# Patient Record
Sex: MALE | Race: WHITE | Employment: OTHER | URBAN - METROPOLITAN AREA
[De-identification: names, ages, dates, MRNs, and addresses within clinical notes are randomized per-mention and may not be internally consistent; named-entity substitution may affect disease eponyms.]

---

## 2018-03-29 ENCOUNTER — HOSPITAL ENCOUNTER (EMERGENCY)
Age: 63
Discharge: HOME | End: 2018-03-29
Attending: FAMILY MEDICINE

## 2018-03-29 VITALS
DIASTOLIC BLOOD PRESSURE: 82 MMHG | SYSTOLIC BLOOD PRESSURE: 135 MMHG | OXYGEN SATURATION: 96 % | HEART RATE: 88 BPM | BODY MASS INDEX: 29.47 KG/M2 | RESPIRATION RATE: 18 BRPM | WEIGHT: 237 LBS | TEMPERATURE: 97.6 F | HEIGHT: 75 IN

## 2018-03-29 DIAGNOSIS — E11.9 NEW ONSET TYPE 2 DIABETES MELLITUS (HCC): Primary | ICD-10-CM

## 2018-03-29 LAB
ALBUMIN SERPL-MCNC: 4.2 G/DL (ref 3.5–5.2)
ALP BLD-CCNC: 83 U/L (ref 40–129)
ALT SERPL-CCNC: 30 U/L (ref 0–40)
ANION GAP SERPL CALCULATED.3IONS-SCNC: 17 MMOL/L (ref 7–16)
AST SERPL-CCNC: 19 U/L (ref 0–39)
BACTERIA: ABNORMAL /HPF
BASOPHILS ABSOLUTE: 0.03 E9/L (ref 0–0.2)
BASOPHILS RELATIVE PERCENT: 0.3 % (ref 0–2)
BETA-HYDROXYBUTYRATE: 1.81 MMOL/L (ref 0.02–0.27)
BILIRUB SERPL-MCNC: 0.7 MG/DL (ref 0–1.2)
BILIRUBIN URINE: ABNORMAL
BLOOD, URINE: ABNORMAL
BUN BLDV-MCNC: 15 MG/DL (ref 8–23)
CALCIUM SERPL-MCNC: 10 MG/DL (ref 8.6–10.2)
CHLORIDE BLD-SCNC: 93 MMOL/L (ref 98–107)
CLARITY: ABNORMAL
CO2: 24 MMOL/L (ref 22–29)
COLOR: ABNORMAL
CREAT SERPL-MCNC: 1 MG/DL (ref 0.7–1.2)
EOSINOPHILS ABSOLUTE: 0.11 E9/L (ref 0.05–0.5)
EOSINOPHILS RELATIVE PERCENT: 1.1 % (ref 0–6)
EPITHELIAL CELLS, UA: ABNORMAL /HPF
GFR AFRICAN AMERICAN: >60
GFR NON-AFRICAN AMERICAN: >60 ML/MIN/1.73
GLUCOSE BLD-MCNC: 319 MG/DL (ref 74–109)
GLUCOSE URINE: >=1000 MG/DL
HCT VFR BLD CALC: 47.3 % (ref 37–54)
HEMOGLOBIN: 16.4 G/DL (ref 12.5–16.5)
IMMATURE GRANULOCYTES #: 0.05 E9/L
IMMATURE GRANULOCYTES %: 0.5 % (ref 0–5)
KETONES, URINE: 40 MG/DL
LEUKOCYTE ESTERASE, URINE: NEGATIVE
LYMPHOCYTES ABSOLUTE: 2.44 E9/L (ref 1.5–4)
LYMPHOCYTES RELATIVE PERCENT: 24.4 % (ref 20–42)
MCH RBC QN AUTO: 30.3 PG (ref 26–35)
MCHC RBC AUTO-ENTMCNC: 34.7 % (ref 32–34.5)
MCV RBC AUTO: 87.3 FL (ref 80–99.9)
METER GLUCOSE: 248 MG/DL (ref 70–110)
METER GLUCOSE: 341 MG/DL (ref 70–110)
MONOCYTES ABSOLUTE: 0.73 E9/L (ref 0.1–0.95)
MONOCYTES RELATIVE PERCENT: 7.3 % (ref 2–12)
NEUTROPHILS ABSOLUTE: 6.64 E9/L (ref 1.8–7.3)
NEUTROPHILS RELATIVE PERCENT: 66.4 % (ref 43–80)
NITRITE, URINE: NEGATIVE
PDW BLD-RTO: 13.1 FL (ref 11.5–15)
PH UA: 5 (ref 5–9)
PH VENOUS: 7.38 (ref 7.3–7.42)
PLATELET # BLD: 138 E9/L (ref 130–450)
PMV BLD AUTO: 12.4 FL (ref 7–12)
POTASSIUM SERPL-SCNC: 4.5 MMOL/L (ref 3.5–5)
PROTEIN UA: NEGATIVE MG/DL
RBC # BLD: 5.42 E12/L (ref 3.8–5.8)
RBC UA: ABNORMAL /HPF (ref 0–2)
SODIUM BLD-SCNC: 134 MMOL/L (ref 132–146)
SPECIFIC GRAVITY UA: >=1.03 (ref 1–1.03)
TOTAL PROTEIN: 7.3 G/DL (ref 6.4–8.3)
UROBILINOGEN, URINE: 0.2 E.U./DL
WBC # BLD: 10 E9/L (ref 4.5–11.5)
WBC UA: ABNORMAL /HPF (ref 0–5)

## 2018-03-29 PROCEDURE — 99284 EMERGENCY DEPT VISIT MOD MDM: CPT

## 2018-03-29 PROCEDURE — 36415 COLL VENOUS BLD VENIPUNCTURE: CPT

## 2018-03-29 PROCEDURE — 81001 URINALYSIS AUTO W/SCOPE: CPT

## 2018-03-29 PROCEDURE — 82962 GLUCOSE BLOOD TEST: CPT

## 2018-03-29 PROCEDURE — 83036 HEMOGLOBIN GLYCOSYLATED A1C: CPT

## 2018-03-29 PROCEDURE — 80053 COMPREHEN METABOLIC PANEL: CPT

## 2018-03-29 PROCEDURE — 82800 BLOOD PH: CPT

## 2018-03-29 PROCEDURE — 2580000003 HC RX 258: Performed by: FAMILY MEDICINE

## 2018-03-29 PROCEDURE — 82010 KETONE BODYS QUAN: CPT

## 2018-03-29 PROCEDURE — 85025 COMPLETE CBC W/AUTO DIFF WBC: CPT

## 2018-03-29 RX ORDER — 0.9 % SODIUM CHLORIDE 0.9 %
1000 INTRAVENOUS SOLUTION INTRAVENOUS ONCE
Status: COMPLETED | OUTPATIENT
Start: 2018-03-29 | End: 2018-03-29

## 2018-03-29 RX ADMIN — SODIUM CHLORIDE 1000 ML: 9 INJECTION, SOLUTION INTRAVENOUS at 18:09

## 2018-03-29 RX ADMIN — SODIUM CHLORIDE 1000 ML: 9 INJECTION, SOLUTION INTRAVENOUS at 17:02

## 2018-03-30 LAB — HBA1C MFR BLD: 10.2 % (ref 4.8–5.9)

## 2018-09-20 ENCOUNTER — APPOINTMENT (OUTPATIENT)
Dept: CT IMAGING | Age: 63
End: 2018-09-20
Payer: MEDICARE

## 2018-09-20 ENCOUNTER — HOSPITAL ENCOUNTER (EMERGENCY)
Age: 63
Discharge: HOME | End: 2018-09-20
Payer: MEDICARE

## 2018-09-20 VITALS
BODY MASS INDEX: 29.84 KG/M2 | TEMPERATURE: 98 F | WEIGHT: 240 LBS | HEIGHT: 75 IN | RESPIRATION RATE: 18 BRPM | OXYGEN SATURATION: 93 % | DIASTOLIC BLOOD PRESSURE: 68 MMHG | SYSTOLIC BLOOD PRESSURE: 130 MMHG | HEART RATE: 80 BPM

## 2018-09-20 DIAGNOSIS — S30.1XXA CONTUSION OF FLANK AND BACK, INITIAL ENCOUNTER: Primary | ICD-10-CM

## 2018-09-20 DIAGNOSIS — S32.009A: ICD-10-CM

## 2018-09-20 LAB
ANION GAP SERPL CALCULATED.3IONS-SCNC: 14 MMOL/L (ref 7–16)
BACTERIA: NORMAL /HPF
BASOPHILS ABSOLUTE: 0.03 E9/L (ref 0–0.2)
BASOPHILS RELATIVE PERCENT: 0.3 % (ref 0–2)
BILIRUBIN URINE: NEGATIVE
BLOOD, URINE: NEGATIVE
BUN BLDV-MCNC: 19 MG/DL (ref 8–23)
CALCIUM SERPL-MCNC: 9.7 MG/DL (ref 8.6–10.2)
CHLORIDE BLD-SCNC: 98 MMOL/L (ref 98–107)
CLARITY: CLEAR
CO2: 25 MMOL/L (ref 22–29)
COLOR: ABNORMAL
CREAT SERPL-MCNC: 1 MG/DL (ref 0.7–1.2)
EOSINOPHILS ABSOLUTE: 0.05 E9/L (ref 0.05–0.5)
EOSINOPHILS RELATIVE PERCENT: 0.4 % (ref 0–6)
EPITHELIAL CELLS, UA: NORMAL /HPF
GFR AFRICAN AMERICAN: >60
GFR NON-AFRICAN AMERICAN: >60 ML/MIN/1.73
GLUCOSE BLD-MCNC: 227 MG/DL (ref 74–109)
GLUCOSE URINE: >=1000 MG/DL
HCT VFR BLD CALC: 50.5 % (ref 37–54)
HEMOGLOBIN: 16.5 G/DL (ref 12.5–16.5)
IMMATURE GRANULOCYTES #: 0.08 E9/L
IMMATURE GRANULOCYTES %: 0.7 % (ref 0–5)
KETONES, URINE: NEGATIVE MG/DL
LEUKOCYTE ESTERASE, URINE: NEGATIVE
LYMPHOCYTES ABSOLUTE: 1.58 E9/L (ref 1.5–4)
LYMPHOCYTES RELATIVE PERCENT: 14 % (ref 20–42)
MCH RBC QN AUTO: 29.3 PG (ref 26–35)
MCHC RBC AUTO-ENTMCNC: 32.7 % (ref 32–34.5)
MCV RBC AUTO: 89.5 FL (ref 80–99.9)
MONOCYTES ABSOLUTE: 0.63 E9/L (ref 0.1–0.95)
MONOCYTES RELATIVE PERCENT: 5.6 % (ref 2–12)
NEUTROPHILS ABSOLUTE: 8.9 E9/L (ref 1.8–7.3)
NEUTROPHILS RELATIVE PERCENT: 79 % (ref 43–80)
NITRITE, URINE: NEGATIVE
PDW BLD-RTO: 14.2 FL (ref 11.5–15)
PH UA: 5.5 (ref 5–9)
PLATELET # BLD: 156 E9/L (ref 130–450)
PMV BLD AUTO: 11.4 FL (ref 7–12)
POTASSIUM SERPL-SCNC: 5 MMOL/L (ref 3.5–5)
PROTEIN UA: ABNORMAL MG/DL
RBC # BLD: 5.64 E12/L (ref 3.8–5.8)
RBC UA: NORMAL /HPF (ref 0–2)
SODIUM BLD-SCNC: 137 MMOL/L (ref 132–146)
SPECIFIC GRAVITY UA: >=1.03 (ref 1–1.03)
UROBILINOGEN, URINE: 0.2 E.U./DL
WBC # BLD: 11.3 E9/L (ref 4.5–11.5)
WBC UA: NORMAL /HPF (ref 0–5)

## 2018-09-20 PROCEDURE — 85025 COMPLETE CBC W/AUTO DIFF WBC: CPT

## 2018-09-20 PROCEDURE — 80048 BASIC METABOLIC PNL TOTAL CA: CPT

## 2018-09-20 PROCEDURE — 74177 CT ABD & PELVIS W/CONTRAST: CPT

## 2018-09-20 PROCEDURE — 6370000000 HC RX 637 (ALT 250 FOR IP): Performed by: NURSE PRACTITIONER

## 2018-09-20 PROCEDURE — 99284 EMERGENCY DEPT VISIT MOD MDM: CPT

## 2018-09-20 PROCEDURE — 36415 COLL VENOUS BLD VENIPUNCTURE: CPT

## 2018-09-20 PROCEDURE — 81001 URINALYSIS AUTO W/SCOPE: CPT

## 2018-09-20 PROCEDURE — 6360000004 HC RX CONTRAST MEDICATION: Performed by: RADIOLOGY

## 2018-09-20 RX ORDER — HYDROCODONE BITARTRATE AND ACETAMINOPHEN 5; 325 MG/1; MG/1
1 TABLET ORAL ONCE
Status: COMPLETED | OUTPATIENT
Start: 2018-09-20 | End: 2018-09-20

## 2018-09-20 RX ORDER — HYDROCODONE BITARTRATE AND ACETAMINOPHEN 5; 325 MG/1; MG/1
1 TABLET ORAL EVERY 6 HOURS PRN
Qty: 8 TABLET | Refills: 0 | Status: SHIPPED | OUTPATIENT
Start: 2018-09-20 | End: 2018-09-22

## 2018-09-20 RX ADMIN — IOPAMIDOL 100 ML: 755 INJECTION, SOLUTION INTRAVENOUS at 14:29

## 2018-09-20 RX ADMIN — HYDROCODONE BITARTRATE AND ACETAMINOPHEN 1 TABLET: 5; 325 TABLET ORAL at 13:04

## 2018-09-20 ASSESSMENT — PAIN DESCRIPTION - ORIENTATION
ORIENTATION: RIGHT
ORIENTATION: RIGHT
ORIENTATION: RIGHT;LEFT

## 2018-09-20 ASSESSMENT — PAIN DESCRIPTION - LOCATION
LOCATION: BACK

## 2018-09-20 ASSESSMENT — PAIN SCALES - GENERAL
PAINLEVEL_OUTOF10: 10
PAINLEVEL_OUTOF10: 5
PAINLEVEL_OUTOF10: 6
PAINLEVEL_OUTOF10: 10
PAINLEVEL_OUTOF10: 6

## 2018-09-20 ASSESSMENT — PAIN DESCRIPTION - DESCRIPTORS
DESCRIPTORS: CONSTANT;SHARP
DESCRIPTORS: ACHING;CONSTANT
DESCRIPTORS: CONSTANT;SHARP

## 2018-09-20 ASSESSMENT — PAIN DESCRIPTION - FREQUENCY: FREQUENCY: CONTINUOUS

## 2018-09-20 ASSESSMENT — PAIN DESCRIPTION - PAIN TYPE
TYPE: ACUTE PAIN

## 2018-09-20 NOTE — ED NOTES
Home going instructions with verbalized understanding , scripts given x 111 [facility], RN  09/20/18 6523

## 2018-09-20 NOTE — ED NOTES
Patient states he was stepping down from truck , when his foot slipped and he fell landing on right lower back area , patient denies hitting head, c/o right lower back pain worse with any movement pain is constant . Patient has 6 inch discolored area across right lower back, not open .  Moving all extremities without difficulty     Bonnie Cali RN  09/20/18 4766

## 2018-09-21 ENCOUNTER — TELEPHONE (OUTPATIENT)
Dept: ADMINISTRATIVE | Age: 63
End: 2018-09-21

## 2018-09-22 NOTE — ED PROVIDER NOTES
Lucía Dewey CNP was seeing this patient and CT had not results before her shift ended. Upon results, I went in and evaluated the patient and he noted no numbness, tingling, weakness, bowel or bladder incontinence. He had tenderness localized over the R lower lumbar region. I discussed the spine fracture with him and his son as well as the normal kidney function, discussed limiting torso movement and to follow up with PCP and ortho specialist. They voiced understanding. Rx monitoring system was reviewed and short duration of norco Rx'd.      Noni Rizvi PA-C  09/20/18 8767
Glucose 227 (H) 74 - 109 mg/dL    BUN 19 8 - 23 mg/dL    CREATININE 1.0 0.7 - 1.2 mg/dL    GFR Non-African American >60 >=60 mL/min/1.73    GFR African American >60     Calcium 9.7 8.6 - 10.2 mg/dL   CBC Auto Differential   Result Value Ref Range    WBC 11.3 4.5 - 11.5 E9/L    RBC 5.64 3.80 - 5.80 E12/L    Hemoglobin 16.5 12.5 - 16.5 g/dL    Hematocrit 50.5 37.0 - 54.0 %    MCV 89.5 80.0 - 99.9 fL    MCH 29.3 26.0 - 35.0 pg    MCHC 32.7 32.0 - 34.5 %    RDW 14.2 11.5 - 15.0 fL    Platelets 651 638 - 544 E9/L    MPV 11.4 7.0 - 12.0 fL    Neutrophils % 79.0 43.0 - 80.0 %    Immature Granulocytes % 0.7 0.0 - 5.0 %    Lymphocytes % 14.0 (L) 20.0 - 42.0 %    Monocytes % 5.6 2.0 - 12.0 %    Eosinophils % 0.4 0.0 - 6.0 %    Basophils % 0.3 0.0 - 2.0 %    Neutrophils # 8.90 (H) 1.80 - 7.30 E9/L    Immature Granulocytes # 0.08 E9/L    Lymphocytes # 1.58 1.50 - 4.00 E9/L    Monocytes # 0.63 0.10 - 0.95 E9/L    Eosinophils # 0.05 0.05 - 0.50 E9/L    Basophils # 0.03 0.00 - 0.20 E9/L   Microscopic Urinalysis   Result Value Ref Range    WBC, UA NONE 0 - 5 /HPF    RBC, UA 0-1 0 - 2 /HPF    Epi Cells RARE /HPF    Bacteria, UA NONE /HPF       Imaging: All Radiology results interpreted by Radiologist unless otherwise noted. CT ABDOMEN PELVIS W IV CONTRAST Additional Contrast? None   Final Result      1. Nondisplaced fractures of the right transverse processes of L1 and   L2.   2. Punctate cholelithiasis. 3. Hepatic steatosis. ALERT:  THIS IS AN ABNORMAL REPORT                       ED Course / Medical Decision Making     Medications   HYDROcodone-acetaminophen (NORCO) 5-325 MG per tablet 1 tablet (1 tablet Oral Given 9/20/18 1304)   iopamidol (ISOVUE-370) 76 % injection 100 mL (100 mLs Intravenous Given 9/20/18 1429)     ED Course as of Sep 22 1730   Thu Sep 20, 2018   1445 Awake and alert. Symptoms and exam improved.  Discussed available results and that he will likely be discharged with a musculoskeletal injury and should

## 2020-08-31 ENCOUNTER — HOSPITAL ENCOUNTER (INPATIENT)
Age: 65
LOS: 4 days | Discharge: NURSING, REHAB OR LONG-TERM CARE | DRG: 065 | End: 2020-09-04
Attending: EMERGENCY MEDICINE | Admitting: INTERNAL MEDICINE
Payer: MEDICARE

## 2020-08-31 ENCOUNTER — APPOINTMENT (OUTPATIENT)
Dept: CT IMAGING | Age: 65
DRG: 065 | End: 2020-08-31
Payer: MEDICARE

## 2020-08-31 ENCOUNTER — APPOINTMENT (OUTPATIENT)
Dept: GENERAL RADIOLOGY | Age: 65
DRG: 065 | End: 2020-08-31
Payer: MEDICARE

## 2020-08-31 ENCOUNTER — APPOINTMENT (OUTPATIENT)
Dept: MRI IMAGING | Age: 65
DRG: 065 | End: 2020-08-31
Payer: MEDICARE

## 2020-08-31 PROBLEM — I63.9 ACUTE CEREBROVASCULAR ACCIDENT (CVA) (HCC): Status: ACTIVE | Noted: 2020-08-31

## 2020-08-31 PROBLEM — J44.9 COPD (CHRONIC OBSTRUCTIVE PULMONARY DISEASE) (HCC): Status: ACTIVE | Noted: 2020-08-31

## 2020-08-31 PROBLEM — E11.65 UNCONTROLLED TYPE 2 DIABETES MELLITUS WITH HYPERGLYCEMIA (HCC): Status: ACTIVE | Noted: 2020-08-31

## 2020-08-31 PROBLEM — I63.9 ACUTE CVA (CEREBROVASCULAR ACCIDENT) (HCC): Status: ACTIVE | Noted: 2020-08-31

## 2020-08-31 PROBLEM — F17.200 TOBACCO DEPENDENCE: Status: ACTIVE | Noted: 2020-08-31

## 2020-08-31 PROBLEM — R47.1 DYSARTHRIA: Status: ACTIVE | Noted: 2020-08-31

## 2020-08-31 PROBLEM — I63.512 ACUTE ISCHEMIC LEFT MCA STROKE (HCC): Status: ACTIVE | Noted: 2020-08-31

## 2020-08-31 PROBLEM — I66.02 MIDDLE CEREBRAL ARTERY STENOSIS, LEFT: Status: ACTIVE | Noted: 2020-08-31

## 2020-08-31 LAB
ACETAMINOPHEN LEVEL: <5 MCG/ML (ref 10–30)
ALBUMIN SERPL-MCNC: 4.1 G/DL (ref 3.5–5.2)
ALP BLD-CCNC: 72 U/L (ref 40–129)
ALT SERPL-CCNC: 11 U/L (ref 0–40)
ANION GAP SERPL CALCULATED.3IONS-SCNC: 11 MMOL/L (ref 7–16)
APTT: 35.3 SEC (ref 24.5–35.1)
AST SERPL-CCNC: 10 U/L (ref 0–39)
BASOPHILS ABSOLUTE: 0.02 E9/L (ref 0–0.2)
BASOPHILS RELATIVE PERCENT: 0.2 % (ref 0–2)
BILIRUB SERPL-MCNC: 0.7 MG/DL (ref 0–1.2)
BUN BLDV-MCNC: 17 MG/DL (ref 8–23)
CALCIUM SERPL-MCNC: 9.5 MG/DL (ref 8.6–10.2)
CHLORIDE BLD-SCNC: 102 MMOL/L (ref 98–107)
CO2: 26 MMOL/L (ref 22–29)
CREAT SERPL-MCNC: 0.9 MG/DL (ref 0.7–1.2)
EKG ATRIAL RATE: 84 BPM
EKG P AXIS: 83 DEGREES
EKG P-R INTERVAL: 140 MS
EKG Q-T INTERVAL: 366 MS
EKG QRS DURATION: 82 MS
EKG QTC CALCULATION (BAZETT): 432 MS
EKG R AXIS: 70 DEGREES
EKG T AXIS: 49 DEGREES
EKG VENTRICULAR RATE: 84 BPM
EOSINOPHILS ABSOLUTE: 0.05 E9/L (ref 0.05–0.5)
EOSINOPHILS RELATIVE PERCENT: 0.5 % (ref 0–6)
ETHANOL: <10 MG/DL (ref 0–0.08)
GFR AFRICAN AMERICAN: >60
GFR NON-AFRICAN AMERICAN: >60 ML/MIN/1.73
GLUCOSE BLD-MCNC: 201 MG/DL (ref 74–99)
HCT VFR BLD CALC: 49.5 % (ref 37–54)
HEMOGLOBIN: 16.8 G/DL (ref 12.5–16.5)
IMMATURE GRANULOCYTES #: 0.04 E9/L
IMMATURE GRANULOCYTES %: 0.4 % (ref 0–5)
INR BLD: 1
LACTIC ACID: 1.4 MMOL/L (ref 0.5–2.2)
LYMPHOCYTES ABSOLUTE: 1.73 E9/L (ref 1.5–4)
LYMPHOCYTES RELATIVE PERCENT: 16.7 % (ref 20–42)
MCH RBC QN AUTO: 30.4 PG (ref 26–35)
MCHC RBC AUTO-ENTMCNC: 33.9 % (ref 32–34.5)
MCV RBC AUTO: 89.5 FL (ref 80–99.9)
METER GLUCOSE: 146 MG/DL (ref 74–99)
METER GLUCOSE: 170 MG/DL (ref 74–99)
MONOCYTES ABSOLUTE: 0.65 E9/L (ref 0.1–0.95)
MONOCYTES RELATIVE PERCENT: 6.3 % (ref 2–12)
NEUTROPHILS ABSOLUTE: 7.89 E9/L (ref 1.8–7.3)
NEUTROPHILS RELATIVE PERCENT: 75.9 % (ref 43–80)
PDW BLD-RTO: 14.1 FL (ref 11.5–15)
PLATELET # BLD: 144 E9/L (ref 130–450)
PMV BLD AUTO: 10.8 FL (ref 7–12)
POTASSIUM SERPL-SCNC: 4.2 MMOL/L (ref 3.5–5)
PROTHROMBIN TIME: 10.7 SEC (ref 9.3–12.4)
RBC # BLD: 5.53 E12/L (ref 3.8–5.8)
SALICYLATE, SERUM: <0.3 MG/DL (ref 0–30)
SODIUM BLD-SCNC: 139 MMOL/L (ref 132–146)
TOTAL CK: 132 U/L (ref 20–200)
TOTAL PROTEIN: 6.9 G/DL (ref 6.4–8.3)
TRICYCLIC ANTIDEPRESSANTS SCREEN SERUM: NEGATIVE NG/ML
TROPONIN: <0.01 NG/ML (ref 0–0.03)
WBC # BLD: 10.4 E9/L (ref 4.5–11.5)

## 2020-08-31 PROCEDURE — 0042T CT BRAIN PERFUSION: CPT

## 2020-08-31 PROCEDURE — 99448 NTRPROF PH1/NTRNET/EHR 21-30: CPT | Performed by: PSYCHIATRY & NEUROLOGY

## 2020-08-31 PROCEDURE — 82962 GLUCOSE BLOOD TEST: CPT

## 2020-08-31 PROCEDURE — 6360000004 HC RX CONTRAST MEDICATION: Performed by: RADIOLOGY

## 2020-08-31 PROCEDURE — 70498 CT ANGIOGRAPHY NECK: CPT

## 2020-08-31 PROCEDURE — 6370000000 HC RX 637 (ALT 250 FOR IP): Performed by: INTERNAL MEDICINE

## 2020-08-31 PROCEDURE — 99285 EMERGENCY DEPT VISIT HI MDM: CPT

## 2020-08-31 PROCEDURE — 93005 ELECTROCARDIOGRAM TRACING: CPT | Performed by: EMERGENCY MEDICINE

## 2020-08-31 PROCEDURE — 70551 MRI BRAIN STEM W/O DYE: CPT

## 2020-08-31 PROCEDURE — 70450 CT HEAD/BRAIN W/O DYE: CPT

## 2020-08-31 PROCEDURE — 99284 EMERGENCY DEPT VISIT MOD MDM: CPT

## 2020-08-31 PROCEDURE — 6370000000 HC RX 637 (ALT 250 FOR IP): Performed by: EMERGENCY MEDICINE

## 2020-08-31 PROCEDURE — 2060000000 HC ICU INTERMEDIATE R&B

## 2020-08-31 PROCEDURE — 71045 X-RAY EXAM CHEST 1 VIEW: CPT

## 2020-08-31 PROCEDURE — 93010 ELECTROCARDIOGRAM REPORT: CPT | Performed by: INTERNAL MEDICINE

## 2020-08-31 PROCEDURE — 70496 CT ANGIOGRAPHY HEAD: CPT

## 2020-08-31 RX ORDER — DEXTROSE MONOHYDRATE 25 G/50ML
12.5 INJECTION, SOLUTION INTRAVENOUS PRN
Status: DISCONTINUED | OUTPATIENT
Start: 2020-08-31 | End: 2020-09-04 | Stop reason: HOSPADM

## 2020-08-31 RX ORDER — ONDANSETRON 2 MG/ML
4 INJECTION INTRAMUSCULAR; INTRAVENOUS EVERY 6 HOURS PRN
Status: DISCONTINUED | OUTPATIENT
Start: 2020-08-31 | End: 2020-09-04 | Stop reason: HOSPADM

## 2020-08-31 RX ORDER — ALBUTEROL SULFATE 2.5 MG/3ML
2.5 SOLUTION RESPIRATORY (INHALATION) 4 TIMES DAILY
Status: DISCONTINUED | OUTPATIENT
Start: 2020-08-31 | End: 2020-08-31

## 2020-08-31 RX ORDER — PANTOPRAZOLE SODIUM 40 MG/1
40 TABLET, DELAYED RELEASE ORAL
Status: DISCONTINUED | OUTPATIENT
Start: 2020-09-01 | End: 2020-09-04 | Stop reason: HOSPADM

## 2020-08-31 RX ORDER — ATORVASTATIN CALCIUM 40 MG/1
40 TABLET, FILM COATED ORAL NIGHTLY
Status: DISCONTINUED | OUTPATIENT
Start: 2020-08-31 | End: 2020-09-04 | Stop reason: HOSPADM

## 2020-08-31 RX ORDER — ATORVASTATIN CALCIUM 80 MG/1
80 TABLET, FILM COATED ORAL NIGHTLY
Status: DISCONTINUED | OUTPATIENT
Start: 2020-08-31 | End: 2020-08-31

## 2020-08-31 RX ORDER — IPRATROPIUM BROMIDE AND ALBUTEROL SULFATE 2.5; .5 MG/3ML; MG/3ML
1 SOLUTION RESPIRATORY (INHALATION) EVERY 4 HOURS PRN
Status: DISCONTINUED | OUTPATIENT
Start: 2020-08-31 | End: 2020-09-04 | Stop reason: HOSPADM

## 2020-08-31 RX ORDER — DOCUSATE SODIUM 100 MG/1
100 CAPSULE, LIQUID FILLED ORAL NIGHTLY
Status: DISCONTINUED | OUTPATIENT
Start: 2020-08-31 | End: 2020-09-04 | Stop reason: HOSPADM

## 2020-08-31 RX ORDER — CLOPIDOGREL BISULFATE 75 MG/1
300 TABLET ORAL ONCE
Status: COMPLETED | OUTPATIENT
Start: 2020-08-31 | End: 2020-08-31

## 2020-08-31 RX ORDER — ASPIRIN 81 MG/1
324 TABLET, CHEWABLE ORAL ONCE
Status: COMPLETED | OUTPATIENT
Start: 2020-08-31 | End: 2020-08-31

## 2020-08-31 RX ORDER — SODIUM CHLORIDE 0.9 % (FLUSH) 0.9 %
10 SYRINGE (ML) INJECTION
Status: ACTIVE | OUTPATIENT
Start: 2020-08-31 | End: 2020-08-31

## 2020-08-31 RX ORDER — DEXTROSE MONOHYDRATE 50 MG/ML
100 INJECTION, SOLUTION INTRAVENOUS PRN
Status: DISCONTINUED | OUTPATIENT
Start: 2020-08-31 | End: 2020-09-04 | Stop reason: HOSPADM

## 2020-08-31 RX ORDER — NICOTINE POLACRILEX 4 MG
15 LOZENGE BUCCAL PRN
Status: DISCONTINUED | OUTPATIENT
Start: 2020-08-31 | End: 2020-09-04 | Stop reason: HOSPADM

## 2020-08-31 RX ORDER — ASPIRIN 81 MG/1
81 TABLET ORAL DAILY
Status: DISCONTINUED | OUTPATIENT
Start: 2020-08-31 | End: 2020-09-04 | Stop reason: HOSPADM

## 2020-08-31 RX ORDER — ACETAMINOPHEN 325 MG/1
650 TABLET ORAL EVERY 4 HOURS PRN
Status: DISCONTINUED | OUTPATIENT
Start: 2020-08-31 | End: 2020-09-04 | Stop reason: HOSPADM

## 2020-08-31 RX ADMIN — ASPIRIN 324 MG: 81 TABLET, CHEWABLE ORAL at 14:12

## 2020-08-31 RX ADMIN — CLOPIDOGREL 300 MG: 75 TABLET, FILM COATED ORAL at 14:12

## 2020-08-31 RX ADMIN — ATORVASTATIN CALCIUM 40 MG: 40 TABLET, FILM COATED ORAL at 21:37

## 2020-08-31 RX ADMIN — ASPIRIN 81 MG: 81 TABLET, COATED ORAL at 21:37

## 2020-08-31 RX ADMIN — DOCUSATE SODIUM 100 MG: 100 CAPSULE, LIQUID FILLED ORAL at 21:37

## 2020-08-31 RX ADMIN — IOPAMIDOL 100 ML: 755 INJECTION, SOLUTION INTRAVENOUS at 14:59

## 2020-08-31 ASSESSMENT — PAIN SCALES - GENERAL
PAINLEVEL_OUTOF10: 0
PAINLEVEL_OUTOF10: 0

## 2020-08-31 NOTE — ED PROVIDER NOTES
Department of Emergency Medicine   ED  Provider Note  Admit Date/RoomTime: 8/31/2020 12:59 PM  ED Room: 19/19 8/31/20  2:42 PM EDT    Stroke Alert called: Peter Gonzalez Alert     HISTORY OF PRESENT ILLNESS:  (Nurses Notes Reviewed)    Chief Complaint:   Cerebrovascular Accident (found by son this morning, last known well 2 days ago)      Source of history provided by:  patient, EMS personnel and ED provider @ [facility]. History/Exam Limitations: due to condition. David Hay is a 77 y.o. old male presenting to the emergency department by Mobile ICU transfer, with unknown onset of expressive aphasia, which began unknown time, last seen well at 5 PM last night. Last known well time: 5pm yesterday. The episode occurred at home. Since recognized the symptoms have been persistent. He has no neurologic history. He has stroke risk factors of: none. There has been no history of recent trauma. Patient presents as a transfer from [address] [place] [address]. He was seen at their facility after last known well at 5 PM.  He was found to have expressive aphasia as well as facial droop. He was not found to be a TPA candidate, tele-stroke was consulted there. He was transferred here for possible eligibility of interventional.  Did receive aspirin and Plavix prehospital.    Code Status on file: No Order. NIH Stroke Scale at time of initial evaluation:   Last known well time: 5pm yesterday   NIH Stroke Scale at time of initial evaluation: 1440  1A: Level of Consciousness 0 - alert; keenly responsive   1B: Ask Month and Age 2 - answers neither question correctly   1C:  Tell Patient To Open and Close Eyes, then Hand  Squeeze 1   2: Test Horizontal Extraocular Movements 0 - normal   3: Test Visual Fields 0 - no visual loss   4: Test Facial Palsy 2   5A: Test Left Arm Motor Drift 0 - no drift, limb holds 90 (or 45) degrees for full 10 seconds   5B: Test Right Arm Motor Drift 0 - no drift, limb holds 90 (or 45) degrees for full 10 seconds   6A: Test Left Leg Motor Drift 0 - no drift; leg holds 30 degree position for full 5 seconds   6B: Test Right Leg Motor Drift 0 - no drift; leg holds 30 degree position for full 5 seconds   7: Test Limb Ataxia   (FNF/Heel-Shin) 0 - absent   8: Test Sensation 0 - normal; no sensory loss   9: Test Language/Aphasia 2 - severe aphasia; all communication is through fragmentary expression; great need for inference, questioning, and guessing by the listener. Range of information that can be exchanged is limited; listener carries burden of communication. Examiner cannot identify materials provided from patient response. 10: Test Dysarthria 1 - mild to moderate, patient slurs at least some words and at worst, can be understood with some difficulty   11: Test Extinction/Inattention 0 - no abnormality   Total NIH Stroke Score: 8     tPA Criteria*  Inclusion criteria:  - Ischemic stroke onset within 3 hours of drug administration  - Age 25 or older  - No hemorrhage or non-stroke cause of deficit on CT  - Measurable deficit on NIH Stroke Scale    Exclusion criteria: If the patient. ...  - has minor or improving symptoms  - had seizure at onset of stroke  - has had another stroke or serious head trauma within the last 3 months  - has had major surgery within the last 14 days  - has known history of intracranial hemorrhage  - has sustained systolic blood pressure >509 mmHg  - has sustained diastolic blood pressure >883 mmHg  - requires aggressive treatment is necessary to lower their blood pressure  - has symptoms suggestive of subarachnoid hemorrhage  - has had GI or urinary tract hemorrhage within the last 21 days  - has had an arterial puncture at a non-compressible site within the last 7 days  - received heparin within the last 48 hours and has an elevated PTT  - has a prothrombin time (PT) >15 seconds  - has a platelet count <419,869 uL  - serum blood glucose is <50 mg/dL or >400 mg/dL    Relative Contraindications:  - NIH Stroke score >22  - Patient's CT shows evidence of large MCA territory infarction (>1/3 the MCA territory)    *[facility] Policy Paper      Acute CVA Core Measures:     - t-PA Eligibility: IV t-PA was considered and not given due to violations in inclusion criteria including stroke onset was greater than 3 hours prior to presentation          Past Medical History:  has a past medical history of Diabetes mellitus ([facility] [address] Past Surgical History:  has a past surgical history that includes hernia repair and Tonsillectomy. Social History:  reports that he has been smoking cigarettes. He has been smoking about 1.00 pack per day. He has never used smokeless tobacco. He reports that he does not drink alcohol or use drugs. Prior Functional Status(Modified Oriska Scale):  0=No symptoms at all    Family History: family history is not on file. The patients home medications have been reviewed. Prior to Admission medications    Medication Sig Start Date End Date Taking? Authorizing Provider   albuterol sulfate HFA (PROVENTIL HFA) 108 (90 BASE) MCG/ACT inhaler Inhale 2 puffs into the lungs every 4 hours as needed for Wheezing 3/11/17 3/11/18  Romeo Yeh DO       Allergies: Patient has no known allergies. Review of Systems:   Pertinent positives and negatives are stated within HPI, all other systems reviewed and are negative.    ---------------------------------------------------PHYSICAL EXAM--------------------------------------    Constitutional/General: Alert and but unable to answer orientation questions  Head: Normocephalic and atraumatic  Eyes: PERRL, EOMI  Mouth: Oropharynx clear, handling secretions, no trismus. There is facial droop  Neck: Supple, full ROM, non tender to palpation in the midline, no stridor, no crepitus, no meningeal signs  Pulmonary: Lungs clear to auscultation bilaterally, . Not in respiratory distress  Cardiovascular:  Regular rate. Regular rhythm. . 2+ distal pulses  Chest: no chest wall tenderness  Abdomen: Soft. Non tender. Non distended. +BS. No rebound, guarding, or rigidity. No pulsatile masses appreciated. Musculoskeletal: Moves all extremities x 4. Warm and well perfused, no clubbing, cyanosis, or edema. Capillary refill <3 seconds  Skin: warm and dry. No rashes.    Neurologic: Please see night stroke scale, briefly there is left-sided facial droop, expressive aphasia with some dysarthria, NIH stroke scale of 8 at this time  Psych: calm Affect      -------------------------------------------------- RESULTS -------------------------------------------------  All laboratory and imaging studies have been reviewed by myself    LABS:  Results for orders placed or performed during the hospital encounter of 08/31/20   CBC auto differential   Result Value Ref Range    WBC 10.4 4.5 - 11.5 E9/L    RBC 5.53 3.80 - 5.80 E12/L    Hemoglobin 16.8 (H) 12.5 - 16.5 g/dL    Hematocrit 49.5 37.0 - 54.0 %    MCV 89.5 80.0 - 99.9 fL    MCH 30.4 26.0 - 35.0 pg    MCHC 33.9 32.0 - 34.5 %    RDW 14.1 11.5 - 15.0 fL    Platelets 189 796 - 458 E9/L    MPV 10.8 7.0 - 12.0 fL    Neutrophils % 75.9 43.0 - 80.0 %    Immature Granulocytes % 0.4 0.0 - 5.0 %    Lymphocytes % 16.7 (L) 20.0 - 42.0 %    Monocytes % 6.3 2.0 - 12.0 %    Eosinophils % 0.5 0.0 - 6.0 %    Basophils % 0.2 0.0 - 2.0 %    Neutrophils Absolute 7.89 (H) 1.80 - 7.30 E9/L    Immature Granulocytes # 0.04 E9/L    Lymphocytes Absolute 1.73 1.50 - 4.00 E9/L    Monocytes Absolute 0.65 0.10 - 0.95 E9/L    Eosinophils Absolute 0.05 0.05 - 0.50 E9/L    Basophils Absolute 0.02 0.00 - 0.20 E9/L   Comprehensive Metabolic Panel   Result Value Ref Range    Sodium 139 132 - 146 mmol/L    Potassium 4.2 3.5 - 5.0 mmol/L    Chloride 102 98 - 107 mmol/L    CO2 26 22 - 29 mmol/L    Anion Gap 11 7 - 16 mmol/L    Glucose 201 (H) 74 - 99 mg/dL    BUN 17 8 - 23 mg/dL    CREATININE 0.9 0.7 - 1.2 mg/dL    GFR Non- encounter and vital signs as below have been reviewed. /79   Pulse 78   Temp 97.5 °F (36.4 °C) (Temporal)   Resp 16   Wt 240 lb (108.9 kg)   SpO2 95%   BMI 30.00 kg/m²   Oxygen Saturation Interpretation: Normal    The patients available past medical records and past encounters were reviewed. ------------------------------ ED COURSE/MEDICAL DECISION MAKING----------------------  Medications   aspirin chewable tablet 324 mg (324 mg Oral Given 8/31/20 1412)   clopidogrel (PLAVIX) tablet 300 mg (300 mg Oral Given 8/31/20 1412)     Tele-neurology has already been consulted         Medical Decision Making:    Patient presents from [facility] after strokelike episode, last known well nearly 24 hours ago. He was accepted by medicine but sent to the ED for CTA CT brain perfusions. These were obtained, spoke with neuro intervention. He is not a candidate at this time. Spoke with family and updated them, patient will be admitted    Re-Evaluations:             Re-evaluation. Patients symptoms show no change    This patient's ED course included: a personal history and physicial examination, re-evaluation prior to disposition, IV medications, cardiac monitoring, continuous pulse oximetry and complex medical decision making and emergency management    This patient has remained hemodynamically stable during their ED course. Consultations: The case has been discussed with Dr. Connie Crawley, they patient is not a candidate for neuro intervention at this time. Dr. Radha Villanueva has previously been consulted and placed admitting orders             Counseling: The emergency provider has spoken with the patient and family member patient and son and discussed todays presentation, condition, results and treatment options, in addition to providing specific details for the plan of care and counseling regarding the diagnosis and prognosis.   Questions are answered at this time

## 2020-08-31 NOTE — VIRTUAL HEALTH
Consults  Patient Location:  [facility] Emergency Department    Provider Location ([facility]/State): [place], [place]    This virtual visit was conducted via interactive/real-time audio/video. [facility] AT [place] Stroke and Vascular Neurology Consult for  [facility] ED Stroke Alert through [address] @ 1:40pm  8/31/2020 1:52 PM  Pt Name: Veda Briones  MRN: [id number]  YOB: 1955  Date of evaluation: 8/31/2020  Primary Care Physician: Maxx Gonzalez MD  Reason for Evaluation: Stroke evaluation with Phone Consult, Discussion and Review of imaging    Veda Briones is a 77 y.o. male with DM, walked into ED with global aphasia since 5pm yesterday. Patient has no weakness. Stroke alert was called. CT head in the ED showed a left acute insular stroke. Allergies  has No Known Allergies. Medications  Prior to Admission medications    Medication Sig Start Date End Date Taking? Authorizing Provider   albuterol sulfate HFA (PROVENTIL HFA) 108 (90 BASE) MCG/ACT inhaler Inhale 2 puffs into the lungs every 4 hours as needed for Wheezing 3/11/17 3/11/18  Syed Palacios DO    Scheduled Meds:  Continuous Infusions:  PRN Meds:.  Past Medical History   has a past medical history of Diabetes mellitus ([facility] [address]   Social History  Social History     Socioeconomic History    Marital status:      Spouse name: Not on file    Number of children: Not on file    Years of education: Not on file    Highest education level: Not on file   Occupational History    Not on file   Social Needs    Financial resource strain: Not on file    Food insecurity     Worry: Not on file     Inability: Not on file   [employer] needs     Medical: Not on file     Non-medical: Not on file   Tobacco Use    Smoking status: Current Every Day Smoker     Packs/day: 1.00     Types: Cigarettes    Smokeless tobacco: Never Used   Substance and Sexual Activity    Alcohol use: No    Drug use: No    Sexual activity: Not on file   Lifestyle    Physical activity     Days per week: Not on file     Minutes per session: Not on file    Stress: Not on file   Relationships    Social connections     Talks on phone: Not on file     Gets together: Not on file     Attends Denominational service: Not on file     Active member of club or organization: Not on file     Attends meetings of clubs or organizations: Not on file     Relationship status: Not on file    Intimate partner violence     Fear of current or ex partner: Not on file     Emotionally abused: Not on file     Physically abused: Not on file     Forced sexual activity: Not on file   Other Topics Concern    Not on file   Social History Narrative    Not on file     Family History  History reviewed. No pertinent family history. OBJECTIVE  BP (!) 149/72   Pulse 82   Temp 99 °F (37.2 °C) (Temporal)   Resp 18   Wt 240 lb (108.9 kg)   SpO2 95%   BMI 30.00 kg/m²     NIH Stroke Scale  Interval: Baseline  Level of Consciousness (1a. ): Alert  LOC Questions (1b. ): Answers neither question correctly  LOC Commands (1c. ): Performs one task correctly  Best Gaze (2. ): Normal  Visual (3. ): No visual loss  Facial Palsy (4. ): (!) Partial paralysis  Motor Arm, Left (5a. ): No drift  Motor Arm, Right (5b. ): No drift  Motor Leg, Left (6a. ): No drift  Motor Leg, Right (6b. ): No drift  Limb Ataxia (7. ): Absent  Sensory (8. ): Normal  Best Language (9. ): Severe aphasia  Dysarthria (10. ): Normal  Extinction and Inattention (11): No abnormality  Total: 7  Pre-Morbid mRS: 0    Imaging:  Images were personally reviewed including:  CT brain without contrast: acute left insular stroke  CTA imaging: n/a    Assessment    77year old man with acute left insular stroke      Recommendations:  1. NIH 7  2. Recommend Inpatient Neurology Consult for further assessment and evaluation   3.  consider . [id number]  4. Not a tPA candidate due to out of window  5.  Not a thrombectomy candidate as his stroke is apparent on the CT head  6. Direct admit to [facility] for stroke workup and neurology consult  7. Load patient with aspirin 325mg x 1 dose, con't with 81mg daily lifelong  8. Load patient with plavix 300mg x 1 dose, con't with 75mg daily for 21 days  9. lipitor 80mg daily. 10. MRI brain w/o contreast, CTA head/neck when he is in [facility].             Discussed with ED Physician        This is a Phone Consult, I have not seen the patient face to face, there is no telemedicine service available at the consulting hospital time spend 21 mins    Carmen Valles MD   Stroke, Neurocritical Care And/or [address] [facility] Stroke [address]  Electronically signed 8/31/2020 at 1:52 PM

## 2020-08-31 NOTE — ED NOTES
Patient passed swallow screen. One chewable aspirin given to patient and instructed to chew. Patient not able to follow command and swallowed aspirin whole. Plavix and rest of aspirin held. Dr. Jorge Geronimo notified.       [facility] Valentino, RN  08/31/20 0771

## 2020-08-31 NOTE — CONSULTS
Neurointerventional Radiology    I was consulted by ED attending Dr. Martha Saldaña regarding endovascular intervention in this 69-year-old male who presents with aphasia. NIHSS 7. Onset was 1700 hrs on 8/30/2020, the day before presentation to the ED. Notes and imaging reviewed. CT shows evolving small infarct in left insula, frontal operculum, putamen, and external capsule. CTA shows severe atherosclerotic stenosis in the proximal cervical left ICA. No intracranial proximal MCA occlusion is identified. CTP shows delayed, decreased CBF and preserved CBV in left frontal and temporal lobes corresponding with the region of the ischemic insult on CT. The patient was loaded with clopidogrel 300 mg and aspirin 324 mg in ED at 1412 hrs. A/P:   Left MCA territory infarct. Not a candidate for intracranial endovascular intervention. Severe proximal left ICA stenosis (~75% by my estimate). Stat brain MRI ordered. I discussed the case with Dr. Martha Saldaña.      Electronically signed by Roxy Lopez MD on 8/31/2020 at 5:05 PM

## 2020-08-31 NOTE — ED NOTES
Patient given aspirin and plavix, per Dr. Diego orders.       [facility] Valentino, RN  08/31/20 3662

## 2020-08-31 NOTE — ED PROVIDER NOTES
HPI:  8/31/20, Time: 1:15 PM EDT      HPI per son, patient is unable to communicate, the only words he is able to verbalse are \"yes\" and \"ok\". Richard Lynn is a 77 y.o. male presenting to the ED for right facial droop, loss of speech and inability to follow commands. He was brought in by his son, who found him like this about an hour prior to arrival. So stated he last saw with his father 2 days ago, but a family member spoke with him on the phone around 8pm last night and he was normal. The patient apparently didn't show up for work this morning and there were messages on his phone looking for him. The complaint has been persistent, moderate in severity, and worsened by nothing. There is no report of drug or alcohol use. Patient takes no medications, and lives independently and works full-time. Patient is unable to participate in the HPI but son denies fever/chills, cough, congestion, chest pain, shortness of breath, edema, headache, visual disturbances, focal paresthesias, focal weakness, abdominal pain, nausea, vomiting, diarrhea, constipation, dysuria, hematuria, trauma, neck or back pain or other complaints. ROS:   Pertinent positives and negatives are stated within HPI, all other systems reviewed and are negative.      --------------------------------------------- PAST HISTORY ---------------------------------------------  Past Medical History:  has a past medical history of Diabetes mellitus ([facility] [address] Past Surgical History:  has a past surgical history that includes hernia repair and Tonsillectomy. Social History:  reports that he has been smoking cigarettes. He has been smoking about 1.00 pack per day. He has never used smokeless tobacco. He reports that he does not drink alcohol or use drugs. Family History: family history is not on file. The patients home medications have been reviewed.     Allergies: Patient has no known allergies. ---------------------------------------------------PHYSICAL EXAM--------------------------------------    Constitutional:  Well developed, well nourished, no acute distress, non-toxic appearance   Eyes:  PERRL, conjunctiva normal, EOMI  HENT:  Atraumatic, external ears normal, nose normal, oropharynx moist. Neck- normal range of motion, no nuchal rigidity   Respiratory:  No respiratory distress, normal breath sounds, no rales, no wheezing   Cardiovascular:  Normal rate, normal rhythm, no murmurs, no gallops, no rubs. Radial and DP pulses 2+ bilaterally. GI:  Soft, nondistended, normal bowel sounds, nontender, no rebound, no guarding   :  No costovertebral angle tenderness   Musculoskeletal:  No edema, no tenderness, no deformities. Back- no tenderness  Integument:  Well hydrated, no rash. Adequate perfusion. Lymphatic:  No cervical lymphadenopathy noted   Neurologic:  Alert & oriented x 3,  normal gait, no focal deficits noted. Aphasia (says only \"yes\" and \"ok\"). Follows intermittent commands. Mild right facial droop (spares forehead otherwise CN 2-12 WNL. Psychiatric:  Speech and behavior appropriate     .    -------------------------------------------------- RESULTS -------------------------------------------------  I have personally reviewed all laboratory and imaging results for this patient. Results are listed below.      LABS:  Results for orders placed or performed during the hospital encounter of 08/31/20   CBC auto differential   Result Value Ref Range    WBC 10.4 4.5 - 11.5 E9/L    RBC 5.53 3.80 - 5.80 E12/L    Hemoglobin 16.8 (H) 12.5 - 16.5 g/dL    Hematocrit 49.5 37.0 - 54.0 %    MCV 89.5 80.0 - 99.9 fL    MCH 30.4 26.0 - 35.0 pg    MCHC 33.9 32.0 - 34.5 %    RDW 14.1 11.5 - 15.0 fL    Platelets 920 108 - 152 E9/L    MPV 10.8 7.0 - 12.0 fL    Neutrophils % 75.9 43.0 - 80.0 %    Immature Granulocytes % 0.4 0.0 - 5.0 %    Lymphocytes % 16.7 (L) 20.0 - 42.0 %    Monocytes % 6.3 2.0 - 12.0 %    Eosinophils % 0.5 0.0 - 6.0 %    Basophils % 0.2 0.0 - 2.0 %    Neutrophils Absolute 7.89 (H) 1.80 - 7.30 E9/L    Immature Granulocytes # 0.04 E9/L    Lymphocytes Absolute 1.73 1.50 - 4.00 E9/L    Monocytes Absolute 0.65 0.10 - 0.95 E9/L    Eosinophils Absolute 0.05 0.05 - 0.50 E9/L    Basophils Absolute 0.02 0.00 - 0.20 E9/L   Comprehensive Metabolic Panel   Result Value Ref Range    Sodium 139 132 - 146 mmol/L    Potassium 4.2 3.5 - 5.0 mmol/L    Chloride 102 98 - 107 mmol/L    CO2 26 22 - 29 mmol/L    Anion Gap 11 7 - 16 mmol/L    Glucose 201 (H) 74 - 99 mg/dL    BUN 17 8 - 23 mg/dL    CREATININE 0.9 0.7 - 1.2 mg/dL    GFR Non-African American >60 >=60 mL/min/1.73    GFR African American >60     Calcium 9.5 8.6 - 10.2 mg/dL    Total Protein 6.9 6.4 - 8.3 g/dL    Alb 4.1 3.5 - 5.2 g/dL    Total Bilirubin 0.7 0.0 - 1.2 mg/dL    Alkaline Phosphatase 72 40 - 129 U/L    ALT 11 0 - 40 U/L    AST 10 0 - 39 U/L   Troponin   Result Value Ref Range    Troponin <0.01 0.00 - 0.03 ng/mL   APTT   Result Value Ref Range    aPTT 35.3 (H) 24.5 - 35.1 sec   Protime-INR   Result Value Ref Range    Protime 10.7 9.3 - 12.4 sec    INR 1.0    Serum Drug Screen   Result Value Ref Range    Ethanol Lvl <10 mg/dL    Acetaminophen Level <5.0 (L) 10.0 - 29.7 mcg/mL    Salicylate, Serum <9.7 0.0 - 30.0 mg/dL   Lactic Acid, Plasma   Result Value Ref Range    Lactic Acid 1.4 0.5 - 2.2 mmol/L   CK   Result Value Ref Range    Total  20 - 200 U/L   POCT Glucose   Result Value Ref Range    Meter Glucose 170 (H) 74 - 99 mg/dL   EKG 12 Lead   Result Value Ref Range    Ventricular Rate 84 BPM    Atrial Rate 84 BPM    P-R Interval 140 ms    QRS Duration 82 ms    Q-T Interval 366 ms    QTc Calculation (Bazett) 432 ms    P Axis 83 degrees    R Axis 70 degrees    T Axis 49 degrees       RADIOLOGY:  Interpreted by Radiologist.  XR CHEST PORTABLE   Final Result   COPD with atelectasis in the lung bases.             CT HEAD WO CONTRAST Final Result   1. Recent infarct in the left insula, frontal operculum, basal   ganglia, and external capsule, possibly subacute. 2. Possible acute or subacute infarct in the left parietal lobe. 3. No sign of acute intracranial hemorrhage or mass effect. The major findings were discussed with ED attending Dr. Gilmer Burns   at approximately 1330 hours on 8/31/2020. EKG Interpretation  Interpreted by emergency department physician,    Time: 1300  Rhythm: normal sinus   Rate: 84  Axis: normal  Conduction: normal  ST Segments: no acute change  T Waves: no acute change  Clinical Impression: no acute changes  Comparison to prior EKG: None      ------------------------- NURSING NOTES AND VITALS REVIEWED ---------------------------   The nursing notes within the ED encounter and vital signs as below have been reviewed by myself. /79   Pulse 78   Temp 97.5 °F (36.4 °C) (Temporal)   Resp 16   Wt 240 lb (108.9 kg)   SpO2 95%   BMI 30.00 kg/m²   Oxygen Saturation Interpretation: Normal    The patients available past medical records and past encounters were reviewed. ------------------------------ ED COURSE/MEDICAL DECISION MAKING----------------------  Medications   aspirin chewable tablet 324 mg (324 mg Oral Given 8/31/20 1412)   clopidogrel (PLAVIX) tablet 300 mg (300 mg Oral Given 8/31/20 1412)           Procedures:  none      Medical Decision Making:    NIH Stroke Scale/Score at time of initial evaluation:  1A: Level of Consciousness 0 - alert; keenly responsive   1B: Ask Month and Age 2 - answers neither question correctly   1C:  Tell Patient To Open and Close Eyes, then Hand  Squeeze 1 - performs one task correctly   2: Test Horizontal Extraocular Movements 0 - normal   3: Test Visual Fields 0 - no visual loss   4: Test Facial Palsy 2 - partial paralysis (total or near total paralysis of the lower face)   5A: Test Left Arm Motor Drift 0 - no drift, limb holds 90 (or 45) degrees for full 10 seconds   5B: Test Right Arm Motor Drift 0 - no drift, limb holds 90 (or 45) degrees for full 10 seconds   6A: Test Left Leg Motor Drift 0 - no drift; leg holds 30 degree position for full 5 seconds   6B: Test Right Leg Motor Drift 0 - no drift; leg holds 30 degree position for full 5 seconds   7: Test Limb Ataxia   (FNF/Heel-Shin) 0 - absent   8: Test Sensation 0 - normal; no sensory loss   9: Test Language/Aphasia 2 - severe aphasia; all communication is through fragmentary expression; great need for inference, questioning, and guessing by the listener. Range of information that can be exchanged is limited; listener carries burden of communication. Examiner cannot identify materials provided from patient response. 10: Test Dysarthria 0 - normal   11: Test Extinction/Inattention 0 - no abnormality   Total Score: 7   8/31/20 at 1:15 PM EDT. Acute CVA Core Measures:      - t-PA Eligibility: IV t-PA was considered and not given due to violations in inclusion criteria including stroke onset was greater than 3 hours prior to presentation             This patient's ED course included: multiple bedside re-evaluations, cardiac monitoring, continuous pulse oximetry and a personal history and physicial eaxmination    This patient has remained hemodynamically stable and remained unchanged during their ED course. Re-Evaluations:             Time: 1400  Re-evaluation. Patients symptoms show no change  Repeat physical examination is not changed        Consultations:             1:27 PM EDT  Spoke with Dr Alfonzo Russell, radiologist, discussed case,    1:44 PM EDT  Spoke with Dr Fatimah Wilson, discussed case, accepts to [facility] ER for [facility] alert and CTA head/neck and perfusion and possible IR intervention immediately   1:55 PM EDT  Spoke with Dr. Sena Bowers, discussed case, patient not candidate for tpa or IR. Admit medicine and get MRI. Recommends ASA 324mg, Plavix 300mg and Lipitor 80mg now.     2:10 PM EDT  Spoke with Dr Angelito Horton, discussed case, accepts patient to their service. Critical Care: none        Counseling: The emergency provider has spoken with the patient and son and discussed todays results, in addition to providing specific details for the plan of care and counseling regarding the diagnosis and prognosis. Questions are answered at this time and they are agreeable with the plan.       --------------------------------- IMPRESSION AND DISPOSITION ---------------------------------    IMPRESSION  1.  Acute CVA (cerebrovascular accident) ([facility] [address]        DISPOSITION  Disposition: Admit to telemetry  Patient condition is stable                 Bia Sadler DO  08/31/20 4927

## 2020-09-01 ENCOUNTER — APPOINTMENT (OUTPATIENT)
Dept: ULTRASOUND IMAGING | Age: 65
DRG: 065 | End: 2020-09-01
Payer: MEDICARE

## 2020-09-01 LAB
ANION GAP SERPL CALCULATED.3IONS-SCNC: 13 MMOL/L (ref 7–16)
BACTERIA: ABNORMAL /HPF
BILIRUBIN URINE: NEGATIVE
BLOOD, URINE: NEGATIVE
BUN BLDV-MCNC: 17 MG/DL (ref 8–23)
CALCIUM SERPL-MCNC: 9.4 MG/DL (ref 8.6–10.2)
CHLORIDE BLD-SCNC: 99 MMOL/L (ref 98–107)
CHOLESTEROL, TOTAL: 188 MG/DL (ref 0–199)
CLARITY: CLEAR
CO2: 24 MMOL/L (ref 22–29)
COLOR: YELLOW
CREAT SERPL-MCNC: 0.8 MG/DL (ref 0.7–1.2)
GFR AFRICAN AMERICAN: >60
GFR NON-AFRICAN AMERICAN: >60 ML/MIN/1.73
GLUCOSE BLD-MCNC: 154 MG/DL (ref 74–99)
GLUCOSE URINE: >=1000 MG/DL
HBA1C MFR BLD: 10 % (ref 4–5.6)
HDLC SERPL-MCNC: 36 MG/DL
KETONES, URINE: NEGATIVE MG/DL
LDL CHOLESTEROL CALCULATED: 128 MG/DL (ref 0–99)
LEUKOCYTE ESTERASE, URINE: NEGATIVE
LV EF: 50 %
LVEF MODALITY: NORMAL
METER GLUCOSE: 126 MG/DL (ref 74–99)
METER GLUCOSE: 144 MG/DL (ref 74–99)
METER GLUCOSE: 151 MG/DL (ref 74–99)
METER GLUCOSE: 159 MG/DL (ref 74–99)
NITRITE, URINE: NEGATIVE
PH UA: 7 (ref 5–9)
POTASSIUM SERPL-SCNC: 4.2 MMOL/L (ref 3.5–5)
PROTEIN UA: NEGATIVE MG/DL
RBC UA: ABNORMAL /HPF (ref 0–2)
SODIUM BLD-SCNC: 136 MMOL/L (ref 132–146)
SPECIFIC GRAVITY UA: 1.01 (ref 1–1.03)
TRIGL SERPL-MCNC: 120 MG/DL (ref 0–149)
UROBILINOGEN, URINE: 1 E.U./DL
VLDLC SERPL CALC-MCNC: 24 MG/DL
WBC UA: ABNORMAL /HPF (ref 0–5)

## 2020-09-01 PROCEDURE — 2580000003 HC RX 258: Performed by: FAMILY MEDICINE

## 2020-09-01 PROCEDURE — 36415 COLL VENOUS BLD VENIPUNCTURE: CPT

## 2020-09-01 PROCEDURE — 6370000000 HC RX 637 (ALT 250 FOR IP): Performed by: FAMILY MEDICINE

## 2020-09-01 PROCEDURE — 93306 TTE W/DOPPLER COMPLETE: CPT

## 2020-09-01 PROCEDURE — 80061 LIPID PANEL: CPT

## 2020-09-01 PROCEDURE — 97530 THERAPEUTIC ACTIVITIES: CPT

## 2020-09-01 PROCEDURE — 82962 GLUCOSE BLOOD TEST: CPT

## 2020-09-01 PROCEDURE — 6370000000 HC RX 637 (ALT 250 FOR IP): Performed by: INTERNAL MEDICINE

## 2020-09-01 PROCEDURE — 83036 HEMOGLOBIN GLYCOSYLATED A1C: CPT

## 2020-09-01 PROCEDURE — 97162 PT EVAL MOD COMPLEX 30 MIN: CPT

## 2020-09-01 PROCEDURE — 92610 EVALUATE SWALLOWING FUNCTION: CPT

## 2020-09-01 PROCEDURE — APPSS30 APP SPLIT SHARED TIME 16-30 MINUTES: Performed by: NURSE PRACTITIONER

## 2020-09-01 PROCEDURE — 92523 SPEECH SOUND LANG COMPREHEN: CPT

## 2020-09-01 PROCEDURE — 93880 EXTRACRANIAL BILAT STUDY: CPT

## 2020-09-01 PROCEDURE — 99222 1ST HOSP IP/OBS MODERATE 55: CPT | Performed by: SURGERY

## 2020-09-01 PROCEDURE — 81001 URINALYSIS AUTO W/SCOPE: CPT

## 2020-09-01 PROCEDURE — 97166 OT EVAL MOD COMPLEX 45 MIN: CPT

## 2020-09-01 PROCEDURE — 2060000000 HC ICU INTERMEDIATE R&B

## 2020-09-01 PROCEDURE — 80048 BASIC METABOLIC PNL TOTAL CA: CPT

## 2020-09-01 RX ORDER — INSULIN GLARGINE 100 [IU]/ML
10 INJECTION, SOLUTION SUBCUTANEOUS DAILY
Status: DISCONTINUED | OUTPATIENT
Start: 2020-09-01 | End: 2020-09-02

## 2020-09-01 RX ORDER — SODIUM CHLORIDE 9 MG/ML
INJECTION, SOLUTION INTRAVENOUS CONTINUOUS
Status: ACTIVE | OUTPATIENT
Start: 2020-09-01 | End: 2020-09-01

## 2020-09-01 RX ADMIN — PANTOPRAZOLE SODIUM 40 MG: 40 TABLET, DELAYED RELEASE ORAL at 05:33

## 2020-09-01 RX ADMIN — ATORVASTATIN CALCIUM 40 MG: 40 TABLET, FILM COATED ORAL at 19:59

## 2020-09-01 RX ADMIN — INSULIN GLARGINE 10 UNITS: 100 INJECTION, SOLUTION SUBCUTANEOUS at 11:27

## 2020-09-01 RX ADMIN — ASPIRIN 81 MG: 81 TABLET, COATED ORAL at 08:41

## 2020-09-01 RX ADMIN — SODIUM CHLORIDE: 9 INJECTION, SOLUTION INTRAVENOUS at 08:40

## 2020-09-01 RX ADMIN — DOCUSATE SODIUM 100 MG: 100 CAPSULE, LIQUID FILLED ORAL at 19:59

## 2020-09-01 ASSESSMENT — PAIN SCALES - GENERAL
PAINLEVEL_OUTOF10: 0

## 2020-09-01 NOTE — PROGRESS NOTES
Romberg: positive  - four stage balance test:   Feet together: 10 seconds, semi-tandem: 8 seconds, Tandem: pt unable, SLS: NT  The above balance tests indicate pt is at high risk for falls. Patient education  Pt educated on role of PT intervention. Pt educated on safety in room with utilization of call light for assistance with mobility. Discussed with pt and son need for acute rehabilitation. Patient response to education:   Pt verbalized understanding Pt demonstrated skill Pt requires further education in this area   yes Yes with cues yes     ASSESSMENT:    Comments:  RN cleared pt for activity prior to session. Pt received supine in bed and agreeable to PT intervention with OT collaboration at this time. Pt performed all functional mobility as noted above. Pt presenting with severe aphasia and impaired motor planning/command following. Pt demonstrating good strength throughout BUE and BLE but is unable to follow multi-step commands. Pt's speech is limited to \"yes\" and \"ok\" at this time. At end of session, pt returned to supine and left with all needs met and call light in reach. Pt requires continued skilled PT intervention for the purposes of maximizing functional mobility and independence. Pt would benefit from physical medicine and rehabilitation consultation. Treatment:  Patient practiced and was instructed in the following treatment:     Therapeutic Activities Completed:  o Functional mobility as noted above:   - Bed mobility: SBA all aspects with bed rail. Cueing for safety. Pt sat at EOB at SBA level. - Transfer training: sit<>stand x 3 reps at Kaylin level. Pt cued for use of BUE to complete to allow for more independent performance. Stand pivot with no AD Kaylin. Pt unsteady throughout. - Ambulation: 50 feet no AD Kaylin x 2 reps.   Mildly unsteady throughout and worsened with fatigue.    o Skilled repositioning in supine with HOB elevated for comfort.  o Pt education as noted above.    Pt's/ family goals   1. Acute Rehab. Patient and or family understand(s) diagnosis, prognosis, and plan of care. yes    PLAN:    PT care will be provided in accordance with the objectives noted above. Exercises and functional mobility practice will be used as well as appropriate assistive devices or modalities to obtain goals. Patient and family education will also be administered as needed. Frequency of treatments: 2-5x/week x 1-2 weeks. Time in  1330  Time out  1355    Total Treatment Time  15 minutes     Evaluation Time includes thorough review of current medical information, gathering information on past medical history/social history and prior level of function, completion of standardized testing/informal observation of tasks, assessment of data and education on plan of care and goals.     CPT codes:  [] Low Complexity PT evaluation 98822  [x] Moderate Complexity PT evaluation 03358  [] High Complexity PT evaluation 26719  [] PT Re-evaluation 78246  [] Gait training 68315 0 minutes  [] Manual therapy 66812 0 minutes  [x] Therapeutic activities 86085 15 minutes  [] Therapeutic exercises 11572 0 minutes  [] Neuromuscular reeducation 00098 0 minutes     Jennifer Callahan, PT, DPT  [id number]

## 2020-09-01 NOTE — PROGRESS NOTES
Hospitalist Progress Note      SYNOPSIS:     77 y.o. male who presented to [facility] with past medical history of diabetes, COPD and tobacco dependence. Patient was last known well 2 days ago. Found by son today altered and not able to answer questions. Patient's dysarthria is constant, severe, associated with right-sided weakness and facial droop. Patient is unable to answer any questions due to severe dysarthria. NIH score was 8. Vital signs notable for blood pressure of 142/83. Labs showed hemoglobin of 16.8 glucose 201, troponin is negative and blood alcohol level is negative. Chest x-ray shows COPD/atelectasis, CAT scan of the head shows acute left MCA stroke. CTA of the head and neck showed 90% left ICA stenosis. EKG shows sinus rhythm rate of 84. SUBJECTIVE:    Patient seen and examined  Records reviewed. No current complaints  Patient's son at bedside. Also name Tia Roberts. Difficulty with speech noted    Stable overnight. No other overnight issues reported. Temp (24hrs), Av.2 °F (36.8 °C), Min:97.5 °F (36.4 °C), Max:99 °F (37.2 °C)    DIET: DIET CARB CONTROL;  CODE: Full Code    Intake/Output Summary (Last 24 hours) at 2020 1025  Last data filed at 2020 0537  Gross per 24 hour   Intake 50 ml   Output 0 ml   Net 50 ml       OBJECTIVE:    BP (!) 102/58   Pulse 59   Temp 97.5 °F (36.4 °C) (Temporal)   Resp 16   Ht 6' 1\" (1.854 m)   Wt 240 lb (108.9 kg)   SpO2 94%   BMI 31.66 kg/m²     General appearance: No apparent distress, appears stated age and cooperative. HEENT:  Conjunctivae/corneas clear. Neck: Supple. No jugular venous distention. Respiratory: Clear to auscultation bilaterally, normal respiratory effort  Cardiovascular: Regular rate rhythm, normal S1-S2  Abdomen: Soft, nontender, nondistended  Musculoskeletal: No clubbing, cyanosis, no bilateral lower extremity edema. Brisk capillary refill.    Skin: Appears to have some kind of rash or abrasion on bilateral heels.  Neurologic: awake, alert and following commands; with marked aphasia. Follows simple commands. ASSESSMENT:    Acute left MCA stroke  Carotid artery stenosis, symptomatic  Dysarthria  Uncontrolled type 2 diabetes with an A1c of 10.0  COPD without exacerbation, chronic  Hyperlipidemia  Tobacco dependence    A1c 10.0    PLAN:  Allow for permissive hypertension  Not a candidate for intracranial endovascular intervention or TPA due to being out of window at the time of presentation  Vascular surgery has been consulted in regards to left proximal ICA stenosis. Ultrasound Doppler  ordered to evaluate carotids and flow. Continue with antiplatelet, statin. Neurology consultation is pending  Tobacco cessation counseling has been provided  Insulin has been ordered but not given yet? Would recommend changing long-acting Lantus 10 to 15 units a day.   Echo cardiogram, PT OT evaluation  Case was discussed in detail with patient's son with patient's permission at bedside    DISPOSITION: Pending further course, likely rehab if needed    Medications:  REVIEWED DAILY    Infusion Medications    sodium chloride 75 mL/hr at 09/01/20 0840    dextrose       Scheduled Medications    docusate sodium  100 mg Oral Nightly    pantoprazole  40 mg Oral QAM AC    aspirin  81 mg Oral Daily    atorvastatin  40 mg Oral Nightly    insulin lispro  0-10 Units Subcutaneous 4x Daily AC & HS     PRN Meds: ondansetron, acetaminophen, glucose, dextrose, glucagon (rDNA), dextrose, perflutren lipid microspheres, ipratropium-albuterol    Labs:     Recent Labs     08/31/20  1316   WBC 10.4   HGB 16.8*   HCT 49.5          Recent Labs     08/31/20  1316 09/01/20  0549    136   K 4.2 4.2    99   CO2 26 24   BUN 17 17   CREATININE 0.9 0.8   CALCIUM 9.5 9.4       Recent Labs     08/31/20  1316   PROT 6.9   ALKPHOS 72   ALT 11   AST 10   BILITOT 0.7       Recent Labs     08/31/20  1316   INR 1.0       Recent Labs 08/31/20  1316   CKTOTAL 132   TROPONINI <0.01       Chronic labs:    Lab Results   Component Value Date    CHOL 188 09/01/2020    TRIG 120 09/01/2020    HDL 36 09/01/2020    LDLCALC 128 (H) 09/01/2020    INR 1.0 08/31/2020    LABA1C 10.0 (H) 09/01/2020       Radiology: REVIEWED DAILY    +++++++++++++++++++++++++++++++++++++++++++++++++  [facility] Physician - [address] [place], [place]  +++++++++++++++++++++++++++++++++++++++++++++++++  NOTE: This report was transcribed using voice recognition software. Every effort was made to ensure accuracy; however, inadvertent computerized transcription errors may be present.

## 2020-09-01 NOTE — CONSULTS
Vascular Surgery Consultation Note    Reason for Consult:  Evaluation of Bilateral Carotid artery stenosis in patient with new CVA in L MCA distribution     HPI :    This is a 77 y.o. male with a past medical history of COPD, type 2 diabetes, and 1 pack/day smoking history who is admitted to the hospital for treatment of right facial droop, loss of speech, and inability to follow commands. CT head showed an ischemic event in the left MCA distribution. CTA neck showed left proximal ICA stenosis greater than 50%. Vascular surgery is consulted for evaluation and treatment of carotid artery stenosis in this patient who had a recent CVA. History obtained from chart. Patient does follow commands appropriately but cannot answer my questions and gets confused easily.     ROS : Negative if blank [], Positive if [x]  General Vascular   [] Fevers [] Claudication (Blocks)   [] Chills [] Rest Pain   [] Weight Loss [] Tissue Loss   [] Chest Pain [] Clotting Disorder    [] SOB at rest [] Leg Swelling   [] SOB with exertion [] DVT/PE      [] Nausea    [] Vomitting [x] Stroke/TIA   [] Abdominal Pain [] Focal weakness   [] Melena [x] Slurred Speech   [] Hematochezia [] Vision Changes   [] Hematuria    [] Dysuria [] Hx of Central Catheters   [] Wears Glasses/Contacts  [] Dialysis and If so date initiated   [] Blindness     [x] Right Hand Dominant   [] Difficulty swallowing        Past Medical History:   Diagnosis Date    Diabetes mellitus ([facility] [address]         Past Surgical History:   Procedure Laterality Date    HERNIA REPAIR      TONSILLECTOMY       Current Medications:    dextrose        ondansetron, acetaminophen, glucose, dextrose, glucagon (rDNA), dextrose, perflutren lipid microspheres, ipratropium-albuterol    docusate sodium  100 mg Oral Nightly    pantoprazole  40 mg Oral QAM AC    aspirin  81 mg Oral Daily    atorvastatin  40 mg Oral Nightly    insulin lispro  0-10 Units Subcutaneous 4x Daily AC & HS Allergies:  Patient has no known allergies. Social History     Socioeconomic History    Marital status:      Spouse name: Not on file    Number of children: Not on file    Years of education: Not on file    Highest education level: Not on file   Occupational History    Not on file   Social Needs    Financial resource strain: Not on file    Food insecurity     Worry: Not on file     Inability: Not on file    Transportation needs     Medical: Not on file     Non-medical: Not on file   Tobacco Use    Smoking status: Current Every Day Smoker     Packs/day: 1.00     Types: Cigarettes    Smokeless tobacco: Never Used   Substance and Sexual Activity    Alcohol use: No    Drug use: No    Sexual activity: Not on file   Lifestyle    Physical activity     Days per week: Not on file     Minutes per session: Not on file    Stress: Not on file   Relationships    Social connections     Talks on phone: Not on file     Gets together: Not on file     Attends [employer] service: Not on file     Active member of club or organization: Not on file     Attends meetings of clubs or organizations: Not on file     Relationship status: Not on file    Intimate partner violence     Fear of current or ex partner: Not on file     Emotionally abused: Not on file     Physically abused: Not on file     Forced sexual activity: Not on file   Other Topics Concern    Not on file   Social History Narrative    Not on file        History reviewed. No pertinent family history. PHYSICAL EXAM:    BP (!) 142/83   Pulse 66   Temp 97.9 °F (36.6 °C) (Temporal)   Resp 18   Ht 6' 1\" (1.854 m)   Wt 240 lb (108.9 kg)   SpO2 96%   BMI 31.66 kg/m²   CONSTITUTIONAL: Follows commands. No ability for speech. No facial droop.   Normal  EYES:  lids and lashes normal, sclera clear and conjunctiva normal  ENT:  normocepalic, without obvious abnormality, external ears without lesions  NECK:  supple, symmetrical, trachea midline,no jugular venous distension, no carotid bruits  HEMATOLOGIC/LYMPHATICS:  no cervical lymphadenopathy  LUNGS:  no increased work of breathing, good air exchange  CARDIOVASCULAR:  regular rate and rhythm no murmur noted  ABDOMEN:  soft, non-distended, non-tender, Aorta is not palpable  SKIN:  normal skin color, texture, turgor  EXTREMITIES:   R UE Swelling absent Incisions absent       5/5 Strength  L UE Swelling absent Incisions absent       5/5 Strength  R LE Edema absent  Incisions absent    Varicose veins absent    Wounds absent, normalcaprefill   5/5 Strength, neuropathy is absent  L LE Edema absent  Incisions absent    Varicose veins absent    Wounds absent, normalcaprefill   5/5 Strength, neuropathy is absent  R brachial 2+ L brachial 2+   R radial 2+ L radial 2+   R femoral 2+ L femoral 2+   R popliteal 2+ L popliteal 2+   R posterior tibial 2+ L posterior tibial 2+   R dorsalis pedis 2+ L dorsalis pedis 2+     LABS:    Lab Results   Component Value Date    WBC 10.4 08/31/2020    HGB 16.8 (H) 08/31/2020    HCT 49.5 08/31/2020     08/31/2020    PROTIME 10.7 08/31/2020    INR 1.0 08/31/2020    K 4.2 08/31/2020    BUN 17 08/31/2020    CREATININE 0.9 08/31/2020       RADIOLOGY:    CTA neck: Left proximal ICA stenosis greater than 50%    CT head plus MRI head show new ischemic event in left MCA distribution    Assesment/Plan    New-onset CVA in patient with greater than 50% carotid artery stenosis  Patient loaded with aspirin 325+ Plavix 300+ statin --continue  Plan for bilateral carotid artery duplex ultrasounds to further evaluate carotid artery disease      Electronically signed by Diana Latham MD on 9/1/2020 at 1:40 AM     Pt seen  He decline examination   He did not want to talk and asked me to leave the room    L MCA stroke  Likely Sx Carotid L ICA    CTA L ICA 70% stenosis    Carotid duplex ordered    Will reevluate pt 9/2/20 to discuss future L CEA     Birdgette Reid

## 2020-09-01 NOTE — PROGRESS NOTES
SPEECH/LANGUAGE PATHOLOGY  BEDSIDE SWALLOWING EVALUATION    PATIENT NAME:  Henrietta Fowler      :  1955          TODAY'S DATE:  2020 ROOM:  [facility]      SUMMARY OF EVALUATION     DYSPHAGIA DIAGNOSIS:  Within functional limits      DIET RECOMMENDATIONS: Regular consistency solids with  thin liquids     FEEDING RECOMMENDATIONS:     Assistance level:  no assistance needed      Compensatory strategies recommended:compensatory strategies are not recommended at this time    THERAPY RECOMMENDATIONS:      Dysphagia therapy is not recommended                  PROCEDURE     Consistencies Administered During the Evaluation   Liquids: Thin   Solids:  Pureed, solids      Method of Intake:   Straw, spoon  Self fed, fed by clinician    Position:   Upright seated                  RESULTS     Oral Stage: The oral stage of swallowing was within functional limits      Pharyngeal Stage:      No signs of aspiration were noted during this evaluation however, silent aspiration cannot be ruled out at bedside. If silent aspiration is suspected, a Videofluoroscopic Study of Swallowing (MBS) is recommended and requires a physician order. The Speech Language Pathologist (SLP) completed education with the patient regarding results of evaluation. Explained that Speech Pathology intervention is not warranted at this time      CPT code:  29755  bedside swallow eval      [x]The admitting diagnosis and active problem list, as listed below have been reviewed prior to initiation of this evaluation.      ADMITTING DIAGNOSIS: Acute cerebrovascular accident (CVA) ([address] [I63.9]  Acute cerebrovascular accident (CVA) ([address] [I63.9]  Acute cerebrovascular accident (CVA) ([address] [I63.9]  Acute CVA (cerebrovascular accident) ([address] [I63.9]     ACTIVE PROBLEM LIST:   Patient Active Problem List   Diagnosis    Acute ischemic left MCA stroke ([address]    Middle cerebral artery stenosis, left    Dysarthria    [facility] type 2 [facility] mellitus with hyperglycemia [facility]    Tobacco dependence    COPD (chronic obstructive pulmonary disease) ([facility]

## 2020-09-01 NOTE — PROGRESS NOTES
Patient would not hold still used all straps, cushions, etc. Patient ended up pulling himself out of scanner. Was a danger to himself trying to jump down off of table. Tech returned patient to room- to not have the patient wait for transport like this. Dr. Alto Nissen notified and he wants us to send what we were able to get. Limited study. Best images obtainable.

## 2020-09-01 NOTE — PROGRESS NOTES
MRI called to inform me that the patient \" is not an MRI candidate. Patient repeatedly said no and was  Trying to get out of the machine.

## 2020-09-01 NOTE — PROGRESS NOTES
SPEECH/LANGUAGE PATHOLOGY  SPEECH/LANGUAGE/COGNITIVE EVALUATION      PATIENT NAME:  Adan Wray      :  1955          TODAY'S DATE:  2020 ROOM:  [address] [place] [address]       ADMITTING DIAGNOSIS: Acute cerebrovascular accident (CVA) ([facility] [I63.9]  Acute cerebrovascular accident (CVA) ([facility] [I63.9]  Acute cerebrovascular accident (CVA) ([facility] [I63.9]  Acute CVA (cerebrovascular accident) ([facility] [I63.9]    SPEECH PATHOLOGY DIAGNOSIS:    Marked receptive/moderate expressive aphasia    THERAPY RECOMMENDATIONS:       Speech Pathology intervention is recommended 3-6 times per week for LOS or when goals are met with emphasis on the following: To Improve Orientation to spatial and temporal surroundings with use of external memory aides. To Improve comprehension of questions, directions and conversation during structured and non-structured activities with moderate verbal and visual cuing  To reduce paraphasic and perseveration errors during structured tasks and conversation with moderate verbal and visual cuing  To Improve expressive language skills for completion of automatic speech tasks, object/picture naming, phrase completion, and phrasal expression of basic wants and needs with moderate verbal and visual cuing               MOTOR SPEECH       Oral Peripheral Examination   Adequate lingual/labial strength     Parameters of Speech Production  Respiration:  Adequate for speech production  Articulation:  Within functional limits  Resonance:  Within functional limits  Quality:   Within functional limits  Pitch:     Within functional limits  Intensity: Within functional limits  Fluency:  Intact  Prosody Intact    RECEPTIVE LANGUAGE    Comprehension of Yes/No Questions:   Inconsistent    Process  Simple Verbal Commands:   Latent and Inconsistent  Process Intermediate Verbal Commands:   Incomplete and Perseveration  Process Complex Verbal Commands:     Incomplete    Comprehension of Conversation: Inconsistent      EXPRESSIVE LANGUAGE     Serials: Impaired    Imitation:  Words   Impaired   Sentences Impaired    Naming:  (Modality used:  Verbal)  Confrontation Naming  Impaired  Functional Description  Impaired  Response Naming: Impaired    Conversation:      Literal paraphasic errors were noted and Semantic paraphasic errors were noted    COGNITION     Unable to evaluation cognition at this time due to aphasia          CLINICAL OBSERVATIONS NOTED DURING THE EVALUATION  Latent responses, Inconsistent responses, Perseveration errors, Paraphasic errors and Cueing was required                  Prognosis for improvements is good  This plan will be re-evaluated and revised in 1 week  if warranted. Patient stated goals: Did not state  Treatment goals discussed with Patient  and Family   The Family understand(s) the diagnosis, prognosis and plan of care       CPT code:    18588  eval speech sound lang comprehension        The admitting diagnosis and active problem list, as listed below have been reviewed prior to initiation of this evaluation.         ACTIVE PROBLEM LIST:   Patient Active Problem List   Diagnosis    Acute ischemic left [facility] stroke ([facility]    Middle cerebral artery stenosis, left    Dysarthria    Uncontrolled type 2 diabetes mellitus with hyperglycemia (HCC)    Tobacco dependence    COPD (chronic obstructive pulmonary disease) ([facility]

## 2020-09-01 NOTE — CONSULTS
Haven Cooley is a 77 y.o. right handed     Neurology was consulted for L MCA stroke    Past Medical History:     Past Medical History:   Diagnosis Date    Diabetes mellitus ([address]      Past Surgical History:     Past Surgical History:   Procedure Laterality Date    HERNIA REPAIR      TONSILLECTOMY       Allergies:     Patient has no known allergies. Medications:     Prior to Admission medications    Medication Sig Start Date End Date Taking? Authorizing Provider   albuterol sulfate HFA (PROVENTIL HFA) 108 (90 BASE) MCG/ACT inhaler Inhale 2 puffs into the lungs every 4 hours as needed for Wheezing 3/11/17 3/11/18  Alba Daly DO     Social History:     He reports smoking 1-2 ppd, denies alcohol use. He lives at home independently and works part-time as a . Review of Systems:     No chest pain or palpitations  + chronic SOB  No vertigo, lightheadedness or loss of consciousness  No falls, tripping or stumbling  No incontinence of bowels or bladder  No itching or bruising appreciated  No numbness, tingling or focal arm/leg weakness    ROS is otherwise negative     Family History:     History reviewed. No pertinent family history. History of Present Illness:     Patient is a limited historian due to mixed aphasia. His son, Rena Henriquez at bedside, provides most of the history otherwise obtained from medical record. 76-year-old male with significant past medical history of diabetes mellitus, COPD and long-standing history of tobacco dependence presented to the emergency department yesterday with expressive aphasia, right-sided weakness and facial droop. Last known well was determined two days prior to arrival. His son reports the patient spoke with his brother on Sunday 8/30 evening at 8 pm without any reported speech difficulties. His son states he was to go to his father's house on Monday to meet the cable company as his dad was to be working.  When he arrived at noon, his dad was still at home and with limited speech output. Upon arrival to the ED, NIHSS 8- severe aphasia, dysarthria  CT head revealed hypodensity in left subcortex and left parietal lobe. He was deemed not a candidate for IV thrombolytics due to late presentation and evidence of infarct on imaging by telestroke consultation. He received loading doses of aspirin and Plavix. He was transferred to [facility] for further imaging and neurology evaluation. CTA head/neck demonstrated bilateral carotid artery stenosis with greater than 50% stenosis of proximal left ICA. CT brain perfusion showed delayed mean and total transit time in the left MCA territory. He was deemed not a candidate for endovascular reperfusion therapy due to no evidence of large vessel occlusion by neuro-interventional radiology. MRI brain was obtained however study limited as patient refused and was attempting to get out of machine. Imaging does revealed L MCA territory infarct with small foci of diffusion restriction throughout the left frontal, parietal and occipital lobes concerning for watershed distribution. Vascular surgery has been consulted for high grade stenosis of the left ICA and recommend further evaluation with carotid ultrasound. He remains on daily DAPT and high intensity statin. , A1c 10.0. Today, he son is at the bedside and reports his father has been tired and showing no interest in eating. Therapy evaluations are pending.       Objective:     BP (!) 102/58   Pulse 59   Temp 97.5 °F (36.4 °C) (Temporal)   Resp 16   Ht 6' 1\" (1.854 m)   Wt 240 lb (108.9 kg)   SpO2 94%   BMI 31.66 kg/m²     General appearance: alert, appears stated age, cooperative and no distress noted   Head: normocephalic, without obvious abnormality, atraumatic  Eyes: conjunctivae/corneas clear  Neck: no adenopathy, no carotid bruit, supple, symmetrical, trachea midline  Lungs: clear to auscultation bilaterally  Heart: regular rate and rhythm-NSR on the monitor, S1, S2 normal, no murmur  Extremities: normal, atraumatic, no cyanosis or edema, multiple healing abrasions to bilateral lower extremities   Pulses: 2+ and symmetric  Skin: color, texture, turgor normal---no rashes or lesions    Mental Status: alert, oriented to self- able to state first and last name appropriately, disoriented to time, place and situation    Speech: fragmented, no dysarthria appreciated   Language: mixed aphasia- paraphasic and anomic errors, repetition intact, follows simple commands, unable to perform 3-step tasks    Cranial Nerves:  I: smell    II: visual acuity     II: visual fields Blinks to threat bilaterally   II: pupils PERRLA   III,VII: ptosis None   III,IV,VI: extraocular muscles  EOMI without nystagmus    V: mastication Normal   V: facial light touch sensation  Normal   V,VII: corneal reflex     VII: facial muscle function - upper  Normal   VII: facial muscle function - lower Normal   VIII: hearing Normal   IX: soft palate elevation  Normal   IX,X: gag reflex    XI: trapezius strength  5/5   XI: sternocleidomastoid strength 5/5   XI: neck extension strength  5/5   XII: tongue strength  Normal     Motor:  5/5 throughout  Normal bulk and tone   No drift  No abnormal movements    Sensory:  LT intact throughotu    Coordination:   FN, FFM and MARVIN normal- slightly decreased on the right   HS normal    Gait:  normal    DTR:   Right Brachioradialis reflex 1+  Left Brachioradialis reflex 1+  Right Biceps reflex 1+  Left Biceps reflex 1+  Right Quadriceps reflex 2+  Left Quadriceps reflex 2+  Right Achilles reflex 1+  Left Achilles reflex 1+    No Babinskis  No Baldwin's     Laboratory/Radiology:     CBC with Differential:    Lab Results   Component Value Date    WBC 10.4 08/31/2020    RBC 5.53 08/31/2020    HGB 16.8 08/31/2020    HCT 49.5 08/31/2020     08/31/2020    MCV 89.5 08/31/2020    MCH 30.4 08/31/2020    MCHC 33.9 08/31/2020    RDW 14.1 08/31/2020    NRBC 1.3 03/11/2017 LYMPHOPCT 16.7 08/31/2020    MONOPCT 6.3 08/31/2020    BASOPCT 0.2 08/31/2020    MONOSABS 0.65 08/31/2020    LYMPHSABS 1.73 08/31/2020    EOSABS 0.05 08/31/2020    BASOSABS 0.02 08/31/2020     CMP:    Lab Results   Component Value Date     09/01/2020    K 4.2 09/01/2020    CL 99 09/01/2020    CO2 24 09/01/2020    BUN 17 09/01/2020    CREATININE 0.8 09/01/2020    GFRAA >60 09/01/2020    LABGLOM >60 09/01/2020    GLUCOSE 154 09/01/2020    PROT 6.9 08/31/2020    LABALBU 4.1 08/31/2020    CALCIUM 9.4 09/01/2020    BILITOT 0.7 08/31/2020    ALKPHOS 72 08/31/2020    AST 10 08/31/2020    ALT 11 08/31/2020     HgBA1c:    Lab Results   Component Value Date    LABA1C 10.0 09/01/2020     FLP:    Lab Results   Component Value Date    TRIG 120 09/01/2020    HDL 36 09/01/2020    LDLCALC 128 09/01/2020    LABVLDL 24 09/01/2020     MRI brain 8/31/20  Left middle cerebral artery infarction        CTA head/neck  Soft and calcific atherosclerotic plaque results in approximately 90%    stenosis of the origin of the left internal carotid artery.         A focus of hypoattenuation in the left middle cerebral artery    territory involving the insula, frontal operculum, basal ganglia and    external capsule compatible with subacute infarct is once again    present as seen on prior CTs earlier today.         Cortical atrophy and chronic periventricular microangiopathy.         No other evidence for stenosis, aneurysm or dissection on CTA of the    head and neck. I independently reviewed the labs and imaging studies today    Assessment/Plan:     Left MCA territory infarct with small infarcts in the left parietal, frontal and occipital lobes in watershed distribution producing a moderate mixed aphasia. Mechanism of stroke concerning for artery-to-artery embolus in setting of high-grade left carotid artery stenosis. Continue DAPT for secondary stroke prevention and permissive hypertension.  Vascular surgery following- carotid ultrasound recommended to determine need and timing for possible left CEA. Risk factor modification  Hyperlipidemia: - goal less than 70, continue Lipitor 40 mg daily  Diabetes mellitus: markedly elevated A1c 10.0%, goal less than 7%- defer to medicine  Tobacco dependence: smoking cessation encouraged    Stroke Standards  VTE prophylaxis: PCDs ordered   Swallow screen: passed bedside, SLP dysphagia evaluation pending  Rehab assessment: PT/OT/SLP evaluations in process     Will discuss with neurohospitalist, Dr. Shayla Sargent.      ELAINE Briceno, APRN, FNP-C  8:54 AM  9/1/2020

## 2020-09-01 NOTE — PROGRESS NOTES
Pt pulled IV out. At first pt refused to let nurse reinsert a new site. This nurse was able to insert a new iv site. Pt is also refusing to eat dinner at this time. On waiting list for a tel sitter.

## 2020-09-02 PROBLEM — I63.232 STROKE DUE TO STENOSIS OF LEFT CAROTID ARTERY (HCC): Chronic | Status: ACTIVE | Noted: 2020-09-02

## 2020-09-02 LAB
ANION GAP SERPL CALCULATED.3IONS-SCNC: 14 MMOL/L (ref 7–16)
BUN BLDV-MCNC: 17 MG/DL (ref 8–23)
CALCIUM SERPL-MCNC: 9.7 MG/DL (ref 8.6–10.2)
CHLORIDE BLD-SCNC: 98 MMOL/L (ref 98–107)
CO2: 23 MMOL/L (ref 22–29)
CREAT SERPL-MCNC: 0.8 MG/DL (ref 0.7–1.2)
GFR AFRICAN AMERICAN: >60
GFR NON-AFRICAN AMERICAN: >60 ML/MIN/1.73
GLUCOSE BLD-MCNC: 121 MG/DL (ref 74–99)
METER GLUCOSE: 125 MG/DL (ref 74–99)
POTASSIUM SERPL-SCNC: 4 MMOL/L (ref 3.5–5)
SODIUM BLD-SCNC: 135 MMOL/L (ref 132–146)

## 2020-09-02 PROCEDURE — 6370000000 HC RX 637 (ALT 250 FOR IP): Performed by: INTERNAL MEDICINE

## 2020-09-02 PROCEDURE — 97535 SELF CARE MNGMENT TRAINING: CPT

## 2020-09-02 PROCEDURE — 99232 SBSQ HOSP IP/OBS MODERATE 35: CPT | Performed by: PHYSICIAN ASSISTANT

## 2020-09-02 PROCEDURE — 82962 GLUCOSE BLOOD TEST: CPT

## 2020-09-02 PROCEDURE — 2060000000 HC ICU INTERMEDIATE R&B

## 2020-09-02 PROCEDURE — 80048 BASIC METABOLIC PNL TOTAL CA: CPT

## 2020-09-02 PROCEDURE — 6370000000 HC RX 637 (ALT 250 FOR IP): Performed by: FAMILY MEDICINE

## 2020-09-02 PROCEDURE — 36415 COLL VENOUS BLD VENIPUNCTURE: CPT

## 2020-09-02 PROCEDURE — 92507 TX SP LANG VOICE COMM INDIV: CPT

## 2020-09-02 PROCEDURE — 99221 1ST HOSP IP/OBS SF/LOW 40: CPT | Performed by: PHYSICAL MEDICINE & REHABILITATION

## 2020-09-02 RX ORDER — ATORVASTATIN CALCIUM 40 MG/1
40 TABLET, FILM COATED ORAL NIGHTLY
Status: CANCELLED | OUTPATIENT
Start: 2020-09-02

## 2020-09-02 RX ORDER — ASPIRIN 81 MG/1
81 TABLET ORAL DAILY
Status: CANCELLED | OUTPATIENT
Start: 2020-09-03

## 2020-09-02 RX ORDER — GLIPIZIDE 5 MG/1
5 TABLET ORAL
Status: DISCONTINUED | OUTPATIENT
Start: 2020-09-03 | End: 2020-09-04 | Stop reason: HOSPADM

## 2020-09-02 RX ORDER — CLOPIDOGREL BISULFATE 75 MG/1
75 TABLET ORAL DAILY
Status: CANCELLED | OUTPATIENT
Start: 2020-09-02

## 2020-09-02 RX ORDER — CLOPIDOGREL BISULFATE 75 MG/1
75 TABLET ORAL DAILY
Status: DISCONTINUED | OUTPATIENT
Start: 2020-09-02 | End: 2020-09-04 | Stop reason: HOSPADM

## 2020-09-02 RX ORDER — GLIPIZIDE 5 MG/1
5 TABLET ORAL
Status: CANCELLED | OUTPATIENT
Start: 2020-09-03

## 2020-09-02 RX ADMIN — ASPIRIN 81 MG: 81 TABLET, COATED ORAL at 10:12

## 2020-09-02 RX ADMIN — CLOPIDOGREL 75 MG: 75 TABLET, FILM COATED ORAL at 14:13

## 2020-09-02 RX ADMIN — ATORVASTATIN CALCIUM 40 MG: 40 TABLET, FILM COATED ORAL at 20:01

## 2020-09-02 RX ADMIN — DOCUSATE SODIUM 100 MG: 100 CAPSULE, LIQUID FILLED ORAL at 20:01

## 2020-09-02 RX ADMIN — PANTOPRAZOLE SODIUM 40 MG: 40 TABLET, DELAYED RELEASE ORAL at 06:44

## 2020-09-02 RX ADMIN — ACETAMINOPHEN 650 MG: 325 TABLET, FILM COATED ORAL at 12:10

## 2020-09-02 ASSESSMENT — PAIN SCALES - GENERAL
PAINLEVEL_OUTOF10: 4
PAINLEVEL_OUTOF10: 10

## 2020-09-02 NOTE — CARE COORDINATION
PM & R consult was completed. PM & R DR brittany sanchez for appropriateness.    Yunior Montgomery, L.S.W.  [phone]

## 2020-09-02 NOTE — PROGRESS NOTES
Modified [place]   Grooming Stand by Assist  CGA/SBA  Standing at sink, cues to locate items on R side of sink  Cued he was holding mouth wash not tooth brush with pt correcting & then completed oral hygiene  Modified [place]    UB Dressing Stand by Assist   Gown while seated EOB SBA  simulated Modified [place]    LB Dressing Minimal Assist  Min A  Simulated pants    SBA with socks  [place] & donning seated in chair   Supervision    Bathing Minimal Assist  Min A  simulated Supervision    Toileting Minimal Assist   NT Supervision    Bed Mobility  Rolling: Stand by Assist   Supine to sit: Stand by Assist   Sit to supine: Stand by Assist   SBA  Supine to sit  Rolling: Independent   Supine to sit: Modified [place]   Sit to supine: Modified [place]    Functional Transfers Min A  For safety and balance  CGA  Cues for hand placement & safety Supervision   Functional Mobility Min A w/o AD  Household distances  Min/CGA  No AD  In room distances, cues to attend to R side of environment   Supervision   Balance Sitting: SBA  Standing: Min A w/o AD  Sitting: SBA  Standing: CGA/Min     Activity Tolerance Fair  Noted physical and mental fatigue as session progressed  Fair Good   Visual/  Perceptual Glasses: no  R inattention noted during functional ambulation and formal assessment  Unable to further assess d/t cognition    R inattention   Possible visual deficit       Education:  Pt was educated through out treatment regarding proper technique & safety with bed mobility, functional transfers & mobility, increasing awareness of R side, OOB activity & ADL compensatory strategies to ease tasks to improve safety & prevent falls and allow pt to return home safely. Comments: Upon arrival pt was in bed & agreeable to therapy son present. At end of session son still present, pt seated in chair, ARU  arrived, all lines and tubes intact, call light within reach.  Encouraged pt to stay up in chair & open blinds through out the day. Pt refused to have blinds opened at this time. · Pt has made Good progress towards set goals. · Continue with current plan of care    Treatment Time In: 2:00           Treatment Time Out: 2:23              Treatment Charges: Mins Units   Ther Ex  [id number]     Manual Therapy Nikki Mccracken [phone] [id number]     ADL/Home Mgt [id number] 23 2   Neuro Re-ed [id number]     Group Therapy      Orthotic manage/training  [id number]     Non-Billable Time     Total Timed Treatment 23 2       Dede KHAN  [address] [place] [address] Drive, [address] [place] [address]

## 2020-09-02 NOTE — PROGRESS NOTES
Shabbir Abreu is a 77 y.o. right handed male     Neurology is following for stroke. Past medical history significant for hyperlipidemia, diabetes, tobacco use. He presented with acute onset expressive aphasia and right-sided weakness as well as a right facial droop. He had presented outside of the window for TPA or neurovascular intervention. MRI of the brain revealed a left MCA stroke. Vessel imaging revealed left ICA stenosis. Vascular was consulted for evaluation. A carotid ultrasound revealed 50 to 79% stenosis of the left ICA. An echo with bubble was completed and unrevealing. He has been started on dual antiplatelet therapy and high intensity statin. . HA1C 10.0    He is a current smoker of 1 pack/day. He reports continued speech difficulty as well as right-sided weakness. He feels that his symptoms have improved since admission. He is being evaluated to possibly go to acute rehab. He has no new neurologic symptoms or complaints today. His son is at the bedside. All questions were answered at this time. Review of systems otherwise negative other than stated above.     Current Facility-Administered Medications   Medication Dose Route Frequency Provider Last Rate Last Dose    [START ON 9/3/2020] glipiZIDE (GLUCOTROL) tablet 5 mg  5 mg Oral QAM AC Naomi Greco MD        docusate sodium (COLACE) capsule 100 mg  100 mg Oral Nightly Vitaly Kent, DO   100 mg at 09/01/20 1959    pantoprazole (PROTONIX) tablet 40 mg  40 mg Oral QAM AC Vitaly Kent, DO   40 mg at 09/02/20 0644    ondansetron (ZOFRAN) injection 4 mg  4 mg Intravenous Q6H PRN Laine Cruz DO        acetaminophen (TYLENOL) tablet 650 mg  650 mg Oral Q4H PRN Vitaly Kent, DO        aspirin EC tablet 81 mg  81 mg Oral Daily Vitaly Kent, DO   81 mg at 09/02/20 1012    atorvastatin (LIPITOR) tablet 40 mg  40 mg Oral Nightly Vitaly Kent, DO   40 mg at 09/01/20 1959    glucose (GLUTOSE) 40 % oral gel 15 g  15 g Oral PRN [facility], DO        dextrose 50 % IV solution  12.5 g Intravenous PRN Daphnie Sanchez,         glucagon (rDNA) injection 1 mg  1 mg Intramuscular PRN [facility], DO        dextrose 5 % solution  100 mL/hr Intravenous PRN [facility], DO        perflutren lipid microspheres (DEFINITY) injection 1.65 mg  1.5 mL Intravenous ONCE PRN [facility], DO        ipratropium-albuterol (DUONEB) nebulizer solution 1 ampule  1 ampule Inhalation Q4H PRN Travis Webber MD         Objective:       BP (!) 141/75   Pulse 69   Temp 97.6 °F (36.4 °C)   Resp 16   Ht 6' 1\" (1.854 m)   Wt 240 lb (108.9 kg)   SpO2 96%   BMI 31.66 kg/m²     General appearance: alert, appears stated age, cooperative and no distress noted   Head: normocephalic, without obvious abnormality, atraumatic  Eyes: conjunctivae/corneas clear  Neck: Limited ROM. No carotid bruit  Lungs: clear to auscultation bilaterally  Heart: regular rate and rhythm  Extremities: normal, atraumatic, no cyanosis or edema  Pulses: 2+ and symmetric  Skin: color, texture, turgor normal---no rashes or lesions    Mental Status: Alert. Oriented to self and son. Difficulty answering remainder of orientation questions relative to aphasia. Follows simple commands well. Difficulty with multistep commands. Appropriate attention/concentration  Repetition intact    Speech: No dysarthria, decreased fluency  Language: Mixed aphasia, expressive > receptive. Anomic and paraphasic errors as well as perseveration.      Cranial Nerves:  I: smell    II: visual acuity     II: visual fields Full to finger counting   II: pupils GRADY   III,VII: ptosis None   III,IV,VI: extraocular muscles  Full ROM   V: mastication Normal   V: facial light touch sensation  Normal   V,VII: corneal reflex     VII: facial muscle function - upper  Normal   VII: facial muscle function - lower  right seventh [facility] paresis VIII: hearing Normal   IX: soft palate elevation  Normal   IX,X: gag reflex    XI: trapezius strength  5/5   XI: sternocleidomastoid strength 5/5   XI: neck extension strength  5/5   XII: tongue strength  Normal     Motor:  5/5 throughout except RUE 5-/5  Normal tone and bulk  No drift  No abnormal movements    Sensory:  LT normal    Coordination:   FN F, FFM slightly slower on right without dysmetria  HTS normal    DTR:   +1 arms  +2 knees  +1 ankles    No Babinskis  No Baldwin's    No pathological reflexes    Laboratory/Radiology:     CBC with Differential:    Lab Results   Component Value Date    WBC 10.4 08/31/2020    RBC 5.53 08/31/2020    HGB 16.8 08/31/2020    HCT 49.5 08/31/2020     08/31/2020    MCV 89.5 08/31/2020    MCH 30.4 08/31/2020    MCHC 33.9 08/31/2020    RDW 14.1 08/31/2020    NRBC 1.3 03/11/2017    LYMPHOPCT 16.7 08/31/2020    MONOPCT 6.3 08/31/2020    BASOPCT 0.2 08/31/2020    MONOSABS 0.65 08/31/2020    LYMPHSABS 1.73 08/31/2020    EOSABS 0.05 08/31/2020    BASOSABS 0.02 08/31/2020     CMP:    Lab Results   Component Value Date     09/02/2020    K 4.0 09/02/2020    CL 98 09/02/2020    CO2 23 09/02/2020    BUN 17 09/02/2020    CREATININE 0.8 09/02/2020    GFRAA >60 09/02/2020    LABGLOM >60 09/02/2020    GLUCOSE 121 09/02/2020    PROT 6.9 08/31/2020    LABALBU 4.1 08/31/2020    CALCIUM 9.7 09/02/2020    BILITOT 0.7 08/31/2020    ALKPHOS 72 08/31/2020    AST 10 08/31/2020    ALT 11 08/31/2020     HgBA1c:    Lab Results   Component Value Date    LABA1C 10.0 09/01/2020     FLP:    Lab Results   Component Value Date    TRIG 120 09/01/2020    HDL 36 09/01/2020    LDLCALC 128 09/01/2020    LABVLDL 24 09/01/2020     MRI brain         CTA head and neck  Soft and calcific atherosclerotic plaque results in approximately 90%   stenosis of the origin of the left internal carotid artery.       A focus of hypoattenuation in the left middle cerebral artery   territory involving the insula,

## 2020-09-02 NOTE — PLAN OF CARE
Problem: Falls - Risk of:  Goal: Will remain free from falls  Description: Will remain free from falls  Outcome: Met This Shift  Goal: Absence of physical injury  Description: Absence of physical injury  Outcome: Met This Shift     Problem: HEMODYNAMIC STATUS  Goal: Patient has stable vital signs and fluid balance  Outcome: Met This Shift     Problem: ACTIVITY INTOLERANCE/IMPAIRED MOBILITY  Goal: Mobility/activity is maintained at optimum level for patient  Outcome: Met This Shift

## 2020-09-02 NOTE — PROGRESS NOTES
Chart reviewed with Dr Lexi Arango. Patient appropriate for ARU.  Will accept pending medical stability, insurance approval, completed testing and bed availability

## 2020-09-02 NOTE — CONSULTS
PM&R Consult Note  Patient: Castillo Santana  Age/sex: 77 y.o. male  Medical Record #: [id number]      Referring physician: Pankaj Ramos MD  Consulting physician: Dr. Martinez Damon MD  Primary care provider: Kasandra Harvey MD      Chief complaint:   Impairments and acitivities limitations in ADLs, mobility, functional communication, cognition secondary to acute CVA. HPI:   Castillo Santana is a 77 y.o. male who presented to [facility] on 8/31/2020 after he was found by son with right facial droop, aphasia, and confusion, after not showing up to work that morning. He was out of the window for tPA. Head CT revealed acute left MCA stroke. CTA of head/neck showed 90% left ICA stenosis. Carotic US showed 70% stenosis of the left ICA. He was evaluated by Neurology and started on DAPT and statin. Post stroke workup completed. Brain MRI showed left MCA infarct. He was evaluated by Vascular surgery who recommended left CEA in approximately 6 weeks. He has participated in therapy evaluations on acute care. Past Medical History:   Diagnosis Date    Diabetes mellitus ([facility] [address]     Stroke due to stenosis of left carotid artery ([facility] [address] 9/2/2020     Past Surgical History:   Procedure Laterality Date    HERNIA REPAIR      TONSILLECTOMY         History reviewed. No pertinent family history.     No Known Allergies    Scheduled Meds:  [START ON 9/3/2020] glipiZIDE, 5 mg, QAM AC  clopidogrel, 75 mg, Daily  docusate sodium, 100 mg, Nightly  pantoprazole, 40 mg, QAM AC  aspirin, 81 mg, Daily  atorvastatin, 40 mg, Nightly        PRN Meds  ondansetron, 4 mg, Q6H PRN  acetaminophen, 650 mg, Q4H PRN  glucose, 15 g, PRN  dextrose, 12.5 g, PRN  glucagon (rDNA), 1 mg, PRN  dextrose, 100 mL/hr, PRN  perflutren lipid microspheres, 1.5 mL, ONCE PRN  ipratropium-albuterol, 1 ampule, Q4H PRN        Social History:  Social History     Socioeconomic History    Marital status:      Spouse name: Not on file    Number of children: Not on file    Years of education: Not on file    Highest education level: Not on file   Occupational History    Not on file   Social Needs    Financial resource strain: Not on file    Food insecurity     Worry: Not on file     Inability: Not on file    Transportation needs     Medical: Not on file     Non-medical: Not on file   Tobacco Use    Smoking status: Current Every Day Smoker     Packs/day: 1.00     Types: Cigarettes    Smokeless tobacco: Never Used   Substance and Sexual Activity    Alcohol use: No    Drug use: No    Sexual activity: Not on file   Lifestyle    Physical activity     Days per week: Not on file     Minutes per session: Not on file    Stress: Not on file   Relationships    Social connections     Talks on phone: Not on file     Gets together: Not on file     Attends [employer] service: Not on file     Active member of club or organization: Not on file     Attends meetings of clubs or organizations: Not on file     Relationship status: Not on file    Intimate partner violence     Fear of current or ex partner: Not on file     Emotionally abused: Not on file     Physically abused: Not on file     Forced sexual activity: Not on file   Other Topics Concern    Not on file   Social History Narrative    Not on file       Social supports: Lives alone. Son lives locally. Home situation: Lives in 1 level home with 1 step to enter and no handrail    Working full time as a        Functional History:  Premorbid ADL's: independent   Premorbid Mobility: independent   Present: decrease in mobility and function and decreased cognition.     Physical Therapy:  Bed mobility: SBA  Transfers: Min A  Ambulation: 50 ft no AD Min A    Occupational Therapy:  Feeding: Supervision  Grooming: CGA/SBA  UB dressing: sBA  LB dressing: Min A    Review Of Systems:   Contsitutional: No Fever, chills  HEENT: No HA, blurry vision  Respiratory: No Cough, SOB  Cardiovascular: No CP  Gastrointestinal: No nausea, vomiting, diarrhea, abdominal discomfort; + continent   Genitourinary: No Dysuria  Musculoskeletal:  per HPI  Neurologic: per HPI  Psychiatric: No Depression, No anxiety      Physical Examination:  Vitals:   Vitals:    09/01/20 1925 09/01/20 2345 09/02/20 0750 09/02/20 1400   BP: (!) 152/69 (!) 142/76 (!) 141/75 115/74   Pulse: 60 58 69 55   Resp: 16 16 16 16   Temp: 97.7 °F (36.5 °C) 97.9 °F (36.6 °C) 97.6 °F (36.4 °C) 96.8 °F (36 °C)   TempSrc: Temporal Temporal     SpO2:  96% 96% 96%   Weight:       Height:           GEN: NAD, sitting up at bedside    HEENT: NC/AT, PERRL, EOMI  RESP: CTAB, no R/R/W  CV: +S1 S2, RRR  ABD: soft, NT, ND, normal BS   EXT: No erythema/clubbing/cyanosis, 2+ pulses  Skin: no rash  Psych: pleasant mood and affect     Neuro Exam:  Alert, oriented to person, to Month, to \"[place]\". No oriented to date.     -Speech clear, no dysarthria  -Expressive > receptive aphasia is present  -Perseverative  -Follows 1 step commands  -Recall impaired, 0/3 at 3 min    Cranial Nerves:  I: olfactory not tested  II  visual fields grossly intact   III, IV, VI: extra occular muscles intact  Pupils (II, III): ERRL  V: Facial Sensation/Jaw clench: intact  VII: Facial Motor: right facial droop  VIII. Hearing: intact  XI/X: Palate: intact  XI: Shoulder shrug/SCM:intact  XII: Tongue: intact      Coordination:  F - N: slow on right, not ataxic     Sensory:    LT: intact           Motor:   Tone was normal    Motor Findings  Strength: Right  Strength: Left    Deltoid  5/5  5/5    Biceps  5/5  5/5    Triceps  5/5  5/5    Wrist Extensors  5/5  5/5    FDP 5-/5 5/5   Finger abductors 5-/5 5/5   Iliospoas  5/5  5/5    Quadriceps  5/5  5/5    Tibialis anterior  5/5  5/5    EHL 5/5 5/5   Gastroc soleus  5/5  5/5        DTRs:  Reflex Right Left   Biceps 1+ 1+   Triceps 1+ 1+   Brachioradialis 1+ 1+   Patella 2+ 2+   Achilles  1+ 1+     No Babinski  No Baldwin's      Laboratory:    Lab Results   Component Value Date    WBC 10.4 08/31/2020    HGB 16.8 (H) 08/31/2020    HCT 49.5 08/31/2020    MCV 89.5 08/31/2020     08/31/2020     Lab Results   Component Value Date     09/02/2020    K 4.0 09/02/2020    CL 98 09/02/2020    CO2 23 09/02/2020    BUN 17 09/02/2020    CREATININE 0.8 09/02/2020    GLUCOSE 121 09/02/2020    CALCIUM 9.7 09/02/2020      Lab Results   Component Value Date    ALT 11 08/31/2020    AST 10 08/31/2020    ALKPHOS 72 08/31/2020    BILITOT 0.7 08/31/2020       Lab Results   Component Value Date    COLORU Yellow 09/01/2020    NITRU Negative 09/01/2020    GLUCOSEU >=1000 09/01/2020    KETUA Negative 09/01/2020    UROBILINOGEN 1.0 09/01/2020    BILIRUBINUR Negative 09/01/2020     Lab Results   Component Value Date    LABA1C 10.0 (H) 09/01/2020         Pertinent Imaging:  Head CT; Impression    1. Recent infarct in the left insula, frontal operculum, basal    ganglia, and external capsule, possibly subacute.         2. Possible acute or subacute infarct in the left parietal lobe.         3. No sign of acute intracranial hemorrhage or mass effect.         The major findings were discussed with ED attending Dr. Iman Timmons        Brain MRI  Impression         Left middle cerebral artery infarction.         CTA head/neck  Impression         Soft and calcific atherosclerotic plaque results in approximately 90%    stenosis of the origin of the left internal carotid artery.         A focus of hypoattenuation in the left middle cerebral artery    territory involving the insula, frontal operculum, basal ganglia and    external capsule compatible with subacute infarct is once again    present as seen on prior CTs earlier today.         Cortical atrophy and chronic periventricular microangiopathy.         No other evidence for stenosis, aneurysm or dissection on CTA of the    head and neck.             Carotid US  Impression         50-79% stenosis left ICA based on NASCET criteria (0-49%).         No hemodynamically significant stenosis of right internal carotid    artery.         No evidence for subclavian steal.          IMPRESSION:  Sharlene He is a 77 y.o. male with impairments and acitivities limitations in ADLs, mobility, functional communication, cognition secondary to acute CVA. Demonstrating impaired cognition, impaired communication, impaired balance, impaired safety awareness,  impaired functional mobility and impaired self care. Recommendations:   Patient is appropriate for Acute rehabilitation when medically cleared and any pending workup is complete. Thank you for the consult.       Lizz Esquivel MD  Physical Medicine and Rehabilitation

## 2020-09-02 NOTE — PROGRESS NOTES
Acute Rehab Pre-Admission Screen      Referral date: 9/1/20  Onset/Hospital Admit Date: 8/31/2020 12:59 PM    Current Location: [facility]    Name: Ryan Rosenr: 1955  Age: 77 y.o. Admitting Diagnosis: CVA   Address: [address] [place] [address]  Home Phone: [phone] (home)  Rebekah Lang #:     Sex: male  Race:   Marital Status:    Ethnic/Cultural/Advent Considerations: none    Advanced Directives: [x] Full Code  [] [address] [] Medications only       [] Living Will  [] DPOA      []Organ donor      [] No mechanical breathing or ventilation     [] no tube feeding, nutrition or hydration      [x] Patient does not have advanced directives or living will        COVERAGE INFORMATION   Primary Insurer: medicare Medicare #: [id number]  Verified coverage: [] Patient  [] Family/caregiver    [x] financial department [] insurance carrier    MEDICAL UPDATE:  History of present admission: 77year old male admitted 8/31/20 after being found by son at home with AMS and and unable to answer questions. Right sided weakness and facial droop. Outside window of TPA. CT showed acute left MCA. CTA of the head and neck showed 90% left ICA stenosis. Per Vascular, he will need CEA in about 6 weeks.        PHYSICIAN / REFERRAL INFORMATION  Referring Physician: Jannette Samuels MD  Attending Physician: Robin Crowley MD  Primary Care Physician: Vidhya Mahan MD  Consultants/Opinions (see full consult notes on chart): neurology [facility]- CVA  Vascular- ICA stenosis  Clyde Posadas (cardiology ) rec: negative stress test, low risk for CEA    SOCIAL INFORMATION  Primary  Contact: Taylor Nieves  Relationship: child  Primary Phone:   Secondary  Contact: Trudy Ward  Relationship: brother  Primary Phone: [phone]    Previous Community Services: none  Caregiver available: [] Yes [] No Hours per day available: TBD  Patient previously employed:  [x] Yes [] Part Time [] Full Time [] No [] Retired  Occupation/Profession: master security  Prior living arrangements: [x] Home  [] Assisted living  [] SNF [] Other  Lived with:  [x] Alone  [] Spouse  [] Family  [] Other  Home:  1 story home  1 entry steps  Rails: 0   Bedroom: [x] 1st floor  [] 2nd floor    Bathroom:  [x] 1st floor  [] 2nd floor    Prior Functional Level: Independent for: all, drove   Dominant hand: [x] Right  [] Left    Previous Home Equipment:  [] Cane [] Grab bars [] Orthotic / prosthetic   [] Shower chair [] Tub bench  [] 3-in-1 Commode [] Long handle sponge   [] Oxygen [] Sock aide  [] Wheelchair  [] motorized wc/scooter  [] Wheelchair cushion   [] Crutches [] Long handle shoehorn  [] Reachers [] Toilet seat elevator [] Rollator  [] Walker(wheeled)   [] Walker(standard) [] Mechanical lift    [x] None of the above     Has patient had 2 or more falls in the past year or any fall with injury in the past year? [] yes   [x] no   []unknown    Has patient had major surgery during past 100 days prior to admission?    [] yes   [x] no     Surgical History:  Past Surgical History:   Procedure Laterality Date    CARDIOVASCULAR STRESS TEST  09/03/2020    Lexiscan stress test    HERNIA REPAIR      TONSILLECTOMY         Past Medical:  Past Medical History:   Diagnosis Date    [facility]     Stroke due to stenosis of left carotid artery ([facility] 9/2/2020       Current Co-morbidities:  [] Alzheimer's   [] Dysphasia     [] Parkinsonism  [] Amputation   [] GERD  [] Peripheral artery disease   [] Anemia      [] Encephalopathy  [] Peripheral vascular disease  [] Anxiety   [] Gangrene   [] Pneumonia  [] Aphasia   [] Gout    [] Polyneuropathy  [] Asthma   [] Heart Failure (diastolic) [] Post-polio syndrome  [] Atrial fibrillation  [] Heart Failure (left-sided) [] Pseudomonas enteritis   [] Blind    [] Heart Failure (right-sided) [] Pulmonary embolism  [] Cellulitis     [] Heart Failure (systolic) [] Renal dialysis  [] Clostridium difficile  [] Hemiparesis   [] Renal failure  [] Congestive heart failure [] Hypertension   [] Rheumatoid arthritis  [x] COPD   [] Hypotension   [] Seizure disorder   [] Coronary Artery Disease [] Hypothyroidism  [] Septicemia   [] Deaf    [] Hyperlipidemia   [] Sleep apnea  [] Depression   [] Morbid obesity  [] Spinal cord injury  [x] Diabetes   [] MRSA   [x] Stroke  [] Diabetic nephropathy  [] Myocardial infarction  [] Tracheostomy  [] Diabetic neuropathy  [] Osteoarthritis  [] Traumatic brain injury   [] Diabetic retinopathy  [] Osteoporosis   [] Urinary tract infection  [] DVT    [] Pancytopenia  [] Vocal cord paralysis  []  Spinal stenosis   []  kidney disease [] VRE  [] Post op    []    []        Medical/Functional Conditions requiring inpatient rehabilitation: Requires multidisciplinary treatment including PM&R physician daily care, 24 hour rehabilitation nursing, physical therapy, occupational therapy, rehabilitation psychology, recreation therapy and rehabilitation social work, nutrition services due to new deficits status post CVA. Risk for Medical/Clinical Complications: Falls, injury, pain, skin breakdown, abnormal vitals, abnormal labs, DVT, PE, pneumonia, decreased mobility, neuro changes     CLINICAL DATA:     Height : 6'1     Weight:  240 lbs   BMI: 31.66       Date: 9/1/20 Date: 9/4/20 Date:   temperature 97.9 98.4    pulse 58 64    respirations 16 20    Blood pressure 142/76 118/63    Pulse oximeter 96% room air 94% Room air       ALLERGIES: Patient has no known allergies. DIET : DIET CARB CONTROL; Carb Control: 4 carb choices (60 gms)/meal    Current Lab and Diagnostic Tests:   Recent Results (from the past 24 hour(s))   POCT Glucose    Collection Time: 09/03/20  9:08 PM   Result Value Ref Range    Meter Glucose 141 (H) 74 - 99 mg/dL     Ct Head Wo Contrast  Result Date: 8/31/2020  1. Recent infarct in the left insula, frontal operculum, basal ganglia, and external capsule, possibly subacute.  2. Possible acute or subacute infarct in the left parietal lobe. 3. No sign of acute intracranial hemorrhage or mass effect. The major findings were discussed with ED attending Dr. Carlos Ring at approximately 1330 hours on 8/31/2020. Xr Chest Portable  Result Date: 8/31/2020  COPD with atelectasis in the lung bases. Cta Neck W Contrast  Result Date: 8/31/2020  Soft and calcific atherosclerotic plaque results in approximately 90% stenosis of the origin of the left internal carotid artery. A focus of hypoattenuation in the left middle cerebral artery territory involving the insula, frontal operculum, basal ganglia and external capsule compatible with subacute infarct is once again present as seen on prior CTs earlier today. Cortical atrophy and chronic periventricular microangiopathy. No other evidence for stenosis, aneurysm or dissection on CTA of the head and neck. Ct Brain Perfusion  Result Date: 8/31/2020  TFocal area of infarct is less than 50% of the ischemic penumbra of acute left MCA distribution ischemic event. Cta Head W Contrast  Result Date: 8/31/2020  Soft and calcific atherosclerotic plaque results in approximately 90% stenosis of the origin of the left internal carotid artery. A focus of hypoattenuation in the left middle cerebral artery territory involving the insula, frontal operculum, basal ganglia and external capsule compatible with subacute infarct is once again present as seen on prior CTs earlier today. Cortical atrophy and chronic periventricular microangiopathy. No other evidence for stenosis, aneurysm or dissection on CTA of the head and neck. Mri Brain Wo Contrast  Result Date: 8/31/2020  Left middle cerebral artery infarction. Us Carotid Artery Bilateral  Result Date: 9/1/2020  50-79% stenosis left ICA based on NASCET criteria (0-49%). No hemodynamically significant stenosis of right internal carotid artery.  No evidence for subclavian steal.      Additional labs or diagnostic Special equipment:  [] Devices/Splints  [] Type   [] Brace   [] Type  [] Bariatric bed  [] Extra wide commode  [] Extra wide wheelchair [] Extra wide walker  [] Aiden walker  [] Aiden wheelchair  [] Transfer lift    [] Other equipment     FUNCTIONAL STATUS PT / Dennie Lints / Mitch Canter:  Boboe Perla / EVAL Discipline Initial: 9/1/20 Follow Up: 9/4/20 Current:    Eating OT Supervision   Supervision      Grooming OT Stand by Assist   Stand by Assist  /Minimum assistance      Bathing OT Minimum assistance   Minimum assistance      Dressing Upper Extremity OT Stand by Assist   Stand by Assist      Dressing Lower Extremity OT Minimum assistance   Minimum assistance      Toileting OT Minimum assistance   nt    Toilet Transfers OT nt Minimum assistance    Tub/Shower Transfers OT nt nt    Homemaking OT nt nt    Bed Mobility PT Stand by Assist   Stand by Assist  (9/3/20)    Bed/Wheelchair Transfers PT Minimum assistance   Minimum assistance  (9/3/20)    Locomotion Walk / Wheelchair  Device:  Distance: PT 50 feet no AD Minimum assistance   75 feet no AD Kaylin    Endurance PT Fair+     Expression SP Marked receptive/moderate expressive aphasia     Social Interaction SP      Problem Solving SP      Memory SP      Comprehension SP      Swallowing SP      Bowel Management NSG      Bladder Management NSG        Comments on Functional Status: Able to tolerate 3 hours of therapy a day.  Will benefit from 3 hours of acute rehab therapy daily to improve gait, transfers, ADLs, IADLs, aphasia    [x] Able to participate a minimum of 3 hours per day of therapy intervention    Required treatments/services: [x] Rehabilitation nursing [] Dietitian / nurtition                 [x] Case management  [] Respiratory Therapy      [x] Social work   [] Other 1    Required Therapy:  Therapy Hours per Day Days per Week Therapeutic Interventions Required   [x] Physical Therapy 1 5-7 Gait, transfers, Safety, strength, education, endurance   [x] Occupational Therapy 1 5-7 ADLs, IADLs, Safety, strength, education, endurance   [x] Speech Pathology 1 5-7 Speech, cognition, swallowing, safety, education   [] Prosthetics / Rc Homer       []         Anticipated Discharge Plan:   Anticipated DME Needs:  [x] Home     [] Commode   [] Alone    [] Wheelchair   [] Supervised    [] Walker   [] Assist    [] Oxygen        [] Hospital Bed  [] Assisted Living    [] Ramp        [x] To Be Determined    Anticipated Home Health Services:  Anticipated Outpatient Services:  [] PT       [] PT  [] OT      [] OT  [] Speech     [] Speech  [] Nursing     [] Dialysis  [] Aide      [x] To Be Determined  [x] To Be Determined    Anticipated support group:  [] Amputation  [] Multiple Sclerosis  [x] Stroke  [] Brain Injury  [] Spinal cord injury  [] Other     Barriers to discharge: impaired self care, impaired communication    Discharge Support: [] Patient lives alone and does not have a caregiver available     [x] Patient has a caregiver available     [] Discharge plan has been verified with patient's caregiver      [] Caregiver is in agreement with the discharge plan     Expected functional status for safe discharge: Supervision     Patient/support person goals: go home    Expected length of stay: 7-10 days    Discussed expected length of stay and agreeable to IRF plan: [x] Yes   [] No    Impairment Group Category: 1.4    Etiological Diagnosis: CVA    Primary Rehabilitation Diagnosis: CVA    Electronically signed by Nick De León RN on 9/4/2020 at 9:00 AM    Prescreen completed __________________________________ (signature of prescreener)    Date:   9/4/20 Time:  0900    JUSTIFICATION FOR ADMISSION TO ACUTE REHABILITATION:  Patient has suffered decline in functional abilities for gait, transfers, speech, swallowing, cognition,  ADL's and IADL's as well as endurance. Patient has functional deficits requiring intensive therapy across multiple disciplines in order to return home safely.  Patient will need physician oversight for respiratory issues, abnormal vital signs, nutritional and hydration status, safety issues, medications and therapy modalities. PT, OT and speech will work on deficits as noted in evaluations. Case management and social work will provide services for DME and management of a safe discharge home. RECOMMEND LEVEL OF CARE  Recommend inpatient rehabilitation: [x] Yes   [] No  If no indicate reason:  [] Functional level too high  [] Unmotivated  [] No insurance carrier approval [] Unlikely to return to community  [] No medical necessity  [] Patient or family chose other facility  [] Too medically complex  [] Inadequate discharge plan  [] Rehabilitation bed unavailable [] Functional level too low  [] patient or family refused ARU    If patient not accepted for IRF admission, recommended level of care:  [] [address]  [] [address] [facility]  [] [place]   [] Home Care  [] Other      [] LTAC       Physician Assigned:  [x] Dr. Jagdish Angeles         [] Dr. Sindy Reynolds              [] Dr. Aida Cornelius [] Dr. Keri Bernal  [] Dr. Sue Galdamez (if not admitted within 48 hours of initial pre-screen)    Medical Update/Changes: Prescreen updated to reflect current data since initiated. Functional Update/Changes: Therapy notes updated on prescreen graph to reflect current status.      Reviewer Signature:_____________________________________    Date:  9/4/20 Time: 0900    PHYSICIAN ADMISSION DETERMINATION AND REVIEW UPDATE:     ____________________________________________________________________  ____________________________________________________________________  ____________________________________________________________________  ____________________________________________________________________  ____________________________________________________________________    Physician Signature:_____________________________________    Print Signature:_________________________________________    Date: 9/4/20    Time:  0900

## 2020-09-02 NOTE — PROGRESS NOTES
Hospitalist Progress Note      SYNOPSIS:     77 y.o. male who presented to [facility] with past medical history of diabetes, COPD and tobacco dependence. Patient was last known well 2 days ago. Found by son today altered and not able to answer questions. Patient's dysarthria is constant, severe, associated with right-sided weakness and facial droop. Patient is unable to answer any questions due to severe dysarthria. NIH score was 8. Vital signs notable for blood pressure of 142/83. Labs showed hemoglobin of 16.8 glucose 201, troponin is negative and blood alcohol level is negative. Chest x-ray shows COPD/atelectasis, CAT scan of the head shows acute left MCA stroke. CTA of the head and neck showed 90% left ICA stenosis. EKG shows sinus rhythm rate of 84. SUBJECTIVE:    Patient seen and examined  Records reviewed. No current complaints  Reviewed pending results from work-up yesterday      Stable overnight. No other overnight issues reported. Temp (24hrs), Av.6 °F (36.4 °C), Min:97.3 °F (36.3 °C), Max:97.9 °F (36.6 °C)    DIET: DIET CARB CONTROL; Carb Control: 4 carb choices (60 gms)/meal  CODE: Full Code    Intake/Output Summary (Last 24 hours) at 2020 1330  Last data filed at 2020 1925  Gross per 24 hour   Intake 975 ml   Output --   Net 975 ml       OBJECTIVE:    BP (!) 141/75   Pulse 69   Temp 97.6 °F (36.4 °C)   Resp 16   Ht 6' 1\" (1.854 m)   Wt 240 lb (108.9 kg)   SpO2 96%   BMI 31.66 kg/m²     General appearance: No apparent distress, appears stated age and cooperative. HEENT:  Conjunctivae/corneas clear. Neck: Supple. No jugular venous distention. Respiratory: Clear to auscultation bilaterally, normal respiratory effort  Cardiovascular: Regular rate rhythm, normal S1-S2  Abdomen: Soft, nontender, nondistended  Musculoskeletal: No clubbing, cyanosis, no bilateral lower extremity edema. Brisk capillary refill.    Skin: Appears to have some kind of rash or abrasion on DAILY    +++++++++++++++++++++++++++++++++++++++++++++++++  Usama Nicholas  +++++++++++++++++++++++++++++++++++++++++++++++++  NOTE: This report was transcribed using voice recognition software. Every effort was made to ensure accuracy; however, inadvertent computerized transcription errors may be present.

## 2020-09-02 NOTE — PROGRESS NOTES
Message sent to vascular resident regarding patients son requesting to be updated on the plan of care.  Awaiting response

## 2020-09-02 NOTE — PROGRESS NOTES
Pt pulled another IV site out and refusing to let nurse insert a new site. Left wrist is swollen where iv site was.

## 2020-09-03 ENCOUNTER — APPOINTMENT (OUTPATIENT)
Dept: NUCLEAR MEDICINE | Age: 65
DRG: 065 | End: 2020-09-03
Payer: MEDICARE

## 2020-09-03 ENCOUNTER — APPOINTMENT (OUTPATIENT)
Dept: NON INVASIVE DIAGNOSTICS | Age: 65
DRG: 065 | End: 2020-09-03
Payer: MEDICARE

## 2020-09-03 LAB
ANION GAP SERPL CALCULATED.3IONS-SCNC: 14 MMOL/L (ref 7–16)
ANION GAP SERPL CALCULATED.3IONS-SCNC: 22 MMOL/L (ref 7–16)
BUN BLDV-MCNC: 22 MG/DL (ref 8–23)
BUN BLDV-MCNC: 23 MG/DL (ref 8–23)
CALCIUM SERPL-MCNC: 9.5 MG/DL (ref 8.6–10.2)
CALCIUM SERPL-MCNC: 9.7 MG/DL (ref 8.6–10.2)
CHLORIDE BLD-SCNC: 99 MMOL/L (ref 98–107)
CHLORIDE BLD-SCNC: 99 MMOL/L (ref 98–107)
CO2: 18 MMOL/L (ref 22–29)
CO2: 23 MMOL/L (ref 22–29)
CREAT SERPL-MCNC: 0.8 MG/DL (ref 0.7–1.2)
CREAT SERPL-MCNC: 0.8 MG/DL (ref 0.7–1.2)
GFR AFRICAN AMERICAN: >60
GFR AFRICAN AMERICAN: >60
GFR NON-AFRICAN AMERICAN: >60 ML/MIN/1.73
GFR NON-AFRICAN AMERICAN: >60 ML/MIN/1.73
GLUCOSE BLD-MCNC: 144 MG/DL (ref 74–99)
GLUCOSE BLD-MCNC: 150 MG/DL (ref 74–99)
LV EF: 68 %
LVEF MODALITY: NORMAL
MAGNESIUM: 2.1 MG/DL (ref 1.6–2.6)
METER GLUCOSE: 141 MG/DL (ref 74–99)
METER GLUCOSE: 142 MG/DL (ref 74–99)
POTASSIUM SERPL-SCNC: 4 MMOL/L (ref 3.5–5)
POTASSIUM SERPL-SCNC: 4.1 MMOL/L (ref 3.5–5)
SODIUM BLD-SCNC: 136 MMOL/L (ref 132–146)
SODIUM BLD-SCNC: 139 MMOL/L (ref 132–146)
TSH SERPL DL<=0.05 MIU/L-ACNC: 2.28 UIU/ML (ref 0.27–4.2)

## 2020-09-03 PROCEDURE — 97129 THER IVNTJ 1ST 15 MIN: CPT

## 2020-09-03 PROCEDURE — 6360000002 HC RX W HCPCS: Performed by: CLINICAL NURSE SPECIALIST

## 2020-09-03 PROCEDURE — 6370000000 HC RX 637 (ALT 250 FOR IP): Performed by: FAMILY MEDICINE

## 2020-09-03 PROCEDURE — 83735 ASSAY OF MAGNESIUM: CPT

## 2020-09-03 PROCEDURE — 93017 CV STRESS TEST TRACING ONLY: CPT

## 2020-09-03 PROCEDURE — 82962 GLUCOSE BLOOD TEST: CPT

## 2020-09-03 PROCEDURE — A9500 TC99M SESTAMIBI: HCPCS | Performed by: RADIOLOGY

## 2020-09-03 PROCEDURE — 78452 HT MUSCLE IMAGE SPECT MULT: CPT

## 2020-09-03 PROCEDURE — 3430000000 HC RX DIAGNOSTIC RADIOPHARMACEUTICAL: Performed by: RADIOLOGY

## 2020-09-03 PROCEDURE — 93018 CV STRESS TEST I&R ONLY: CPT | Performed by: INTERNAL MEDICINE

## 2020-09-03 PROCEDURE — 2060000000 HC ICU INTERMEDIATE R&B

## 2020-09-03 PROCEDURE — 80048 BASIC METABOLIC PNL TOTAL CA: CPT

## 2020-09-03 PROCEDURE — 97530 THERAPEUTIC ACTIVITIES: CPT

## 2020-09-03 PROCEDURE — APPSS60 APP SPLIT SHARED TIME 46-60 MINUTES: Performed by: CLINICAL NURSE SPECIALIST

## 2020-09-03 PROCEDURE — 6370000000 HC RX 637 (ALT 250 FOR IP): Performed by: INTERNAL MEDICINE

## 2020-09-03 PROCEDURE — 84443 ASSAY THYROID STIM HORMONE: CPT

## 2020-09-03 PROCEDURE — 99223 1ST HOSP IP/OBS HIGH 75: CPT | Performed by: INTERNAL MEDICINE

## 2020-09-03 PROCEDURE — 36415 COLL VENOUS BLD VENIPUNCTURE: CPT

## 2020-09-03 PROCEDURE — 93016 CV STRESS TEST SUPVJ ONLY: CPT | Performed by: INTERNAL MEDICINE

## 2020-09-03 PROCEDURE — 92507 TX SP LANG VOICE COMM INDIV: CPT

## 2020-09-03 RX ORDER — LORAZEPAM 2 MG/ML
1 INJECTION INTRAMUSCULAR PRN
Status: DISCONTINUED | OUTPATIENT
Start: 2020-09-03 | End: 2020-09-04 | Stop reason: HOSPADM

## 2020-09-03 RX ADMIN — ASPIRIN 81 MG: 81 TABLET, COATED ORAL at 08:45

## 2020-09-03 RX ADMIN — PANTOPRAZOLE SODIUM 40 MG: 40 TABLET, DELAYED RELEASE ORAL at 05:31

## 2020-09-03 RX ADMIN — REGADENOSON 0.4 MG: 0.08 INJECTION, SOLUTION INTRAVENOUS at 15:17

## 2020-09-03 RX ADMIN — GLIPIZIDE 5 MG: 5 TABLET ORAL at 06:46

## 2020-09-03 RX ADMIN — CLOPIDOGREL 75 MG: 75 TABLET, FILM COATED ORAL at 08:45

## 2020-09-03 RX ADMIN — Medication 10 MILLICURIE: at 14:32

## 2020-09-03 RX ADMIN — Medication 35 MILLICURIE: at 15:36

## 2020-09-03 NOTE — CARE COORDINATION
[facility] Lia[facility] - [facility] will let JORGE know this afternoon if a bed will likely open for Pt. Pt was accepted, pending bed availability. Son agreed to [facility] if there is no bed available at [facility] here. Discharge Plan is ARU, when medically stable. JORGE/FELICITA to follow for discharge needs.    KENISHA Lee.  [phone]

## 2020-09-03 NOTE — CONSULTS
Inpatient Cardiology Consultation      Reason for Consult:  Preoperative risk assessment    Consulting Physician: Dr. Denae Archuleta    Requesting Physician:  Dr. Alicja Heredia     Date of Consultation: 9/3/2020    History of Present Illness:    Mr. Melly Alonso is a 77year old gentleman who is previously unknown to our practice;  he denies past cardiology evaluation. He was brought to the ER on August 31 after being found with right facial droop and aphasia, and CT of the head confirmed acute left insular stroke. He was medicated with 80 mg of Lipitor, 324 mg of aspirin and 300 mg of clopidogrel. tPA was not given as he was last known well the day prior to presentation. CTA showed severe atherosclerotic stenosis in the proximal left ICA, and vascular surgery was consulted, with plans to wait six weeks prior to surgical intervention. Cardiology consulted was requested for preoperative risk stratification. Currently, Mr. Melly Alonso is resting in bed and in no apparent distress; his son, Elaina To, is present and supplements the history. His speech and right side weakness have improved. He is typically active without chest discomfort or exertional dyspnea, and his son has noticed no decline in functional capacity. Of note, ransthoracic echocardiogram performed September 1 showed EF of 50% with no wall motion abnormalities or significant valve disease or wall motion abnormalities. Past Medical History:    1. Diabetes mellitus: he was prescribed metformin in the past, but did not take it and was trying to control with diet. 2. Tobacco abuse  3. COPD    Medications Prior to Admit:  Prior to Admission medications    Medication Sig Start Date End Date Taking?  Authorizing Provider   albuterol sulfate HFA (PROVENTIL HFA) 108 (90 BASE) MCG/ACT inhaler Inhale 2 puffs into the lungs every 4 hours as needed for Wheezing 3/11/17 3/11/18  Kaushik Muñoz DO       Current Medications:    Current Facility-Administered Medications: glipiZIDE (GLUCOTROL) tablet 5 mg, 5 mg, Oral, QAM AC  clopidogrel (PLAVIX) tablet 75 mg, 75 mg, Oral, Daily  docusate sodium (COLACE) capsule 100 mg, 100 mg, Oral, Nightly  pantoprazole (PROTONIX) tablet 40 mg, 40 mg, Oral, QAM AC  ondansetron (ZOFRAN) injection 4 mg, 4 mg, Intravenous, Q6H PRN  acetaminophen (TYLENOL) tablet 650 mg, 650 mg, Oral, Q4H PRN  aspirin EC tablet 81 mg, 81 mg, Oral, Daily  atorvastatin (LIPITOR) tablet 40 mg, 40 mg, Oral, Nightly  glucose (GLUTOSE) 40 % oral gel 15 g, 15 g, Oral, PRN  dextrose 50 % IV solution, 12.5 g, Intravenous, PRN  glucagon (rDNA) injection 1 mg, 1 mg, Intramuscular, PRN  dextrose 5 % solution, 100 mL/hr, Intravenous, PRN  perflutren lipid microspheres (DEFINITY) injection 1.65 mg, 1.5 mL, Intravenous, ONCE PRN  ipratropium-albuterol (DUONEB) nebulizer solution 1 ampule, 1 ampule, Inhalation, Q4H PRN    Allergies:  Patient has no known allergies. Social History:    He smokes 1 PPD and has been a smoker for 54 years. He denies alcohol or illicit drug use and lives alone. Family History: Mother  at 76 of suspected heart attack  Father's cause of death is unknown   One brother who had MI in his late 46s, and he thinks he had stents placed then. He  in his 62s of cancer  One sister who was killed in MVA at age 25     Review of Systems:     · Constitutional: Denies fatigue, fevers, chills or night sweats  · Eyes: No vision problems prior to admission. He does have some difficulty with vision in his right eye now. · ENT: Denies headaches, bleeding gums, or epistaxis   · Cardiovascular: Denies chest pain, palpitations, or lower extremity swelling. · Respiratory: Denies dyspnea, cough, orthopnea or PND. · Gastrointestinal: Denies nausea, vomiting, diarrhea, abdominal pain, or dark/bloody stools. · Genitourinary: Denies urgency, dysuria or hematuria. · Musculoskeletal: Denies gait disturbance, myalgias or arthralgias.    · Integumentary: Denies rash or ulcerations. · Neurological: Denies dizziness or syncope. Stroke symptoms as above. · Psychiatric: Denies anxiety or depression. · Endocrine: Denies temperature intolerance or excessive thirst.   · Hematologic/Lymphatic: Denies abnormal bruising or bleeding. Physical Exam:  BP (!) 148/67   Pulse 65   Temp 97.2 °F (36.2 °C) (Temporal)   Resp 16   Ht 6' 1\" (1.854 m)   Wt 240 lb (108.9 kg)   SpO2 96%   BMI 31.66 kg/m²   CONST:  Well developed, well nourished gentleman who appears of stated age. Awake, alert and cooperative. No apparent distress. HEENT:   Head- Normocephalic, atraumatic   Neck-  No stridor, jugular venous distention or carotid bruit. CHEST: Chest symmetrical and non-tender to palpation. Respirations unlabored. RESPIRATORY: Breath sounds clear throughout   CARDIOVASCULAR:     Heart Ausculation- Regular rate and rhythm, no murmur. No s3, s4 or rub   PV: No lower extremity edema. No varicosities. Pedal pulses palpable  ABDOMEN: Soft, non-tender to light palpation. Bowel sounds present. No palpable masses  MS: Good muscle strength and tone. No atrophy or abnormal movements. : Deferred  SKIN: Warm and dry   NEURO / PSYCH: Oriented to person, place and time. Follows all commands. Pleasant affect     Telemetry: SR with 1.7 second pause at 9:21 AM on September 2.  Triplet at 4:48 AM this morning   ECG 8/31/2020: SR, 84 with nonspecific ST abnormality       Intake/Output Summary (Last 24 hours) at 9/3/2020 0725  Last data filed at 9/3/2020 0212  Gross per 24 hour   Intake 0 ml   Output --   Net 0 ml       Labs:   CBC:   Recent Labs     08/31/20  1316   WBC 10.4   HGB 16.8*   HCT 49.5        BMP:   Recent Labs     09/02/20  0609 09/03/20  0449    136   K 4.0 4.0   CO2 23 23   BUN 17 22   CREATININE 0.8 0.8   LABGLOM >60 >60   CALCIUM 9.7 9.5     HgA1c:   Lab Results   Component Value Date    LABA1C 10.0 (H) 09/01/2020     PT/INR:   Recent Labs     08/31/20  1316   PROTIME 10. 7   INR 1.0     APTT:  Recent Labs     08/31/20  1316   APTT 35.3*     CARDIAC ENZYMES:  Recent Labs     08/31/20  1316   CKTOTAL 132   TROPONINI <0.01     FASTING LIPID PANEL:  Lab Results   Component Value Date    CHOL 188 09/01/2020    HDL 36 09/01/2020    LDLCALC 128 09/01/2020    TRIG 120 09/01/2020     LIVER PROFILE:  Recent Labs     08/31/20  1316   AST 10   ALT 11   LABALBU 4.1     TTE 9/01/2020 (Dr. Annette Orellana):  Summary   Technically suboptimal and limited study, adequate images limited to  subcostal projection. Left ventricular internal dimensions were normal in diastole and systole. No regional wall motion abnormalities seen. Low normal left ventricular systolic function. EF 50%. No intracardiac shunt via saline contrast injection including valsalva    Carotid ultrasound 9/01/2020: Impression:      50-79% stenosis left ICA based on NASCET criteria (0-49%). No hemodynamically significant stenosis of right internal carotid artery. No evidence for subclavian steal.     MRI brain without contrast 8/31/2020: Impression:       Left middle cerebral artery infarction. CTA neck with contrast 8/31/2020: Impression:   Soft and calcific atherosclerotic plaque results in approximately 90%  stenosis of the origin of the left internal carotid artery. A focus of hypoattenuation in the left middle cerebral artery territory involving the insula, frontal operculum, basal ganglia and external capsule compatible with subacute infarct is once again present as seen on prior CTs earlier today. Cortical atrophy and chronic periventricular microangiopathy.    No other evidence for stenosis, aneurysm or dissection on CTA of the head and neck    CXR 8/31/2020: Impression:      COPD with atelectasis in the lung bases. Assessment and Recommendations to follow by Dr. Louis Aaron.      Electronically signed by CARROLL Bird on 9/3/2020 at 7:25 AM      I have personally seen and evaluated the bowel sounds. Neuro: Full ROM X 4, EOMI, no tremors. EXT: No bilateral lower extremity edema  Skin: warm, dry, intact. Good turgor. Psych: A&O x 3, normal behavior, not anxious. Patient seen and examined. Chart, labs & data reviewed. A:  1. Left MCA acute CVA. 2. Peripheral vascular disease with severe carotid stenosis. 3. Atypical chest discomfort as described above. 4. Smoker/COPD on exam.  5. Diabetes. 6. He did have one ventricular triplet this morning only. Rec:  1. Lexiscan Cardiolite stress test.  2. Check magnesium and TSH. 3. Risk stratification and further recommendations based on the stress test report.     Electronically signed by Charlies Kayser, DO on 9/3/2020 at 10:06 AM

## 2020-09-03 NOTE — PROGRESS NOTES
Occupational Therapy     Date:9/3/2020  Patient Name: Lin Larkin  MRN: [id number]  : 1955  Room: [address] [place] [address]     Completed chart review & attempted to see pt with pt off the unit for testing. Will attempt to see pt at another time. Ekaterina Banegas.  [address] [place] [address], [address] [place] [address]

## 2020-09-03 NOTE — PROGRESS NOTES
Physical Therapy  Treatment Note   Name: Dominick Otto  : 1955  MRN: [id number]    Referring Provider:  Vinod Sanchez DO    Date of Service: 9/3/2020    Evaluating PT:  Tati Odell, PT, DPT [id number]    Room #:  7641/3637-Q  Diagnosis:  Acute CVA  PMHx/PSHx:  DM  Precautions:  Falls, aphasia, impaired multi-step command following  Equipment Needs:  TBD closer to discharge    SUBJECTIVE:    Pt lives alone in a 1 story home with 1 stairs to enter and 0 rail. Bed is on first floor and bath is on first floor. Pt ambulated with no AD independently PTA. Pt worked full time, independent with ADLs, and driving PTA. OBJECTIVE:   Initial Evaluation  Date: 2020 Treatment  Date: 9/3/2020 Short Term/ Long Term   Goals   AM-PAC 6 Clicks     Was pt agreeable to Eval/treatment? yes yes    Does pt have pain? No signs or complaints of pain none    Bed Mobility  Rolling: SBA  Supine to sit: SBA  Sit to supine: SBA  Scooting: SBA SBA all aspects Rolling: Independent  Supine to sit: Independent  Sit to supine: Independent  Scooting: Independent   Transfers Sit to stand: Kaylin  Stand to sit: Kaylin  Stand pivot: Kaylin no AD Sit<>Stand: SBA  Stand pivot: Kaylin no AD Sit to stand: Independent  Stand to sit: Independent  Stand pivot: Independent   Ambulation    50 feet with no AD Kaylin 75 feet no AD Kaylin 300 feet with no AD Independent   Stair negotiation: ascended and descended  NT, unsafe at this time. 4 steps with 2 rails modA 4 steps with 1 rail [place]   ROM BUE:  Per OT note  BLE:  WNL     Strength BUE:  Per OT note  BLE:  WNL     Balance Sitting EOB:  SBA  Dynamic Standing:  Kaylin no AD Sitting EOB: SBA  Dynamic standing: Kaylin no AD Sitting EOB:  Independent  Dynamic Standing:  Independent     Pt is A & O x difficult to assess d/t aphasia. Pt able to state name and location.    Sensation:  Pt denies numbness and tingling to extremities  Edema:  Unremarkable    Patient education  Pt educated on role of PT intervention. Pt educated on safety in room with utilization of call light for assistance with mobility. Patient response to education:   Pt verbalized understanding Pt demonstrated skill Pt requires further education in this area   yes yes yes     ASSESSMENT:    Comments:  RN cleared pt for activity prior to session. Pt received seated in chair and agreeable to PT intervention at this time. Pt performed all functional mobility as noted above. Pt demonstrating improved speech and cognition on this date. Continues to be aphasic and with R sided visual field deficits. At end of session, pt returned to supine and left with all needs met and call light in reach. Pt requires continued skilled PT intervention for the purposes of maximizing functional mobility and independence. Treatment:  Patient practiced and was instructed in the following treatment:     Therapeutic Activities Completed:  o Functional mobility as noted above:   - Bed mobility: SBA all aspects  - Transfer training: SBA sit<>stand and Kaylin stand pivot with no AD. Pt midly unsteady when turning.   - Ambulation: 75 feet with no AD Kaylin. Pt then performed 50 feet x 2 reps of ambulation with head turns to challenge dynamic balance and address R sided visual field deficits. Pt with worsening balance with head turns. o Balance Training: four stage balance test: feet together x 10 seconds, semi-tandem x 10 seconds, full tandem: pt had LOB x 2 attempts. o Skilled repositioning in supine with HOB elevated for comfort.  o Pt education as noted above. PLAN:    Patient is making good progress towards established goals. Will continue with current POC.       Time in  1315  Time out  1330    Total Treatment Time  15 minutes     CPT codes:  [] Gait training 58718 0 minutes  [] Manual therapy 94279 0 minutes  [x] Therapeutic activities 62683 15 minutes  [] Therapeutic exercises 97973 0 minutes  [] Neuromuscular reeducation 52384 0 minutes    Padmini Cortez, PT, DPT  [id number]

## 2020-09-03 NOTE — PROCEDURES
Lexiscan stress report    Indication: Chest pain    He was infused with Lexiscan 0.4 mg bolus followed by currently bolus. Stress ECG: No ischemic changes    Assessment:  Nondiagnostic stress ECG for ischemia.   Perfusion imaging pending

## 2020-09-03 NOTE — CARE COORDINATION
[facility] - Liaison [facility] informed SW that she will accept Pt and bed will be ready Friday. [facility] requested updated OT note. Pt already left unit for stress test.  OT will update note Friday morning. JORGE informed [place] -  [facility] that OT will see Pt in the am d/t Pt leaving floor for Stress test.  [facility] needs therapy notes within 48 hours of admission. JORGE informed Jeferson Marie by phone that Pt was accepted at ARU and would be admitted on Friday. JORGE will call Son and inform about room Number and time of transfer. JORGE/FELICITA to follow for discharge needs.    Gopi Quintanilla, L.S.W.  [phone]

## 2020-09-03 NOTE — PLAN OF CARE
Problem: Falls - Risk of:  Goal: Will remain free from falls  Description: Will remain free from falls  Outcome: Met This Shift  Goal: Absence of physical injury  Description: Absence of physical injury  Outcome: Met This Shift     Problem: ACTIVITY INTOLERANCE/IMPAIRED MOBILITY  Goal: Mobility/activity is maintained at optimum level for patient  Outcome: Met This Shift

## 2020-09-03 NOTE — PROGRESS NOTES
Vascular Surgery Progress Note    Pt is being seen in f/u today regarding Sx L ICA, stroke  Subjective  Pt s/e. Feeling better today. Denies new deficits. Denies sob or cp.     Current Medications:    dextrose        ondansetron, acetaminophen, glucose, dextrose, glucagon (rDNA), dextrose, perflutren lipid microspheres, ipratropium-albuterol    [START ON 9/3/2020] glipiZIDE  5 mg Oral QAM AC    clopidogrel  75 mg Oral Daily    docusate sodium  100 mg Oral Nightly    pantoprazole  40 mg Oral QAM AC    aspirin  81 mg Oral Daily    atorvastatin  40 mg Oral Nightly      PHYSICAL EXAM:    /74   Pulse 55   Temp 96.8 °F (36 °C)   Resp 16   Ht 6' 1\" (1.854 m)   Wt 240 lb (108.9 kg)   SpO2 96%   BMI 31.66 kg/m²   No intake or output data in the 24 hours ending 09/02/20 2231     Gen awake and alert  CVS 1S2  Resp good resp excursion  CN II - XII intact except for right fascial droop  Abd soft nt nd  R UE 3/5  strength, 4/5 flexion extension  L UE 5/5 , flexion, extension  R LE 5/5 DF/PF  L LE 5/5 DF/PF    LABS:    Lab Results   Component Value Date    WBC 10.4 08/31/2020    HGB 16.8 (H) 08/31/2020    HCT 49.5 08/31/2020     08/31/2020    PROTIME 10.7 08/31/2020    INR 1.0 08/31/2020    APTT 35.3 (H) 08/31/2020    K 4.0 09/02/2020    BUN 17 09/02/2020    CREATININE 0.8 09/02/2020     A/P L ICA sx stenosis cva  · Discussed with patient re findings of L ICA stenosis ~ 70%  · cva etiology is likely related to L ICA stenosis  · continue asa plavix statin  I had a long and detailed discussion the patient regarding carotid stenosis   All options, risks and benefits were explained including non-intervention with observation  The risks, benefits, options and alternatives were explained in detail including but not limited to complications of extracranial nerve injury, hoarseness of voice, difficulty swallowing, deviation of the tongue, stroke, paralysis, cardiopulmonary complications and death with surgical intervention  Will discuss with pts son in the am  Plan would be considering stroke volume to wait ~ 6 weeks prior to intervention  Patient understands and consents for surgery. All of their questions were answered.    Consult cardiology for risk stratification    Bridgette Hernandes

## 2020-09-03 NOTE — PROGRESS NOTES
Stress test was negative for ischemia. Low risk for carotid endarterectomy for perioperative ischemic cardiac events.

## 2020-09-03 NOTE — PROCEDURES
[address]                  [address] [facility],  [facility]                              CARDIAC STRESS TEST  PATIENT NAME: Viviana Gipson                       :        1955  MED REC NO:   [id number]                            ROOM:       [facility]  ACCOUNT NO:   [de-identified]                           ADMIT DATE: 2020  PROVIDER:     Hector Ying DO    CARDIOVASCULAR DIAGNOSTIC DEPARTMENT    DATE OF STUDY:  2020    LEXISCAN CARDIOLITE STRESS TEST    RESTING BLOOD PRESSURE:  159/80. RESTING HEART RATE:  60.    TARGET HEART RATE:  N/A. PEAK INFUSION HEART RATE:  57. PEAK INFUSION BLOOD PRESSURE:  136/80. SYMPTOMS:  None. REASON FOR TERMINATION:  Protocol completion. PROTOCOL:  He was infused with Lexiscan 0.4 mg bolus followed by  Cardiolite bolus. BASELINE ECG:  Sinus rhythm, 57 beats per minute. Normal axis. Nonspecific ST-T waves. STRESS ECG:  Sinus rhythm, no dysrhythmias, no significant ST-T wave  changes for ischemia. ASSESSMENT:  1. Nondiagnostic stress ECG for ischemia. 2.  Perfusion imaging is pending.         Brayden Dick DO    D: 2020 15:21:36       T: 2020 15:42:47     [email or web address]  Job#: [id number]     Doc#: [id number]    CC:

## 2020-09-03 NOTE — PROGRESS NOTES
Hospitalist Progress Note      SYNOPSIS:     77 y.o. male who presented to [facility] with past medical history of diabetes, COPD and tobacco dependence. Patient was last known well 2 days ago. Found by son today altered and not able to answer questions. Patient's dysarthria is constant, severe, associated with right-sided weakness and facial droop. Patient is unable to answer any questions due to severe dysarthria. NIH score was 8. Vital signs notable for blood pressure of 142/83. Labs showed hemoglobin of 16.8 glucose 201, troponin is negative and blood alcohol level is negative. Chest x-ray shows COPD/atelectasis, CAT scan of the head shows acute left MCA stroke. CTA of the head and neck showed 90% left ICA stenosis. EKG shows sinus rhythm rate of 84. SUBJECTIVE:    Patient seen and examined  Records reviewed. Cardio consulted for clearance  No complaints      Stable overnight. No other overnight issues reported. Temp (24hrs), Av °F (36.1 °C), Min:96.8 °F (36 °C), Max:97.2 °F (36.2 °C)    DIET: DIET CARB CONTROL; Carb Control: 4 carb choices (60 gms)/meal  CODE: Full Code    Intake/Output Summary (Last 24 hours) at 9/3/2020 1704  Last data filed at 9/3/2020 0212  Gross per 24 hour   Intake 0 ml   Output --   Net 0 ml       OBJECTIVE:    /66   Pulse 63   Temp 96.9 °F (36.1 °C) (Temporal)   Resp 17   Ht 6' 1\" (1.854 m)   Wt 240 lb (108.9 kg)   SpO2 97%   BMI 31.66 kg/m²     General appearance: No apparent distress, appears stated age and cooperative. HEENT:  Conjunctivae/corneas clear. Neck: Supple. No jugular venous distention. Respiratory: Clear to auscultation bilaterally, normal respiratory effort  Cardiovascular: Regular rate rhythm, normal S1-S2  Abdomen: Soft, nontender, nondistended  Musculoskeletal: No clubbing, cyanosis, no bilateral lower extremity edema. Brisk capillary refill. Skin: Appears to have some kind of rash or abrasion on bilateral heels.   Neurologic: awake, alert and following commands; with marked aphasia. Follows simple commands. ASSESSMENT:    Acute left MCA stroke  Carotid artery stenosis, symptomatic  Dysarthria  Uncontrolled type 2 diabetes with an A1c of 10.0  COPD without exacerbation, chronic  Hyperlipidemia  Tobacco dependence    A1c 10.0    PLAN:  Allow for permissive hypertension  Echo unremarkable  Vascular surgery following for future left carotid endarterectomy  Tobacco cessation counseling has been provided again today  I discontinue insulin. Start glipizide and possibly metformin  Case was discussed in detail with patient's son with patient's permission at bedside  For stress test  Consult acute rehab for admission    DISPOSITION: Pending further course, likely rehab if needed    Medications:  REVIEWED DAILY    Infusion Medications    dextrose       Scheduled Medications    glipiZIDE  5 mg Oral QAM AC    clopidogrel  75 mg Oral Daily    docusate sodium  100 mg Oral Nightly    pantoprazole  40 mg Oral QAM AC    aspirin  81 mg Oral Daily    atorvastatin  40 mg Oral Nightly     PRN Meds: LORazepam, ondansetron, acetaminophen, glucose, dextrose, glucagon (rDNA), dextrose, perflutren lipid microspheres, ipratropium-albuterol    Labs:     No results for input(s): WBC, HGB, HCT, PLT in the last 72 hours. Recent Labs     09/01/20  0549 09/02/20  0609 09/03/20  0449    135 136   K 4.2 4.0 4.0   CL 99 98 99   CO2 24 23 23   BUN 17 17 22   CREATININE 0.8 0.8 0.8   CALCIUM 9.4 9.7 9.5       No results for input(s): PROT, ALB, ALKPHOS, ALT, AST, BILITOT, AMYLASE, LIPASE in the last 72 hours. No results for input(s): INR in the last 72 hours. No results for input(s): [place] in the last 72 hours.     Chronic labs:    Lab Results   Component Value Date    CHOL 188 09/01/2020    TRIG 120 09/01/2020    HDL 36 09/01/2020    LDLCALC 128 (H) 09/01/2020    TSH 2.280 09/03/2020    INR 1.0 08/31/2020    LABA1C 10.0 (H) 09/01/2020       Radiology: REVIEWED DAILY    +++++++++++++++++++++++++++++++++++++++++++++++++  Wang [address] [place] [address]  +++++++++++++++++++++++++++++++++++++++++++++++++  NOTE: This report was transcribed using voice recognition software. Every effort was made to ensure accuracy; however, inadvertent computerized transcription errors may be present.

## 2020-09-03 NOTE — PROGRESS NOTES
SURGERY  DAILY PROGRESS NOTE  9/3/2020    Chief Complaint:  Chief Complaint   Patient presents with    Cerebrovascular Accident     found by son this morning, last known well 2 days ago       Subjective:  Pain controlled, tolerating diet    Objective:  BP (!) 148/67   Pulse 65   Temp 97.2 °F (36.2 °C) (Temporal)   Resp 16   Ht 6' 1\" (1.854 m)   Wt 240 lb (108.9 kg)   SpO2 96%   BMI 31.66 kg/m²     GENERAL:  Laying in bed, awake, alert, cooperative, no apparent distress  LUNGS:  No increased work of breathing  CARDIOVASCULAR:  Regular rate   ABDOMEN:  Soft, no tenderness, non distended  EXTREMITIES:  5/5 strength in all extremities except 4/5  strength in LUE    Assessment/Plan:  77 y.o. male s/p L ICA CVA    Continue ASA/Plavix  Will need CEA in about 6 weeks  Consult cardiology for risk stratification    Electronically signed by Ej Connor MD on 9/3/2020 at 6:18 AM    Pt seen and examined  Denies acute issues overnight  No new deficits    Gen awake and alert  CVS 1S2  Resp good resp excursion  CN II - XII intact except for right fascial droop  Abd soft nt nd  R UE    4/5  strength, 4/5 flexion extension  L UE 5/5 , flexion, extension  R LE    5/5 DF/PF  L LE     5/5 DF/PF    A/P L ICA sx stenosis cva  · Discussed with patient re findings of L ICA stenosis ~ 70%  · cva etiology is likely related to L ICA stenosis  · continue asa plavix statin  · Plan would be considering stroke volume to wait ~ 6 weeks prior to intervention  · Patient understands and consents for surgery - L CEA in the future  · Cardiology has deemed pt low risk after stress test    [address] [place] [address]

## 2020-09-03 NOTE — PROGRESS NOTES
Patient was in stress test    I did speak extensively on the phone with his son in regards to his sx carotid disease    · Discussed with patient re findings of L ICA stenosis ~ 70%  · cva etiology is likely related to L ICA stenosis  · continue asa plavix statin  · I had a long and detailed discussion the sont regarding carotid stenosis   · All options, risks and benefits were explained including non-intervention with observation  · The risks, benefits, options and alternatives were explained in detail including but not limited to complications of extracranial nerve injury, hoarseness of voice, difficulty swallowing, deviation of the tongue, stroke, paralysis, cardiopulmonary complications and death with surgical intervention  · Plan would be considering stroke volume to wait ~ 6 weeks prior to intervention    Susie Luong

## 2020-09-04 ENCOUNTER — CARE COORDINATION (OUTPATIENT)
Dept: CASE MANAGEMENT | Age: 65
End: 2020-09-04

## 2020-09-04 ENCOUNTER — HOSPITAL ENCOUNTER (INPATIENT)
Age: 65
LOS: 11 days | Discharge: HOME | DRG: 057 | End: 2020-09-15
Attending: PHYSICAL MEDICINE & REHABILITATION | Admitting: PHYSICAL MEDICINE & REHABILITATION
Payer: MEDICARE

## 2020-09-04 ENCOUNTER — APPOINTMENT (OUTPATIENT)
Dept: CT IMAGING | Age: 65
DRG: 065 | End: 2020-09-04
Payer: MEDICARE

## 2020-09-04 VITALS
WEIGHT: 240 LBS | DIASTOLIC BLOOD PRESSURE: 62 MMHG | RESPIRATION RATE: 20 BRPM | BODY MASS INDEX: 31.81 KG/M2 | HEART RATE: 65 BPM | HEIGHT: 73 IN | OXYGEN SATURATION: 93 % | TEMPERATURE: 98.3 F | SYSTOLIC BLOOD PRESSURE: 121 MMHG

## 2020-09-04 LAB
METER GLUCOSE: 116 MG/DL (ref 74–99)
METER GLUCOSE: 132 MG/DL (ref 74–99)
METER GLUCOSE: 137 MG/DL (ref 74–99)

## 2020-09-04 PROCEDURE — 82962 GLUCOSE BLOOD TEST: CPT

## 2020-09-04 PROCEDURE — 70450 CT HEAD/BRAIN W/O DYE: CPT

## 2020-09-04 PROCEDURE — 92507 TX SP LANG VOICE COMM INDIV: CPT

## 2020-09-04 PROCEDURE — 6370000000 HC RX 637 (ALT 250 FOR IP): Performed by: FAMILY MEDICINE

## 2020-09-04 PROCEDURE — 99222 1ST HOSP IP/OBS MODERATE 55: CPT | Performed by: PHYSICAL MEDICINE & REHABILITATION

## 2020-09-04 PROCEDURE — 1280000000 HC REHAB R&B

## 2020-09-04 PROCEDURE — 6370000000 HC RX 637 (ALT 250 FOR IP): Performed by: PHYSICAL MEDICINE & REHABILITATION

## 2020-09-04 PROCEDURE — 6370000000 HC RX 637 (ALT 250 FOR IP): Performed by: INTERNAL MEDICINE

## 2020-09-04 PROCEDURE — 97535 SELF CARE MNGMENT TRAINING: CPT

## 2020-09-04 PROCEDURE — 99231 SBSQ HOSP IP/OBS SF/LOW 25: CPT | Performed by: PHYSICIAN ASSISTANT

## 2020-09-04 RX ORDER — GLIPIZIDE 5 MG/1
5 TABLET ORAL
Status: DISCONTINUED | OUTPATIENT
Start: 2020-09-05 | End: 2020-09-15 | Stop reason: HOSPADM

## 2020-09-04 RX ORDER — CLOPIDOGREL BISULFATE 75 MG/1
75 TABLET ORAL DAILY
Status: DISCONTINUED | OUTPATIENT
Start: 2020-09-05 | End: 2020-09-15 | Stop reason: HOSPADM

## 2020-09-04 RX ORDER — ACETAMINOPHEN 325 MG/1
650 TABLET ORAL EVERY 6 HOURS PRN
Status: DISCONTINUED | OUTPATIENT
Start: 2020-09-04 | End: 2020-09-15 | Stop reason: HOSPADM

## 2020-09-04 RX ORDER — ASPIRIN 81 MG/1
81 TABLET ORAL DAILY
Status: DISCONTINUED | OUTPATIENT
Start: 2020-09-05 | End: 2020-09-15 | Stop reason: HOSPADM

## 2020-09-04 RX ORDER — ATORVASTATIN CALCIUM 40 MG/1
40 TABLET, FILM COATED ORAL NIGHTLY
Status: DISCONTINUED | OUTPATIENT
Start: 2020-09-04 | End: 2020-09-15 | Stop reason: HOSPADM

## 2020-09-04 RX ORDER — POLYETHYLENE GLYCOL 3350 17 G/17G
17 POWDER, FOR SOLUTION ORAL DAILY PRN
Status: DISCONTINUED | OUTPATIENT
Start: 2020-09-04 | End: 2020-09-15 | Stop reason: HOSPADM

## 2020-09-04 RX ADMIN — ACETAMINOPHEN 650 MG: 325 TABLET, FILM COATED ORAL at 20:44

## 2020-09-04 RX ADMIN — PANTOPRAZOLE SODIUM 40 MG: 40 TABLET, DELAYED RELEASE ORAL at 05:21

## 2020-09-04 RX ADMIN — ATORVASTATIN CALCIUM 40 MG: 40 TABLET, FILM COATED ORAL at 20:44

## 2020-09-04 RX ADMIN — ASPIRIN 81 MG: 81 TABLET, COATED ORAL at 10:16

## 2020-09-04 RX ADMIN — METFORMIN HYDROCHLORIDE 500 MG: 500 TABLET ORAL at 19:56

## 2020-09-04 RX ADMIN — GLIPIZIDE 5 MG: 5 TABLET ORAL at 05:21

## 2020-09-04 RX ADMIN — CLOPIDOGREL 75 MG: 75 TABLET, FILM COATED ORAL at 08:45

## 2020-09-04 ASSESSMENT — PAIN SCALES - GENERAL
PAINLEVEL_OUTOF10: 0
PAINLEVEL_OUTOF10: 3
PAINLEVEL_OUTOF10: 0
PAINLEVEL_OUTOF10: 0

## 2020-09-04 ASSESSMENT — PAIN DESCRIPTION - LOCATION: LOCATION: GENERALIZED

## 2020-09-04 ASSESSMENT — PAIN DESCRIPTION - FREQUENCY: FREQUENCY: INTERMITTENT

## 2020-09-04 ASSESSMENT — PAIN DESCRIPTION - DESCRIPTORS: DESCRIPTORS: ACHING

## 2020-09-04 NOTE — DISCHARGE INSTR - COC
Continuity of Care Form    Patient Name: Vale Hall   :  1955  MRN:  [id number]    Admit date:  2020  Discharge date:  20    Code Status Order: Full Code   Advance Directives:   [facility] Documentation     Date/Time Healthcare Directive Type of Healthcare Directive Copy in [facility] [address] Agent's Name Healthcare Agent's Phone Number    20 1755  No, patient does not have an advance directive for healthcare treatment -- -- -- -- --          Admitting Physician:  Oseas Calabrese DO  PCP: Maritza Galvan MD    Discharging Nurse: [facility] Unit/Room#: 8505/8505-B  Discharging Unit Phone Number: [phone]    Emergency Contact:   Extended Emergency Contact Information  Primary Emergency Contact: Yanet Vallejo  [employer] Phone: [phone]  Relation: Child  Secondary Emergency Contact: Amador Or [address] Phone: [phone]  Relation: Brother/Sister    Past Surgical History:  Past Surgical History:   Procedure Laterality Date    CARDIOVASCULAR STRESS TEST  2020    Lexiscan stress test    HERNIA REPAIR      TONSILLECTOMY         Immunization History: There is no immunization history on file for this patient.     Active Problems:  Patient Active Problem List   Diagnosis Code    Acute ischemic left MCA stroke ([facility]) O51.686    Middle cerebral artery stenosis, left I66.02    Dysarthria R47.1    Uncontrolled type 2 diabetes mellitus with hyperglycemia ([facility]) E11.65    Tobacco dependence F17.200    COPD (chronic obstructive pulmonary disease) ([facility]) J44.9    Stroke due to stenosis of left carotid artery ([facility]) N72.064    Acute CVA (cerebrovascular accident) ([facility] [address] I63.9       Isolation/Infection:   Isolation          No Isolation        Patient Infection Status     None to display          Nurse Assessment:  Last Vital Signs: /63   Pulse 64   Temp 98.4 °F (36.9 °C) (Temporal)   Resp 20 Ht 6' 1\" (1.854 m)   Wt 240 lb (108.9 kg)   SpO2 94%   BMI 31.66 kg/m²     Last documented pain score (0-10 scale): Pain Level: 0  Last Weight:   Wt Readings from Last 1 Encounters:   08/31/20 240 lb (108.9 kg)     Mental Status:  oriented and alert int. Expressive aphasia     IV Access:  - None    Nursing Mobility/ADLs:  Walking   Assisted  Transfer  Assisted  Bathing  Assisted  Dressing  Assisted  Toileting  Assisted  Feeding  Assisted  Med Admin  Assisted  Med Delivery   whole    Wound Care Documentation and Therapy:        Elimination:  Continence:   · Bowel: Yes  · Bladder: Yes  Urinary Catheter: none  Colostomy/Ileostomy/Ileal Conduit: No       Date of Last BM:     Intake/Output Summary (Last 24 hours) at 9/4/2020 0924  Last data filed at 9/4/2020 0331  Gross per 24 hour   Intake 0 ml   Output --   Net 0 ml     No intake/output data recorded. Safety Concerns:     None    Impairments/Disabilities:      Speech    Nutrition Therapy:  Current Nutrition Therapy:   - Oral Diet:  Carb Control 4 carbs/meal (1800kcals/day)    Routes of Feeding: Oral  Liquids: Thin Liquids  Daily Fluid Restriction: no  Last Modified Barium Swallow with Video (Video Swallowing Test): not done    Treatments at the Time of Hospital Discharge:   Respiratory Treatments: ***  Oxygen Therapy:  is not on home oxygen therapy.   Ventilator:    - No ventilator support    Rehab Therapies: Physical Therapy and Occupational Therapy  Weight Bearing Status/Restrictions: No weight bearing restirctions  Other Medical Equipment (for information only, NOT a DME order):  ***  Other Treatments: ***    Patient's personal belongings (please select all that are sent with patient):  None, clothes and shaver    RN SIGNATURE:  Electronically signed by Franca Meyers RN on 9/4/20 at 9:28 AM EDT    CASE MANAGEMENT/SOCIAL WORK SECTION    Inpatient Status Date: ***    Readmission Risk Assessment Score:  Readmission Risk              Risk of Unplanned Readmission:        9           Discharging to Facility/ Agency   · Name:   · Address:  · Phone:  · Fax:    Dialysis Facility (if applicable)   · Name:  · Address:  · Dialysis Schedule:  · Phone:  · Fax:    / signature: {Esignature:[id number]}    PHYSICIAN SECTION    Prognosis: {Prognosis:9787556398}    Condition at Discharge: Carolyn Bryant Patient Condition:414801200}    Rehab Potential (if transferring to Rehab): {Prognosis:4878753855}    Recommended Labs or Other Treatments After Discharge: ***    Physician Certification: I certify the above information and transfer of [place]  is necessary for the continuing treatment of the diagnosis listed and that he requires {Admit to Appropriate Level of Care:76913} for {GREATER/LESS:384562739} 30 days.      Update Admission H&P: {CHP DME Changes in [facility]:465612097}    PHYSICIAN SIGNATURE:  {Esignature:[id number]}

## 2020-09-04 NOTE — PROGRESS NOTES
Patient oriented to room .  Patient Guide reviewed and patient given an explanation of Rights And Responsibilities Important message from medicare signed and copy given to patient  Chucho Teri 9/4/2020 4:14 PM

## 2020-09-04 NOTE — PROGRESS NOTES
Asked by nursing to reevaluate patient for concerns of worsening dysarthria. Son at bedside who also feels his father's speech is more slurred. Per patient when he woke up this am, he noticed his speech was worse. His son also feels his father's L side of face is drooping more    He is now also reporting new numbness in the L arm. On Exam   Mild dysarthria and decreased fluency   Mixed aphasia expressive > receptive   CN II-XII intact except R 7th [facility] paresis   Motor: 5/5 throughout, no drift, no abnormal movements   Sensory: Decreased to LT in L arm   Coordination: No ataxia     Will send down for CT head to assess for any interval change     Continue DAPT and statin and plan for f/u with vascular in near future for CEA     Agueda Al PA-C    Repeat [facility] shows evolution of infarct--no bleed or new findings. BP has been in the 928O-652X systolic. Due to his severe ICA stenosis there is likely a pressure dependent component. Recommend -160. He is not on any antihypertensives. Recommend increasing fluid and salt intake. He is okay for rehab.

## 2020-09-04 NOTE — PROGRESS NOTES
OT BEDSIDE TREATMENT NOTE      Date:2020  Patient Name: Richard Lynn  MRN: [id number]  : 1955  Room: [facility]     Per OT Eval:    Evaluating [facility], OTR/L #[id number]     AM-PAC Daily Activity Raw Score:   Recommended Adaptive Equipment: TBD      Diagnosis: Acute cerebrovascular accident (CVA) ([facility] [address] [I63.9]  Acute cerebrovascular accident (CVA) ([facility] [address] [I63.9]  Acute cerebrovascular accident (CVA) ([facility] [address] [I63.9]  Acute CVA (cerebrovascular accident) ([facility] [address] [I63.9]     Modified Stanton Scale (MRS)  Score     Description  0             No symptoms  1             No significant disability despite symptoms  2             Slight disability; able to look after own affairs  3             Moderate disability; able to ambulate without assist/ requires assist with ADLs  4             Moderate/Severe disability;requires assist to ambulate/assist with ADLs  5             Severe disability;bedridden/incontinent   6               Score:   4  Referring physician: Ashia Garsia DO      Pertinent Medical History: COPD, DM     Precautions:  Falls, aphasic      Home Living: Pt lives alone in 1 floor home. 1 BARRY, 0 handrail   Equipment owned: n/a     Prior Level of Function: independent with ADLs , independent with IADLs; ambulated independently w/o AD  Driving: yes  Occupation:  (full time)  Son provided PLOF/home setup     Pain Level: Pt c/o no pain this session      Cognition: A&O: x 3, difficulty verbally this morning due to aphasia but able to state name, Ed Cerda. Pt was pleasant & cooperative, flat affect, continues to have difficulty with word finding & processing. Asked pt to reach for comb and  another object. Appears to be global aphasia?               Memory: Fair              Sequencing:  fair-              Problem solving:  fair-              Judgement/safety:  fair decreased awareness of his deficits & impulsive with movements                Functional Assessment:    Initial Eval Status  Date: 9/1/20 Treatment Status  Date:  9/4/20 Short Term Goals/LTG  Treatment frequency: 1-4x/wk   Feeding Supervision Supervision Modified [place]   Grooming Stand by Assist  CGA/SBA  Standing at sink, cues to locate items on R side of sink  When cued to  comb, pt picked up toothpaste  Modified [place]    UB Dressing Stand by Assist   Gown while seated EOB SBA  Doff & tiff gown, after cues for initiation  Modified [place]    LB Dressing Minimal Assist  Min A  Simulated pants    SBA with socks  [place] & donning seated in chair   Supervision    Bathing Minimal Assist  Min A  simulated Supervision    Toileting Minimal Assist   NT Supervision    Bed Mobility  Rolling: Stand by Assist   Supine to sit: Stand by Assist   Sit to supine: Stand by Assist   Supervision   Supine to sit  Rolling: Independent   Supine to sit: Modified [place]   Sit to supine: Modified [place]    Functional Transfers Min A  For safety and balance  CGA  Cues for hand placement & safety Supervision   Functional Mobility Min A w/o AD  Household distances  Min/CGA  No AD  In room distances, cues to attend to R side of environment   Supervision   Balance Sitting: SBA  Standing: Min A w/o AD  Sitting: SBA  Standing: CGA/Min     Activity Tolerance Fair  Noted physical and mental fatigue as session progressed  Fair Good   Visual/  Perceptual Glasses: no  R inattention noted during functional ambulation and formal assessment  Unable to further assess d/t cognition    R inattention   Possible visual deficit       Education:  Pt was educated through out treatment regarding proper technique & safety with bed mobility, functional transfers & mobility, increasing awareness of R side, OOB activity & ADL compensatory strategies to ease tasks to improve safety & prevent falls and allow pt to return home safely. Comments: Upon arrival pt was in bed & agreeable to therapy.  At end of session pt seated in

## 2020-09-04 NOTE — PROGRESS NOTES
Pt seen for expressive aphasia. Good accuracy with completion of following multiple step directions. Good completion of phrase completion task. Pt benefited from intermittent verbal, phonemic, and semantic cues when asked to identify an object using multiple modalities. Pt benefited from more time to think before answering. Will continue to be seen.     Nathanael Castillo, Graduate Clinician

## 2020-09-04 NOTE — PROGRESS NOTES
Pt seen for expressive aphasia. Good accuracy with completion of picture identification task. Pt was given several modalities and benefited from min-mod v/c. Pt completed a following sequential directions task with good accuracy. Pt was asked what he would request in a functional scenario. Given min v/c, Pt was able to express his requests with good accuracy. Will continue to be seen.     Jayde Sheikh, Graduate Clinician

## 2020-09-04 NOTE — PROGRESS NOTES
SURGERY  DAILY PROGRESS NOTE  9/4/2020    Chief Complaint:  Chief Complaint   Patient presents with    Cerebrovascular Accident     found by son this morning, last known well 2 days ago       Subjective:  Pain controlled, tolerating diet. Denies any weakness.      Objective:  BP (!) 140/65   Pulse 62   Temp 97.6 °F (36.4 °C) (Temporal)   Resp 18   Ht 6' 1\" (1.854 m)   Wt 240 lb (108.9 kg)   SpO2 95%   BMI 31.66 kg/m²     GENERAL:  Laying in bed, awake, alert, cooperative, no apparent distress  LUNGS:  No increased work of breathing  CARDIOVASCULAR:  Regular rate   ABDOMEN:  Soft, no tenderness, non distended  EXTREMITIES:  5/5 strength in all extremities    Assessment/Plan:  77 y.o. male s/p L ICA CVA    Continue ASA/Plavix and statin  Will need CEA in about 6 weeks  Appreciate cardiology recommendations    Electronically signed by Rupa Oliva MD on 9/4/2020 at 7:17 AM

## 2020-09-04 NOTE — LETTER
PORTABLE PATIENT PROFILE  Adan Wray  [id number]-R    MEDICAL DIAGNOSIS/CONDITION:   Patient Active Problem List   Diagnosis    Acute ischemic left MCA stroke ([facility] [address]    Middle cerebral artery stenosis, left    Dysarthria    Uncontrolled type 2 diabetes mellitus with hyperglycemia (HCC)    Tobacco dependence    COPD (chronic obstructive pulmonary disease) ([facility] [address]    Stroke due to stenosis of left carotid artery (HCC)    Acute CVA (cerebrovascular accident) ([facility] [address]       INSURANCE INFORMATION:  Payor: MEDICARE /  /  /     ADVANCED DIRECTIVES:   Advance Directives (For Healthcare)  Pre-existing DNR Comfort Care/DNR Arrest/DNI Order: No  Healthcare Directive: No, patient does not have an advance directive for healthcare treatment  Information on Healthcare Directives Requested: Yes  Patient Requests Assistance: Yes, advice to complete post discharge  Advance Directives: Documents explained, Documents given  [unfilled]     EMERGENCY CONTACT:       RISK FACTORS:   Social History     Tobacco Use    Smoking status: Current Every Day Smoker     Packs/day: 1.00     Types: Cigarettes    Smokeless tobacco: Never Used   Substance Use Topics    Alcohol use: Yes     Alcohol/week: 1.0 standard drinks     Types: 1 Cans of beer per week     Comment: on occassion       ALLERGIES:  No Known Allergies    IMMUNIZATIONS:  There is no immunization history for the selected administration types on file for this patient. SWALLOWING:   Difficulty Chewing or Swallowing Food: No    VISION AND HEARING:   Sensory Problems  Visual impairment: Glasses    PHYSICIANS INVOLVED WITH CARE:    Fatoumata Jimenes MD  No ref.  provider found  MD Fatoumata Boone MD

## 2020-09-04 NOTE — PROGRESS NOTES
Pt accepted to ARU bed 7715V. Orders placed under misc nursing. Bed will be ready this afternoon after discharges take place.

## 2020-09-04 NOTE — DISCHARGE SUMMARY
Hospitalist Discharge Summary    Patient ID: Ace Fregoso   Patient : 1955  Patient's PCP: Steven Gonzales MD    Admit Date: 2020   Admitting Physician: Nara Vega DO    Discharge Date:  2020  Discharge Physician: Yassine Santos MD   Discharge Condition: Stable  Discharge Disposition: Acute Rehab at [facility] course in brief:  (Please refer to daily progress notes for a comprehensive review of the hospitalization by requesting medical records)    77 y.o. male who presented to [address] [place] [address] with past medical history of diabetes, COPD and tobacco dependence. Patient was last known well 2 days ago. Found by son today altered and not able to answer questions. Patient's dysarthria is constant, severe, associated with right-sided weakness and facial droop. Patient is unable to answer any questions due to severe dysarthria.      NIH score was 8. Vital signs notable for blood pressure of 142/83. Labs showed hemoglobin of 16.8 glucose 201, troponin is negative and blood alcohol level is negative. Chest x-ray shows COPD/atelectasis, CAT scan of the head shows acute left MCA stroke. CTA of the head and neck showed 90% left ICA stenosis. EKG shows sinus rhythm rate of 84. Vascular surgery was consulted for left carotid endarterectomy. Plan for 6 weeks. Cardiology was consulted for preop clearance. Patient had a stress test which was negative and patient was deemed a surgical candidate with low operative risk for perioperative ischemic cardiac events. PT OT evaluations deemed patient a candidate for acute rehab. Patient was discharged in stable condition to inpatient rehab at [facility]    Discharge exam  /63   Pulse 64   Temp 98.4 °F (36.9 °C) (Temporal)   Resp 20   Ht 6' 1\" (1.854 m)   Wt 240 lb (108.9 kg)   SpO2 94%   BMI 31.66 kg/m²   General appearance: No apparent distress, appears stated age and cooperative.   HEENT: Conjunctivae/corneas clear. Neck: Supple. No jugular venous distention. Respiratory: Clear to auscultation bilaterally, normal respiratory effort  Cardiovascular: Regular rate rhythm, normal S1-S2  Abdomen: Soft, nontender, nondistended  Musculoskeletal: No clubbing, cyanosis, no bilateral lower extremity edema. Brisk capillary refill. Skin: Appears to have some kind of rash or abrasion on bilateral heels. Neurologic: awake, alert and following commands; with marked aphasia. Follows simple commands. Consults:   IP CONSULT TO NEUROLOGY  IP CONSULT TO HOSPITALIST  IP CONSULT TO NEUROLOGY  IP CONSULT TO NEUROLOGY  IP CONSULT TO VASCULAR SURGERY  IP CONSULT TO PHYSICAL MEDICINE REHAB  IP CONSULT TO CARDIOLOGY    Discharge Diagnoses:    Acute left MCA stroke  Carotid artery stenosis, symptomatic  Dysarthria  Uncontrolled type 2 diabetes with an A1c of 10.0  COPD without exacerbation, chronic  Hyperlipidemia  Tobacco dependence    A1c 10.0    Discharge Instructions / Follow up:    Continued appropriate risk factor modification of blood pressure, diabetes and serum lipids will remain essential to reducing risk of future atherosclerotic development    Activity: activity as tolerated    Significant labs:  CBC:   No results for input(s): WBC, RBC, HGB, HCT, MCV, RDW, PLT in the last 72 hours. BMP:   Recent Labs     09/02/20  0609 09/03/20  0449    139  136   K 4.0 4.1  4.0   CL 98 99  99   CO2 23 18*  23   BUN 17 23  22   CREATININE 0.8 0.8  0.8   MG  --  2.1     LFT:  No results for input(s): PROT, ALB, ALKPHOS, ALT, AST, BILITOT, AMYLASE, LIPASE in the last 72 hours. PT/INR: No results for input(s): INR, APTT in the last 72 hours. BNP: No results for input(s): BNP in the last 72 hours.   Hgb A1C:   Lab Results   Component Value Date    LABA1C 10.0 (H) 09/01/2020     Folate and B12: No results found for: JTWLDGXC84, No results found for: FOLATE  Thyroid Studies:   Lab Results   Component Value Date    TSH 2.280 2020       Urinalysis:    Lab Results   Component Value Date    NITRU Negative 2020    WBCUA NONE 2020    BACTERIA NONE SEEN 2020    RBCUA NONE 2020    BLOODU Negative 2020    SPECGRAV 1.015 2020    GLUCOSEU >=1000 2020       Imaging:  Ct Head Wo Contrast    Result Date: 2020  Patient MRN:  [id number] : 1955 Age: 77 years Gender: Male Order Date:  2020 1:19 PM Exam: CT HEAD WO CONTRAST Indication:  Evaluate intracranial abnormality Evaluate intracranial abnormality Number of images:  152 Contrast: None. Comparison: None. Technique:  Computed tomography of the head was performed without intravenous contrast. Images were constructed in the axial plane. Multiplanar reformation of the axial images was performed in coronal and sagittal planes. Low-dose CT acquisition technique included one of the following options: (1) automated exposure control;  (2) adjustment of mA and/or kV according to patient's size; or (3) use of iterative reconstruction. Findings: No acute intracranial hemorrhage is seen. There is no intracranial mass effect. There is a small, confluent area of hypoattenuation and loss of gray-white differentiation involving the left frontal operculum, mesial temporal lobe, putamen, and external capsule, suggestive of a recent infarct. There is also a very small cortical area of hypoattenuation in the posterior left parietal lobe (series 2 images 18-19), suggestive of a recent infarct. There is mild generalized cerebral volume loss and associated prominence of the lateral and third ventricles. 1. Recent infarct in the left insula, frontal operculum, basal ganglia, and external capsule, possibly subacute. 2. Possible acute or subacute infarct in the left parietal lobe. 3. No sign of acute intracranial hemorrhage or mass effect.  The major findings were discussed with ED attending Dr. Laura Casey at approximately 1330 hours on 2020. Xr Chest Portable    Result Date: 2020  Patient MRN:  [id number] : 1955 Age: 77 years Gender: Male Order Date:  2020 1:19 PM EXAM: XR CHEST PORTABLE INDICATION:  Possible Stroke 2 images Possible Stroke COMPARISON: 3/11/2017 FINDINGS: Heart and the mediastinal structures are normal. There is COPD with atelectasis in lung bases. There is no focal consolidation or pleural effusion. COPD with atelectasis in the lung bases. Cta Neck W Contrast    Result Date: 2020  Clinical indications: RUIZ alert  RUIZ alert Exposure control: This examination and all examinations utilizing ionizing radiation at this facility done so according to the ALARA (as low as reasonably achievable) principal for radiation dose reduction. TECHNIQUE: Axial, sagittal, and coronal computed tomography of the head and neck was performed following intravenous administration of 100 mL of Isovue-370. 3-D post processing. Three-dimensional reconstructions of the arterial tree. MIP imaging. FINDINGS: Soft and calcific atherosclerotic plaque results in approximately 90% stenosis of the origin of the left internal carotid artery. A focus of hypoattenuation in the left middle cerebral artery territory involving the insula, frontal operculum, basal ganglia and external capsule compatible with subacute infarct is once again present as seen on prior CTs earlier today. There is mild prominence of ventricles, sulci and cisterns bilaterally. There is mild hypoattenuation in the periventricular deep white matter bilaterally. There is calcific and soft plaque elsewhere within the distal common and proximal internal carotid arteries bilaterally and the bilateral carotid siphons. The brain parenchyma is otherwise unremarkable. There is no evidence for mass or lymphadenopathy within the neck. There is mild mucosal thickening within the paranasal sinuses but no fluid levels are evident.  There are degenerative changes of the carotid arteries, vertebral arteries, the internal carotid arteries, the external carotid arteries or their respective visualized branches. Soft and calcific atherosclerotic plaque results in approximately 90% stenosis of the origin of the left internal carotid artery. A focus of hypoattenuation in the left middle cerebral artery territory involving the insula, frontal operculum, basal ganglia and external capsule compatible with subacute infarct is once again present as seen on prior CTs earlier today. Cortical atrophy and chronic periventricular microangiopathy. No other evidence for stenosis, aneurysm or dissection on CTA of the head and neck. Mri Brain Wo Contrast    Result Date: 2020  Diffusion-weighted axial imaging of the brain is compared to multiple CTs, CTAs and CT brain perfusion studies obtained earlier today. CLINICAL INDICATION: Left middle cerebral artery infarction. COMPARISON: CT brain 2020. CTA brain 2020. CT brain perfusion 2020. FINDINGS: As seen on the recent CT scans, a region of increased signal intensity on the diffusion weighted sequence measuring approximately 3.5 x 3.0 cm is centered within the left insula, frontal operculum, basal ganglia and external capsule. There are smaller regions of diffusion restriction elsewhere within the left frontal, parietal and occipital lobes. Left middle cerebral artery infarction. Nm Cardiac Stress Test Nuclear Imaging    Result Date: 9/3/2020  Patient MRN:  [id number] : 1955 Age: 77 years Gender: Male Order Date:  9/3/2020 2:06 PM EXAM: NM MYOCARDIAL SPECT REST EXERCISE OR RX Number of Images: 9 imaging sequence and composite views INDICATION: Preoperative evaluation, smoker, type II diabetic, COPD, CVA COMPARISON: None TECHNIQUE: 10.8  mCi of Tc-99m MIBI was injected intravenously at rest and cardiac SPECT images were performed.  In addition, 35 mCi of Tc-99m MIBI was injected intravenously at maximum stress by using Lexiscan chemical stress (0.4 mg/5 mL). Stress SPECT images and gated study were performed. FINDINGS: Reported vital signs were 159/80 mmHg and 60 BPM at baseline, and feet measurements were 156/80 mmHg, and 94 BPM. Three-minute resting/recovery vital signs were reported as 136/80 mmHg and 82 bpm. Initial  tomographic images show no significant motion artifact. Perfusion images demonstrate no reversible perfusion defect. Subtle diminished activity in the inferior wall near the base appears fixed, and could be in part due to diaphragmatic attenuation artifact. Wall motion is within normal limits. The end diastolic volume is 66 ml. The end systolic volume is 21 ml. The estimated ejection fraction is 68 %. 1. No reversible perfusion defect 2. Ejection fraction is 68 %. 3. No significant wall motion abnormality     Us Carotid Artery Bilateral    Result Date: 9/1/2020  Real-time and Doppler sonography of the carotid and vertebrobasilar arterial systems. Grayscale, color Doppler, and spectral Doppler analysis. [facility] FINDINGS: Peak systolic velocity of the left internal carotid artery is 175 cm/s. Peak systolic velocity of left common carotid artery is 84 cm/s. The ratio of ICA to CCA peak systolic velocities on the left is 2.1. The Excelling velocity of right internal carotid artery is 84 cm/s. Peak systolic velocity right common carotid artery is 87 cm/s. Ratio of ICA to CCA peak systolic velocities on the right is 1.0. There is antegrade flow within the vertebral arteries bilaterally. There is atherosclerotic plaque within the distal common and proximal internal carotid arteries bilaterally. There is no visible evidence for hemodynamically significant stenosis. Antegrade flow is seen within the vertebral arteries bilaterally. Evaluation of peak systolic velocities and ratios of ICA and CCA reveals no numeric evidence for hemodynamically significant stenosis.  Peak systolic velocity of ICA to CCA on the right is 0.6, on the left, 0.5.     50-79% stenosis left ICA based on NASCET criteria (0-49%). No hemodynamically significant stenosis of right internal carotid artery. No evidence for subclavian steal.      Discharge Medications:      Medication List      ASK your doctor about these medications    albuterol sulfate  (90 Base) MCG/ACT inhaler  Commonly known as:  Proventil HFA  Inhale 2 puffs into the lungs every 4 hours as needed for Wheezing     metFORMIN 1000 MG tablet  Commonly known as:  GLUCOPHAGE            Time Spent on discharge is more than 35 minutes in the examination, evaluation, counseling and review of medications and discharge plan.    +++++++++++++++++++++++++++++++++++++++++++++++++  [place], [place]  +++++++++++++++++++++++++++++++++++++++++++++++++  NOTE: This report was transcribed using voice recognition software. Every effort was made to ensure accuracy; however, inadvertent computerized transcription errors may be present.

## 2020-09-04 NOTE — PROGRESS NOTES
Mary Gaitan [place] with Neuro about Dr. Dimple Rojas is requesting for her to evaluate him again because son is complaining he has slurred speech now.

## 2020-09-05 LAB
ANION GAP SERPL CALCULATED.3IONS-SCNC: 13 MMOL/L (ref 7–16)
BASOPHILS ABSOLUTE: 0.03 E9/L (ref 0–0.2)
BASOPHILS RELATIVE PERCENT: 0.4 % (ref 0–2)
BUN BLDV-MCNC: 29 MG/DL (ref 8–23)
CALCIUM SERPL-MCNC: 9.5 MG/DL (ref 8.6–10.2)
CHLORIDE BLD-SCNC: 100 MMOL/L (ref 98–107)
CO2: 22 MMOL/L (ref 22–29)
CREAT SERPL-MCNC: 1 MG/DL (ref 0.7–1.2)
EOSINOPHILS ABSOLUTE: 0.09 E9/L (ref 0.05–0.5)
EOSINOPHILS RELATIVE PERCENT: 1.3 % (ref 0–6)
GFR AFRICAN AMERICAN: >60
GFR NON-AFRICAN AMERICAN: >60 ML/MIN/1.73
GLUCOSE BLD-MCNC: 132 MG/DL (ref 74–99)
HCT VFR BLD CALC: 53.8 % (ref 37–54)
HEMOGLOBIN: 18 G/DL (ref 12.5–16.5)
IMMATURE GRANULOCYTES #: 0.03 E9/L
IMMATURE GRANULOCYTES %: 0.4 % (ref 0–5)
LYMPHOCYTES ABSOLUTE: 2.05 E9/L (ref 1.5–4)
LYMPHOCYTES RELATIVE PERCENT: 28.5 % (ref 20–42)
MCH RBC QN AUTO: 29.2 PG (ref 26–35)
MCHC RBC AUTO-ENTMCNC: 33.5 % (ref 32–34.5)
MCV RBC AUTO: 87.2 FL (ref 80–99.9)
METER GLUCOSE: 113 MG/DL (ref 74–99)
METER GLUCOSE: 128 MG/DL (ref 74–99)
MONOCYTES ABSOLUTE: 0.69 E9/L (ref 0.1–0.95)
MONOCYTES RELATIVE PERCENT: 9.6 % (ref 2–12)
NEUTROPHILS ABSOLUTE: 4.31 E9/L (ref 1.8–7.3)
NEUTROPHILS RELATIVE PERCENT: 59.8 % (ref 43–80)
PDW BLD-RTO: 13.8 FL (ref 11.5–15)
PLATELET # BLD: 152 E9/L (ref 130–450)
PMV BLD AUTO: 11.3 FL (ref 7–12)
POTASSIUM REFLEX MAGNESIUM: 4.5 MMOL/L (ref 3.5–5)
RBC # BLD: 6.17 E12/L (ref 3.8–5.8)
SODIUM BLD-SCNC: 135 MMOL/L (ref 132–146)
WBC # BLD: 7.2 E9/L (ref 4.5–11.5)

## 2020-09-05 PROCEDURE — 85025 COMPLETE CBC W/AUTO DIFF WBC: CPT

## 2020-09-05 PROCEDURE — 36415 COLL VENOUS BLD VENIPUNCTURE: CPT

## 2020-09-05 PROCEDURE — 92523 SPEECH SOUND LANG COMPREHEN: CPT

## 2020-09-05 PROCEDURE — 97530 THERAPEUTIC ACTIVITIES: CPT

## 2020-09-05 PROCEDURE — 92507 TX SP LANG VOICE COMM INDIV: CPT

## 2020-09-05 PROCEDURE — 6370000000 HC RX 637 (ALT 250 FOR IP): Performed by: FAMILY MEDICINE

## 2020-09-05 PROCEDURE — 80048 BASIC METABOLIC PNL TOTAL CA: CPT

## 2020-09-05 PROCEDURE — 99232 SBSQ HOSP IP/OBS MODERATE 35: CPT | Performed by: PHYSICAL MEDICINE & REHABILITATION

## 2020-09-05 PROCEDURE — 97162 PT EVAL MOD COMPLEX 30 MIN: CPT

## 2020-09-05 PROCEDURE — 92610 EVALUATE SWALLOWING FUNCTION: CPT

## 2020-09-05 PROCEDURE — 97166 OT EVAL MOD COMPLEX 45 MIN: CPT

## 2020-09-05 PROCEDURE — 97535 SELF CARE MNGMENT TRAINING: CPT

## 2020-09-05 PROCEDURE — 1280000000 HC REHAB R&B

## 2020-09-05 PROCEDURE — 82962 GLUCOSE BLOOD TEST: CPT

## 2020-09-05 RX ADMIN — ATORVASTATIN CALCIUM 40 MG: 40 TABLET, FILM COATED ORAL at 19:48

## 2020-09-05 RX ADMIN — ASPIRIN 81 MG: 81 TABLET, COATED ORAL at 08:35

## 2020-09-05 RX ADMIN — METFORMIN HYDROCHLORIDE 500 MG: 500 TABLET ORAL at 15:56

## 2020-09-05 RX ADMIN — METFORMIN HYDROCHLORIDE 500 MG: 500 TABLET ORAL at 08:35

## 2020-09-05 RX ADMIN — CLOPIDOGREL 75 MG: 75 TABLET, FILM COATED ORAL at 08:35

## 2020-09-05 RX ADMIN — GLIPIZIDE 5 MG: 5 TABLET ORAL at 06:12

## 2020-09-05 ASSESSMENT — PAIN SCALES - GENERAL
PAINLEVEL_OUTOF10: 0

## 2020-09-05 NOTE — PROGRESS NOTES
Date:2020  Patient Name: Greer Styles  MRN: [id number]  : 1955  Room: [address] [place] [address]    Discharge Recommendations: to home     Frequency / Duration: BID 5-7 tx/wk; 1-2 weeks  Recommended Adaptive Equipment:        Diagnosis: CVA  Surgery: none   Past Medical History: COPD, DM  HPI: Pt admitted 20 after being found by son at home with AMS, unable to answer questions. Right sided weakness and facial droop noted. MRI showed acute left MCA.  CTA of the head and neck showed 90% left ICA stenosis. Per Vascular, he will need CEA in about 6 weeks  Precautions: falls, aphasia(expressive and receptive), TSM, bed alarm      Home Living: Pt lives alone in a 1 story with 1 steps to enter and 0 rail(s); bed/bath on 1st  Bathroom setup: tub shower  Equipment owned: none    Prior Level of Function: indep with ADLs;  indep with IADLs. No AD for ambulation. Driving: yes  Occupation: full time      Pain Level:   Reports 0/10 pain     Cognition:   alert, oriented to self, month, year, Pt unable to verbalize location upon request, improved with verbal and visual prompts,   Communication: receptive and expressive aphasia    memory: fair  Problem solving: fair-  Judgement/safety: fair  decreased awareness of his deficits & impulsive with movements  Comments: bed alarm, TSM, requires repeated directions to process info d/t  aphasia    Vision/Perception  Hearing: wfl  Vision: appears grossly intact ; Glasses: yes [] no [] reading [x]  Perception: appears to have mild impairment as noted during ADL performance. May benefit from further testing.     UE Assessment:  Hand Dominance: right    Rt UE ROM: Within Functional Limits  Rt UE Strength: 4/5  Rt  strength: wfl  Coordination: wfl    Lt UE ROM: Within Functional Limits  Lt UE Strength: 4/5   Lt  Strength: wfl  Coordination: wfl    Sensation: NT, no c/o numbness in hands/feet, He did c/o numbness in lower back area (chronic?)  Tone: wfl  Edema: none Functional Assessment:     Bed mobility: supervision      Commode Transfer: NT     Tub / Shower Transfer: NT      Function Mobility: SBA with no AD    Sit Balance: indep  Stand Balance: SBA    Activities of Daily Living/IADL:     Feeding: supervision     Grooming/oral hygiene: SBA/CGA standing at sink, cues to locate items, sequence     Bathing: SBA UE bathing while standing next to sink, SBA LE simulated seated EOB     UB Dressing: SBA to doff/don pull over shirt     LB Dressing: min A to don belt on pants and for fastening           Footware: SBA to doff/don socks, tennis shoes,      Toileting: SBA to stand at toilet to urinate. Adjust belt on pants, cues for hand hygiene at sink. Homemaking: min A and cues for item retrieval and location in room and on floor. Endurance: fair+ with ADL activity    Patient / Family Goal:   Not stated    Rehab potential:   good       OT for functional assessment of  ADL, Functional Transfer/Mobility Training, Equipment Needs, Energy Conservation Techniques, Pt/Family Education, Ther Ex., endurance and balance ex. OT role and POC reviewed. Patient  demo fair understanding of instructions. Requires repeated verbal cues and visual prompts for improved comprehension.  .    Long Term Goals: 2 weeks   indep UE ADL- feeding, grooming, bathing, dressing  indep LE ADL- bathing, dressing  indep functional transfers to commode, tub shower  indep functional mobility/standing balance during ADL/IADLs with no AD  pt to demo good judgement, safety, problem solving and sequencing during ADLs and IADLs  indep IADL to return home to indep living    Assessment of current deficits   Functional mobility [x]  ROM [] Strength [x]  Cognition [x]  ADLs [x]   IADLs [x] Safety Awareness [x] Endurance [x]  Fine Motor Coordination [x] Balance [x] Vision/perception [x] Sensation [x]   Gross Motor Coordination [x]      Plan of Care:   ADL retraining [x]   Equipment needs [x]   Neuromuscular re-education [] Energy Conservation Techniques [x]  Functional Transfer training [x] Patient and/or Family Education [x]  Functional Mobility training [x]  Environmental Modifications [x]  Cognitive re-training [x]   Compensatory techniques for ADLs [x]  Splinting Needs []   Positioning/joint protection []  Other: []       Treatment/ Education Provided: Pt was educated through out treatment regarding proper technique & safety with bed mobility, functional transfers & mobility, increasing awareness of R side, OOB activity & ADL compensatory strategies to ease tasks to improve safety & prevent falls and allow pt to return home safely. Evaluation time: In: 56   Out: 0805  Treatment Time In: 0805         Treatment Time Out: 0830         Treatment Charges: Mins Units   Ther Ex  [id number]       Manual Therapy 53556       Thera Activities [id number]     ADL/Home Mgt 00188 25 2   Neuro Re-ed [id number]       Group Therapy        Orthotic manage/training  87117       Non-Billable Time       Total Timed Treatment 25 2      Total individual Tx Time: 35   Time in Time out   Evaluation + Tx Session [id number]     · Eval Complexity: Mod Complexity  · History: Expanded review of medical records and additional review of physical, cognitive, or psychosocial history related to current functional performance  · Exam: 5+ performance deficits  · Assistance/Modification: min assistance or modifications required to perform tasks. May have comorbidities that affect occupational performance.       [place], [place]  [id number]

## 2020-09-05 NOTE — PROGRESS NOTES
Physical Therapy    Facility/Department: [facility] 5SE REHAB  Initial Assessment    NAME: Princess Botello  : 1955  MRN: [id number]    Date of Service: 2020    Evaluating Therapist: Bill Meza PT, DPT    ROOM: [facility]  DIAGNOSIS: CVA  PRECAUTIONS: falls, aphasia (mixed; expressive>receptive), L peripheral visual field deficit  HPI: Pt admitted 20 after being found by son at home with AMS, unable to answer questions. Right sided weakness and facial droop noted. MRI showed acute left MCA. CTA of the head and neck showed 90% left ICA stenosis. Per Vascular, he will need CEA in about 6 weeks. Pt lives alone in a 1 story home with 1 stairs to enter and 0 rail. Bed is on first floor and bath is on first floor. Pt ambulated with no AD independently PTA. Pt worked full time, independent with ADLs, and driving PTA. Initial Evaluation  20 AM     PM    Short Term Goals Long Term Goals    Was pt agreeable to Eval/treatment? yes       Does pt have pain?  yes       Bed Mobility  Rolling: Supervision  Supine to sit: Supervision  Sit to supine: Supervision  Scooting: Supervision   Mod I Independent   Transfers Sit to stand: SBA  Stand to sit: SBA  Stand pivot: SBA   Supervision Independent   Ambulation   300 feet x 2 reps without AD with SBA   600 feet without AD with Supervison 1000 feet without AD with Supervision   Walking 10 feet on uneven surface 10 feet without AD with SBA   10 feet without AD with Supervision 10 feet without AD with Supervision   Wheel Chair Mobility NA       Car Transfers SBA   Supervision Supervision    Stair negotiation: ascended and descended  12 steps with 1 rail with SBA   16 steps with 1 rail with Supervision 20 steps without rail with Supervision   Curb Step:   ascended and descended 7.5 inch step without AD and SBA   7.5 inch step with out AD and Supervision 7.5 inch step without AD and Supervision   Picking up object off the floor Picked up 4lb weight with SB assist   Will pick days.    Time in: 0845  Time out: [id number] [place], [place], DPT  [id number]

## 2020-09-05 NOTE — PROGRESS NOTES
Speech Language Pathology  ACUTE REHABILITATION--DAILY PROGRESS NOTE            FIMS    FIMS SCORES      Swallowing    Current Status  7--Independent    Short Term Goal Met    Long Term Goal Met     Receptive    Current Status  3--Moderate Assistance    Short Term Goal 4--Minimal Assistance    Long Term Goal 5--Supervision     Expressive    Current Status  3--Moderate Assistance    Short Term Goal 4--Minimal Assistance    Long Term Goal 5--Supervision     Social Interaction    Current Status  3--Moderate Assistance    Short Term Goal 4--Minimal Assistance    Long Term Goal 5--Supervision         Problem Solving    Current Status  3--Moderate Assistance    Short Term Goal 4--Minimal Assistance    Long Term Goal 5--Supervision      Memory    Current Status  3--Moderate Assistance    Short Term Goal 4--Minimal Assistance    Long Term Goal 5--Supervision                      SWALLOWING:      Diet:  Regular consistency solids with thin liquids     Patient consumed breakfast without difficulty. Not being followed for swallow therapy. LANGUAGE:     Required repetition of most questions and stimulus items x2. Increased accuracy of responses when questions are phrased in a yes/no format; however these responses were still inconsistent. He required cueing to recite the days of the week and the months of the year. He would list 2-3 correctly and then skip. He benefits from multiple repetitions. He completed basic level open ended carrier phrases on 6/10 attempts. He was able to name some items around the room (pen, cup, paper), but unable to name television, clock, and window. COGNITION:       Alert and grossly oriented to person and date. He selected the correct place given a choice of 2. Safety:  fair    SPEECH:     Good intelligibility; however speech is characterized with neologisms and paraphasic errors. SP recommended after discharge:   To be determined     Will continue SP intervention as per previously established POC.     Minute Tracking:    Individual therapy:   15 minutes  Concurrent therapy:    0 minutes  Group therapy:     0 minutes  Co-treatment therapy:    0 minutes    Total minutes for 9/5/2020: 15 minutes      [address] [place], [place] [address]Saurav [address]  9/5/2020

## 2020-09-05 NOTE — PROGRESS NOTES
SPEECH/LANGUAGE PATHOLOGY  CLINICAL ASSESSMENT OF SWALLOWING FUNCTION    PATIENT NAME:  Trice Mills      :  1955      TODAY'S DATE:  2020  ROOM:  [address] [place] [address]    SUMMARY OF EVALUATION     DYSPHAGIA DIAGNOSIS:  Within functional limits      DIET RECOMMENDATIONS: Regular consistency solids with  thin liquids     FEEDING RECOMMENDATIONS:     Assistance level:  no assistance needed      Compensatory strategies recommended:compensatory strategies are not recommended at this time    THERAPY RECOMMENDATIONS:      Dysphagia therapy is not recommended                  PROCEDURE     Consistencies Administered During the Evaluation   Liquids: Thin   Solids:  Pureed, solids      Method of Intake:   Straw, spoon  Self fed, fed by clinician    Position:   Upright seated                  RESULTS     Oral Stage: The oral stage of swallowing was within functional limits      Pharyngeal Stage:      No signs of aspiration were noted during this evaluation however, silent aspiration cannot be ruled out at bedside. If silent aspiration is suspected, a Videofluoroscopic Study of Swallowing (MBS) is recommended and requires a physician order. The Speech Language Pathologist (SLP) completed education with the patient regarding results of evaluation. Explained that Speech Pathology intervention is not warranted at this time      CPT code:  28907  bedside swallow eval        [x]The admitting diagnosis and active problem list, as listed below have been reviewed prior to initiation of this evaluation.      ADMITTING DIAGNOSIS: Acute CVA (cerebrovascular accident) [facility]) [I63.9]     ACTIVE PROBLEM LIST:   Patient Active Problem List   Diagnosis    [facility] left [facility] stroke [facility]    Middle cerebral artery stenosis, left    Dysarthria    Uncontrolled type 2 diabetes mellitus with hyperglycemia (HCC)    Tobacco dependence    COPD (chronic obstructive pulmonary disease) ([facility]    Stroke due to stenosis of left carotid artery ([facility]    Acute [facility]cerebrovascular accident) [facility])       [facility] [address]  9/5/2020

## 2020-09-05 NOTE — FLOWSHEET NOTE
09/05/20 1348   Encounter Summary   Services provided to: Patient and family together   Referral/Consult From: [facility] Family members   Continue Visiting   (9/5)   Complexity of Encounter High   Spiritual Assessment Completed Yes   Advance Care Planning Yes   Routine   Type Follow up   Spiritual/Restorationism   Type Spiritual support   Assessment Approachable;Coping   Intervention Explored feelings, thoughts, concerns; Active listening;Discussed belief system/Jainism practices/virgilio;Discussed illness/injury and it's impact; Discussed meaning/purpose   Outcome Expressed gratitude;Engaged in conversation;Expressed feelings/needs/concerns   Advance Directives (For Healthcare)   Healthcare Directive No, patient does not have an advance directive for healthcare treatment   Information on Healthcare Directives Requested Yes   Patient Requests Assistance Yes, advice to complete post discharge   Advance Directives Documents explained;Documents given     Healthcare directive documents [facility]. At this time he would not be able to fill out. Michael's son had concerns regarding paying bills and so fourth which I let him know that documents that I gave would be for medical care. They were thankful for the support.

## 2020-09-05 NOTE — H&P
History and Physical Exam    Date of Admission:  9/4/2020  Primary Care Physician: Larry Alba MD  Attending Physician:  Sandrita Díaz MD      Chief Complaint:   Impairments and acitivities limitations in ADLs, mobility, functional communication, cognition secondary to acute CVA. History of Present Illness:  Henrietta Fowler is a 77 y.o. male who presented to [facility] on 8/31/2020 after he was found by son with right facial droop, aphasia, and confusion, after not showing up to work that morning. He was out of the window for tPA. Head CT revealed acute left MCA stroke. CTA of head/neck showed 90% left ICA stenosis. Carotic US showed 70% stenosis of the left ICA. He was evaluated by Neurology and started on DAPT and statin. Post stroke workup completed. Brain MRI showed left MCA infarct. He was evaluated by Vascular surgery who recommended left CEA in approximately 6 weeks.  He participated in therapy evaluations on acute care and is now admitted for the acute inpatient rehab program.       Histories:   Past Medical History:   Past Medical History:   Diagnosis Date    Diabetes mellitus ([facility]     Stroke due to stenosis of left carotid artery ([facility] 9/2/2020      Past Surgical History:    Past Surgical History:   Procedure Laterality Date    CARDIOVASCULAR STRESS TEST  09/03/2020    Lexiscan stress test    HERNIA REPAIR      TONSILLECTOMY             Family History:   No pertinent history reported     Social History:   Social History     Socioeconomic History    Marital status:      Spouse name: Not on file    Number of children: Not on file    Years of education: Not on file    Highest education level: Not on file   Occupational History    Not on file   Social Needs    Financial resource strain: Not on file    Food insecurity     Worry: Not on file     Inability: Not on file    Transportation needs     Medical: Not on file     Non-medical: Not on file   Tobacco Use    Smoking status: Current Every Day Smoker     Packs/day: 1.00     Types: Cigarettes    Smokeless tobacco: Never Used   Substance and Sexual Activity    Alcohol use: No    Drug use: No    Sexual activity: Not on file   Lifestyle    Physical activity     Days per week: Not on file     Minutes per session: Not on file    Stress: Not on file   Relationships    Social connections     Talks on phone: Not on file     Gets together: Not on file     Attends Yazidism service: Not on file     Active member of club or organization: Not on file     Attends meetings of clubs or organizations: Not on file     Relationship status: Not on file    Intimate partner violence     Fear of current or ex partner: Not on file     Emotionally abused: Not on file     Physically abused: Not on file     Forced sexual activity: Not on file   Other Topics Concern    Not on file   Social History Narrative    Not on file       Home Environment:  Social supports: Lives alone. Son lives locally. Home situation: Lives in 1 level home with 1 step to enter and no handrail     Working full time as a      Functional Status:        Premorbid ADL's: independent   Premorbid Mobility: independent     Current Functional Status:    Physical Therapy:  Bed mobility: SBA  Transfers: Min A  Ambulation: 50 ft no AD Min A     Occupational Therapy:  Feeding: Supervision  Grooming: CGA/SBA  UB dressing: SBA  LB dressing: Min A    Medications    Scheduled Meds:  [START ON 9/5/2020] aspirin, 81 mg, Daily  atorvastatin, 40 mg, Nightly  [START ON 9/5/2020] clopidogrel, 75 mg, Daily  [START ON 9/5/2020] glipiZIDE, 5 mg, QAM AC  metFORMIN, 500 mg, BID WC        PRN Meds:  acetaminophen, 650 mg, Q6H PRN  polyethylene glycol, 17 g, Daily PRN          Drug Allergies   Patient has no known allergies. Review of Systems   Constitutional:  No fevers/chills. Eye:  Negative for vision changes. Ear/Nose/Mouth/Throat:  Negative for hearing changes.     Respiratory:  No SOB, No cough. Cardiovascular:  No CP, No palpitations. Gastrointestinal:  No nausea, No vomiting   Genitourinary:  no dysuria. Hematology/Lymphatics:  Negative. Endocrine:  Negative. Musculoskeletal:  Per HPI  Integumentary:  No rashes. Neurologic:  Per HPI  Psychiatric:  No anxiety, No depression. Physical Examination:   Vitals:    09/04/20 1616   BP: (!) 150/90   Pulse: 62   Resp: 20   Temp: 98.9 °F (37.2 °C)   TempSrc: Temporal   Weight: 197 lb 7 oz (89.6 kg)   Height: 6' 3\" (1.905 m)          GEN: NAD, sitting up at bedside    HEENT: NC/AT, PERRL, EOMI  RESP: CTAB, no R/R/W  CV: +S1 S2, RRR  ABD: soft, NT, ND, normal BS   EXT: No erythema/clubbing/cyanosis, 2+ pulses  Skin: no rash  Psych: pleasant mood and affect      Neuro Exam:  Alert, oriented to person, to month, to place. Not oriented to date.      -Speech clear, no dysarthria  -Expressive > receptive aphasia is present  -Perseverative  -Follows 1 step commands  -Recall impaired, 0/3 at 3 min  -slow processing  -impaired attention/concentration      Cranial Nerves:  I: olfactory not tested  II  visual fields grossly intact   III, IV, VI: extra occular muscles intact  Pupils (II, III): ERRL  V: Facial Sensation/Jaw clench: intact  VII: Facial Motor: right facial droop  VIII. Hearing: intact  XI/X: Palate: intact  XI: Shoulder shrug/SCM:intact  XII: Tongue: intact        Coordination:  F - N: slow on right, not ataxic      Sensory:    LT: intact                       Motor:   Tone was normal     Motor Findings  Strength: Right  Strength: Left    Deltoid  5/5  5/5    Biceps  5/5  5/5    Triceps  5/5  5/5    Wrist Extensors  5/5  5/5    FDP 5-/5 5/5   Finger abductors 5-/5 5/5   Iliospoas  5/5  5/5    Quadriceps  5/5  5/5    Tibialis anterior  5/5  5/5    EHL 5/5 5/5   Gastroc soleus  5/5  5/5          DTRs:  Reflex Right Left   Biceps 1+ 1+   Triceps 1+ 1+   Brachioradialis 1+ 1+   Patella 2+ 2+   Achilles  1+ 1+      No Babinski  No Denny's         Laboratory:    Lab Results   Component Value Date    WBC 10.4 08/31/2020    HGB 16.8 (H) 08/31/2020    HCT 49.5 08/31/2020    MCV 89.5 08/31/2020     08/31/2020     Lab Results   Component Value Date     09/03/2020     09/03/2020    K 4.0 09/03/2020    K 4.1 09/03/2020    CL 99 09/03/2020    CL 99 09/03/2020    CO2 23 09/03/2020    CO2 18 09/03/2020    BUN 22 09/03/2020    BUN 23 09/03/2020    CREATININE 0.8 09/03/2020    CREATININE 0.8 09/03/2020    GLUCOSE 150 09/03/2020    GLUCOSE 144 09/03/2020    CALCIUM 9.5 09/03/2020    CALCIUM 9.7 09/03/2020      Lab Results   Component Value Date    ALT 11 08/31/2020    AST 10 08/31/2020    ALKPHOS 72 08/31/2020    BILITOT 0.7 08/31/2020       Lab Results   Component Value Date    COLORU Yellow 09/01/2020    NITRU Negative 09/01/2020    GLUCOSEU >=1000 09/01/2020    KETUA Negative 09/01/2020    UROBILINOGEN 1.0 09/01/2020    BILIRUBINUR Negative 09/01/2020     Lab Results   Component Value Date    LABA1C 10.0 (H) 09/01/2020         Imaging:  Head CT; Impression    1. Recent infarct in the left insula, frontal operculum, basal    ganglia, and external capsule, possibly subacute.         2. Possible acute or subacute infarct in the left parietal lobe.         3.  No sign of acute intracranial hemorrhage or mass effect.         The major findings were discussed with ED attending Dr. Dilshad Menendez          Brain MRI  Impression         Left middle cerebral artery infarction.          CTA head/neck  Impression         Soft and calcific atherosclerotic plaque results in approximately 90%    stenosis of the origin of the left internal carotid artery.         A focus of hypoattenuation in the left middle cerebral artery    territory involving the insula, frontal operculum, basal ganglia and    external capsule compatible with subacute infarct is once again    present as seen on prior CTs earlier today.         Cortical atrophy and chronic periventricular microangiopathy.         No other evidence for stenosis, aneurysm or dissection on CTA of the    head and neck.               Carotid US  Impression         50-79% stenosis left ICA based on NASCET criteria (0-49%).         No hemodynamically significant stenosis of right internal carotid    artery.         No evidence for subclavian steal.              Impression and Plan:  76 y/o male with impairments and acitivities limitations in ADLs, mobility, functional communication, cognition secondary to acute CVA. Primary Medical Rehabilitation Diagnosis and Impression:       Dx: Acute CVA   Assoc Impairments: aphasia, cognitive impairment, right upper extremity weakness    Functional Deficits Summary: see above      Preliminary Overall Medical Rehabilitation Plan    Daily physiatrist evaluation and management     24 hour specialty rehabilitation nursing care       Medical prognosis is good   The following licensed rehabilitation therapy disciplines will be ordered: PT, OT, Speech therapy. Patient to have 60 min of each therapy Mon-Fri and 30 min of each therapy Sat or Sun   Functional Goals: Supervision    Potential: good potential to reach functional goals   ELOS: 10 days with anticipated discharge to home    Co-morbidities :  -Left MCA stroke: Continue Aspirin, Plavix, statin. Begin acute rehab program.   -Expressive/receptive aphasia, cognitive impairment: speech therapy to follow on rehab  -Left ICA stenosis: evaluated by Vascular surgery, recommended left CEA in approximately 6 weeks  -DM: continue metformin, glipizide. BG controlled      Summary of Medical Appropriateness for Admission to [address] [place] [address],[address]    1.  The functional deficits and medical and nursing needs outlined above require:   a. close medical supervision by a physiatrist;   b. 24 hour availability of nurses skilled in rehabilitation; and   c. treatment by multiple other licensed rehabilitation professionals in a time intensive and medically coordinated program.   2. The medical stability of the patient and management of medical or surgical co-morbidities are:   a. manageable in the rehabilitation hospital; and   b. sufficiently under control so as to permit simultaneous participation in the rehabilitation program.   3. The patient is now capable of fully participating in the inpatient rehabilitation program.   4. The patient has a high probability of benefiting from the program of care.    5. The patient has a home and available family or care providers         Electronically signed by Lakeisha Wheeler MD on 9/4/2020 at 8:26 PM

## 2020-09-05 NOTE — PROGRESS NOTES
Goal 5--Supervision         MOTOR SPEECH       Oral Peripheral Examination   Adequate lingual/labial strength     Parameters of Speech Production  Respiration:  Adequate for speech production  Articulation:  Within functional limits  Resonance:  Within functional limits  Quality:   Within functional limits  Pitch: Within functional limits  Intensity: Within functional limits  Fluency:  Dysfluent  Prosody Intact    RECEPTIVE LANGUAGE    Comprehension of Yes/No Questions:   Inconsistent    Process  Simple Verbal Commands:   Inconsistent  Process Intermediate Verbal Commands:   Inconsistent and Perseveration  Process Complex Verbal Commands:     Unable    Comprehension of Conversation:      Inconsistent      EXPRESSIVE LANGUAGE     Serials: Impaired    Imitation:  Words   Impaired   Sentences Impaired    Naming:  (Modality used:  Verbal)  Confrontation Naming  Impaired  Functional Description  Impaired  Response Naming: Impaired    Conversation:      Anomia was present, Literal paraphasic errors were noted, Semantic paraphasic errors were noted and Neologistic errors were noted    COGNITION     Attention/Orientation  Attention: Sustained attention   Orientation:   Person:  oriented    Place:  oriented with cues    Year:  correct    Month:  accurate within 5 days    Day of Week:  correct    Situation:  Inaccurately described    Memory   Long Term Recall: Address:  could not recall   Birthdate:  recalled after cuing  Age: recalled after cuing  Family: could not recall    Immediate Recall: Sock:  recalled accurately     Blue:  recalled accurately     Bed:  recalled accurately    Delayed Recall:   Sock: could not recall     Blue:  could not recall     Bed:  could not recall    Organization/Problem Solving/Reasoning   Sequencing:    Verbal: Impaired      Motor: To be assessed    Problem solving: Verbal: Impaired      Motor:   To be assessed    Mathematics:  Simple:  Impaired     Complex:  Impaired      CLINICAL OBSERVATIONS NOTED DURING THE EVALUATION  Latent responses, Inconsistent responses, Perseveration errors, Paraphasic errors and Cueing was required      EDUCATION    Speech Pathologist (SLP) completed education with the patient and/or family regarding identified cognitive linguistic deficits and subsequent need for speech pathology intervention. Discussed deficit areas to be targeted by formal intervention and established short/long term goals. Reviewed compensatory strategies to improve functional outcome (as appropriate). Encouraged patient and/or family to engage SLP in structured Q&A session relative to identified deficit areas. Patient and/or family indicated understanding of all information provided via satisfactory verbal response. ? Prognosis for improvements is good  This plan will be re-evaluated and revised in 1 week  if warranted. Patient stated goals: Did not state  Treatment goals discussed with Patient   The Patient understand(s) the diagnosis, prognosis and plan of care       The admitting diagnosis and active problem list, as listed below have been reviewed prior to initiation of this evaluation.         ACTIVE PROBLEM LIST:   Patient Active Problem List   Diagnosis    Acute ischemic left MCA stroke ([address]    Middle cerebral artery stenosis, left    Dysarthria    Uncontrolled type 2 diabetes mellitus with hyperglycemia (HCC)    Tobacco dependence    COPD (chronic obstructive pulmonary disease) ([address]    Stroke due to stenosis of left carotid artery ([address]    Acute CVA (cerebrovascular accident) [facility])         Noble Bacon M.S. [facility] 8461  9/5/2020

## 2020-09-05 NOTE — PROGRESS NOTES
[facility] Physical Medicine and Rehabilitation  Comprehensive Progress Note    Subjective:      Daryl Goetz is a 77 y.o. male admitted to inpatient rehabilitation for impairments and acitivities limitations in ADLs, mobility, functional communication, cognition secondary to acute left MCA CVA. .      No acute events overnight. No cp, sob, n/v. Tolerating therapy evaluations. BG controlled. No complaints. Impulsive. The patient's medical records have been reviewed. Scheduled Meds:aspirin, 81 mg, Daily  atorvastatin, 40 mg, Nightly  clopidogrel, 75 mg, Daily  glipiZIDE, 5 mg, QAM AC  metFORMIN, 500 mg, BID WC  influenza virus vaccine, 0.5 mL, Prior to discharge      Continuous Infusions:  PRN Meds:acetaminophen, 650 mg, Q6H PRN  polyethylene glycol, 17 g, Daily PRN         Objective:      Vitals:    09/04/20 1616 09/04/20 1749 09/05/20 0245 09/05/20 0741   BP: (!) 150/90  132/79 (!) 159/84   Pulse: 62 75 68 77   Resp: 20  16 20   Temp: 98.9 °F (37.2 °C)  98 °F (36.7 °C) 97.7 °F (36.5 °C)   TempSrc: Temporal  Oral Temporal   SpO2:   92% 97%   Weight: 197 lb 7 oz (89.6 kg)      Height: 6' 3\" (1.905 m)          General appearance: Alert, NAD  Lungs: CTA b/l, no w/r/r  Heart: RRR  Abdomen: soft, non-tender, normal bowel sounds  Extremities: no cyanosis or edema  Musculoskeletal: Range of motion normal   Neurologic: Alert, oriented. Expressive>receptive aphasia. Impaired recall. Impaired attention/concentration.  Motor 5/5 throughout, except right FDP and intrinsics 5-/5       Laboratory:    Lab Results   Component Value Date    WBC 7.2 09/05/2020    HGB 18.0 (H) 09/05/2020    HCT 53.8 09/05/2020    MCV 87.2 09/05/2020     09/05/2020     Lab Results   Component Value Date     09/05/2020    K 4.5 09/05/2020     09/05/2020    CO2 22 09/05/2020    BUN 29 09/05/2020    CREATININE 1.0 09/05/2020    GLUCOSE 132 09/05/2020    CALCIUM 9.5 09/05/2020      Lab Results   Component Value Date    ALT 11 08/31/2020    AST 10 08/31/2020    ALKPHOS 72 08/31/2020    BILITOT 0.7 08/31/2020       Lab Results   Component Value Date    COLORU Yellow 09/01/2020    NITRU Negative 09/01/2020    GLUCOSEU >=1000 09/01/2020    KETUA Negative 09/01/2020    UROBILINOGEN 1.0 09/01/2020    BILIRUBINUR Negative 09/01/2020     Lab Results   Component Value Date    LABA1C 10.0 (H) 09/01/2020       Functional Status:   Feeding: Supervision  Grooming: CGA  UB dressing: SBA  LB dressing: Min A  Bed mobility: Supervision  Transfers: SBA  Ambulation: 300 ft without AD SBA       Assessment/Plan:       77 y.o. male admitted to inpatient rehabilitation for impairments and acitivities limitations in ADLs, mobility, functional communication, cognition secondary to acute left MCA CVA. .    -Left MCA stroke: Continue Aspirin, Plavix, statin. Continue acute rehab program.   -Expressive/receptive aphasia, cognitive impairment: speech therapy to follow on rehab  -Left ICA stenosis: evaluated by Vascular surgery, recommended left CEA in approximately 6 weeks  -DM: continue metformin, glipizide. BG controlled        -BG stable, on oral medication, no insulin.  Will decreased BG checks to [facility] BID      Electronically signed by Mya Aguilar MD on 9/5/2020 at 12:32 PM

## 2020-09-05 NOTE — PLAN OF CARE
Problem: Falls - Risk of:  Goal: Will remain free from falls  Description: Will remain free from falls  Outcome: Met This Shift  Goal: Absence of physical injury  Description: Absence of physical injury  Outcome: Met This Shift     Problem: HEMODYNAMIC STATUS  Goal: Patient has stable vital signs and fluid balance  Outcome: Met This Shift     Problem: ACTIVITY INTOLERANCE/IMPAIRED MOBILITY  Goal: Mobility/activity is maintained at optimum level for patient  Outcome: Met This Shift     Problem: Skin Integrity:  Goal: Will show no infection signs and symptoms  Description: Will show no infection signs and symptoms  Outcome: Met This Shift  Goal: Absence of new skin breakdown  Description: Absence of new skin breakdown  Outcome: Met This Shift     Problem: COMMUNICATION IMPAIRMENT  Goal: Ability to express needs and understand communication  Outcome: Ongoing     Problem: Neurological  Goal: Maximum potential motor/sensory/cognitive function  Outcome: Ongoing

## 2020-09-06 LAB
METER GLUCOSE: 116 MG/DL (ref 74–99)
METER GLUCOSE: 158 MG/DL (ref 74–99)

## 2020-09-06 PROCEDURE — 6370000000 HC RX 637 (ALT 250 FOR IP): Performed by: FAMILY MEDICINE

## 2020-09-06 PROCEDURE — 97535 SELF CARE MNGMENT TRAINING: CPT

## 2020-09-06 PROCEDURE — 97530 THERAPEUTIC ACTIVITIES: CPT

## 2020-09-06 PROCEDURE — 92507 TX SP LANG VOICE COMM INDIV: CPT

## 2020-09-06 PROCEDURE — 82962 GLUCOSE BLOOD TEST: CPT

## 2020-09-06 PROCEDURE — 1280000000 HC REHAB R&B

## 2020-09-06 RX ADMIN — ASPIRIN 81 MG: 81 TABLET, COATED ORAL at 08:41

## 2020-09-06 RX ADMIN — GLIPIZIDE 5 MG: 5 TABLET ORAL at 06:47

## 2020-09-06 RX ADMIN — ATORVASTATIN CALCIUM 40 MG: 40 TABLET, FILM COATED ORAL at 20:22

## 2020-09-06 RX ADMIN — CLOPIDOGREL 75 MG: 75 TABLET, FILM COATED ORAL at 08:41

## 2020-09-06 RX ADMIN — METFORMIN HYDROCHLORIDE 500 MG: 500 TABLET ORAL at 16:31

## 2020-09-06 RX ADMIN — METFORMIN HYDROCHLORIDE 500 MG: 500 TABLET ORAL at 08:41

## 2020-09-06 ASSESSMENT — PAIN SCALES - GENERAL: PAINLEVEL_OUTOF10: 0

## 2020-09-06 NOTE — PROGRESS NOTES
Therapeutic Recreation Assessment     LEISURE INTEREST SURVEY               Key: P = Past Interest C = Current Interest F = Future Interest     Arts/Crafts:  ___Mechanical  ___Woodworking    ___ Painting   ___Floral arranging ___ Ceramics         _ __ Knitting                                                     ___ Crocheting        ___Other: ___________      Cooking:  _ _Baking _C__Cooking    ___   Other: _______   Comments:       Games:  _P__Cards  ___Darts  ___Board games    ___Word games  ___Crossword puzzles    ___Seek-n-find/jumble                   P___Other: _Uno________      Horticulture:  ___Vegetables  ___Flowers  ___House plants                                                     ___Other: ___________      House/Yard Care:  ___Ironing  ___Laundry  ___Cleaning                                                      _C__Repairs              _C__Lawn care                                                     ___Other: ___________      Music Listening/Playing:  ___Classical       ___Big Band       ___Rock-n-Roll                                                     __C_Country          ___R&B             ___Gospel/Hymns                                                     ___Other: ___________      Outdoor Activities:  ___Hunting          ___Fishing          ___Camping                                                     ___Other: ___________      Pets/Animals:  P___Dogs             ___Cats                ___Fish                                                     ___Birds             ___Livestock         ___Other: _________    Reading:   ___Newspapers  ___Magazines ___Other:stories                                                     ___Other: ___________      Cheondoism Activities:  Restorationism: ___________   Holly Sachs Activities: ___________      Shopping:   _C__Grocery  ___Clothing  ___Flea market     ___Other: ___________      Socialization: _C__Family  C___Friends  C___Neighbors       ___Church ___Volunteer ___Club/Organization                                                     ___Other: ___________    Sports/Exercises:  ___Walking  P___Football  ___Basketball     ___Golf  ___Baseball  ___Tennis       ___Bowling  _C__Other: __Coached Football_________      Traveling:   ___Global  _C__Stateside  C___Local       ___Other: ___________      TV/Radio:   ___Mysteries  ___Comedies ___Romance                                                     ___Game show       _C__Sports       ___News                                                      ___Talk show          ___Other: ___________      Other:     Personal Leisure Profile:   Question Response   Attributes Identify a positive quality: []Ambitious  []Appreciative  [x]Caring  []Courteous  []Considerate  []Compassionate  []Creative  []Dependable   []Generous  []Honest  []Hard working  [employer]  []Kind  [][place]   [][place]  []Mannerly  []Patient  []Polite  []Respectful  []Sociable  []Sense of humor  []Thoughtful  []Other: ___________    [facility] are very good at Sports   Awareness Why do you participate in your leisure interest: []Competition  []Creativity  []Concentration  []Exercise  [x]Enjoyment  []Fun  []Hand/eye Coordination  []Increase confidence  []Learning a new skill  []Relaxation  []Social Interaction  []Supporting one another  []Thinking  []Other: ___________    Are your leisure activities limited at this time? [x]Yes  []No  Comments: _________    How often do you participate in your leisure interest? Everyday   Resources Name a restaurant, store or business you frequent? [employer]   Personal When are you most happy?  Sports,Family     Barriers to Leisure Participation:  []Visual   [][place]  [x]Cognition/Communication  []Motivation  []Financial  []Transportation  []Time Constraints  [x]Physical Ability  []Leisure Awareness  []Drug/Alcohol  []Other: ___________    Recommendations:  []Group Session  [x]Individual Session  []Client Refused Services  []No Therapy []Other: ___________    Objectives:  [x]Establish adaptations for leisure involvement   []Increase Self worth/self esteem  []Community reintegration training   [x]Social interaction  [x]Leisure participation  []Other: ___________    Goals for Therapeutic Recreation Services:   Social Interaction:   Admission Functional Kinney Measure: ____3_______  Discharge Functional Kinney Measure: _____4/5______   Other:________________________________      Leisure Choice/ Increase Lucila Quality of Life: To participate in 1 premorbid leisure activities of choice by discharge to increase leisure choice and independence thus increasing the overall quality of his/her life. Socialization/Social Interaction: To increase social interaction skills by introducing self 1 x per week at the beginning of TR session. Leisure Activity Tolerance: To increase leisure tolerance by attending 1 leisure activities per week for 20 minutes with active participation. Self Expression: To participate in 1 leisure activity(s) of choice, identifying 1 benefits to leisure participation. Self esteem/confidence: To complete 1 leisure activities with success 1x  by discharge. Lucie Montes

## 2020-09-06 NOTE — PLAN OF CARE
Problem: Falls - Risk of:  Goal: Will remain free from falls  Description: Will remain free from falls  9/5/2020 2154 by [facility]  Outcome: Met This Shift  9/5/2020 0941 by Soheila Strange RN  Outcome: Met This Shift  Goal: Absence of physical injury  Description: Absence of physical injury  9/5/2020 2154 by [facility]  Outcome: Met This Shift  9/5/2020 0941 by Soheila Strange RN  Outcome: Met This Shift     Problem: HEMODYNAMIC STATUS  Goal: Patient has stable vital signs and fluid balance  9/5/2020 2154 by [facility]  Outcome: Met This Shift  9/5/2020 0941 by Soheila Strange RN  Outcome: Met This Shift     Problem: ACTIVITY INTOLERANCE/IMPAIRED MOBILITY  Goal: Mobility/activity is maintained at optimum level for patient  9/5/2020 2154 by [facility]  Outcome: Met This Shift  9/5/2020 0941 by Soheila Strange RN  Outcome: Met This Shift     Problem: COMMUNICATION IMPAIRMENT  Goal: Ability to express needs and understand communication  9/5/2020 2154 by [facility]  Outcome: Met This Shift  9/5/2020 0941 by Soheila Strange RN  Outcome: Ongoing     Problem: Skin Integrity:  Goal: Will show no infection signs and symptoms  Description: Will show no infection signs and symptoms  9/5/2020 2154 by [facility]  Outcome: Met This Shift  9/5/2020 0941 by Soheila Strange RN  Outcome: Met This Shift  Goal: Absence of new skin breakdown  Description: Absence of new skin breakdown  9/5/2020 2154 by [facility]  Outcome: Met This Shift  9/5/2020 0941 by Soheila Starnge RN  Outcome: Met This Shift     Problem: Neurological  Goal: Maximum potential motor/sensory/cognitive function  9/5/2020 2154 by [facility]  Outcome: Met This Shift  9/5/2020 0941 by Soheila Strange RN  Outcome: Ongoing

## 2020-09-06 NOTE — PLAN OF CARE
Problem: Falls - Risk of:  Goal: Will remain free from falls  Description: Will remain free from falls  Outcome: Met This Shift  Goal: Absence of physical injury  Description: Absence of physical injury  Outcome: Met This Shift     Problem: HEMODYNAMIC STATUS  Goal: Patient has stable vital signs and fluid balance  Outcome: Met This Shift     Problem: ACTIVITY INTOLERANCE/IMPAIRED MOBILITY  Goal: Mobility/activity is maintained at optimum level for patient  Outcome: Met This Shift     Problem: COMMUNICATION IMPAIRMENT  Goal: Ability to express needs and understand communication  Outcome: Met This Shift     Problem: Skin Integrity:  Goal: Will show no infection signs and symptoms  Description: Will show no infection signs and symptoms  Outcome: Met This Shift  Goal: Absence of new skin breakdown  Description: Absence of new skin breakdown  Outcome: Met This Shift     Problem: Neurological  Goal: Maximum potential motor/sensory/cognitive function  Outcome: Met This Shift

## 2020-09-06 NOTE — PROGRESS NOTES
Speech Language Pathology  ACUTE REHABILITATION--DAILY PROGRESS NOTE            FIMS    FIMS SCORES      Swallowing    Current Status  7--Independent    Short Term Goal Met    Long Term Goal Met     Receptive    Current Status  3--Moderate Assistance    Short Term Goal 4--Minimal Assistance    Long Term Goal 5--Supervision     Expressive    Current Status  3--Moderate Assistance    Short Term Goal 4--Minimal Assistance    Long Term Goal 5--Supervision     Social Interaction    Current Status  3--Moderate Assistance    Short Term Goal 4--Minimal Assistance    Long Term Goal 5--Supervision         Problem Solving    Current Status  3--Moderate Assistance    Short Term Goal 4--Minimal Assistance    Long Term Goal 5--Supervision      Memory    Current Status  3--Moderate Assistance    Short Term Goal 4--Minimal Assistance    Long Term Goal 5--Supervision                      SWALLOWING:      Diet:  Regular consistency solids with thin liquids     Patient consumed breakfast without difficulty. Not being followed for swallow therapy. LANGUAGE:     Required repetition of most questions and stimulus items x2. Increased accuracy of responses when questions are phrased in a yes/no format; however these responses were still inconsistent. He benefits from multiple repetitions. He completed basic level phrasal completions with 70% accuracy with occasional perseveration observed. COGNITION:       Alert and grossly oriented to person and date. Safety:  fair    SPEECH:     Good intelligibility; however speech is characterized with neologisms and paraphasic errors. SP recommended after discharge: To be determined     Will continue SP intervention as per previously established POC.     Larisa Sweeney M.A.  Speech Pathologist  9/06/2020     Minute Tracking:    Individual therapy:     0 minutes  Concurrent therapy:    0 minutes  Group therapy:   30 minutes  Co-treatment therapy:    0 minutes    Total minutes for 9/6/2020: 30 minutes

## 2020-09-06 NOTE — PROGRESS NOTES
Physical Therapy    Facility/Department: [address] [facility]     NAME: Princess Botello  : 1955  MRN: [id number]    Date of Service: 2020    Evaluating Therapist: Bill Meza PT, DPT    ROOM: [facility]  DIAGNOSIS: CVA  PRECAUTIONS: falls, aphasia (mixed; expressive>receptive), L peripheral visual field deficit  HPI: Pt admitted 20 after being found by son at home with AMS, unable to answer questions. Right sided weakness and facial droop noted. MRI showed acute left MCA. CTA of the head and neck showed 90% left ICA stenosis. Per Vascular, he will need CEA in about 6 weeks. Pt lives alone in a 1 story home with 1 stairs to enter and 0 rail. Bed is on first floor and bath is on first floor. Pt ambulated with no AD independently PTA. Pt worked full time, independent with ADLs, and driving PTA. Initial Evaluation  20 AM      2020 PM    Short Term Goals Long Term Goals    Was pt agreeable to Eval/treatment? yes Yes       Does pt have pain?  yes c/o pain but did not specify       Bed Mobility  Rolling: Supervision  Supine to sit: Supervision  Sit to supine: Supervision  Scooting: Supervision Rolling: Supervision  Supine to sit: Supervision  Sit to supine: NT  Scooting: Supervision  Mod I Independent   Transfers Sit to stand: SBA  Stand to sit: SBA  Stand pivot: SBA Sit to stand: SBA  Stand to sit: SBA  Stand pivot: SBA  Supervision Independent   Ambulation   300 feet x 2 reps without AD with  ft , 400 ft x 1 no AD SBA  600 feet without AD with Supervison 1000 feet without AD with Supervision   Walking 10 feet on uneven surface 10 feet without AD with SBA NT  10 feet without AD with Supervision 10 feet without AD with Supervision   Wheel Chair Mobility NA NA      Car Transfers SBA SBA  Supervision Supervision    Stair negotiation: ascended and descended  12 steps with 1 rail with SBA 12 steps w/ 2 rails SBA  16 steps with 1 rail with Supervision 20 steps without rail with

## 2020-09-06 NOTE — PROGRESS NOTES
OCCUPATIONAL THERAPY DAILY NOTE    Date:2020  Patient Name: Armando Zavala  MRN: [id number]  : 1955  Room: [address] [place], [place] [address]     Diagnosis: CVA  Surgery: none   Past Medical History: COPD, DM    Precautions: falls, aphasia(expressive and receptive),TSM, bed alarm     Functional Assessment:   Date Status AE  Comments   Feeding 2020 Sup  Per eval    Grooming 20  SBA/CGA  Per eval    Bathing 20  SBA  Per eval    UB Dressing 20  SBA  Per eval   LB Dressing 20  SBA   Pt donned shoes while seated on EOB using BLE crossover method    Homemaking 20  CGA No device  Pt stood to fold washcloths in OT apt including stacking items with no seated rest break needed     Functional Transfers / Balance:   Date Status DME  Comments   Sit Balance 20 I/SUP  Sitting balance on EOB and queen bed, on firm recliner/sofa, on dining room chair and on commode and tub bench    Stand Balance 20  SBA  Standing balance during ambulation using no device, transfers and hmkg task. [x] Tub  [] Shower   Transfer 20 SBA Shower ariel and grab bar  Pt entered/exited tub using shower ariel and grab bar. Commode   Transfer 20 S/SBA 3:1 device Pt completed commode transfers with 3:1 device over preexisting due to height. Functional   Mobility 20  SBA No device  Pt used no device ambulating from room to OTapt and back    Other: firm recliner,sofa, dining chair      Supine to EOB 20 SBA        supervision        Pt engaged in transfers on/off various surfaces. Supine to EOB transfers at supervision level for safety. Functional Exercises / Activity:  Pt engaged in functional transfers training in OT apt to increase skills along with safety awareness. Pt completed hmkg tasks standing in front of washer/dryer folding wash cloths with no LOB noted and niece present since patient wanting her to come with him.      Pt ambulated without device at SBA household distances and in OT apt

## 2020-09-07 LAB
METER GLUCOSE: 129 MG/DL (ref 74–99)
METER GLUCOSE: 159 MG/DL (ref 74–99)

## 2020-09-07 PROCEDURE — 1280000000 HC REHAB R&B

## 2020-09-07 PROCEDURE — 97110 THERAPEUTIC EXERCISES: CPT

## 2020-09-07 PROCEDURE — 99231 SBSQ HOSP IP/OBS SF/LOW 25: CPT | Performed by: PHYSICAL MEDICINE & REHABILITATION

## 2020-09-07 PROCEDURE — 6370000000 HC RX 637 (ALT 250 FOR IP): Performed by: FAMILY MEDICINE

## 2020-09-07 PROCEDURE — 97530 THERAPEUTIC ACTIVITIES: CPT

## 2020-09-07 PROCEDURE — 82962 GLUCOSE BLOOD TEST: CPT

## 2020-09-07 RX ADMIN — CLOPIDOGREL 75 MG: 75 TABLET, FILM COATED ORAL at 08:25

## 2020-09-07 RX ADMIN — GLIPIZIDE 5 MG: 5 TABLET ORAL at 06:36

## 2020-09-07 RX ADMIN — ATORVASTATIN CALCIUM 40 MG: 40 TABLET, FILM COATED ORAL at 20:23

## 2020-09-07 RX ADMIN — METFORMIN HYDROCHLORIDE 500 MG: 500 TABLET ORAL at 08:25

## 2020-09-07 RX ADMIN — METFORMIN HYDROCHLORIDE 500 MG: 500 TABLET ORAL at 16:20

## 2020-09-07 RX ADMIN — ASPIRIN 81 MG: 81 TABLET, COATED ORAL at 08:25

## 2020-09-07 ASSESSMENT — PAIN SCALES - GENERAL
PAINLEVEL_OUTOF10: 0
PAINLEVEL_OUTOF10: 0

## 2020-09-07 NOTE — PROGRESS NOTES
OCCUPATIONAL THERAPY DAILY NOTE    Date:2020  Patient Name: Vale Hall  MRN: [id number]  : 1955  Room: [address] [place] [address]     Diagnosis: CVA  Surgery: none   Past Medical History: COPD, DM    Precautions: falls, aphasia(expressive and receptive),TSM, bed alarm     Functional Assessment:   Date Status AE  Comments   Feeding 2020 Sup     Grooming 20  SBA/CGA     Bathing 20  SBA     UB Dressing 20  SBA     LB Dressing 20  SBA   Seated EOB pt donned shoes. Homemaking 20  CGA No device       Functional Transfers / Balance:   Date Status DME  Comments   Sit Balance 20 I/SUP     Stand Balance 20  SBA     [x] Tub  [] Shower   Transfer 20 SBA Shower ariel and grab bar     Commode   Transfer 20 S/SBA 3:1 device    Functional   Mobility 20  SBA No device     Other: firm recliner,sofa, dining chair      Supine to EOB 20 SBA        supervision           Functional Exercises / Activity:  3B block copy pattern with focus on spatial relations and problem solving. Pt completes with Mod A over all for Moderately complex designs for spatial relations. To complete minimally complex designs pt able to complete independently with increased time. 3# dowel ariel 3 X 10 on all planes to increase endurance and BUE strength for independence with functional tasks and transfers. Sensory / Neuromuscular Re-Education:      Cognitive Skills:   Status Comments   Problem   Solving Fair+    Memory Fair+    Sequencing Fair+    Safety Fair+      Visual Perception:    Education:  Pt on safety ambulating to and from therapy. [] Family teach completed on:    Pain Level: 0/10    Additional Notes:       Patient has made good progress during treatment sessions toward set goals.  Therapy emphasis to obtain goals:  Long Term Goals: 2 weeks   indep UE ADL- feeding, grooming, bathing, dressing  indep LE ADL- bathing, dressing  indep functional transfers to commode, tub shower  indep functional mobility/standing balance during ADL/IADLs with no AD  pt to demo good judgement, safety, problem solving and sequencing during ADLs and IADLs  indep IADL to return home to indep living    [x] Continue with current OT Plan of care.   [] Prepare for Discharge     DISCHARGE RECOMMENDATIONS  Recommended DME:    Post Discharge Care:   []Home Independently  []Home with 24hr Care / Supervision []Home with Partial Supervision []Home with Home Health OT []Home with Out Pt OT []Other: ___   Comments:         Time in Time out Tx Time Breakdown  Variance:   First Session  11:00 11:30 [x] Individual Tx- 30 Mins  [] Concurrent Tx -  [] Co-Tx -   [] Group Tx -   [] Time Missed -     Second Session   [] Individual Tx-   [] Concurrent Tx -  [] Co-Tx -   [] Group Tx -   [] Time Missed -     Third Session    [] Individual Tx-   [] Concurrent Tx -  [] Co-Tx -   [] Group Tx -   [] Time Missed -         Total Tx Time  30 mins     Nany Auburn FLANAGAN/L [id number]

## 2020-09-07 NOTE — PROGRESS NOTES
[facility]  Comprehensive Progress Note    Subjective:      Adan Wray is a 77 y.o. male admitted to inpatient rehabilitation for impairments and acitivities limitations in ADLs, mobility, functional communication, cognition secondary to acute left MCA CVA. No acute events overnight. No cp, sob, n/v. No new issues reported by staff. Patient denies complaints. Tolerating therapy, progressing. The patient's medical records have been reviewed. Scheduled Meds:aspirin, 81 mg, Daily  atorvastatin, 40 mg, Nightly  clopidogrel, 75 mg, Daily  glipiZIDE, 5 mg, QAM AC  metFORMIN, 500 mg, BID WC  influenza virus vaccine, 0.5 mL, Prior to discharge      Continuous Infusions:  PRN Meds:acetaminophen, 650 mg, Q6H PRN  polyethylene glycol, 17 g, Daily PRN         Objective:      Vitals:    09/05/20 0741 09/06/20 0100 09/06/20 0745 09/07/20 0727   BP: (!) 159/84  134/70 127/73   Pulse: 77  52 81   Resp: 20  18 18   Temp: 97.7 °F (36.5 °C)  97.8 °F (36.6 °C) 97.7 °F (36.5 °C)   TempSrc: Temporal  Temporal Temporal   SpO2: 97% 95% 96% 95%   Weight:       Height:         General appearance: Alert, NAD  Lungs: CTA b/l, no w/r/r  Heart: RRR  Abdomen: soft, non-tender, normal bowel sounds  Extremities: no cyanosis or edema  Musculoskeletal: Range of motion normal   Neurologic: Alert, oriented. Expressive>receptive aphasia. Impaired recall. Impaired attention/concentration.  Motor 5/5 throughout, except right FDP and intrinsics 5-/5       Laboratory:    Lab Results   Component Value Date    WBC 7.2 09/05/2020    HGB 18.0 (H) 09/05/2020    HCT 53.8 09/05/2020    MCV 87.2 09/05/2020     09/05/2020     Lab Results   Component Value Date     09/05/2020    K 4.5 09/05/2020     09/05/2020    CO2 22 09/05/2020    BUN 29 09/05/2020    CREATININE 1.0 09/05/2020    GLUCOSE 132 09/05/2020    CALCIUM 9.5 09/05/2020      Lab Results   Component Value Date    ALT 11 08/31/2020 AST 10 08/31/2020    ALKPHOS 72 08/31/2020    BILITOT 0.7 08/31/2020       Lab Results   Component Value Date    COLORU Yellow 09/01/2020    NITRU Negative 09/01/2020    GLUCOSEU >=1000 09/01/2020    KETUA Negative 09/01/2020    UROBILINOGEN 1.0 09/01/2020    BILIRUBINUR Negative 09/01/2020     Lab Results   Component Value Date    LABA1C 10.0 (H) 09/01/2020         Functional Status:   Feeding: Supervision  Grooming: CGA  UB dressing: SBA  LB dressing: Min A  Bed mobility: Supervision  Transfers: SBA  Ambulation: 300 ft without AD SBA       Assessment/Plan:       77 y.o. male admitted to inpatient rehabilitation for impairments and acitivities limitations in ADLs, mobility, functional communication, cognition secondary to acute left MCA CVA. .     -Left MCA stroke: Continue Aspirin, Plavix, statin. Continue acute rehab program.   -Expressive/receptive aphasia, cognitive impairment: speech therapy to follow on rehab  -Left ICA stenosis: evaluated by Vascular surgery, recommended left CEA in approximately 6 weeks  -DM: continue metformin, glipizide. BG controlled    Continue Acute rehab program. Progressing well in therapy. Encourage ambulation.        Electronically signed by Phu Clay MD on 9/7/2020 at 11:14 AM

## 2020-09-08 LAB — METER GLUCOSE: 133 MG/DL (ref 74–99)

## 2020-09-08 PROCEDURE — 97530 THERAPEUTIC ACTIVITIES: CPT

## 2020-09-08 PROCEDURE — 82962 GLUCOSE BLOOD TEST: CPT

## 2020-09-08 PROCEDURE — 6370000000 HC RX 637 (ALT 250 FOR IP): Performed by: FAMILY MEDICINE

## 2020-09-08 PROCEDURE — 97535 SELF CARE MNGMENT TRAINING: CPT

## 2020-09-08 PROCEDURE — 1280000000 HC REHAB R&B

## 2020-09-08 PROCEDURE — 97110 THERAPEUTIC EXERCISES: CPT

## 2020-09-08 PROCEDURE — 92507 TX SP LANG VOICE COMM INDIV: CPT

## 2020-09-08 RX ADMIN — METFORMIN HYDROCHLORIDE 500 MG: 500 TABLET ORAL at 17:18

## 2020-09-08 RX ADMIN — METFORMIN HYDROCHLORIDE 500 MG: 500 TABLET ORAL at 08:13

## 2020-09-08 RX ADMIN — ASPIRIN 81 MG: 81 TABLET, COATED ORAL at 08:13

## 2020-09-08 RX ADMIN — CLOPIDOGREL 75 MG: 75 TABLET, FILM COATED ORAL at 08:13

## 2020-09-08 RX ADMIN — GLIPIZIDE 5 MG: 5 TABLET ORAL at 06:19

## 2020-09-08 RX ADMIN — ATORVASTATIN CALCIUM 40 MG: 40 TABLET, FILM COATED ORAL at 21:36

## 2020-09-08 ASSESSMENT — PAIN SCALES - GENERAL
PAINLEVEL_OUTOF10: 0
PAINLEVEL_OUTOF10: 0

## 2020-09-08 NOTE — PROGRESS NOTES
09/08/20 1234   Attendance   Activity Games  (Home repair projects / [employer])   Participation Active participation   [address] ability to complete social goals   Social Skills Does not initiate conversation or social interaction;Demonstrates ability to listen to others   Leisure Education Demonstrates knowledge of benefits of leisure involvement  Kathia Stevenson identified competitive as a benefit.)   Time Spent With Patient   Minutes 40

## 2020-09-08 NOTE — CARE COORDINATION
Social Work Assessment Summary    PCP: Jacquelyn    Patient Resides: Alone    Home Architecture : [employer]. 1 step to main entrance without rails. 3 bedrooms and bathroom has a tub/shower combo with curtains. Will you return to your own home? yes        Primary Caregiver: Remonia Sicard, son, 43, retired , is healthy and can drive. Pierre Nieto, brother, 62/3, retired, is healthy and can drive. Level of Function PTA : Independent in all fields    Employment: Retired . Worked for [employer]. Receives SSD no     DME Pta  : none    Community Agency Involvement PTA : none    Do they have a medical profile: no    Family Teaching: to be scheduled    Strength: \"Independent\"    Preference: \"Get better\"      NAME RELATION HOME # WORK # CELL #   Fallon Jones.  Kimber son   [phone]   Pierre Nieto brother [phone]              Height: 6'3  Weight: [place]  [facility]  9/8/2020

## 2020-09-08 NOTE — PROGRESS NOTES
NT Supervision Supervision    Stair negotiation: ascended and descended  12 steps with 1 rail with SBA 12 steps w/ 1 rails CGA<>SBA NT 16 steps with 1 rail with Supervision 20 steps without rail with Supervision   Curb Step:   ascended and descended 7.5 inch step without AD and SBA NT NT 7.5 inch step with out AD and Supervision 7.5 inch step without AD and Supervision   Picking up object off the floor Picked up 4lb weight with SB assist NT Picked up cone from floor with Supervision Will  4lb weight with Supervision assist Will  4lb weight with Supervision assist   BLE ROM WNL WNL      BLE Strength BLE grossly 5/5 BLE grossly 5/5      Balance  Static and dynamic standing balance SBA without AD Static and dynamic standing balance SBA without AD      Date Family Teach Completed  No family present at this date       Is additional Family Teaching Needed? Y or N Y Y      Hindering Progress aphasia aphasia      PT recommended ELOS 5 days       Team's Discharge Plan        Therapist at Team Meeting          Therapeutic Exercise:   AM:   Ambulation: 60 feet x2 with SBA, 4 way gait (forward, backward, right, left) in hallway with SBA  Stair negation: x2 with 2 rails SBA, with 1 rail CGA<>SBA  Heel strike on level ground x12 on 4inch box x12   Weaving through 5 QC with SBA (forward and backward) x2    PM:   Cone pick ups from floor to [id number] [facility] with identifying colors and instructing R vs L UE use with supervision  Cane weaves with color identifier to each cane with SBA  Sit to stand x10 with supervision     Additional Comments: A&O x2 (to self and place). Pt demonstrated decreased arm swing and heel strike on the R d/t weakness on R side. Pt demonstrated improved visual field as compared to previous attempt. Inattention was challenged with weaving through 5 QC forward and backwards with SBA and mild cues when walking backwards.  Pt was able to understand skills that were required of him but had difficulty verbalizing which side of the body felt stronger. Heel strike exercise in order to improve heel strike through gait. In the afternoon session, ambulation with emphasis on heel strike was performed in encourage better gait sequence. Pt demonstrated mild difficulty identifying colors with cone pick ups but with addition to of R vs L patient took longer time to respond and made mistakes. Pt had great difficulty with cane weaves including color identifier and R vs L direction, pt struggles with two step instruction. Pt did not seem confident in each color he was approaching. This exercise encouraged the brain to be challenging with cognition and mobility. Patient/family education  Pt/family educated on heel strike with gait. Patient/family response to education:   Pt/family verbalized understanding Pt/family demonstrated skill Pt/family requires further education in this area   yes yes Reinforce      AM  Time in: 1045  Time out: 1130    PM  Time in: 1430  Time out: 1515    Pt is making good progress toward established Physical Therapy goals. Continue with physical therapy current plan of care. Dominick Tamez, DEBORAH Hdz.  Yanely Chacon, [facility]  License Number: [id number]

## 2020-09-08 NOTE — PLAN OF CARE
Problem: Falls - Risk of:  Goal: Will remain free from falls  Description: Will remain free from falls  9/8/2020 0212 by Arturo Bagley RN  Outcome: Met This Shift     Problem: Falls - Risk of:  Goal: Absence of physical injury  Description: Absence of physical injury  9/8/2020 0212 by Arturo Bagley RN  Outcome: Met This Shift     Problem: ACTIVITY INTOLERANCE/IMPAIRED MOBILITY  Goal: Mobility/activity is maintained at optimum level for patient  9/8/2020 2662 by Arturo Bagley RN  Outcome: Met This Shift     Problem: Skin Integrity:  Goal: Will show no infection signs and symptoms  Description: Will show no infection signs and symptoms  9/8/2020 0212 by Arturo Bagley RN  Outcome: Met This Shift     Problem: Skin Integrity:  Goal: Absence of new skin breakdown  Description: Absence of new skin breakdown  9/8/2020 0212 by Arturo Bagley RN  Outcome: Met This Shift

## 2020-09-08 NOTE — PROGRESS NOTES
fall prevention & fall recovery    [] Family teach completed on:    Pain Level: Pt did not appear to have pain    Additional Notes:       Patient has made good progress during treatment sessions toward set goals. Therapy emphasis to obtain goals:  Long Term Goals: 2 weeks   indep UE ADL- feeding, grooming, bathing, dressing  indep LE ADL- bathing, dressing  indep functional transfers to commode, tub shower  indep functional mobility/standing balance during ADL/IADLs with no AD  pt to demo good judgement, safety, problem solving and sequencing during ADLs and IADLs  indep IADL to return home to indep living    [x] Continue with current OT Plan of care.   [] Prepare for Discharge     DISCHARGE RECOMMENDATIONS  Recommended DME:    Post Discharge Care:   []Home Independently  []Home with 24hr Care / Supervision []Home with Partial Supervision []Home with Home Health OT []Home with Out Pt OT []Other: ___   Comments:         Time in Time out Tx Time Breakdown  Variance:   First Session  [id number] [] Individual Tx-  Mins  [x] Concurrent Tx - 45  [] Co-Tx -   [] Group Tx -   [] Time Missed -     Second Session [phone] [] Individual Tx-   [x] Concurrent Tx - 45[] Co-Tx -   [] Group Tx -   [] Time Missed -     Third Session    [] Individual Tx-   [] Concurrent Tx -  [] Co-Tx -   [] Group Tx -   [] Time Missed -         Total Tx Time  90 mins     Reema Brenner OTR/L [id number]

## 2020-09-09 LAB
METER GLUCOSE: 106 MG/DL (ref 74–99)
METER GLUCOSE: 132 MG/DL (ref 74–99)

## 2020-09-09 PROCEDURE — 99232 SBSQ HOSP IP/OBS MODERATE 35: CPT | Performed by: PHYSICAL MEDICINE & REHABILITATION

## 2020-09-09 PROCEDURE — 6370000000 HC RX 637 (ALT 250 FOR IP): Performed by: FAMILY MEDICINE

## 2020-09-09 PROCEDURE — 97530 THERAPEUTIC ACTIVITIES: CPT

## 2020-09-09 PROCEDURE — 92507 TX SP LANG VOICE COMM INDIV: CPT

## 2020-09-09 PROCEDURE — 1280000000 HC REHAB R&B

## 2020-09-09 PROCEDURE — 97110 THERAPEUTIC EXERCISES: CPT

## 2020-09-09 PROCEDURE — 97535 SELF CARE MNGMENT TRAINING: CPT

## 2020-09-09 PROCEDURE — 82962 GLUCOSE BLOOD TEST: CPT

## 2020-09-09 RX ADMIN — METFORMIN HYDROCHLORIDE 500 MG: 500 TABLET ORAL at 16:20

## 2020-09-09 RX ADMIN — CLOPIDOGREL 75 MG: 75 TABLET, FILM COATED ORAL at 08:31

## 2020-09-09 RX ADMIN — METFORMIN HYDROCHLORIDE 500 MG: 500 TABLET ORAL at 06:51

## 2020-09-09 RX ADMIN — ATORVASTATIN CALCIUM 40 MG: 40 TABLET, FILM COATED ORAL at 20:32

## 2020-09-09 RX ADMIN — GLIPIZIDE 5 MG: 5 TABLET ORAL at 06:51

## 2020-09-09 RX ADMIN — ASPIRIN 81 MG: 81 TABLET, COATED ORAL at 08:31

## 2020-09-09 ASSESSMENT — PAIN SCALES - GENERAL
PAINLEVEL_OUTOF10: 0

## 2020-09-09 NOTE — PROGRESS NOTES
Speech Language Pathology  ACUTE REHABILITATION--DAILY PROGRESS NOTE            FIMS    FIMS SCORES      Swallowing    Current Status  7--Independent    Short Term Goal Met    Long Term Goal Met     Receptive    Current Status  3--Moderate Assistance    Short Term Goal 4--Minimal Assistance    Long Term Goal 5--Supervision     Expressive    Current Status  3--Moderate Assistance    Short Term Goal 4--Minimal Assistance    Long Term Goal 5--Supervision     Social Interaction    Current Status  3--Moderate Assistance    Short Term Goal 4--Minimal Assistance    Long Term Goal 5--Supervision         Problem Solving    Current Status  3--Moderate Assistance    Short Term Goal 4--Minimal Assistance    Long Term Goal 5--Supervision      Memory    Current Status  3--Moderate Assistance    Short Term Goal 4--Minimal Assistance    Long Term Goal 5--Supervision                      SWALLOWING:      Diet:  Regular consistency solids with thin liquids     Patient consumed breakfast without difficulty. Not being followed for swallow therapy. LANGUAGE:     Pt benefited from repetition of most questions and stimulus items. He completed questions pertaining to an image phrased in a yes/no format with 100% accuracy. He was able to identify objects/functions with 75% accuracy while using a picture scene. Pt was given auditory directions to follow pertaining to an image and completed 0/5 directions correctly. He completed basic level phrasal completions with 80% accuracy with occasional perseveration observed. Pt completed picture description task with fair+ accuracy. COGNITION:       Alert and grossly oriented to person and date. Pt completed functional sequencing task with fair accuracy. He was able to come up with proper sequence with extended time and min-mod cues. Safety:  fair    SPEECH:     Good intelligibility; however speech is characterized with neologisms and paraphasic errors.      SP recommended after discharge: To be determined     Will continue SP intervention as per previously established POC.       Minute Tracking:    Individual therapy:    45 minutes   Concurrent therapy:    0 minutes  Group therapy:     0 minutes  Co-treatment therapy:    0 minutes    Total minutes for 9/9/2020: 45 minutes    Paula Zafar, Graduate Clinician

## 2020-09-09 NOTE — PROGRESS NOTES
OCCUPATIONAL THERAPY DAILY NOTE    Date:2020  Patient Name: Iain Monaco  MRN: [id number]  : 1955  Room: [address] [place] [address]     Diagnosis: CVA  Surgery: none   Past Medical History: COPD, DM  Precautions: falls, aphasia(expressive and receptive),TSM, bed alarm     Functional Assessment:   Date Status AE  Comments   Feeding 2020 Sup     Grooming 20 Min A  Standing to brush his teeth & comb his hair. Pt require assist to spit & rinse after he brushed his teeth   Bathing 20  Matt seat Bathing both seated & standing Min A & required min-mod vc's for initiation, sequencing & safety   UB Dressing 20 Sup     LB Dressing 20 SBA-Matt  Pt had 1 LOB while standing to pull underwear & pants up. Pt educated with regards to balance & safety   Homemaking 20 Min A No device       Functional Transfers / Balance:   Date Status DME  Comments   pt Sit Balance 20 SUP     Stand Balance 20  SBA     [] Tub  [x] Shower   Transfer 20 SBA Shower seat vcs for safety & insight   Commode   Transfer 20 SBA No device    Functional   Mobility 20  SBA No device  Assist for topographical directions  2nd session: throughout rehab unit v/c's for safety with pace. Other: firm recliner,sofa, dining chair      Supine to EOB 20 SBA        supervision           Functional Exercises / Activity:  Pt presents supine in bed & was agreeable to OT intervention. Pt demo Sup supine>sit with min vc's to ensure pt safety that he untangled himself from the covers. Pt demo SBA functional mobility in his room with no device & min vc's for safety. Pt demo SBA commode trf with no device to a standard commode. Pt demo SBA walk in shower trf with a tub seat. Pt bathed @ shower level with Min A both seated & standing. Pt require assist to use soap on a wash cloth to bathe himself. Pt initially was just rubbing wet wash cloth over his body.  After assist provided to apply soap to the wash cloth was able to wash Supervision []Home with Partial Supervision []Home with Home Health OT []Home with Out Pt OT []Other: ___   Comments:         Time in Time out Tx Time Breakdown  Variance:   First Session  [phone] [x] Individual Tx- 55 Mins  [] Concurrent Tx -   [] Co-Tx -   [] Group Tx -   [] Time Missed -     Second Session [id number] [] Individual Tx-   [x] Concurrent Tx - 45 min  [] Co-Tx -   [] Group Tx -   [] Time Missed -     Third Session    [] Individual Tx-   [] Concurrent Tx -  [] Co-Tx -   [] Group Tx -   [] Time Missed -         Total Tx Time  100 mins     Terrie Yepez  FLANAGAN/L [id number]    Gena Barba OTR/L [id number]     I have read & agree with the above status.     Gena Barba OTR/L [id number]

## 2020-09-09 NOTE — PROGRESS NOTES
[facility]  Comprehensive Progress Note    Subjective:      Greer Styles is a 77 y.o. male admitted to inpatient rehabilitation for impairments and acitivities limitations in ADLs, mobility, functional communication, cognition secondary to [place] [address] [place] No acute events overnight. Progressing well in PT. Persistent aphasia. No cp, sob, n/v. No complaints this am.     The patient's medical records have been reviewed. Scheduled Meds:aspirin, 81 mg, Daily  atorvastatin, 40 mg, Nightly  clopidogrel, 75 mg, Daily  glipiZIDE, 5 mg, QAM AC  metFORMIN, 500 mg, BID WC  influenza virus vaccine, 0.5 mL, Prior to discharge      Continuous Infusions:  PRN Meds:acetaminophen, 650 mg, Q6H PRN  polyethylene glycol, 17 g, Daily PRN         Objective:      Vitals:    09/07/20 0727 09/08/20 0742 09/08/20 1630 09/09/20 0815   BP: 127/73 (!) 159/72  (!) 150/78   Pulse: 81 62 66 60   Resp: 18 18  18   Temp: 97.7 °F (36.5 °C) 97.3 °F (36.3 °C)  97.5 °F (36.4 °C)   TempSrc: Temporal Temporal  Temporal   SpO2: 95%  96% 97%   Weight:       Height:           General appearance: Alert, NAD  Lungs: CTA b/l, no w/r/r  Heart: RRR  Abdomen: soft, non-tender, normal bowel sounds  Extremities: no cyanosis or edema  Musculoskeletal: Range of motion normal   Neurologic: Alert, oriented. Expressive>receptive aphasia. Impaired recall. Impaired attention/concentration.  Motor 5/5 throughout    Laboratory:    Lab Results   Component Value Date    WBC 7.2 09/05/2020    HGB 18.0 (H) 09/05/2020    HCT 53.8 09/05/2020    MCV 87.2 09/05/2020     09/05/2020     Lab Results   Component Value Date     09/05/2020    K 4.5 09/05/2020     09/05/2020    CO2 22 09/05/2020    BUN 29 09/05/2020    CREATININE 1.0 09/05/2020    GLUCOSE 132 09/05/2020    CALCIUM 9.5 09/05/2020      Lab Results   Component Value Date    ALT 11 08/31/2020    AST 10 08/31/2020    ALKPHOS 72 08/31/2020    BILITOT 0.7 08/31/2020       Lab Results   Component Value Date    COLORU Yellow 09/01/2020    NITRU Negative 09/01/2020    GLUCOSEU >=1000 09/01/2020    KETUA Negative 09/01/2020    UROBILINOGEN 1.0 09/01/2020    BILIRUBINUR Negative 09/01/2020     Lab Results   Component Value Date    LABA1C 10.0 (H) 09/01/2020         Functional Status:   Feeding: Supervision  Grooming: Min A  UB dressing: Supervision  LB dressing: SBA-Min A  Bed mobility: Supervision  Transfers: Supervision  Ambulation: >400 ft without AD Supervision        Assessment/Plan:       77 y. o. male admitted to inpatient rehabilitation for impairments and acitivities limitations in ADLs, mobility, functional communication, cognition secondary to acute left MCA CVA. .     -Left MCA stroke: Continue Aspirin, Plavix, statin. Continue acute rehab program.   -Expressive/receptive aphasia, cognitive impairment: speech therapy to follow on rehab  -Left ICA stenosis: evaluated by Vascular surgery, recommended left CEA in approximately 6 weeks  -DM: continue metformin, glipizide. BG controlled    Add recreation therapy  Progressing well with PT goals. Will shift some scheduled PT time to Speech therapy.     Electronically signed by Onelia Mcleod MD on 9/9/2020 at 11:03 AM

## 2020-09-09 NOTE — CARE COORDINATION
Per nursing - PT recommended FT so that family can be [place]. Met wit patient son . He is only able to come for FT in am due to his children schedule for school.     FT - scheduled for 9/10 am.    Guillaume Mao

## 2020-09-09 NOTE — PROGRESS NOTES
Physical Therapy    Facility/Department: [address] REHAB  Daily treatment     NAME: Shabbir Abreu  : 1955  MRN: [id number]    Date of Service: 2020    Evaluating Therapist: Kat Castillo PT, DPT    ROOM: [facility]  DIAGNOSIS: CVA  PRECAUTIONS: falls, aphasia (mixed; expressive>receptive), L peripheral visual field deficit  HPI: Pt admitted 20 after being found by son at home with AMS, unable to answer questions. Right sided weakness and facial droop noted. MRI showed acute left MCA. CTA of the head and neck showed 90% left ICA stenosis. Per Vascular, he will need CEA in about 6 weeks. Pt lives alone in a 1 story home with 1 stairs to enter and 0 rail. Bed is on first floor and bath is on first floor. Pt ambulated with no AD independently PTA. Pt worked full time, independent with ADLs, and driving PTA. Initial Evaluation  20 AM     PM    Short Term Goals Long Term Goals    Was pt agreeable to Eval/treatment? yes Yes  Yes with encouragement      Does pt have pain?  yes No c/o of pain No c/o of pain     Bed Mobility  Rolling: Supervision  Supine to sit: Supervision  Sit to supine: Supervision  Scooting: Supervision NT Rolling: Supervision  Supine to sit: Supervision  Sit to supine: supervision  Scooting: Supervision Mod I Independent   Transfers Sit to stand: SBA  Stand to sit: SBA  Stand pivot: SBA Supervision for all aspects Supervision for all aspects  Supervision Independent   Ambulation   300 feet x 2 reps without AD with SBA >400 feet with no AD Superivsion 150 feet with no AD and supervision 600 feet without AD with Supervison 1000 feet without AD with Supervision   Walking 10 feet on uneven surface 10 feet without AD with SBA Walked over varying surfaces including grass & rocks outside without AD with SBA NT 10 feet without AD with Supervision 10 feet without AD with Supervision   Wheel Chair Mobility NA NA NA     Car Transfers SBA NT NT Supervision Supervision    Stair negotiation: ascended and descended  12 steps with 1 rail with SBA NT 20 steps in stairwell with 1 rail with SBA 16 steps with 1 rail with Supervision 20 steps without rail with Supervision   Curb Step:   ascended and descended 7.5 inch step without AD and SBA Outdoor curb (6 inches) without AD and SBA NT 7.5 inch step with out AD and Supervision 7.5 inch step without AD and Supervision   Picking up object off the floor Picked up 4lb weight with SB assist NT NT Will  4lb weight with Supervision assist Will  4lb weight with Supervision assist   BLE ROM WNL WNL      BLE Strength BLE grossly 5/5 BLE grossly 5/5      Balance  Static and dynamic standing balance SBA without AD Static and dynamic standing balance SBA<> Supervision without AD      Date Family Teach Completed  No family present at this date       Is additional Family Teaching Needed? Y or N Y Y      Hindering Progress aphasia aphasia      PT recommended ELOS 5 days       Team's Discharge Plan        Therapist at Team Meeting          Therapeutic Exercise:   AM:   Outdoor walk on side walks and through cross walks x2 for >1,000 feet with supervision without AD  Braiding gait pattern in hallway with SBA with single UE support on wall progressing to no UE support   4/10 cone colored search with ambulation  Floor transfers     PM:   Agility ladder: sequencing instructions (in,in, out, out) (forward, forward, back, back) (in, in, out, out to the R only) (in, in, out, out to the L only) each x2 with SBA  Sequencing activities in order of instruction in different orders (cone  from step, stairs, weave around QC) with supervision  Stairs negotiation: 2 flights up and 2 flights down in stairwell with 1 rail and with SBA     Additional Comments: Pt ambulated outside with SBA<>Supervision through cross walks, side walks, grass and rocks. Pt performed well in outdoor environment and stopped before each cross walk to look both ways without requiring cues.  Pt was able to identify/find 4/10 cones hidden around the room without any assistance. Pt required cues of color and encouragement to find the remaining 6/10. Pt demonstrated difficulty following multi- step directions with braiding gait intervention requiring demonstration to perform skill. Pt performed floor transfers with supervision in order to promote fall safety. PT talked with speech on incorporating sequencing in therapy to improve cognitive abilities, therapeutic activities reflect this recommendation. Pt demonstrated inconsistency in his ability to follow instruction with each trial of sequencing various activities and needed cues. Pt was able to perform agility ladder with instruction, pt had mild regression with sequencing towards end of intervention. Patient/family education  Pt/family educated on floor tranfers. Patient/family response to education:   Pt/family verbalized understanding Pt/family demonstrated skill Pt/family requires further education in this area   Yes Yes reinforce     AM  Time in: 1045  Time out: 1130    PM  Time in: 1430  Time out: 1515    Pt is making good progress toward established Physical Therapy goals. Continue with physical therapy current plan of care. Carol Hernández, DEBORAH Gooden.  Barbara Sterling, [address] [place] [address]  License Number: [id number]

## 2020-09-10 LAB
METER GLUCOSE: 120 MG/DL (ref 74–99)
METER GLUCOSE: 121 MG/DL (ref 74–99)

## 2020-09-10 PROCEDURE — 6370000000 HC RX 637 (ALT 250 FOR IP): Performed by: FAMILY MEDICINE

## 2020-09-10 PROCEDURE — 82962 GLUCOSE BLOOD TEST: CPT

## 2020-09-10 PROCEDURE — 97130 THER IVNTJ EA ADDL 15 MIN: CPT | Performed by: SPEECH-LANGUAGE PATHOLOGIST

## 2020-09-10 PROCEDURE — 97110 THERAPEUTIC EXERCISES: CPT

## 2020-09-10 PROCEDURE — 97530 THERAPEUTIC ACTIVITIES: CPT

## 2020-09-10 PROCEDURE — 97535 SELF CARE MNGMENT TRAINING: CPT

## 2020-09-10 PROCEDURE — 97129 THER IVNTJ 1ST 15 MIN: CPT | Performed by: SPEECH-LANGUAGE PATHOLOGIST

## 2020-09-10 PROCEDURE — 6370000000 HC RX 637 (ALT 250 FOR IP): Performed by: PHYSICAL MEDICINE & REHABILITATION

## 2020-09-10 PROCEDURE — 1280000000 HC REHAB R&B

## 2020-09-10 RX ADMIN — ATORVASTATIN CALCIUM 40 MG: 40 TABLET, FILM COATED ORAL at 20:19

## 2020-09-10 RX ADMIN — GLIPIZIDE 5 MG: 5 TABLET ORAL at 06:07

## 2020-09-10 RX ADMIN — ASPIRIN 81 MG: 81 TABLET, COATED ORAL at 08:18

## 2020-09-10 RX ADMIN — CLOPIDOGREL 75 MG: 75 TABLET, FILM COATED ORAL at 08:18

## 2020-09-10 RX ADMIN — POLYETHYLENE GLYCOL 3350 17 G: 17 POWDER, FOR SOLUTION ORAL at 06:54

## 2020-09-10 RX ADMIN — METFORMIN HYDROCHLORIDE 500 MG: 500 TABLET ORAL at 17:25

## 2020-09-10 RX ADMIN — METFORMIN HYDROCHLORIDE 500 MG: 500 TABLET ORAL at 08:18

## 2020-09-10 ASSESSMENT — PAIN SCALES - GENERAL
PAINLEVEL_OUTOF10: 0
PAINLEVEL_OUTOF10: 0

## 2020-09-10 NOTE — PROGRESS NOTES
Speech Language Pathology  ACUTE REHABILITATION--DAILY PROGRESS NOTE            FIMS    FIMS SCORES      Swallowing    Current Status  7--Independent    Short Term Goal Met    Long Term Goal Met     Receptive    Current Status  3--Moderate Assistance    Short Term Goal 4--Minimal Assistance    Long Term Goal 5--Supervision     Expressive    Current Status  3--Moderate Assistance    Short Term Goal 4--Minimal Assistance    Long Term Goal 5--Supervision     Social Interaction    Current Status  3--Moderate Assistance    Short Term Goal 4--Minimal Assistance    Long Term Goal 5--Supervision         Problem Solving    Current Status  3--Moderate Assistance    Short Term Goal 4--Minimal Assistance    Long Term Goal 5--Supervision      Memory    Current Status  3--Moderate Assistance    Short Term Goal 4--Minimal Assistance    Long Term Goal 5--Supervision                      SWALLOWING:      Diet:  Regular consistency solids with thin liquids     Patient consumed breakfast without difficulty. Not being followed for swallow therapy. LANGUAGE:     Pt completed exp/ rec langauge tasks requiring him to read list of medication and match written words on list to bottles; able to do so with good accuracy. Reading directions to complete task with fair+ ability, however did require occasional reptition of stimuli to complete. Open ended questions presented to complete safety task resulting in verbal output with fair context; vague and short utterances, phonemic and semantic errors. Discussed all briefly with son. COGNITION:      Pt completed medication management task in which Pt was asked to find appropriate medication bottles (from written list) and compare/ contrast to bottles presented, count out dosages (AM vs PM, etc) , and sort the pills into a medication manager. Pt completed the task with good outcome and mild-moderate cues from SLP d/t language deficits.      Actively engaged in discussion about safety modifications. Given mild v/c, patient identified actions depicted on photo cards that could pose a safety risk. Pt required intermittent structured questions and carrier phrases v/c to provide reasonable adaptations to environment/procedure to promote increased safety. Safety:  fair    SPEECH:     Good intelligibility; however speech is characterized with neologisms and paraphasic errors. SP recommended after discharge: To be determined     Will continue SP intervention as per previously established POC.       Minute Tracking:    Individual therapy:    15 minutes   Concurrent therapy:    0 minutes  Group therapy:     0 minutes  Co-treatment therapy:    30 minutes    Total minutes for 9/10/2020: 45 minutes

## 2020-09-10 NOTE — PROGRESS NOTES
Physical Therapy    Facility/Department: [address] REHAB  Weekly Progress Note    NAME: Veda Briones  : 1955  MRN: [id number]    Date of Service: 9/10/2020    Evaluating Therapist: Neftaly Owen PT, DPT    ROOM: [facility], [place]  DIAGNOSIS: CVA  PRECAUTIONS: falls, aphasia (mixed; expressive>receptive), R peripheral visual field deficit  HPI: Pt admitted 20 after being found by son at home with AMS, unable to answer questions. Right sided weakness and facial droop noted. MRI showed acute left MCA. CTA of the head and neck showed 90% left ICA stenosis. Per Vascular, he will need CEA in about 6 weeks. Pt lives alone in a 1 story home with 1 stairs to enter and 0 rail. Bed is on first floor and bath is on first floor. Pt ambulated with no AD independently PTA. Pt worked full time, independent with ADLs, and driving PTA.       Initial Evaluation  9/5/20 9/10/20  Comments   Short Term Goals Long Term Goals    Bed Mobility  Rolling: Supervision  Supine to sit: Supervision  Sit to supine: Supervision  Scooting: Supervision Sup (all aspects)  Mod I Independent   Transfers Sit to stand: SBA  Stand to sit: SBA  Stand pivot: SBA Sup without AD  Supervision Independent   Ambulation   300 feet x 2 reps without AD with SBA >400 feet with no AD Supervision  600 feet without AD with Supervison 1000 feet without AD with Supervision   Walking 10 feet on uneven surface 10 feet without AD with SBA >1000' sba without AD  10 feet without AD with Supervision 10 feet without AD with Supervision   Wheel Chair Mobility NA NA      Car Transfers SBA supervision  Supervision Supervision    Stair negotiation: ascended and descended  12 steps with 1 rail with SBA 12-20 steps with one HR sba  16 steps with 1 rail with Supervision 20 steps without rail with Supervision   Curb Step:   ascended and descended 7.5 inch step without AD and SBA 6 in curb step sba without AD  7.5 inch step with out AD and Supervision 7.5 inch step without AD and Supervision   BLE ROM WNL WNL      BLE Strength BLE grossly 5/5 BLE grossly 5/5      Balance  Static and dynamic standing balance SBA without AD Sitting: Independent   Standing: sba without AD    Fernandez Balance TBA      Date Family Teach Completed  FT with son 9/10      Is additional Family Teaching Needed? Y or N Y N      Hindering Progress aphasia Aphasia, balance      PT recommended ELOS 5 days 3-5 days      Team's Discharge Plan  Discharge Tuesday 9/15/20      Therapist at Team Meeting  PRITESH Fonseca, DPT [id number]          Date:  9/10/20  Supporting factors:  Strong family support, high PLOF, improving [place] return   Barriers to discharge:  Aphasia, balance, Was at [address] living PTA  Additional comments:  Limited by aphasia/balance deficits. FT with son on 9/10 reviewing all aspects of mobility and need for supervision at this time. May require transition to assisted living initially when returning home due to aphasia/cognitive deficits. Formal balance assessment to be completed.    DME:  n/a  After Care:  ОЛЬГА AdornoT

## 2020-09-10 NOTE — PROGRESS NOTES
Physical Therapy    Facility/Department: [facility] 5SE REHAB  Daily treatment     NAME: Tonette Canavan  : 1955  MRN: [id number]    Date of Service: 9/10/2020    Evaluating Therapist: Aurelia Coleman PT, DPT    ROOM: [facility]  DIAGNOSIS: CVA  PRECAUTIONS: falls, aphasia (mixed; expressive>receptive), R peripheral visual field deficit  HPI: Pt admitted 20 after being found by son at home with AMS, unable to answer questions. Right sided weakness and facial droop noted. MRI showed acute left MCA. CTA of the head and neck showed 90% left ICA stenosis. Per Vascular, he will need CEA in about 6 weeks. Pt lives alone in a 1 story home with 1 stairs to enter and 0 rail. Bed is on first floor and bath is on first floor. Pt ambulated with no AD independently PTA. Pt worked full time, independent with ADLs, and driving PTA. Initial Evaluation  20 AM     PM    Short Term Goals Long Term Goals    Was pt agreeable to Eval/treatment? yes Yes  Yes      Does pt have pain?  yes No c/o of pain No c/o of pain     Bed Mobility  Rolling: Supervision  Supine to sit: Supervision  Sit to supine: Supervision  Scooting: Supervision NT Rolling: Supervision  Supine to sit: Supervision  Sit to supine: supervision  Scooting: Supervision Mod I Independent   Transfers Sit to stand: SBA  Stand to sit: SBA  Stand pivot: SBA Supervision for all aspects Supervision for all aspects  Supervision Independent   Ambulation   300 feet x 2 reps without AD with SBA >400 feet with no AD Superivsion 400 feet and 150 feet  with no AD and supervision 600 feet without AD with Supervison 1000 feet without AD with Supervision   Walking 10 feet on uneven surface 10 feet without AD with SBA NT NT 10 feet without AD with Supervision 10 feet without AD with Supervision   Wheel Chair Mobility NA NA NA     Car Transfers SBA Supervision NT Supervision Supervision    Stair negotiation: ascended and descended  12 steps with 1 rail with SBA 8 steps with 1 rail with Superivsion 20 steps in stairwell with 1 rail with supervision  16 steps with 1 rail with Supervision 20 steps without rail with Supervision   Curb Step:   ascended and descended 7.5 inch step without AD and SBA NT NT 7.5 inch step with out AD and Supervision 7.5 inch step without AD and Supervision   Picking up object off the floor Picked up 4lb weight with SB assist NT NT Will  4lb weight with Supervision assist Will  4lb weight with Supervision assist   BLE ROM WNL WNL      BLE Strength BLE grossly 5/5 BLE grossly 5/5      Balance  Static and dynamic standing balance SBA without AD Static and dynamic standing balance SBA<> Supervision without AD      Date Family Teach Completed  9/10 son       Is additional Family Teaching Needed? Y or N Y N      Hindering Progress aphasia aphasia      PT recommended ELOS 5 days       Team's Discharge Plan        Therapist at Team Meeting          Therapeutic Exercise:   AM:   Stair negotiation: 8 step limit was d/t time with family teach   Ambulation: 400 feet x2 with son with supervision. One repetition was done while balancing a ball on a box and with varying directions (left, stop, fast, slow)  Dual task ambulation: ambulation without AD with categorical cognitive tasks (ex. Naming colors)   Son performed kicking ball with pt with SBA of PT  Ball catch seated with one hand and both hands with son with supervision  Varying heights of cane step overs x4 ( right, left, forward, backward)     PM:  Zig Zag with ambulation with supervision   Stepping on/off 4 inch steps with supervision   Stepping on/off blue foam pads without LOB however occasional unsteadiness with self correction    Patient education  Pt and son educated on safety with mobility and guarding and balance deficits.     Patient response to education:   Pt verbalized understanding Pt demonstrated skill Pt requires further education in this area   Partial  yes reinforce     Additional Comments: Family teach with son demonstrating supervision with mobility, guarding placements and interventions performed in therapy. Son was very willing to help get pt moving and sequencing activities to challenge brain function. Pt performed ambulation with ball on box to balance and demonstrated good balance and reflexes when instructed to stop or move right/left. Pt performed functional mobility with son and demonstrated fall recovery. Pt demonstrated floor to stand without mat table to assist up to standing and had difficulty, requiring Min A (completed per sons request). When pt completed without cueing and using environment, pt demonstrated safe and Independent return to standing. Pt performed cane step overs at varying heights and had one incident of LOB but was able to recover. Pt and son were given then orange dot to ambulate throughout the floor with supervision of family. Son reported wanting to come to more therapy sessions and only available during morning sessions. Skin was inspected: N/A  Chair/bed alarm: TSM    AM  Time in: 1045  Time out: 1130    PM  Time in:1515  Time out:1600    Pt is making good progress toward established Physical Therapy goals. Continue with physical therapy current plan of care.     Denisse Lee, SPT  Sophie Mike DPT  [id number] (AM)    Mo Talamantes [id number]

## 2020-09-10 NOTE — PROGRESS NOTES
Physical Therapy    Facility/Department: [facility] 5S REHAB  Daily treatment     NAME: Viki Servin  : 1955  MRN: [id number]    Date of Service: 9/10/2020    Evaluating Therapist: Nader Shields PT, DPT    ROOM: [facility]  DIAGNOSIS: CVA  PRECAUTIONS: falls, aphasia (mixed; expressive>receptive), R peripheral visual field deficit  HPI: Pt admitted 20 after being found by son at home with AMS, unable to answer questions. Right sided weakness and facial droop noted. MRI showed acute left MCA. CTA of the head and neck showed 90% left ICA stenosis. Per Vascular, he will need CEA in about 6 weeks. Pt lives alone in a 1 story home with 1 stairs to enter and 0 rail. Bed is on first floor and bath is on first floor. Pt ambulated with no AD independently PTA. Pt worked full time, independent with ADLs, and driving PTA. Initial Evaluation  20 AM     PM    Short Term Goals Long Term Goals    Was pt agreeable to Eval/treatment? yes Yes  Yes with encouragement      Does pt have pain?  yes No c/o of pain No c/o of pain     Bed Mobility  Rolling: Supervision  Supine to sit: Supervision  Sit to supine: Supervision  Scooting: Supervision NT Rolling: Supervision  Supine to sit: Supervision  Sit to supine: supervision  Scooting: Supervision Mod I Independent   Transfers Sit to stand: SBA  Stand to sit: SBA  Stand pivot: SBA Supervision for all aspects Supervision for all aspects  Supervision Independent   Ambulation   300 feet x 2 reps without AD with SBA >400 feet with no AD Superivsion 150 feet with no AD and supervision 600 feet without AD with Supervison 1000 feet without AD with Supervision   Walking 10 feet on uneven surface 10 feet without AD with SBA NT NT 10 feet without AD with Supervision 10 feet without AD with Supervision   Wheel Chair Mobility NA NA NA     Car Transfers SBA Supervision NT Supervision Supervision    Stair negotiation: ascended and descended  12 steps with 1 rail with SBA 8 steps with 1 rail with Superivsion 20 steps in stairwell with 1 rail with SBA 16 steps with 1 rail with Supervision 20 steps without rail with Supervision   Curb Step:   ascended and descended 7.5 inch step without AD and SBA NT NT 7.5 inch step with out AD and Supervision 7.5 inch step without AD and Supervision   Picking up object off the floor Picked up 4lb weight with SB assist NT NT Will  4lb weight with Supervision assist Will  4lb weight with Supervision assist   BLE ROM WNL WNL      BLE Strength BLE grossly 5/5 BLE grossly 5/5      Balance  Static and dynamic standing balance SBA without AD Static and dynamic standing balance SBA<> Supervision without AD      Date Family Teach Completed  9/10 son       Is additional Family Teaching Needed? Y or N Y N      Hindering Progress aphasia aphasia      PT recommended ELOS 5 days       Team's Discharge Plan        Therapist at Team Meeting          Therapeutic Exercise:   AM:   Stair negotiation: 8 step limit was d/t time with family teach   Ambulation: 400 feet x2 with son with supervision. One repetition was done while balancing a ball on a box and with varying directions (left, stop, fast, slow)  Dual task ambulation: ambulation without AD with categorical cognitive tasks (ex. Naming colors)   Son performed kicking ball with pt with SBA of PT  Ball catch seated with one hand and both hands with son with supervision  Varying heights of cane step overs x4 ( right, left, forward, backward)     PM:    Patient education  Pt and son educated on safety with mobility and guarding and balance deficits. Patient response to education:   Pt verbalized understanding Pt demonstrated skill Pt requires further education in this area   Partial  yes reinforce     Additional Comments: Family teach with son demonstrating supervision with mobility, guarding placements and interventions performed in therapy.  Son was very willing to help get pt moving and sequencing activities to challenge brain function. Pt performed ambulation with ball on box to balance and demonstrated good balance and reflexes when instructed to stop or move right/left. Pt performed functional mobility with son and demonstrated fall recovery. Pt demonstrated floor to stand without mat table to assist up to standing and had difficulty, requiring Min A (completed per sons request). When pt completed without cueing and using environment, pt demonstrated safe and Independent return to standing. Pt performed cane step overs at varying heights and had one incident of LOB but was able to recover. Pt and son were given then orange dot to ambulate throughout the floor with supervision of family. Son reported wanting to come to more therapy sessions and only available during morning sessions. Skin was inspected: N/A  Chair/bed alarm: TSM    AM  Time in: 1045  Time out: 1130    PM  Time in:  Time out:     Pt is making good progress toward established Physical Therapy goals. Continue with physical therapy current plan of care.     Sayra Sheldon, SPT  Dayana Randall, DPT  [id number] (AM)

## 2020-09-10 NOTE — PLAN OF CARE
Problem: Falls - Risk of:  Goal: Will remain free from falls  Outcome: Met This Shift     Problem: HEMODYNAMIC STATUS  Goal: Patient has stable vital signs and fluid balance  Outcome: Met This Shift     Problem: COMMUNICATION IMPAIRMENT  Goal: Ability to express needs and understand communication  Outcome: Met This Shift     Problem: Skin Integrity:  Goal: Will show no infection signs and symptoms  Outcome: Met This Shift     Problem: Neurological  Goal: Maximum potential motor/sensory/cognitive function  Outcome: Not Met This Shift

## 2020-09-10 NOTE — PROGRESS NOTES
OCCUPATIONAL THERAPY DAILY NOTE    Date:9/10/2020  Patient Name: Te Ugalde  MRN: [id number]  : 1955  Room: [address] [place] [address]B     Diagnosis: CVA  Surgery: none   Past Medical History: COPD, DM  Precautions: falls, aphasia(expressive and receptive),TSM, bed alarm     Functional Assessment:   Date Status AE  Comments   Feeding 2020 Sup     Grooming 20 Min A     Bathing 20  Matt seat    UB Dressing 20 Sup     LB Dressing 9/10/20 SBA-Matt  To don shoes seated at EOB. Homemaking 20 Min A No device       Functional Transfers / Balance:   Date Status DME  Comments   pt Sit Balance 9/10/20 SUP     Stand Balance 9/10/20  SBA  Completed throughout rehab unit and pt's room. [] Tub  [x] Shower   Transfer 9/10/20 SBA Shower seat VCs for hand placement   Commode   Transfer 9/10/20 S/SBA No device    Functional   Mobility 9/10/20  SBA No device  Completed throughout rehab unit and pt's room with SBA for balance/safety d/t unsteadiness. Pt required Min A to utilize wall signs to locate room. Other: firm recliner,sofa, dining chair      Supine to EOB 9/9/20        9/10/20 SBA        supervision           Functional Exercises / Activity:  -Pt completed functional medication management activity seated at table utilizing B UE's with focus on increasing B FMC, strength and sequencing/problem solving. Fair+ tolerance.   -Pt participated in therapeutic leisure checkers activity seated at table with focus on sequencing/problem solving and B UE AROM. Fair+ results. -1# MrJoy completed x10 reps to increase B UE strength/endurance for functional activities. Fair+ tolerance. Sensory / Neuromuscular Re-Education:      Cognitive Skills:   Status Comments   Problem   Solving Fair+ Demo'd during functional activities. Memory Fair+    Sequencing Fair+ Demo'd during functional activities. Safety Fair      Visual Perception:    Education:  Pt educated on safety.     [x] Family teach completed on: 9/10/20  Pt's son present for family teach this date and completed hands on training. Son was educated on levels of assist for ADLs which he was somewhat familiar with. He observed pt completing commode, tub and simulated handwashing. Pt completes transfers with S/SBA. Son instructed to provide SBA during mobility and taking pt to the bathroom. Good questions asked and understanding of instructions demo'd with good skill. Pain Level: No c/o or indication of pain. Additional Notes:       Patient has made good progress during treatment sessions toward set goals. Therapy emphasis to obtain goals:  Long Term Goals: 2 weeks   indep UE ADL- feeding, grooming, bathing, dressing  indep LE ADL- bathing, dressing  indep functional transfers to commode, tub shower  indep functional mobility/standing balance during ADL/IADLs with no AD  pt to demo good judgement, safety, problem solving and sequencing during ADLs and IADLs  indep IADL to return home to indep living    [x] Continue with current OT Plan of care. [] Prepare for Discharge     DISCHARGE RECOMMENDATIONS  Recommended DME:    Post Discharge Care:   []Home Independently  []Home with 24hr Care / Supervision []Home with Partial Supervision []Home with Home Health OT []Home with Out Pt OT []Other: ___   Comments:         Time in Time out Tx Time Breakdown  Variance:   First Session  1000 1030 [x] Individual Tx- 30 Mins  [] Concurrent Tx -   [] Co-Tx -   [] Group Tx -   [] Time Missed -     Second Session 10:00 10:10 [x] Individual Tx- 10 Mins   [] Concurrent Tx -   [] Co-Tx -   [] Group Tx -   [] Time Missed -     Third Session  1300 1345 [x] Individual Tx- 45 Mins  [] Concurrent Tx -  [] Co-Tx -   [] Group Tx -   [] Time Missed -         Total Tx Time 85 Mins     Olena Leyva FLANAGAN/L [id number]  Bernadette Gonzalez FLANAGAN/L [id number]  I have read the above note and agree with the documentation.   Margot Sutton OTR/L [id number]

## 2020-09-11 LAB
METER GLUCOSE: 93 MG/DL (ref 74–99)
METER GLUCOSE: 96 MG/DL (ref 74–99)
SARS-COV-2, NAAT: NOT DETECTED

## 2020-09-11 PROCEDURE — 6370000000 HC RX 637 (ALT 250 FOR IP): Performed by: FAMILY MEDICINE

## 2020-09-11 PROCEDURE — 97535 SELF CARE MNGMENT TRAINING: CPT | Performed by: OCCUPATIONAL THERAPY ASSISTANT

## 2020-09-11 PROCEDURE — 99232 SBSQ HOSP IP/OBS MODERATE 35: CPT | Performed by: PHYSICAL MEDICINE & REHABILITATION

## 2020-09-11 PROCEDURE — 97530 THERAPEUTIC ACTIVITIES: CPT

## 2020-09-11 PROCEDURE — 82962 GLUCOSE BLOOD TEST: CPT

## 2020-09-11 PROCEDURE — 97110 THERAPEUTIC EXERCISES: CPT

## 2020-09-11 PROCEDURE — 97129 THER IVNTJ 1ST 15 MIN: CPT

## 2020-09-11 PROCEDURE — 1280000000 HC REHAB R&B

## 2020-09-11 PROCEDURE — 97130 THER IVNTJ EA ADDL 15 MIN: CPT

## 2020-09-11 PROCEDURE — U0002 COVID-19 LAB TEST NON-CDC: HCPCS

## 2020-09-11 RX ADMIN — ASPIRIN 81 MG: 81 TABLET, COATED ORAL at 08:45

## 2020-09-11 RX ADMIN — GLIPIZIDE 5 MG: 5 TABLET ORAL at 06:23

## 2020-09-11 RX ADMIN — ATORVASTATIN CALCIUM 40 MG: 40 TABLET, FILM COATED ORAL at 21:15

## 2020-09-11 RX ADMIN — METFORMIN HYDROCHLORIDE 500 MG: 500 TABLET ORAL at 08:46

## 2020-09-11 RX ADMIN — METFORMIN HYDROCHLORIDE 500 MG: 500 TABLET ORAL at 16:56

## 2020-09-11 RX ADMIN — CLOPIDOGREL 75 MG: 75 TABLET, FILM COATED ORAL at 08:45

## 2020-09-11 ASSESSMENT — PAIN SCALES - GENERAL: PAINLEVEL_OUTOF10: 0

## 2020-09-11 NOTE — PATIENT CARE CONFERENCE
[address] [place] [address]  ACUTE REHABILITATION  TEAM CONFERENCE NOTE/PATIENT PLAN OF CARE    Date: 2020  Admission date: 2020  Patient Name: Taylor Nieves        MRN: [id number]    : 1955  (77 y.o.)  Gender: male   Rehab diagnosis/surgery with date:  Left MCA CVA 20  Impairment Group Code:  1.2      MEDICAL/FUNCTIONAL HISTORY/STATUS:  Presented on  with right facial droop, aphasia and confusion.  Found to have a left MCA cva, left ICA stenosis  He is on aspirin, plavix, statin, with plans of a left CEA in 6 weeks    Consultations/Labs/X-rays: fasting blood sugar 93      MEDICATION UPDATE:  No changes      NURSING FIMS:    Bowel:   Current level: continent    Bladder:   Current level: continent    Toilet Hygiene:   Current level : Stand by Assist   Short term Toilet hygiene goal: Modified Independent   Long term toilet hygiene goal:  Independent     Skin integrity: intact  Pain: none    NUTRITION    Diet  Carb controlled  Liquid consistency   thin    SOCIAL INFORMATION:  Lives with: alone  Prior community services:  none  Home Architecture:  Ranch, 1 step no rails  Prior Level of function:  independent  DME:  none    FAMILY / PATIENT EDUCATION:  Son was in for family teaching, he is [place]    PHYSICAL THERAPY    Bed mobility:   Current level: Supervision   Short term bed mobility goal: Modified Independent   Long term bed mobility goal: Independent     Chair/bed transfers:  Current level: Stand by Assist   Short term Chair/bed transfers goal: Supervision   Long term Chair/bed transfers goal: Independent       Ambulation:   Current level: 400ft with no device Supervision   Short term ambulation goal: 600 ft with Supervision   Long term ambulation goal: 1000 ft with Supervision     Car transfers:   Current level: Supervision   Short term car transfers goal: met  Long term car transfers goal:met     Stairs:   Current level : 12-20 with 1 rail Stand by Assist   Short term stairs goal: term memory goal: Supervision     Social interaction:  Moderate Assist goal supervsions    Safety awareness: fair      Patient/family's personal goals: get better   Factors supporting goal achievement:  Prior independence  Factors hindering goal achievement:  aphasia          Discharge Plan   Estimated Length of Stay: 9/15            Destination: home with 24 care  Services at Discharge: outpatient PT, speech, OT  Equipment at Discharge: none      INTERDISCIPLINARY TEAM/PHYSICIAN [address] [place] [address] [address]:  Talk with son to determine if patient will have 24 hour supervision at discharge, continue to work toward goals        I approve the established interdisciplinary plan of care as documented within the medical record of [employer]. Electronically signed by Staci Baker RN on 9/11/2020 at 9:10 AM  The following interdisciplinary team members were present:  Sandra Pope, JUAN DANIEL Fontanez, LSW  Chase Chester, DPT  [employer], OTR  Shakila Sherman 87, CCC-SLP

## 2020-09-11 NOTE — PROGRESS NOTES
[facility] Physical Medicine and Rehabilitation  Comprehensive Progress Note    Subjective:      Ace Fregoso is a 77 y.o. male admitted to inpatient rehabilitation for impairments and acitivities limitations in ADLs, mobility, functional communication, cognition secondary to [address]. Doris Arroyo No acute events overnight. No cp, sob, n/v. Tolerating therapy. No new issues reported. No complaints. The patient's medical records have been reviewed. Scheduled Meds:aspirin, 81 mg, Daily  atorvastatin, 40 mg, Nightly  clopidogrel, 75 mg, Daily  glipiZIDE, 5 mg, QAM AC  metFORMIN, 500 mg, BID WC  influenza virus vaccine, 0.5 mL, Prior to discharge      Continuous Infusions:  PRN Meds:acetaminophen, 650 mg, Q6H PRN  polyethylene glycol, 17 g, Daily PRN         Objective:      Vitals:    09/09/20 2340 09/10/20 0015 09/10/20 0800 09/11/20 0730   BP:   136/73 125/82   Pulse: 66  66 63   Resp:   16 18   Temp:   97.2 °F (36.2 °C) 96.6 °F (35.9 °C)   TempSrc:   Temporal Temporal   SpO2:  94% 97% 96%   Weight:       Height:           General appearance: Alert, NAD  Lungs: CTA b/l, no w/r/r  Heart: RRR  Abdomen: soft, non-tender, normal bowel sounds  Extremities: no cyanosis or edema  Musculoskeletal: Range of motion normal   Neurologic: Alert, oriented. Expressive>receptive aphasia. Impaired recall. Impaired attention/concentration.  Motor 5/5 throughout    Laboratory:    Lab Results   Component Value Date    WBC 7.2 09/05/2020    HGB 18.0 (H) 09/05/2020    HCT 53.8 09/05/2020    MCV 87.2 09/05/2020     09/05/2020     Lab Results   Component Value Date     09/05/2020    K 4.5 09/05/2020     09/05/2020    CO2 22 09/05/2020    BUN 29 09/05/2020    CREATININE 1.0 09/05/2020    GLUCOSE 132 09/05/2020    CALCIUM 9.5 09/05/2020      Lab Results   Component Value Date    ALT 11 08/31/2020    AST 10 08/31/2020    ALKPHOS 72 08/31/2020    BILITOT 0.7 08/31/2020       Lab Results   Component Value Date COLORU Yellow 09/01/2020    NITRU Negative 09/01/2020    GLUCOSEU >=1000 09/01/2020    KETUA Negative 09/01/2020    UROBILINOGEN 1.0 09/01/2020    BILIRUBINUR Negative 09/01/2020     Lab Results   Component Value Date    LABA1C 10.0 (H) 09/01/2020         Functional Status:   Feeding: Supervision  Grooming: Min A  UB dressing: Supervision  LB dressing: SBA-Min A  Bed mobility: Supervision  Transfers: Supervision  Ambulation: >400 ft without AD Supervision        Assessment/Plan:       77 y. o. male admitted to inpatient rehabilitation for impairments and acitivities limitations in ADLs, mobility, functional communication, cognition secondary to acute left MCA CVA. .     -Left MCA stroke: Continue Aspirin, Plavix, statin. Continue acute rehab program.   -Expressive/receptive aphasia, cognitive impairment: speech therapy to follow on rehab  -Left ICA stenosis: evaluated by Vascular surgery, recommended left CEA in approximately 6 weeks  -DM: continue metformin, glipizide.  BG controlled    BG controlled  Team conference today    Electronically signed by Stephanie Essex, MD on 9/11/2020 at 10:32 AM

## 2020-09-11 NOTE — PROGRESS NOTES
Physical Therapy    Facility/Department: [facility] 5SE REHAB  Daily treatment     NAME: Tim Kirkland  : 1955  MRN: [id number]    Date of Service: 2020    Evaluating Therapist: Lyndsay Boone PT, DPT    [facility]  DIAGNOSIS: CVA  PRECAUTIONS: falls, aphasia (mixed; expressive>receptive), R peripheral visual field deficit  HPI: Pt admitted 20 after being found by son at home with AMS, unable to answer questions. Right sided weakness and facial droop noted. MRI showed acute left MCA. CTA of the head and neck showed 90% left ICA stenosis. Per Vascular, he will need CEA in about 6 weeks. Pt lives alone in a 1 story home with 1 stairs to enter and 0 rail. Bed is on first floor and bath is on first floor. Pt ambulated with no AD independently PTA. Pt worked full time, independent with ADLs, and driving PTA. Initial Evaluation  20 AM     PM    Short Term Goals Long Term Goals    Was pt agreeable to Eval/treatment? yes Yes  Yes      Does pt have pain?  yes No c/o of pain No c/o of pain     Bed Mobility  Rolling: Supervision  Supine to sit: Supervision  Sit to supine: Supervision  Scooting: Supervision NT NT Mod I Independent   Transfers Sit to stand: SBA  Stand to sit: SBA  Stand pivot: SBA Supervision for all aspects Supervision for all aspects  Supervision Independent   Ambulation   300 feet x 2 reps without AD with SBA >400 feet with no AD Superivsion >400 feet  with no AD and supervision 600 feet without AD with Supervison 1000 feet without AD with Supervision   Walking 10 feet on uneven surface 10 feet without AD with SBA NT NT 10 feet without AD with Supervision 10 feet without AD with Supervision   Wheel Chair Mobility NA NA NA     Car Transfers SBA  NT Supervision Supervision    Stair negotiation: ascended and descended  12 steps with 1 rail with SBA 12 steps with 1 rail with Superivsion    4 steps without HR cg/sba  NT 16 steps with 1 rail with Supervision 20 steps without rail with Supervision   Curb Step:   ascended and descended 7.5 inch step without AD and SBA NT NT 7.5 inch step with out AD and Supervision 7.5 inch step without AD and Supervision   Picking up object off the floor Picked up 4lb weight with SB assist Picked up 4lb weight with SBA NT Will  4lb weight with Supervision assist Will  4lb weight with Supervision assist   BLE ROM WNL WNL      BLE Strength BLE grossly 5/5 BLE grossly 5/5      Balance  Static and dynamic standing balance SBA without AD Static and dynamic standing balance SBA<> Supervision without AD      Date Family Teach Completed  9/10 son       Is additional Family Teaching Needed? Y or N Y N      Hindering Progress aphasia aphasia      PT recommended ELOS 5 days       Team's Discharge Plan        Therapist at Team Meeting          Therapeutic Exercise:   AM:   Ambulation: 400 x2 with visual tracking of cone anterior to patient while walking with varying speeds, then counting to 20 with SBA  5 QC step overs 4 ways (forward, backward, right, left) with SBA<> CGA  Balance: cuff weight, water cup, and cone (differneting colors, tap following instruction and L vs R) with SBA <> CGA. 4\" and 6\" box taps alternating feet with SBA        PM:  Stepping over quad cane x4 laps. ( no LOB )   Fernandez Balance Scale    SITTING TO STANDING  INSTRUCTIONS: Please stand up. Try not to use your hand for support. (   x ) 4 able to stand without using hands and stabilize independently  (    ) 3 able to stand independently using hands  (    ) 2 able to stand using hands after several tries  (    ) 1 needs minimal aid to stand or stabilize  (    ) 0 needs moderate or maximal assist to stand    STANDING UNSUPPORTED  INSTRUCTIONS: Please stand for two minutes without holding on.   (   x ) 4 able to stand safely for 2 minutes  (    ) 3 able to stand 2 minutes with supervision  (    ) 2 able to stand 30 seconds unsupported  (    ) 1 needs several tries to stand 30 seconds weight shift  (    ) 2 turns sideways only but maintains balance  (    ) 1 needs supervision when turning  (    ) 0 needs assist to keep from losing balance or falling    TURN 360 DEGREES  INSTRUCTIONS: Turn completely around in a full [place]. Pause. Then turn a full [place] in the other direction. (  x  ) 4 able to turn 360 degrees safely in 4 seconds or less  (    ) 3 able to turn 360 degrees safely one side only 4 seconds or less  (    ) 2 able to turn 360 degrees safely but slowly  (    ) 1 needs close supervision or verbal cuing  (    ) 0 needs assistance while turning    PLACE ALTERNATE FOOT ON STEP OR STOOL WHILE STANDING UNSUPPORTED  INSTRUCTIONS: Place each foot alternately on the step/stool. Continue until each foot has touch the step/stool four times. (    ) 4 able to stand independently and safely and complete 8 steps in 20 seconds  (    ) 3 able to stand independently and complete 8 steps in > 20 seconds  (    ) 2 able to complete 4 steps without aid with supervision  ( x   ) 1 able to complete > 2 steps needs minimal assist  (    ) 0 needs assistance to keep from falling/unable to try    STANDING UNSUPPORTED ONE FOOT IN FRONT  INSTRUCTIONS: (DEMONSTRATE TO SUBJECT) Place one foot directly in front of the other. If you feel that you cannot place your foot directly in front, try to step far enough ahead that the heel of your forward foot is ahead of the toes of the other foot.  (To score 3 points, the length of the step should exceed the length of the other foot and the width of the stance should approximate the subjects normal stride width.)   (    ) 4 able to place foot tandem independently and hold 30 seconds  (  x  ) 3 able to place foot ahead independently and hold 30 seconds  (    ) 2 able to take small step independently and hold 30 seconds  (    ) 1 needs help to step but can hold 15 seconds  (    ) 0 loses balance while stepping or standing    STANDING ON ONE LEG  INSTRUCTIONS: Stand on one leg as long as you can without holding on. (    ) 4 able to lift leg independently and hold > 10 seconds  (    ) 3 able to lift leg independently and hold  5-10 seconds  (    ) 2 able to lift leg independently and hold ? 3 seconds  (   x ) 1 tries to lift leg unable to hold 3 seconds but remains standing independently. (    ) 0 unable to try of needs assist to prevent fall      (  49  )   TOTAL SCORE (Maximum = 56)      Interpretation: >45 = low fall risk     40-45 = medium fall risk     <40 = high fall risk    Patient education  Pt and son educated on safety with mobility and guarding and balance deficits. Patient response to education:   Pt verbalized understanding Pt demonstrated skill Pt requires further education in this area   Partial  yes reinforce     Additional Comments: Pt attempted cone taps in SLS, pt had difficulty completing d/t processing and aphasia, frequent holding on to parallel bars to recover balance. Pt performed 4\" and 6\" box taps in SLS and had better success in order to promote improved balance and better midline recognition. Pt performed 5 QC steps overs and had 2 LOB d/t R LE getting caught on cane, pt was able to self recover. PT encouraged bigger hip flexion throughout the gait cycle. Pt was able visually track cone and adjust speed and follow direction during ambulation. In PM, plan to assess [place] balance. Skin was inspected: N/A  Chair/bed alarm: TSM    AM  Time in: 1045  Time out: 1130    PM  Time in:1430  Time out:1515     Pt is making good progress toward established Physical Therapy goals. Continue with physical therapy current plan of care.     Ashwini Cardoso, SPT  Jas Meyers, ОЛЬГАT  [id number] (AM)    Aurelia Rosas [id number]

## 2020-09-11 NOTE — DISCHARGE INSTR - COC
Continuity of Care Form    Patient Name: Va Roth   :  1955  MRN:  [id number]    Admit date:  2020  Discharge date:  ***    Code Status Order: Full Code   Advance Directives:   Advance Care Flowsheet Documentation       Date/Time Healthcare Directive Type of Healthcare Directive Copy in [address] Agent's Name Healthcare Agent's Phone Number    20 1348  No, patient does not have an advance directive for healthcare treatment -- -- -- -- --    20 2150  No, patient does not have an advance directive for healthcare treatment -- -- -- -- --            Admitting Physician:  Stephanie Essex, MD  PCP: Adrien Galarza MD    Discharging Nurse: [facility] Unit/Room#: 2630/0839-E  Discharging Unit Phone Number: ***    Emergency Contact:   Extended Emergency Contact Information  Primary Emergency Contact: [employer] Phone: [phone]  Relation: Child  Secondary Emergency Contact: Arsenio [address] Phone: [phone]  Relation: Brother/Sister    Past Surgical History:  Past Surgical History:   Procedure Laterality Date    CARDIOVASCULAR STRESS TEST  2020    Lexiscan stress test    HERNIA REPAIR      TONSILLECTOMY         Immunization History: There is no immunization history for the selected administration types on file for this patient.     Active Problems:  Patient Active Problem List   Diagnosis Code    Acute ischemic left MCA stroke ([facility]) P96.283    Middle cerebral artery stenosis, left I66.02    Dysarthria R47.1    Uncontrolled type 2 diabetes mellitus with hyperglycemia ([facility]) E11.65    Tobacco dependence F17.200    COPD (chronic obstructive pulmonary disease) ([facility]) J44.9    Stroke due to stenosis of left carotid artery ([facility]) Q22.160    Acute CVA (cerebrovascular accident) ([facility] I63.9       Isolation/Infection:   Isolation            No Isolation          Patient Infection Status None to display            Nurse Assessment:  Last Vital Signs: /82   Pulse 63   Temp 96.6 °F (35.9 °C) (Temporal)   Resp 18   Ht 6' 3\" (1.905 m)   Wt 197 lb 7 oz (89.6 kg)   SpO2 96%   BMI 24.68 kg/m²     Last documented pain score (0-10 scale): Pain Level: 0  Last Weight:   Wt Readings from Last 1 Encounters:   09/04/20 197 lb 7 oz (89.6 kg)     Mental Status:  disoriented, alert, coherent and able to concentrate and follow conversation    IV Access:  - None    Nursing Mobility/ADLs:  Walking   Independent  Transfer  Independent  [address]  Independent  Med Delivery   whole    Wound Care Documentation and Therapy:  Wound 09/04/20 Leg Lateral abrasion (Active)   Wound Traumatic 09/04/20 1749   Dressing/Treatment Open to air 09/11/20 1345   Wound Assessment Dry; Intact 09/11/20 1345   Drainage Amount None 09/11/20 1345   Number of days: 6        Elimination:  Continence:   · Bowel: No  · Bladder: No  Urinary Catheter: None   Colostomy/Ileostomy/Ileal Conduit: No       Date of Last BM: ***    Intake/Output Summary (Last 24 hours) at 9/11/2020 1422  Last data filed at 9/11/2020 1130  Gross per 24 hour   Intake 960 ml   Output --   Net 960 ml     I/O last 3 completed shifts: In: 12 [P.O.:950]  Out: -     Safety Concerns: At Risk for Falls    Impairments/Disabilities:      Speech    Nutrition Therapy:  Current Nutrition Therapy:   - Oral Diet:  Carb Control 4 carbs/meal (1800kcals/day) Wound healing oral supplement    Routes of Feeding: Oral  Liquids: Thin Liquids  Daily Fluid Restriction: no  Last Modified Barium Swallow with Video (Video Swallowing Test): not done    Treatments at the Time of Hospital Discharge:   Respiratory Treatments: ***  Oxygen Therapy:  is not on home oxygen therapy.   Ventilator:    - No ventilator support    Rehab Therapies: Physical Therapy, Occupational Therapy and Speech/Language Therapy  Weight Bearing Status/Restrictions: No weight bearing restirctions  Other Medical Equipment (for information only, NOT a DME order):  {EQUIPMENT:[id number]}  Other Treatments: ***    Patient's personal belongings (please select all that are sent with patient):  None    RN SIGNATURE:  Electronically signed by Lori Palacios on 9/15/20 at 9:58 AM EDT    CASE MANAGEMENT/SOCIAL WORK SECTION    Inpatient Status Date: 09/04/2020    Readmission Risk Assessment Score:  Readmission Risk              Risk of Unplanned Readmission:        9           Discharging to Facility/ Agency   · Name: [facility]  · Address:  · Phone:  · Fax:      / signature: Magda England      PHYSICIAN SECTION    Prognosis: Good    Condition at Discharge: Stable    Rehab Potential (if transferring to Rehab): Good    Recommended Labs or Other Treatments After Discharge: Medical follow up appointments as listed    Physician Certification: I certify the above information and transfer of Tonette Canavan  is necessary for the continuing treatment of the diagnosis listed and that he requires [facility] for less 30 days.      Update Admission H&P: No change in H&P    PHYSICIAN SIGNATURE:  Electronically signed by Sanaz Sharpe MD on 9/14/20 at 3:15 PM EDT

## 2020-09-11 NOTE — PLAN OF CARE
Problem: Falls - Risk of:  Goal: Will remain free from falls  Description: Will remain free from falls  Outcome: Met This Shift  Goal: Absence of physical injury  Description: Absence of physical injury  Outcome: Met This Shift     Problem: HEMODYNAMIC STATUS  Goal: Patient has stable vital signs and fluid balance  Outcome: Met This Shift     Problem: ACTIVITY INTOLERANCE/IMPAIRED MOBILITY  Goal: Mobility/activity is maintained at optimum level for patient  Outcome: Met This Shift     Problem: COMMUNICATION IMPAIRMENT  Goal: Ability to express needs and understand communication  Outcome: Met This Shift     Problem: Skin Integrity:  Goal: Will show no infection signs and symptoms  Description: Will show no infection signs and symptoms  Outcome: Met This Shift  Goal: Absence of new skin breakdown  Description: Absence of new skin breakdown  Outcome: Met This Shift     Problem: Neurological  Goal: Maximum potential motor/sensory/cognitive function  Outcome: Met This Shift     Problem:  Bowel/Gastric:  Goal: Ability to achieve a regular elimination pattern will improve  Description: Ability to achieve a regular elimination pattern will improve  Outcome: Met This Shift

## 2020-09-11 NOTE — PROGRESS NOTES
OCCUPATIONAL THERAPY DAILY NOTE    Date:2020  Patient Name: Roberto Aguiar  MRN: [id number]  : 1955  Room: [address] [place], [place] [address]     Diagnosis: CVA  Surgery: none   Past Medical History: COPD, DM  Precautions: falls, aphasia(expressive and receptive),TSM, bed alarm     Functional Assessment:   Date Status AE  Comments   Feeding 20 S     Grooming 20 S  Pt. Washed face, brushed teeth, shaved and donned deodorant. Bathing 20 SBA seat Pt. Declined shower and sponge bathed seated and standing at sink. SBA for safety. Occasional cues to operate pump soap correctly   UB Dressing 20 S  Pt. Donned pullover shirts. LB Dressing 20 SBA  Pt. Donned underwear, pants, socks and shoes. Homemaking 20 Min A No device       Functional Transfers / Balance:   Date Status DME  Comments   pt Sit Balance 20 SUP     Stand Balance 20  SBA     [] Tub  [x] Shower   Transfer 9/10/20 SBA Shower seat    Commode   Transfer 9/10/20 S/SBA No device    Functional   Mobility 9/10/20  SBA No device     Other: firm recliner,sofa, dining chair      Supine to EOB 9/9/20        9/10/20 SBA        supervision           Functional Exercises / Activity:  Pt seated completed B UE ex's focusing on UE strength/endurance to increase independence with transfers, ADL tasks;  4.5# ariel shoulder ladder 2 x 5 reps up/down;   1# mr  up/down 2 x 5 reps; Pt engaged in pattern block activity requiring mod v/c's to identify/correct errors with increased complex pattern with/without grid focusing on insight/reasoning to increase independence with ADL tasks; Sensory / Neuromuscular Re-Education:      Cognitive Skills:   Status Comments   Problem   Solving Fair+ Demo'd during functional activities. Memory Fair+    Sequencing Fair+ Demo'd during functional activities. Safety Fair      Visual Perception:    Education:  Pt educated on ADL techniques and safety.      [x] Family teach completed on: 9/10/20  Pt's son present for family teach this date and completed hands on training. Son was educated on levels of assist for ADLs which he was somewhat familiar with. He observed pt completing commode, tub and simulated handwashing. Pt completes transfers with S/SBA. Son instructed to provide SBA during mobility and taking pt to the bathroom. Good questions asked and understanding of instructions demo'd with good skill. Pain Level: No c/o or indication of pain. Additional Notes:       Patient has made good progress during treatment sessions toward set goals. Therapy emphasis to obtain goals:  Long Term Goals: 2 weeks   indep UE ADL- feeding, grooming, bathing, dressing  indep LE ADL- bathing, dressing  indep functional transfers to commode, tub shower  indep functional mobility/standing balance during ADL/IADLs with no AD  pt to demo good judgement, safety, problem solving and sequencing during ADLs and IADLs  indep IADL to return home to indep living    [x] Continue with current OT Plan of care.   [] Prepare for Discharge     DISCHARGE RECOMMENDATIONS  Recommended DME:    Post Discharge Care:   []Home Independently  []Home with 24hr Care / Supervision []Home with Partial Supervision []Home with Home Health OT []Home with Out Pt OT []Other: ___   Comments:         Time in Time out Tx Time Breakdown  Variance:   First Session  0815 0845 [x] Individual Tx- 30 Mins  [] Concurrent Tx -   [] Co-Tx -   [] Group Tx -   [] Time Missed -     Second Session 1000 1045 [] Individual Tx-    [x] Concurrent Tx - 45 min  [] Co-Tx -   [] Group Tx -   [] Time Missed -     Third Session    [] Individual Tx-    [] Concurrent Tx -  [] Co-Tx -   [] Group Tx -   [] Time Missed -         Total Tx Time 75 Mins     [facility] [place] [address]  [place] [address]

## 2020-09-11 NOTE — PROGRESS NOTES
Physical Therapy    Facility/Department: [facility] 5SE REHAB  Daily treatment     NAME: Greer Styles  : 1955  MRN: [id number]    Date of Service: 2020    Evaluating Therapist: Karla Aguirre PT, DPT    ROOM: [facility]  DIAGNOSIS: CVA  PRECAUTIONS: falls, aphasia (mixed; expressive>receptive), R peripheral visual field deficit  HPI: Pt admitted 20 after being found by son at home with AMS, unable to answer questions. Right sided weakness and facial droop noted. MRI showed acute left MCA. CTA of the head and neck showed 90% left ICA stenosis. Per Vascular, he will need CEA in about 6 weeks. Pt lives alone in a 1 story home with 1 stairs to enter and 0 rail. Bed is on first floor and bath is on first floor. Pt ambulated with no AD independently PTA. Pt worked full time, independent with ADLs, and driving PTA. Initial Evaluation  20 AM     PM    Short Term Goals Long Term Goals    Was pt agreeable to Eval/treatment? yes Yes  Yes      Does pt have pain?  yes No c/o of pain No c/o of pain     Bed Mobility  Rolling: Supervision  Supine to sit: Supervision  Sit to supine: Supervision  Scooting: Supervision NT Rolling: Supervision  Supine to sit: Supervision  Sit to supine: supervision  Scooting: Supervision Mod I Independent   Transfers Sit to stand: SBA  Stand to sit: SBA  Stand pivot: SBA Supervision for all aspects Supervision for all aspects  Supervision Independent   Ambulation   300 feet x 2 reps without AD with SBA >400 feet with no AD Superivsion 150 feet with no AD and supervision 600 feet without AD with Supervison 1000 feet without AD with Supervision   Walking 10 feet on uneven surface 10 feet without AD with SBA NT NT 10 feet without AD with Supervision 10 feet without AD with Supervision   Wheel Chair Mobility NA NA NA     Car Transfers SBA  NT Supervision Supervision    Stair negotiation: ascended and descended  12 steps with 1 rail with SBA 12 steps with 1 rail with Superivsion    4 steps without HR cg/sba  20 steps in stairwell with 1 rail with SBA 16 steps with 1 rail with Supervision 20 steps without rail with Supervision   Curb Step:   ascended and descended 7.5 inch step without AD and SBA NT NT 7.5 inch step with out AD and Supervision 7.5 inch step without AD and Supervision   Picking up object off the floor Picked up 4lb weight with SB assist Picked up 4lb weight with SBA NT Will  4lb weight with Supervision assist Will  4lb weight with Supervision assist   BLE ROM WNL WNL      BLE Strength BLE grossly 5/5 BLE grossly 5/5      Balance  Static and dynamic standing balance SBA without AD Static and dynamic standing balance SBA<> Supervision without AD      Date Family Teach Completed  9/10 son       Is additional Family Teaching Needed? Y or N Y N      Hindering Progress aphasia aphasia      PT recommended ELOS 5 days       Team's Discharge Plan        Therapist at Team Meeting          Therapeutic Exercise:   AM:   Ambulation: 400 x2 with visual tracking of cone anterior to patient while walking with varying speeds, then counting to 20 with SBA  5 QC step overs 4 ways (forward, backward, right, left) with SBA<> CGA  Balance: cuff weight, water cup, and cone (differneting colors, tap following instruction and L vs R) with SBA <> CGA. 4\" and 6\" box taps alternating feet with SBA        PM:    Patient education  Pt and son educated on safety with mobility and guarding and balance deficits. Patient response to education:   Pt verbalized understanding Pt demonstrated skill Pt requires further education in this area   Partial  yes reinforce     Additional Comments: Pt attempted cone taps in SLS, pt had difficulty completing d/t processing and aphasia, frequent holding on to parallel bars to recover balance. Pt performed 4\" and 6\" box taps in SLS and had better success in order to promote improved balance and better midline recognition.  Pt performed 5 QC steps overs and had 2 LOB

## 2020-09-11 NOTE — PROGRESS NOTES
Speech Language Pathology  ACUTE REHABILITATION--DAILY PROGRESS NOTE          [facility] SCORES      Swallowing    Current Status  7--Independent    Short Term Goal 7--Independent   Long Term Goal 7--Independent    Receptive    Current Status  3--Moderate Assistance    Short Term Goal 4--Minimal Assistance    Long Term Goal 5--Supervision     Expressive    Current Status  3--Moderate Assistance    Short Term Goal 4--Minimal Assistance    Long Term Goal 5--Supervision     Social Interaction    Current Status  4--Minimal Assistance     Short Term Goal 5--Supervision    Long Term Goal 5--Supervision         Problem Solving    Current Status  4--Minimal Assistance     Short Term Goal 5--Supervision    Long Term Goal 5--Supervision      Memory    Current Status  4--Minimal Assistance     Short Term Goal 5--Supervision    Long Term Goal 5--Supervision                      SWALLOWING:      Diet:  Regular consistency solids with thin liquids     Patient is not being followed for swallow therapy. LANGUAGE:     Patient benefits from repetition/rephrase of stimulus items. He responds to open ended questions with fair ability. Noted responses to be vague/laconic with phonemic and semantic errors. Patient is able to read simple directives/single words with fair+ ability. Moderate-marked difficulty observed with spelling single words despite benefit of word to copy. COGNITION:       Fair outcome when provided 2D pattern and asked to generate corresponding 3D design given increased time. Patient benefited from min-mod v/c with increased complexity of design. Fair+ outcome when asked to identify absurdities depicted on photo cards. Patient able to explain changes to make the scenarios appropriate. Benefited from increased time and min v/c. Safety:  Fair; required v/c to refrain from lifting/carrying chairs in [address] [place] [address].     SPEECH:     Good intelligibility; however speech is characterized with neologisms and

## 2020-09-11 NOTE — CARE COORDINATION
Per Team:  Plan dc 9/15/2020. Updated the pt. And his son, Christoph Laguna. LTG: Supervision/Independent. Pt. Has minor balance deficits, requires cueing for sequencing and direction following. He will require 24hr supervision due to aphasia, problem solving and memory. Pt's son verbalized he cannot provide 24hr supervision not can the pt. Financially afford adult , private duty or assisted living. SW discussed [place] waiver and he would like to pursue in the [address] [place] [address]. Called [address] [place] [address] Dr [place]), she will review for admission. Christoph Laguna would need to be accepted to Medicare skilled care first then convert to [facility] waiver at [facility] then they would transition him to [facility] [place]. [address] [place] [address]  to review his case and advise. He needs a (-) COVID within 48hrs of admission to SNF. If dc home:    DME:  None  Aftercare:  OP PT/OT & SP.  Pt. Will follow up with Miko LAU. Aida Weathers  9/11/2020     ADDENDUM:    2:05pm-  Rec'd notification from Nikki Mccracken [id number] that the pt. Has been accepted 9/15 to [address] [place] [address]  via their Lonny Moreno. Svitlana to confirm transport time on 9/14. Pt's son aware. HEN completed    Covid test to be completed today (Dry) for send out. Anastasia needs (-) COVID.     Aida Weathers  9/11/2020

## 2020-09-12 LAB
METER GLUCOSE: 131 MG/DL (ref 74–99)
METER GLUCOSE: 89 MG/DL (ref 74–99)

## 2020-09-12 PROCEDURE — 6370000000 HC RX 637 (ALT 250 FOR IP): Performed by: FAMILY MEDICINE

## 2020-09-12 PROCEDURE — 97530 THERAPEUTIC ACTIVITIES: CPT

## 2020-09-12 PROCEDURE — 97535 SELF CARE MNGMENT TRAINING: CPT

## 2020-09-12 PROCEDURE — 82962 GLUCOSE BLOOD TEST: CPT

## 2020-09-12 PROCEDURE — 92507 TX SP LANG VOICE COMM INDIV: CPT

## 2020-09-12 PROCEDURE — 1280000000 HC REHAB R&B

## 2020-09-12 RX ADMIN — METFORMIN HYDROCHLORIDE 500 MG: 500 TABLET ORAL at 07:43

## 2020-09-12 RX ADMIN — METFORMIN HYDROCHLORIDE 500 MG: 500 TABLET ORAL at 15:37

## 2020-09-12 RX ADMIN — GLIPIZIDE 5 MG: 5 TABLET ORAL at 06:09

## 2020-09-12 RX ADMIN — ASPIRIN 81 MG: 81 TABLET, COATED ORAL at 07:43

## 2020-09-12 RX ADMIN — ATORVASTATIN CALCIUM 40 MG: 40 TABLET, FILM COATED ORAL at 19:53

## 2020-09-12 RX ADMIN — CLOPIDOGREL 75 MG: 75 TABLET, FILM COATED ORAL at 07:43

## 2020-09-12 ASSESSMENT — PAIN SCALES - GENERAL
PAINLEVEL_OUTOF10: 0

## 2020-09-12 NOTE — PROGRESS NOTES
Speech Language Pathology  Speech Language Pathology  ACUTE REHABILITATION--DAILY PROGRESS NOTE                                                                            [facility] SCORES                            Swallowing                          Current Status             7--Independent                         Short Term Goal         7--Independent             Long Term Goal          7--Independent     Receptive                          Current Status             3--Moderate Assistance                        Short Term Goal         4--Minimal Assistance               Long Term Goal          5--Supervision      Expressive                          Current Status             3--Moderate Assistance                        Short Term Goal         4--Minimal Assistance               Long Term Goal          5--Supervision      Social Interaction                          Current Status             4--Minimal Assistance                           Short Term Goal         5--Supervision               Long Term Goal          5--Supervision                                                     Problem Solving                          Current Status             4--Minimal Assistance                           Short Term Goal         5--Supervision               Long Term Goal          5--Supervision                  Memory                          Current Status             4--Minimal Assistance                           Short Term Goal         5--Supervision               Long Term Goal          5--Supervision                                                                                                                                                                                            SWALLOWING:                         Diet:  Regular consistency solids with thin liquids                 Patient is not being followed for swallow therapy.       LANGUAGE:                 Patient benefits from repetition/rephrase of stimulus items. He responds to open ended questions with fair ability. Noted responses to be vague/laconic with phonemic and semantic errors. During verbal speech tasks he provided over generalized responses such \"all of those things' or \"things like that\" With semantic cueing he was able to complete responsive naming tasks with fair outcome.                              COGNITION:               Pt had max difficulty with simple money manipulation tasks. Safety:  Fair; poor insight into self limitations at this time      SPEECH:              Good intelligibility; however speech is characterized with neologisms and paraphasic errors.      SP recommended after discharge: To be determined      Will continue SP intervention as per previously established POC.        Minute Tracking:     Individual therapy:                 30 minutes   Concurrent therapy:                  0 minutes  Group therapy:                          0 minutes  Co-treatment therapy:               0 minutes     Total minutes for 9/12/2020: 30  minutes    Jimena Taylor M.Ed. [address] [place] [address] [id number]

## 2020-09-12 NOTE — PROGRESS NOTES
[facility]  Progress Note    GENERAL:   Covering for Dr Lexi Arango. 77 y old M, with history of DM type II, who was admitted to [facility] on 8/31 with right sided weakness 2ry to left MCA CVA. Found to have 90% stenosis of the left ICA, CEA planned in 6 weeks. Transferred to the ARU on 9/4. Resting in bed, no specific complaints. VITALS:   Vitals:    09/10/20 0015 09/10/20 0800 09/11/20 0730 09/11/20 1233   BP:  136/73 125/82    Pulse:  66 63    Resp:  16 18    Temp:  97.2 °F (36.2 °C) 96.6 °F (35.9 °C)    TempSrc:  Temporal Temporal    SpO2: 94% 97% 96%    Weight:       Height:    6' 3\" (1.905 m)       Scheduled Meds:   aspirin  81 mg Oral Daily    atorvastatin  40 mg Oral Nightly    clopidogrel  75 mg Oral Daily    glipiZIDE  5 mg Oral QAM AC    metFORMIN  500 mg Oral BID WC    influenza virus vaccine  0.5 mL Intramuscular Prior to discharge     Continuous Infusions:  PRN Meds:.acetaminophen, polyethylene glycol      LABS:  CBC with Differential:    Lab Results   Component Value Date    WBC 7.2 09/05/2020    RBC 6.17 09/05/2020    HGB 18.0 09/05/2020    HCT 53.8 09/05/2020     09/05/2020    MCV 87.2 09/05/2020    MCH 29.2 09/05/2020    MCHC 33.5 09/05/2020    RDW 13.8 09/05/2020    NRBC 1.3 03/11/2017    LYMPHOPCT 28.5 09/05/2020    MONOPCT 9.6 09/05/2020    BASOPCT 0.4 09/05/2020    MONOSABS 0.69 09/05/2020    LYMPHSABS 2.05 09/05/2020    EOSABS 0.09 09/05/2020    BASOSABS 0.03 09/05/2020     BMP:    Lab Results   Component Value Date     09/05/2020    K 4.5 09/05/2020     09/05/2020    CO2 22 09/05/2020    BUN 29 09/05/2020    LABALBU 4.1 08/31/2020    CREATININE 1.0 09/05/2020    CALCIUM 9.5 09/05/2020    GFRAA >60 09/05/2020    LABGLOM >60 09/05/2020    GLUCOSE 132 09/05/2020       FUNCTIONAL STATUS:     Cognition:   MIN Deficits. Speech:  MOD Receptive and Expressive Aphasia. ADLs and Self Care:  SBA to SUP.     Bed Mobility: SUP.    Transfers:   SUP. Gait:     > 400 with no device with SUP. Stairs:    12 steps with 1 rail with SUP. 4 steps with no rails with CGA. ROM:        PLAN:    1.) Recheck BMP, Hb/Hct.  2.) Shower Patient.   3.) Continue Rehab Program.

## 2020-09-12 NOTE — PROGRESS NOTES
OCCUPATIONAL THERAPY DAILY NOTE    Date:2020  Patient Name: Daryl Goetz  MRN: [id number]  : 1955  Room: [address] [place], [place] [address]     Diagnosis: CVA  Surgery: none   Past Medical History: COPD, DM  Precautions: falls, aphasia(expressive and receptive),TSM, bed alarm     Functional Assessment:   Date Status AE  Comments   Feeding 20 Setup     Grooming 20 S     Bathing 20 SBA seat    UB Dressing 20 Setup  Pt. Donned pullover shirt while sitting EOB. LB Dressing 20 SBA  Pt. Donned socks while in sitting. Homemaking 20 SBA No device  Min verbal prompting to properly complete standing at sink to wash dishes with verbal cues to attend to R side. Pt also completed folding laundry while in standing with min verbal prompts to increase awareness to task. Functional Transfers / Balance:   Date Status DME  Comments   Sit Balance 20 Sup     Stand Balance 20  SBA  During transfers and performing homemaking tasks. [] Tub  [x] Shower   Transfer 9/10/20 SBA Shower seat    Commode   Transfer 9/10/20 S/SBA No device    Functional   Mobility 20 Sup No device  Pt able to ambulate from OT gym to room with verbal prompt to slow pace and increase safety awareness. Other: firm recliner,sofa, dining chair      Supine to EOB 20 SBA        supervision           Functional Exercises / Activity:  Pt seated completed B UE ex's focusing on UE strength/endurance to increase independence with transfers, ADL tasks;  4.5# ariel with increased verbal cues to complete 2 sets of 10. Sensory / Neuromuscular Re-Education:      Cognitive Skills:   Status Comments   Problem   Solving Fair+ Demo'd during functional activities. Memory Fair+    Sequencing Fair+ Demo'd during functional activities. Safety Fair      Visual Perception:    Education:  Pt educated on ADL techniques and safety.      [x] Family teach completed on: 9/10/20  Pt's son present for family teach this date and completed hands on training. Son was educated on levels of assist for ADLs which he was somewhat familiar with. He observed pt completing commode, tub and simulated handwashing. Pt completes transfers with S/SBA. Son instructed to provide SBA during mobility and taking pt to the bathroom. Good questions asked and understanding of instructions demo'd with good skill. Pain Level: No c/o or indication of pain. Additional Notes:     Patient has made good progress during treatment sessions toward set goals. Therapy emphasis to obtain goals:  Long Term Goals: 2 weeks   indep UE ADL- feeding, grooming, bathing, dressing  indep LE ADL- bathing, dressing  indep functional transfers to commode, tub shower  indep functional mobility/standing balance during ADL/IADLs with no AD  pt to demo good judgement, safety, problem solving and sequencing during ADLs and IADLs  indep IADL to return home to indep living    [x] Continue with current OT Plan of care.   [] Prepare for Discharge     DISCHARGE RECOMMENDATIONS  Recommended DME:    Post Discharge Care:   []Home Independently  []Home with 24hr Care / Supervision []Home with Partial Supervision []Home with Home Health OT []Home with Out Pt OT []Other: ___   Comments:         Time in Time out Tx Time Breakdown  Variance:   First Session  1000 1030 [x] Individual Tx- 30 Mins  [] Concurrent Tx -   [] Co-Tx -   [] Group Tx -   [] Time Missed -     Second Session   [] Individual Tx-    [] Concurrent Tx -   [] Co-Tx -   [] Group Tx -   [] Time Missed -     Third Session    [] Individual Tx-    [] Concurrent Tx -  [] Co-Tx -   [] Group Tx -   [] Time Missed -         Total Tx Time 30 Mins     Truman Michel 46, [address]

## 2020-09-13 LAB
ANION GAP SERPL CALCULATED.3IONS-SCNC: 11 MMOL/L (ref 7–16)
BUN BLDV-MCNC: 22 MG/DL (ref 8–23)
CALCIUM SERPL-MCNC: 9.6 MG/DL (ref 8.6–10.2)
CHLORIDE BLD-SCNC: 100 MMOL/L (ref 98–107)
CO2: 26 MMOL/L (ref 22–29)
CREAT SERPL-MCNC: 0.9 MG/DL (ref 0.7–1.2)
GFR AFRICAN AMERICAN: >60
GFR NON-AFRICAN AMERICAN: >60 ML/MIN/1.73
GLUCOSE BLD-MCNC: 108 MG/DL (ref 74–99)
HCT VFR BLD CALC: 48.8 % (ref 37–54)
HEMOGLOBIN: 16.3 G/DL (ref 12.5–16.5)
METER GLUCOSE: 153 MG/DL (ref 74–99)
METER GLUCOSE: 92 MG/DL (ref 74–99)
POTASSIUM SERPL-SCNC: 4.3 MMOL/L (ref 3.5–5)
SODIUM BLD-SCNC: 137 MMOL/L (ref 132–146)

## 2020-09-13 PROCEDURE — 85018 HEMOGLOBIN: CPT

## 2020-09-13 PROCEDURE — 82962 GLUCOSE BLOOD TEST: CPT

## 2020-09-13 PROCEDURE — 36415 COLL VENOUS BLD VENIPUNCTURE: CPT

## 2020-09-13 PROCEDURE — 1280000000 HC REHAB R&B

## 2020-09-13 PROCEDURE — 80048 BASIC METABOLIC PNL TOTAL CA: CPT

## 2020-09-13 PROCEDURE — 85014 HEMATOCRIT: CPT

## 2020-09-13 PROCEDURE — 97530 THERAPEUTIC ACTIVITIES: CPT | Performed by: PHYSICAL THERAPIST

## 2020-09-13 PROCEDURE — 6370000000 HC RX 637 (ALT 250 FOR IP): Performed by: FAMILY MEDICINE

## 2020-09-13 RX ADMIN — ATORVASTATIN CALCIUM 40 MG: 40 TABLET, FILM COATED ORAL at 19:41

## 2020-09-13 RX ADMIN — METFORMIN HYDROCHLORIDE 500 MG: 500 TABLET ORAL at 15:41

## 2020-09-13 RX ADMIN — GLIPIZIDE 5 MG: 5 TABLET ORAL at 06:21

## 2020-09-13 RX ADMIN — METFORMIN HYDROCHLORIDE 500 MG: 500 TABLET ORAL at 07:35

## 2020-09-13 RX ADMIN — ASPIRIN 81 MG: 81 TABLET, COATED ORAL at 07:35

## 2020-09-13 RX ADMIN — CLOPIDOGREL 75 MG: 75 TABLET, FILM COATED ORAL at 07:35

## 2020-09-13 ASSESSMENT — PAIN SCALES - GENERAL
PAINLEVEL_OUTOF10: 0

## 2020-09-13 NOTE — PLAN OF CARE
Problem: Falls - Risk of:  Goal: Will remain free from falls  Description: Will remain free from falls  9/13/2020 0436 by Emilio Gutierrez RN  Outcome: Met This Shift     Problem: Falls - Risk of:  Goal: Absence of physical injury  Description: Absence of physical injury  9/13/2020 0436 by Emilio Gutierrez RN  Outcome: Met This Shift     Problem: HEMODYNAMIC STATUS  Goal: Patient has stable vital signs and fluid balance  Outcome: Met This Shift     Problem: ACTIVITY INTOLERANCE/IMPAIRED MOBILITY  Goal: Mobility/activity is maintained at optimum level for patient  Outcome: Met This Shift     Problem: Skin Integrity:  Goal: Will show no infection signs and symptoms  Description: Will show no infection signs and symptoms  Outcome: Met This Shift     Problem: COMMUNICATION IMPAIRMENT  Goal: Ability to express needs and understand communication  Outcome: Ongoing

## 2020-09-13 NOTE — PROGRESS NOTES
Physical Therapy  Facility/Department: [address] REHAB  Daily Treatment Note  NAME: Princess Botello  : 1955  MRN: [id number]    Date of Service: 2020    Evaluating Therapist: Bill Meza PT, DPT     ROOM: [facility]  DIAGNOSIS: CVA  PRECAUTIONS: falls, aphasia (mixed; expressive>receptive), R peripheral visual field deficit  HPI: Pt admitted 20 after being found by son at home with AMS, unable to answer questions. Right sided weakness and facial droop noted. MRI showed acute left MCA.  CTA of the head and neck showed 90% left ICA stenosis. Per Vascular, he will need CEA in about 6 weeks.     Pt lives alone in a 1 story home with 1 stairs to enter and 0 rail.  Bed is on first floor and bath is on first floor.  Pt ambulated with no AD independently PTA.  Pt worked full time, independent with ADLs, and driving PTA.        Initial Evaluation  20 AM     PM    Short Term Goals Long Term Goals    Was pt agreeable to Eval/treatment? yes Yes         Does pt have pain?  yes No c/o of pain        Bed Mobility  Rolling: Supervision  Supine to sit: Supervision  Sit to supine: Supervision  Scooting: Supervision Mod Independent   Mod I Independent   Transfers Sit to stand: SBA  Stand to sit: SBA  Stand pivot: SBA Supervision for all aspects  Supervision Independent   Ambulation   300 feet x 2 reps without AD with  feet x2, 1000 feet without AD with Supervision  600 feet without AD with Supervison 1000 feet without AD with Supervision   Walking 10 feet on uneven surface 10 feet without AD with SBA 10 feet without AD with Supervision  10 feet without AD with Supervision 10 feet without AD with Supervision   Wheel Chair Mobility NA NA        Car Transfers SBA  Supervision  Supervision Supervision    Stair negotiation: ascended and descended  12 steps with 1 rail with SBA 8 steps with 1 rail with supervision  16 steps with 1 rail with Supervision 20 steps without rail with Supervision   Curb Step:   ascended and descended 7.5 inch step without AD and SBA 7.5 inch step without AD with Supervision  7.5 inch step with out AD and Supervision 7.5 inch step without AD and Supervision   Picking up object off the floor Picked up 4lb weight with SB assist Picked up 4lb weight with Supervision  Will  4lb weight with Supervision assist Will  4lb weight with Supervision assist   BLE ROM WNL WNL         BLE Strength BLE grossly 5/5 BLE grossly 5/5         Balance  Static and dynamic standing balance SBA without AD Static and dynamic standing balance Supervision without AD         Date Family Teach Completed   9/10 son          Is additional Family Teaching Needed? Y or N Y N         Hindering Progress aphasia aphasia         PT recommended ELOS 5 days           Team's Discharge Plan             Therapist at Team Meeting                Therapeutic Exercise:   AM:  Tandem gait training 30 feet x2 with CGA for balance, max cue for full tandem stance  Obstacle course including quad cane step overs, curb step, amb on uneven surface, and object retrieval of 4# cuff weight from floor x4 reps  Cone retrieval activity to obtain cones from various heights throughout therapy clinic for balance and visual scanning  Alternating toe taps to 6\" step without UE support at SBA 30 x2    Patient education  Pt educated on safety with mobility, balance, technique with the above TE's    Patient response to education:   Pt verbalized understanding Pt demonstrated skill Pt requires further education in this area   yes yes yes     Additional Comments: Pt resting semi-supine upon arrival, agreeable to PT session. Pt completed all bed mobility with increased time and effort but no use of bed rails or external assistance required. Pt continues to require verbal/tactile cues for directions throughout ambulation this session.  Pt has difficulty completing tandem gait pattern with inconsistent ability to achieve full tandem stance and requires hands on assist for balance during all attempts. increased difficulty achieving tandem stance with R LE in front vs L LE in front. Pt experienced no LOB during QC step overs forwards during obstacle course activity. Plan to continue to progress independence with all mobility and high level balance activities. AM  Time in: 0935  Time out: 1005    Pt is making good progress toward established Physical Therapy goals. Continue with physical therapy current plan of care.     Madi Ambrose, PT, DPT  [id number]

## 2020-09-14 LAB
METER GLUCOSE: 101 MG/DL (ref 74–99)
METER GLUCOSE: 137 MG/DL (ref 74–99)

## 2020-09-14 PROCEDURE — 6370000000 HC RX 637 (ALT 250 FOR IP): Performed by: FAMILY MEDICINE

## 2020-09-14 PROCEDURE — 99232 SBSQ HOSP IP/OBS MODERATE 35: CPT | Performed by: PHYSICAL MEDICINE & REHABILITATION

## 2020-09-14 PROCEDURE — 97530 THERAPEUTIC ACTIVITIES: CPT

## 2020-09-14 PROCEDURE — 82962 GLUCOSE BLOOD TEST: CPT

## 2020-09-14 PROCEDURE — 1280000000 HC REHAB R&B

## 2020-09-14 PROCEDURE — 97535 SELF CARE MNGMENT TRAINING: CPT

## 2020-09-14 RX ORDER — ATORVASTATIN CALCIUM 40 MG/1
40 TABLET, FILM COATED ORAL NIGHTLY
Qty: 30 TABLET | Refills: 0 | Status: ON HOLD
Start: 2020-09-14 | End: 2022-10-21 | Stop reason: HOSPADM

## 2020-09-14 RX ORDER — POLYETHYLENE GLYCOL 3350 17 G/17G
17 POWDER, FOR SOLUTION ORAL DAILY PRN
Qty: 527 G | Refills: 0
Start: 2020-09-14 | End: 2020-10-15

## 2020-09-14 RX ORDER — CLOPIDOGREL BISULFATE 75 MG/1
75 TABLET ORAL DAILY
Qty: 30 TABLET | Refills: 0
Start: 2020-09-15 | End: 2021-01-05

## 2020-09-14 RX ORDER — GLIPIZIDE 5 MG/1
5 TABLET ORAL
Qty: 30 TABLET | Refills: 0 | Status: ON HOLD
Start: 2020-09-15 | End: 2022-10-21 | Stop reason: SDUPTHER

## 2020-09-14 RX ORDER — ASPIRIN 81 MG/1
81 TABLET ORAL DAILY
Qty: 30 TABLET | Refills: 0
Start: 2020-09-15

## 2020-09-14 RX ADMIN — GLIPIZIDE 5 MG: 5 TABLET ORAL at 05:42

## 2020-09-14 RX ADMIN — ATORVASTATIN CALCIUM 40 MG: 40 TABLET, FILM COATED ORAL at 21:49

## 2020-09-14 RX ADMIN — METFORMIN HYDROCHLORIDE 500 MG: 500 TABLET ORAL at 16:04

## 2020-09-14 RX ADMIN — METFORMIN HYDROCHLORIDE 500 MG: 500 TABLET ORAL at 08:04

## 2020-09-14 RX ADMIN — ASPIRIN 81 MG: 81 TABLET, COATED ORAL at 08:04

## 2020-09-14 RX ADMIN — CLOPIDOGREL 75 MG: 75 TABLET, FILM COATED ORAL at 08:04

## 2020-09-14 ASSESSMENT — PAIN SCALES - GENERAL: PAINLEVEL_OUTOF10: 0

## 2020-09-14 NOTE — PROGRESS NOTES
OCCUPATIONAL THERAPY DAILY NOTE    Date:2020  Patient Name: Lakeisha Good  MRN: [id number]  : 1955  Room: [address] [place], [place] [address]     Diagnosis: CVA  Surgery: none   Past Medical History: COPD, DM    Precautions: falls, aphasia(expressive and receptive),TSM, bed alarm     Functional Assessment:   Date Status AE  Comments   Feeding 20 Setup     Grooming 20 SUP/Mod VCs  Pt able to wash hands/brush teeth/comb hair w/ SUP for safety required Mod VCs for Item ID for each task - toothbrush, toothpaste, brush   Bathing 20 SBA seat    UB Dressing 20 Setup  Pt. Donned pullover shirt while sitting EOB. LB Dressing 20 SBA  Remote SUP + Min VCs to doff slippers, don tennis shoes in sitting. Homemaking 20 SBA No device   SUP/Mod VCs for proper Item ID to retrieve items from top shelf of closet for ADLs and in Kitchen to return clean dishes from sink to proper location          Functional Transfers / Balance:   Date Status DME  Comments   Sit Balance 20 IND  unsupported at EOB w/ ADLs   Stand Balance 20  Remote SUP No AD W/ Standing ADLs, item retrieval/transport and homemaking tasks. [] Tub  [x] Shower   Transfer 9/10/20 SBA Shower seat    Commode   Transfer 9/10/20 S/SBA No device    Functional   Mobility 20 Sup No device  Extended distances Room <> Therapy Clinic 2x throughout clinic, Min VCs to slow pace and increase safety awareness;  Min VCs for directionality. Other: firm recliner,sofa, dining chair      Supine to EOB 20 SBA        supervision              IND     Functional Exercises / Activity:  Completed Functional Task requiring Moderate Problem solving - pt able to complete w/ Min-Mod VCs    Sensory / Neuromuscular Re-Education:      Cognitive Skills:   Status Comments   Problem   Solving Fair Required Mod VCs to problem solve simple functional ax/rec task/card game;   Mod VCs to ID items required for ADL tasks    Memory Fair Mod VCs to recall name of objects for ADL tasks   Sequencing Fair+ Demo'd during functional activities. Safety Fair(+)      Visual Perception:    Education:  Pt educated on ADL techniques and safety. [x] Family teach completed on: 9/10/20  Pt's son present for family teach this date and completed hands on training. Son was educated on levels of assist for ADLs which he was somewhat familiar with. He observed pt completing commode, tub and simulated handwashing. Pt completes transfers with S/SBA. Son instructed to provide SBA during mobility and taking pt to the bathroom. Good questions asked and understanding of instructions demo'd with good skill. Pain Level: No c/o or indication of pain. Additional Notes:     Patient has made good progress during treatment sessions toward set goals. Therapy emphasis to obtain goals:  Long Term Goals: 2 weeks   indep UE ADL- feeding, grooming, bathing, dressing  indep LE ADL- bathing, dressing  indep functional transfers to commode, tub shower  indep functional mobility/standing balance during ADL/IADLs with no AD  pt to demo good judgement, safety, problem solving and sequencing during ADLs and IADLs  indep IADL to return home to indep living    [x] Continue with current OT Plan of care.   [] Prepare for Discharge     DISCHARGE RECOMMENDATIONS  Recommended DME:    Post Discharge Care:   []Home Independently  []Home with 24hr Care / Supervision []Home with Partial Supervision []Home with Home Health OT []Home with Out Pt OT []Other: ___   Comments:         Time in Time out Tx Time Breakdown  Variance:   First Session  1000 1045 [x] Individual Tx- 45 Mins  [] Concurrent Tx -   [] Co-Tx -   [] Group Tx -   [] Time Missed -     Second Session [phone] [] Individual Tx-    [x] Concurrent Tx - 45 mins  [] Co-Tx -   [] Group Tx -   [] Time Missed -     Third Session    [] Individual Tx-    [] Concurrent Tx -  [] Co-Tx -   [] Group Tx -   [] Time Missed -         Total Tx Time 90 Mins

## 2020-09-14 NOTE — PROGRESS NOTES
Physical Therapy  Facility/Department: [facility]  Daily Treatment Note  NAME: Adan Wray  : 1955  MRN: [id number]    Date of Service: 2020    Evaluating Therapist: Capri Butler PT, DPT     ROOM: [facility]  DIAGNOSIS: CVA  PRECAUTIONS: falls, aphasia (mixed; expressive>receptive), R peripheral visual field deficit  HPI: Pt admitted 20 after being found by son at home with AMS, unable to answer questions. Right sided weakness and facial droop noted. MRI showed acute left MCA.  CTA of the head and neck showed 90% left ICA stenosis. Per Vascular, he will need CEA in about 6 weeks.     Pt lives alone in a 1 story home with 1 stairs to enter and 0 rail.  Bed is on first floor and bath is on first floor.  Pt ambulated with no AD independently PTA.  Pt worked full time, independent with ADLs, and driving PTA.        Initial Evaluation  20 AM     PM    Short Term Goals Long Term Goals    Was pt agreeable to Eval/treatment? yes Yes  Yes       Does pt have pain?  yes No c/o of pain No c/o pain       Bed Mobility  Rolling: Supervision  Supine to sit: Supervision  Sit to supine: Supervision  Scooting: Supervision NT Supervision for all aspects  Mod I Independent   Transfers Sit to stand: SBA  Stand to sit: SBA  Stand pivot: SBA Supervision for all aspects Supervision for all aspects  Supervision Independent   Ambulation   300 feet x 2 reps without AD with  feet,  without AD with Supervision 400 feet,  without AD with Supervision 600 feet without AD with Supervison 1000 feet without AD with Supervision   Walking 10 feet on uneven surface 10 feet without AD with SBA NT  NT 10 feet without AD with Supervision 10 feet without AD with Supervision   Wheel Chair Mobility NA NA NA       Car Transfers SBA  Supervision NT Supervision Supervision    Stair negotiation: ascended and descended  12 steps with 1 rail with SBA 8 steps w/o rail with SBA 8 steps w/o rail with SBA 16 steps with 1 rail with Supervision able to perform tandem walking with less difficulty. Pt had some difficulty with dual task (R vs L and 3 cone colors) through cone taps and required tactile cueing. Pt found 7/10 cones without cueing throughout the room, requiring pt to navigate through equipments and bend forward to pick off the ground. Pt was not able to complete entire sequencing activity, and had a hard time following verbal instruction and required cues through activity. Pt demonstrated good balance on Airex but was unable to sense when feet were half off of the Airex pad and required cues to step backward. Pt demonstrated LOB with QC step overs when performing backwards and lateral direction, pt's trail leg would get caught on cane. Pt was able to recover from LOB and use safe strategies to regain balance. Patient/family education  Pt/family educated on balance strategies. Patient/family response to education:   Pt/family verbalized understanding Pt/family demonstrated skill Pt/family requires further education in this area   yes yes reinforce     AM  Time in: 1045  Time out: 1130    PM  Time in: 1430  Time out: 1515    Pt is making good progress toward established Physical Therapy goals. Continue with physical therapy current plan of care. Darrel Bynum, DEBORAH Lujan.  Suleman Apodaca, [facility]  License Number: [id number]

## 2020-09-14 NOTE — PROGRESS NOTES
08/31/2020       Lab Results   Component Value Date    COLORU Yellow 09/01/2020    NITRU Negative 09/01/2020    GLUCOSEU >=1000 09/01/2020    KETUA Negative 09/01/2020    UROBILINOGEN 1.0 09/01/2020    BILIRUBINUR Negative 09/01/2020     Lab Results   Component Value Date    LABA1C 10.0 (H) 09/01/2020         Functional Status:   Feeding: Setup  Grooming: Min A  UB dressing: Supervision  LB dressing: SBA-Min A  Bed mobility: Mod I  Transfers: Supervision  Ambulation: 1000 ft without AD Supervision       Assessment/Plan:       77 y. o. male admitted to inpatient rehabilitation for impairments and acitivities limitations in ADLs, mobility, functional communication, cognition secondary to acute left MCA CVA. .     -Left MCA stroke: Continue Aspirin, Plavix, statin. Continue acute rehab program, progressing.   -Expressive/receptive aphasia, cognitive impairment: speech therapy to follow on rehab  -Left ICA stenosis: evaluated by Vascular surgery, recommended left CEA in approximately 6 weeks  -DM: continue metformin, glipizide. BG controlled    BG controlled  Family unable to provide 24/7 supervision, thus opting for SNF.  Anticipate discharge to SNF tomorrow    Electronically signed by Phu Clay MD on 9/14/2020 at 3:03 PM

## 2020-09-15 ENCOUNTER — CARE COORDINATION (OUTPATIENT)
Dept: CASE MANAGEMENT | Age: 65
End: 2020-09-15

## 2020-09-15 VITALS
RESPIRATION RATE: 16 BRPM | HEART RATE: 63 BPM | WEIGHT: 197.44 LBS | DIASTOLIC BLOOD PRESSURE: 69 MMHG | TEMPERATURE: 98.1 F | BODY MASS INDEX: 24.55 KG/M2 | HEIGHT: 75 IN | OXYGEN SATURATION: 97 % | SYSTOLIC BLOOD PRESSURE: 126 MMHG

## 2020-09-15 LAB — METER GLUCOSE: 117 MG/DL (ref 74–99)

## 2020-09-15 PROCEDURE — 6370000000 HC RX 637 (ALT 250 FOR IP): Performed by: FAMILY MEDICINE

## 2020-09-15 PROCEDURE — 97110 THERAPEUTIC EXERCISES: CPT

## 2020-09-15 PROCEDURE — 99239 HOSP IP/OBS DSCHRG MGMT >30: CPT | Performed by: PHYSICAL MEDICINE & REHABILITATION

## 2020-09-15 PROCEDURE — 97530 THERAPEUTIC ACTIVITIES: CPT

## 2020-09-15 PROCEDURE — 82962 GLUCOSE BLOOD TEST: CPT

## 2020-09-15 RX ADMIN — GLIPIZIDE 5 MG: 5 TABLET ORAL at 06:32

## 2020-09-15 RX ADMIN — ASPIRIN 81 MG: 81 TABLET, COATED ORAL at 09:13

## 2020-09-15 RX ADMIN — METFORMIN HYDROCHLORIDE 500 MG: 500 TABLET ORAL at 09:13

## 2020-09-15 RX ADMIN — CLOPIDOGREL 75 MG: 75 TABLET, FILM COATED ORAL at 09:13

## 2020-09-15 ASSESSMENT — PAIN SCALES - GENERAL: PAINLEVEL_OUTOF10: 0

## 2020-09-15 NOTE — PLAN OF CARE
Problem: Falls - Risk of:  Goal: Will remain free from falls  Description: Will remain free from falls  9/15/2020 1132 by Star Cortez  Outcome: Completed  9/15/2020 0322 by Hellen Jason RN  Outcome: Met This Shift  9/14/2020 2330 by Hellen Jason RN  Outcome: Met This Shift  Goal: Absence of physical injury  Description: Absence of physical injury  Outcome: Completed     Problem: HEMODYNAMIC STATUS  Goal: Patient has stable vital signs and fluid balance  Outcome: Completed     Problem: ACTIVITY INTOLERANCE/IMPAIRED MOBILITY  Goal: Mobility/activity is maintained at optimum level for patient  Outcome: Completed     Problem: COMMUNICATION IMPAIRMENT  Goal: Ability to express needs and understand communication  Outcome: Completed     Problem: Skin Integrity:  Goal: Will show no infection signs and symptoms  Description: Will show no infection signs and symptoms  Outcome: Completed  Goal: Absence of new skin breakdown  Description: Absence of new skin breakdown  Outcome: Completed     Problem: Neurological  Goal: Maximum potential motor/sensory/cognitive function  Outcome: Completed     Problem:  Bowel/Gastric:  Goal: Ability to achieve a regular elimination pattern will improve  Description: Ability to achieve a regular elimination pattern will improve  Outcome: Completed

## 2020-09-15 NOTE — CARE COORDINATION
Notified by [facility] of 12 pm transfer. Spoke to the pt's son Tianna Flores and notified him that the pt will be transferred to [facility] at noon. Notified nursing of the transfer to [facility].      JORGE Atkins Intern  Lita Choe  9/15/2020

## 2020-09-15 NOTE — PLAN OF CARE
Problem: Falls - Risk of:  Goal: Will remain free from falls  Description: Will remain free from falls  9/14/2020 2330 by Cresencio Contreras RN  Outcome: Met This Shift  9/14/2020 0951 by Carol Samuel RN  Outcome: Ongoing

## 2020-09-15 NOTE — PROGRESS NOTES
Physical Therapy  Facility/Department: [facility]  Discharge Summary  NAME: Henrietta Fowler  : 1955  MRN: [id number]    Date of Service: 9/15/2020    Evaluating Therapist: Oneida Harrison PT, DPT     ROOM: [facility]  DIAGNOSIS: CVA  PRECAUTIONS: falls, aphasia (mixed; expressive>receptive), R peripheral visual field deficit  HPI: Pt admitted 20 after being found by son at home with AMS, unable to answer questions. Right sided weakness and facial droop noted. MRI showed acute left MCA.  CTA of the head and neck showed 90% left ICA stenosis.  Per Vascular, he will need CEA in about 6 weeks.     Pt lives alone in a 1 story home with 1 stairs to enter and 0 rail.  Bed is on first floor and bath is on first floor.  Pt ambulated with no AD independently PTA.  Pt worked full time, independent with ADLs, and driving PTA.        Initial Evaluation  20 Discharge  9/15/20    Short Term Goals Long Term Goals    Bed Mobility  Rolling: Supervision  Supine to sit: Supervision  Sit to supine: Supervision  Scooting: Supervision Mod I  Mod I Independent   Transfers Sit to stand: SBA  Stand to sit: SBA  Stand pivot: SBA Supervision for all aspects Supervision Independent   Ambulation   300 feet x 2 reps without AD with SBA >400 feet,  without AD with Supervision 600 feet without AD with Supervison 1000 feet without AD with Supervision   Walking 10 feet on uneven surface 10 feet without AD with SBA 10 feet w/o AD with SBA 10 feet without AD with Supervision 10 feet without AD with Supervision   Wheel Chair Mobility NA NA       Car Transfers SBA  Supervision Supervision Supervision    Stair negotiation: ascended and descended  12 steps with 1 rail with SBA 20 steps with 1 rail with SBA 16 steps with 1 rail with Supervision 20 steps without rail with Supervision   Curb Step:   ascended and descended 7.5 inch step without AD and SBA 7.5 inch step w/o AD with SBA 7.5 inch step with out AD and Supervision 7.5 inch step without AD and Supervision   Picking up object off the floor Picked up 4lb weight with SB assist Picked up 2lb weight with SBA Will  4lb weight with Supervision assist Will  4lb weight with Supervision assist   BLE ROM WNL WNL       BLE Strength BLE grossly 5/5 BLE grossly 5/5       Balance  Static and dynamic standing balance SBA without AD Static and dynamic standing balance Supervision without AD       Date Family Teach Completed   9/10 son        Hindering Progress aphasia aphasia          Additional Comments: Pt made good progress through stay in ARU meeting some but not all short term goals and remains limited by cognition and impulsiveness. Mayte Hudson, SPT    [facility].  Muna Mcclain, [address]  number:  [phone]

## 2020-09-15 NOTE — DISCHARGE SUMMARY
[facility]  Discharge Summary    Date of Rehab Admission:9/4/2020    Date of Discharge: 9/15/2020    Primary Geoff Babin MD     Attending Physician: Yen Fung MD  Primary Service:  Acute Inpatient Rehabilitation     Primary Diagnosis:  Left MCA CVA  Secondary Diagnosis/es: expressive/receptive aphasia, left ICA stenosis, DM    Consults:   None  Procedures:  None  Significant Radiologic Results:   None    Discharge Disposition:  SNF  Condition on Discharge:  Stable. Functionally improved.   ===================================================================  History of Present Illness:     Saulo Gentile a 77 y. o. male who presented to [facility] on 8/31/2020 after he was found by son with right facial droop, aphasia, and confusion, after not showing up to work that morning. He was out of the window for tPA. Head CT revealed acute left MCA stroke. CTA of head/neck showed 90% left ICA stenosis. Carotic US showed 70% stenosis of the left ICA. He was evaluated by Neurology and started on DAPT and statin. Post stroke workup completed. Brain MRI showed left MCA infarct. He was evaluated by Vascular surgery who recommended left CEA in approximately 6 weeks. He participated in therapy evaluations on acute care and was then admitted for the acute inpatient rehab program.         ===================================================================      Functional Status      Initial Evaluation  9/5/20 AM      9/14/2020 PM    9/14/2020    Was pt agreeable to Eval/treatment? yes Yes  Yes   Does pt have pain?  yes No c/o of pain No c/o pain   Bed Mobility  Rolling: Supervision  Supine to sit: Supervision  Sit to supine: Supervision  Scooting: Supervision NT Supervision for all aspects    Transfers Sit to stand: SBA  Stand to sit: SBA  Stand pivot: SBA Supervision for all aspects Supervision for all aspects    Ambulation   300 feet x 2 reps without AD with  feet,  without AD with Supervision 400 feet,  without AD with Supervision   Car Transfers SBA  Supervision NT   Stair negotiation: ascended and descended  12 steps with 1 rail with SBA 8 steps w/o rail with SBA 8 steps w/o rail with SBA   Picking up object off the floor Picked up 4lb weight with SB assist NT Picked up cone from floor with SBA   Balance  Static and dynamic standing balance SBA without AD Static and dynamic standing balance Supervision without AD     Date Family Teach Completed   9/10/2020 son               Functional Assessment:      Date   Status   AE    Comments     Feeding   9/12/20   Setup             Grooming   9/14/20   SUP/Mod VCs       Pt able to wash hands/brush teeth/comb hair w/ SUP for safety required Mod VCs for Item ID for each task - toothbrush, toothpaste, brush     Bathing   9/11/20   SBA   seat         UB Dressing   9/12/20   Setup       Pt. Donned pullover shirt while sitting EOB.         LB Dressing   9/14/20   SBA       Remote SUP + Min VCs to doff slippers, don tennis shoes in sitting.         Homemaking   9/14/20   SBA   No device     SUP/Mod VCs for proper Item ID to retrieve items from top shelf of closet for ADLs and in Kitchen to return clean dishes from sink to proper location                  Pertinent Discharge Physical Examination  General appearance: Alert, NAD  Lungs: CTA b/l, no w/r/r  Heart: RRR  Abdomen: soft, non-tender, normal bowel sounds  Extremities: no cyanosis or edema  Musculoskeletal: Range of motion normal   Neurologic: Alert, oriented. Expressive>receptive aphasia. Impaired recall. Impaired attention/concentration. Motor 5/5 throughout       Hospital Course   Functional and mobility deficits secondary to Left MCA CVA:  The patient completed an intensive inpatient rehabilitation program including PT, OT, SLP, RT and achieved significant improvement in function as documented above.   Recommended continued therapies are documented below.      -Left MCA stroke: Continued Aspirin, Plavix, statin. Completed acute rehab program with functional progress as above. -Expressive/receptive aphasia, cognitive impairment: speech therapy followed during rehab stay  -Left ICA stenosis: evaluated by Vascular surgery, recommended left CEA in approximately 6 weeks. Patient to follow up with Vascular surgery as outpatient  -DM: continue metformin, glipizide. BG controlled. Family unable to provide 24/7 supervision and patient still requiring supervision for safety.  Patient discharged to SNF for ongoing therapy.      ==================================================================  Discharge Medications     Medication List      START taking these medications    aspirin 81 MG EC tablet  Take 1 tablet by mouth daily     atorvastatin 40 MG tablet  Commonly known as:  LIPITOR  Take 1 tablet by mouth nightly     clopidogrel 75 MG tablet  Commonly known as:  PLAVIX  Take 1 tablet by mouth daily     glipiZIDE 5 MG tablet  Commonly known as:  GLUCOTROL  Take 1 tablet by mouth every morning (before breakfast)     polyethylene glycol 17 g packet  Commonly known as:  GLYCOLAX  Take 17 g by mouth daily as needed for Constipation        CHANGE how you take these medications    metFORMIN 500 MG tablet  Commonly known as:  GLUCOPHAGE  Take 1 tablet by mouth 2 times daily (with meals)  What changed:    · medication strength  · how much to take  · when to take this        CONTINUE taking these medications    albuterol sulfate  (90 Base) MCG/ACT inhaler  Commonly known as:  Proventil HFA  Inhale 2 puffs into the lungs every 4 hours as needed for Wheezing           Where to Get Your Medications      Information about where to get these medications is not yet available    Ask your nurse or doctor about these medications  · aspirin 81 MG EC tablet  · atorvastatin 40 MG tablet  · clopidogrel 75 MG tablet  · glipiZIDE 5 MG tablet  · metFORMIN 500 MG tablet  · polyethylene glycol 17 g packet         Allergies  Patient has no known allergies. Recommended Therapies at Discharge: PT, OT, and Speech therapy to continue at [address] [facility] - Blessing Garcia MD  -Neurology - KAREN San  -Vascular surgery- Dr. Divina Townsend  -PM&R - Dr. Michael Cooley provided to the patient and appropriate family members regarding discharge medications and follow up       More than 30 minutes was spent in preparation of this patient's discharge including, but not limited to, examination, preparation of documents, prescription preparation, counseling and coordination.       Electronically signed by Igor Mathur MD on 9/15/2020 at 10:21 AM

## 2020-09-15 NOTE — PROGRESS NOTES
Speech Language Pathology  ACUTE REHABILITATION--DISCHARGE SUMMARY          [facility]ORES      Swallowing    Current Status  7--Independent    Short Term Goal 7--Independent   Long Term Goal 7--Independent    Receptive    Current Status  3--Moderate Assistance    Short Term Goal 4--Minimal Assistance    Long Term Goal 5--Supervision     Expressive    Current Status  3--Moderate Assistance    Short Term Goal 4--Minimal Assistance    Long Term Goal 5--Supervision     Social Interaction    Current Status  4--Minimal Assistance     Short Term Goal 5--Supervision    Long Term Goal 5--Supervision         Problem Solving    Current Status  4--Minimal Assistance     Short Term Goal 5--Supervision    Long Term Goal 5--Supervision      Memory    Current Status  4--Minimal Assistance     Short Term Goal 5--Supervision    Long Term Goal 5--Supervision                      EDUCATION:    Speech Pathologist (SLP) completed education with the patient/family regarding type of communication impairment. Discussed compensatory strategies to promote ease of expression and to improve overall comprehension. Encouraged patient and/or family to engage SLP in unstructured Q&A session relative to identified deficit areas. Understanding of all information provided was indicated via satisfactory verbal response. Speech Pathologist (SLP) completed education with the patient and/or family regarding identified cognitive linguistic deficits and subsequent need for speech pathology intervention. Discussed deficit areas to be targeted by formal intervention and established short/long term goals. Reviewed compensatory strategies to improve functional outcome (as appropriate). Encouraged patient and/or family to engage SLP in structured Q&A session relative to identified deficit areas. Patient and/or family indicated understanding of all information provided via satisfactory verbal response. OUTCOME:    Good progress made toward goals.  Recommend

## 2020-09-15 NOTE — PROGRESS NOTES
09/15/20 0750   Attendance   Activity   ([employer])   Participation Active participation   Therapeutic Recreation   Social Skills Does not initiate conversation or social interaction   Leisure Education Demonstrates knowledge of benefits of leisure involvement  Keely Pacheco identified accomplishment as a benefit.)   Time Spent With Patient   Minutes 25

## 2020-09-15 NOTE — PROGRESS NOTES
OCCUPATIONAL THERAPY DAILY NOTE/DISCHARGE SUMMARY     Date:9/15/2020  Patient Name: Iain Monaco  MRN: [id number]  : 1955  Room: [address] [place] [address]     Diagnosis: CVA  Surgery: none   Past Medical History: COPD, DM    Precautions: falls, aphasia(expressive and receptive),TSM, bed alarm     Functional Assessment:   Date Status AE  Comments   Feeding 20 Setup     Grooming 20 SUP/Mod VCs     Bathing 20 SBA seat    UB Dressing 20 Setup     LB Dressing 20 SBA     Homemaking 20 SBA No device       Functional Transfers / Balance:   Date Status DME  Comments   Sit Balance 9/15/20 IND  demo'd during transfers in living environment    Stand Balance 9/15/20  Remote SUP No AD demo'd during mobility and transfers    [x] Tub  [x] Shower   Transfer 9/15/20    9/10/20 SBA    SBA Shower seat Pt completed tub transfer with step over method using wall as support    Commode   Transfer 9/15/20 S/SBA No device V/c's for technique using vanity for hand placement    Functional   Mobility 9/15/20 Sup No device  Throughout rehab unit    Other: queen bed ,sofa,          Supine to EOB 9/15/20          9/14/20 SBA          supervision      V/c's for technique      Functional Exercises / Activity:  Pt engaged 3D block pattern requiring min v/c's to identify errors and problem solve focusing on insight/reasoning to increase independence with ADL tasks; Pt engaged in B UE ex's focusing on UE strength/endurance to increase independence with transfers and ADL tasks; Power gripper 2 x 25 reps B UE;   3# dumb bells bicep curls 3 x 10 reps; Sensory / Neuromuscular Re-Education:      Cognitive Skills:   Status Comments   Problem   Solving Fair Required Mod VCs to problem solve simple functional ax/rec task/card game; Mod VCs to ID items required for ADL tasks    Memory Fair Mod VCs to recall name of objects for ADL tasks   Sequencing Fair+ Demo'd during functional activities.     Safety Fair(+)      Visual Perception:    Education:  Pt educated on ADL techniques and safety. [x] Family teach completed on: 9/10/20  Pt's son present for family teach this date and completed hands on training. Son was educated on levels of assist for ADLs which he was somewhat familiar with. He observed pt completing commode, tub and simulated handwashing. Pt completes transfers with S/SBA. Son instructed to provide SBA during mobility and taking pt to the bathroom. Good questions asked and understanding of instructions demo'd with good skill. Pain Level: No c/o or indication of pain. Additional Notes:     Patient has made good progress during treatment sessions toward set goals. Therapy emphasis to obtain goals:  Long Term Goals: 2 weeks   indep UE ADL- feeding, grooming, bathing, dressing  indep LE ADL- bathing, dressing  indep functional transfers to commode, tub shower  indep functional mobility/standing balance during ADL/IADLs with no AD  pt to demo good judgement, safety, problem solving and sequencing during ADLs and IADLs  indep IADL to return home to [facility]    [] Continue with current OT Plan of care. [x] Prepare for Discharge     DISCHARGE RECOMMENDATIONS  OCCUPATIONAL THERAPY DISCHARGE SUMMARY    Discontinue Occupational Therapy intervention. Pt has:   [] met all set goals. [x] made good progress toward set goals. [] has made slow progress toward goals and would benefit from rehab setting change.  [] had a medical decline and therefore was transferred off the Rehab unit.     Long term goals  Time Frame for Long term goals : 2 weeks  Long term goal 1: indep UE ADL- feeding, grooming, bathing, dressing  Long term goal 2: indep LE ADL- bathing, dressing  Long term goal 3: indep functional transfers to commode, tub shower  Long term goal 4: indep functional mobility/standing balance during ADL/IADLs with no AD  Long term goal 5: pt to demo good judgement, safety, problem solving and sequencing during ADLs and IADLs  Long term goals 6: indep IADL to return home to indep living         Recommended DME:    Post Discharge Care:   []Home Independently  [x]Home with 24hr Care / Supervision []Home with Partial Supervision []Home with Home Health OT []Home with Out Pt OT []Other: ___   Comments:         Time in Time out Tx Time Breakdown  Variance:   First Session  [phone] [x] Individual Tx- 45 Mins  [] Concurrent Tx -   [] Co-Tx -   [] Group Tx -   [] Time Missed -     Second Session    [] Individual Tx-    [] Concurrent Tx -   [] Co-Tx -   [] Group Tx -   [] Time Missed -     Third Session    [] Individual Tx-    [] Concurrent Tx -  [] Co-Tx -   [] Group Tx -   [] Time Missed -         Total Tx Time 45 Mins       [address] [facility] [address]  I have read the above note and agree with the documentation.   Juanito Organ OTR/L [id number]

## 2020-09-15 NOTE — PLAN OF CARE
Problem: Falls - Risk of:  Goal: Will remain free from falls  Description: Will remain free from falls  9/15/2020 0322 by Maximo Light RN  Outcome: Met This Shift  9/14/2020 2330 by Maximo Light RN  Outcome: Met This Shift

## 2020-09-15 NOTE — PROGRESS NOTES
Physical Therapy  Facility/Department: [address] REHAB  Treatment Note  NAME: Castillo Santana  : 1955  MRN: [id number]    Date of Service: 9/15/2020    Evaluating Therapist: Lisa Tam PT, DPT     ROOM: [facility]  DIAGNOSIS: CVA  PRECAUTIONS: falls, aphasia (mixed; expressive>receptive), R peripheral visual field deficit  HPI: Pt admitted 20 after being found by son at home with AMS, unable to answer questions. Right sided weakness and facial droop noted. MRI showed acute left MCA.  CTA of the head and neck showed 90% left ICA stenosis. Per Vascular, he will need CEA in about 6 weeks.     Pt lives alone in a 1 story home with 1 stairs to enter and 0 rail.  Bed is on first floor and bath is on first floor.  Pt ambulated with no AD independently PTA.  Pt worked full time, independent with ADLs, and driving PTA.        Initial Evaluation  20 AM      Short Term Goals Long Term Goals    Was pt agreeable to Eval/treatment? yes Yes         Does pt have pain?  yes No c/o of pain        Bed Mobility  Rolling: Supervision  Supine to sit: Supervision  Sit to supine: Supervision  Scooting: Supervision Mod I    Mod I Independent   Transfers Sit to stand: SBA  Stand to sit: SBA  Stand pivot: SBA Supervision for all aspects   Supervision Independent   Ambulation   300 feet x 2 reps without AD with  feet,  without AD with Supervision  600 feet without AD with Supervison 1000 feet without AD with Supervision   Walking 10 feet on uneven surface 10 feet without AD with SBA 10 feet w/o AD with SBA  10 feet without AD with Supervision 10 feet without AD with Supervision   Wheel Chair Mobility NA NA        Car Transfers SBA  Supervision  Supervision Supervision    Stair negotiation: ascended and descended  12 steps with 1 rail with SBA NT  16 steps with 1 rail with Supervision 20 steps without rail with Supervision   Curb Step:   ascended and descended 7.5 inch step without AD and SBA 7.5 inch step w/o AD with SBA  7.5 inch step with out AD and Supervision 7.5 inch step without AD and Supervision   Picking up object off the floor Picked up 4lb weight with SB assist Picked up 2lb weight with SBA  Will  4lb weight with Supervision assist Will  4lb weight with Supervision assist   BLE ROM WNL WNL         BLE Strength BLE grossly 5/5 BLE grossly 5/5         Balance  Static and dynamic standing balance SBA without AD Static and dynamic standing balance Supervision without AD         Date Family Teach Completed   9/10 son          Is additional Family Teaching Needed? Y or N Y N         Hindering Progress aphasia aphasia         PT recommended ELOS 5 days           Team's Discharge Plan             Therapist at Team Meeting                Therapeutic Exercise:   AM:   Agility ladder: lateral, forward and backward patterns (in, in, out, out) with SBA. Additional Comments: Pt demonstrated improved balance through the [facility] Scale (53/56 on 9/15, 49/56 on 9/11). Pt performed agility ladder inconsistently and required cueing to perform what was required through verbal instruction. Pt has improved his balance and mobility through his time in ARU but is still limited by his cognition and impulsivness. Fernandez Balance Scale    SITTING TO STANDING  INSTRUCTIONS: Please stand up. Try not to use your hand for support. (  x  ) 4 able to stand without using hands and stabilize independently  (    ) 3 able to stand independently using hands  (    ) 2 able to stand using hands after several tries  (    ) 1 needs minimal aid to stand or stabilize  (    ) 0 needs moderate or maximal assist to stand    STANDING UNSUPPORTED  INSTRUCTIONS: Please stand for two minutes without holding on.   (  x  ) 4 able to stand safely for 2 minutes  (    ) 3 able to stand 2 minutes with supervision  (    ) 2 able to stand 30 seconds unsupported  (    ) 1 needs several tries to stand 30 seconds unsupported  (    ) 0 unable to stand 30 seconds maintains balance  (    ) 1 needs supervision when turning  (    ) 0 needs assist to keep from losing balance or falling    TURN 360 DEGREES  INSTRUCTIONS: Turn completely around in a full [place]. Pause. Then turn a full [place] in the other direction. (  x  ) 4 able to turn 360 degrees safely in 4 seconds or less  (    ) 3 able to turn 360 degrees safely one side only 4 seconds or less  (    ) 2 able to turn 360 degrees safely but slowly  (    ) 1 needs close supervision or verbal cuing  (    ) 0 needs assistance while turning    PLACE ALTERNATE FOOT ON STEP OR STOOL WHILE STANDING UNSUPPORTED  INSTRUCTIONS: Place each foot alternately on the step/stool. Continue until each foot has touch the step/stool four times. (   x ) 4 able to stand independently and safely and complete 8 steps in 20 seconds  (    ) 3 able to stand independently and complete 8 steps in > 20 seconds  (    ) 2 able to complete 4 steps without aid with supervision  (    ) 1 able to complete > 2 steps needs minimal assist  (    ) 0 needs assistance to keep from falling/unable to try    STANDING UNSUPPORTED ONE FOOT IN FRONT  INSTRUCTIONS: (DEMONSTRATE TO SUBJECT) Place one foot directly in front of the other. If you feel that you cannot place your foot directly in front, try to step far enough ahead that the heel of your forward foot is ahead of the toes of the other foot. (To score 3 points, the length of the step should exceed the length of the other foot and the width of the stance should approximate the subjects normal stride width.)   (    ) 4 able to place foot tandem independently and hold 30 seconds  (    ) 3 able to place foot ahead independently and hold 30 seconds  (  x ) 2 able to take small step independently and hold 30 seconds  (    ) 1 needs help to step but can hold 15 seconds  (    ) 0 loses balance while stepping or standing    STANDING ON ONE LEG  INSTRUCTIONS: Stand on one leg as long as you can without holding on.   (    ) 4 able to lift leg independently and hold > 10 seconds  ( x   ) 3 able to lift leg independently and hold  5-10 seconds  (    ) 2 able to lift leg independently and hold ? 3 seconds  (    ) 1 tries to lift leg unable to hold 3 seconds but remains standing independently. (    ) 0 unable to try of needs assist to prevent fall      (  53  )   TOTAL SCORE (Maximum = 56)      Interpretation: >45 = low fall risk     40-45 = medium fall risk     <40 = high fall risk    Patient/family education  Pt/family educated on safety with mobility    Patient/family response to education:   Pt/family verbalized understanding Pt/family demonstrated skill Pt/family requires further education in this area   yes yes reinforce     AM  Time in: 1045  Time out: 1130    Pt is making good progress toward established Physical Therapy goals. Continue with physical therapy current plan of care. Margi Orellana, SPT    Kev Lockett.  David Ortiz, [place]  License Number: [id number]

## 2020-09-16 NOTE — PROGRESS NOTES
DISCHARGE SUMMARY    Group interaction skills/socialization:  Angel Lopez displayed social interaction skills at the minimal direction. Leisure participation/awareness:  Angel Lopez participated in 2 therapeutic recreation interventions identifying 2 benefits to leisure participation.     Other:    Outcomes: goals achieved      Electronically signed by Anthony Dorantes on 9/16/2020 at 1:13 PM

## 2020-09-22 ENCOUNTER — CARE COORDINATION (OUTPATIENT)
Dept: CASE MANAGEMENT | Age: 65
End: 2020-09-22

## 2020-09-27 ENCOUNTER — CARE COORDINATION (OUTPATIENT)
Dept: CASE MANAGEMENT | Age: 65
End: 2020-09-27

## 2020-09-27 NOTE — CARE COORDINATION
[facility] Transitions Follow Up Call    2020    Patient: Stephanie Hernandez  Patient : 1955   MRN: <[id number]>  Reason for Admission: Stroke  Discharge Date: 9/15/20 RARS: Readmission Risk Score: 10         Spoke with: Attempted to contact patient for follow up BPCI-A transition call. Mail box full - unable to leave message. Will Follow up at later time. Linda Pearson LPN     [facility] Transitions Subsequent and Final Call    Subsequent and Final Calls  Care Transitions Interventions  Other Interventions:             Follow Up  Future Appointments   Date Time Provider Department Center   10/19/2020  8:45 AM Jcarlos Jane MD [facility]       Linda Pearson LPN

## 2020-09-30 ENCOUNTER — CARE COORDINATION (OUTPATIENT)
Dept: CASE MANAGEMENT | Age: 65
End: 2020-09-30

## 2020-09-30 NOTE — CARE COORDINATION
[facility] Transitions Follow Up Call    2020    Patient: Armando Zavala  Patient : 1955   MRN: <[id number]>  Reason for Admission: CVA  Discharge Date: 9/15/20 RARS: Readmission Risk Score: 10         Attempted to contact patient for BPCI-A call. VMB full. Attempted to contact [facility]. No answer. Will continue to follow for BPCI-A.     Follow Up  Future Appointments   Date Time Provider Alexander Powers   10/19/2020  8:45 AM Isael Olmstead MD [facility]       Randee Garcia RN

## 2020-10-07 ENCOUNTER — CARE COORDINATION (OUTPATIENT)
Dept: CASE MANAGEMENT | Age: 65
End: 2020-10-07

## 2020-10-07 NOTE — CARE COORDINATION
[facility]  Transitions Follow Up Call    10/7/2020    Patient: Jesse Landau  Patient : 1955   MRN: <[id number]>  Reason for Admission:   Discharge Date: 9/15/20 RARS: Readmission Risk Score: 10    Follow Up:  [facility]. Spoke with Gail Moreira. She stated patient no longer at facility. She stated he was admitted on 9/15/20 and discharged 20. He was transferred to another facility but did not know which facility. Contacted patient for BPCI-A follow up. Patient answered the phone. He stated he is doing fine. He stated he is working with PT and OT. He is not sure which facility he is at. Staff member entered the room while speaking with patient. He asked staff member where he was . Patient is at [facility] [employer], phone number [phone]. Will continue to follow for BPCI-A bundle.       Future Appointments   Date Time Provider Alexander Powers   10/19/2020  8:45 AM Vangie Ruth MD [facility]       Nilsa Essex, RN

## 2020-10-14 ENCOUNTER — CARE COORDINATION (OUTPATIENT)
Dept: CASE MANAGEMENT | Age: 65
End: 2020-10-14

## 2020-10-14 NOTE — CARE COORDINATION
[facility] Transitions Follow Up Call    10/14/2020    Patient: Dorota Lugo  Patient : 1955   MRN: <[id number]>  Reason for Admission:   Discharge Date: 9/15/20 RARS: Readmission Risk Score: 10    Follow Up:  Contacted [facility] [employer]. Spoke with Lam Caballero. She confirmed patient is on the [address]. She transferred call to the nurse. Spoke with Brian. She stated Milena Koroma is doing okay. Some days are better than others. She stated he called his son today and told his son to pick him up. She reports confusion is worse on some days but stated he is doing okay overall. Facility nurse administers his medications. Will continue to follow.       Future Appointments   Date Time Provider Alexander Powers   [facility]19/2020  [facility]       Ritu Mondragon RN

## 2020-10-19 ENCOUNTER — OFFICE VISIT (OUTPATIENT)
Dept: VASCULAR SURGERY | Age: 65
End: 2020-10-19
Payer: MEDICARE

## 2020-10-19 PROBLEM — I65.22 SYMPTOMATIC STENOSIS OF LEFT CAROTID ARTERY: Status: ACTIVE | Noted: 2020-10-19

## 2020-10-19 PROCEDURE — 4040F PNEUMOC VAC/ADMIN/RCVD: CPT | Performed by: SURGERY

## 2020-10-19 PROCEDURE — 1123F ACP DISCUSS/DSCN MKR DOCD: CPT | Performed by: SURGERY

## 2020-10-19 PROCEDURE — G8484 FLU IMMUNIZE NO ADMIN: HCPCS | Performed by: SURGERY

## 2020-10-19 PROCEDURE — 1036F TOBACCO NON-USER: CPT | Performed by: SURGERY

## 2020-10-19 PROCEDURE — G8427 DOCREV CUR MEDS BY ELIG CLIN: HCPCS | Performed by: SURGERY

## 2020-10-19 PROCEDURE — G8420 CALC BMI NORM PARAMETERS: HCPCS | Performed by: SURGERY

## 2020-10-19 PROCEDURE — 3017F COLORECTAL CA SCREEN DOC REV: CPT | Performed by: SURGERY

## 2020-10-19 PROCEDURE — 99214 OFFICE O/P EST MOD 30 MIN: CPT | Performed by: SURGERY

## 2020-10-19 RX ORDER — HYDROCODONE BITARTRATE AND ACETAMINOPHEN 5; 325 MG/1; MG/1
1 TABLET ORAL EVERY 6 HOURS PRN
Status: ON HOLD | COMMUNITY
End: 2020-10-30 | Stop reason: SDUPTHER

## 2020-10-19 RX ORDER — DOCUSATE SODIUM 100 MG/1
100 CAPSULE, LIQUID FILLED ORAL 2 TIMES DAILY
COMMUNITY

## 2020-10-19 RX ORDER — LORATADINE 10 MG/1
10 CAPSULE, LIQUID FILLED ORAL DAILY
COMMUNITY
End: 2020-10-23 | Stop reason: ALTCHOICE

## 2020-10-19 RX ORDER — GUAIFENESIN AND DEXTROMETHORPHAN HYDROBROMIDE 100; 10 MG/5ML; MG/5ML
5 SOLUTION ORAL EVERY 4 HOURS PRN
COMMUNITY

## 2020-10-19 RX ORDER — LOPERAMIDE HYDROCHLORIDE 2 MG/1
2 CAPSULE ORAL 4 TIMES DAILY PRN
COMMUNITY

## 2020-10-19 RX ORDER — ACETAMINOPHEN 325 MG/1
650 TABLET ORAL EVERY 6 HOURS PRN
COMMUNITY

## 2020-10-19 RX ORDER — MAGNESIUM HYDROXIDE/ALUMINUM HYDROXICE/SIMETHICONE 120; 1200; 1200 MG/30ML; MG/30ML; MG/30ML
5 SUSPENSION ORAL EVERY 6 HOURS PRN
COMMUNITY

## 2020-10-19 RX ORDER — HYDROXYZINE HYDROCHLORIDE 25 MG/1
25 TABLET, FILM COATED ORAL 2 TIMES DAILY PRN
COMMUNITY

## 2020-10-19 NOTE — PATIENT INSTRUCTIONS
Patient Education        Carotid Stenosis: Care Instructions  Your Care Instructions     Carotid stenosis is narrowing of one or both of the carotid arteries. These arteries take blood from the heart to the brain. There is one on each side of the neck. A substance called plaque builds up inside an artery. This makes it too narrow. Plaque comes from damage to the artery over time. This damage may be caused by high blood pressure, high cholesterol, diabetes, or smoking. Sometimes plaque can break loose from the carotid artery and move to the brain. This can cause a stroke or transient ischemic attack (TIA). The goal of treatment is to lower your risk of having a stroke or TIA. You can lower your risk by making healthy lifestyle changes and taking medicine. Sometimes a surgery or procedure is done. Follow-up care is a key part of your treatment and safety. Be sure to make and go to all appointments, and call your doctor if you are having problems. It's also a good idea to know your test results and keep a list of the medicines you take. How can you care for yourself at home? · Take your medicines exactly as prescribed. Call your doctor if you think you are having a problem with your medicine. You may take medicine to lower your blood pressure, to lower your cholesterol, or to prevent blood clots. · If you take a blood thinner, such as aspirin, be sure to get instructions about how to take your medicine safely. Blood thinners can cause serious bleeding problems. · Do not smoke. People who smoke have a higher chance of stroke than those who quit. If you need help quitting, talk to your doctor about stop-smoking programs and medicines. These can increase your chances of quitting for good. · Eat a healthy diet that is low in saturated fat and salt. Eat lots of fresh fruits and vegetables and foods high in fiber. · Stay at a healthy weight. Lose weight if you need to.   · Talk to your doctor about starting an exercise program. Regular exercise lowers your chance of stroke. · Limit alcohol to 2 drinks a day for men and 1 drink a day for women. Too much alcohol can cause health problems. · Work with your doctor to control high blood pressure, high cholesterol, diabetes, and other conditions that increase your chance of a stroke. A healthy diet, exercise, weight loss (if needed), and medicines can help. · Avoid colds and flu. Get the flu vaccine every year. When should you call for help? Call 911 anytime you think you may need emergency care. For example, call if:    · You passed out (lost consciousness).     · You have symptoms of a stroke. These may include:  ? Sudden numbness, tingling, weakness, or loss of movement in your face, arm, or leg, especially on only one side of your body. ? Sudden vision changes. ? Sudden trouble speaking. ? Sudden confusion or trouble understanding simple statements. ? Sudden problems with walking or balance. ? A sudden, severe headache that is different from past headaches. Call your doctor now or seek immediate medical care if:    · You are dizzy or lightheaded, or you feel like you may faint. Watch closely for changes in your health, and be sure to contact your doctor if you have any problems. Where can you learn more? Go to https://OQO.[employer]. org and sign in to your [employer] account. Enter L056 in the [employer] Interactive box to learn more about \"Carotid Stenosis: Care Instructions. \"     If you do not have an account, please click on the \"Sign Up Now\" link. Current as of: December 16, 2019               Content Version: 12.6  © 6282-1776 [employer]. Care instructions adapted under license by [facility] [employer] [facility] ([facility]). If you have questions about a medical condition or this instruction, always ask your healthcare professional. [address] any warranty or liability for your use of this information.

## 2020-10-19 NOTE — PROGRESS NOTES
Vascular Surgery Outpatient Followup    PCP : Chele Cortez MD      HISTORY OF PRESENT ILLNESS:    This is a 77 y.o. male who presents in fu regarding sx L ICA stenosis. Previously seen in the hospital 9 of 2020. He had presented with right facial droop, loss of speech, and inability to follow commands. CT head showed an ischemic event in the left MCA distribution. He currently has had resolution of his right facial droop. He is able to speak though does struggle sometimes with comprehension. He is able to follow basic commands. He is struggling with multiple commands at same time. As difficulty of reading he also struggles to progress. Currently living in memory unit at [facility]. Concerns for vascular dementia also. Significant issues with mid and longer term memory. Doing better with short term memory. He denies new sxs of slurred speech, focal weakness, or amaurosis fugax.       Past Medical History:        Diagnosis Date    Diabetes mellitus ([facility] [address]     Stroke due to stenosis of left carotid artery (HCC) 9/2/2020     Past Surgical History:        Procedure Laterality Date    CARDIOVASCULAR STRESS TEST  09/03/2020    Lexiscan stress test    HERNIA REPAIR      TONSILLECTOMY       Current Medications:   Current Outpatient Medications   Medication Sig Dispense Refill    aspirin 81 MG EC tablet Take 1 tablet by mouth daily 30 tablet 0    glipiZIDE (GLUCOTROL) 5 MG tablet Take 1 tablet by mouth every morning (before breakfast) 30 tablet 0    metFORMIN (GLUCOPHAGE) 500 MG tablet Take 1 tablet by mouth 2 times daily (with meals) 60 tablet 0    atorvastatin (LIPITOR) 40 MG tablet Take 1 tablet by mouth nightly 30 tablet 0    clopidogrel (PLAVIX) 75 MG tablet Take 1 tablet by mouth daily 30 tablet 0    albuterol sulfate HFA (PROVENTIL HFA) 108 (90 BASE) MCG/ACT inhaler Inhale 2 puffs into the lungs every 4 hours as needed for Wheezing 1 Inhaler zero     No current visit.  CONSTITUTIONAL:   Awake, alert, cooperative  PSYCHIATRIC :  Oriented to time, place and person     Appropriate insight to disease process  EYES: Lids and lashes normal  ENT:  External ears and nose without lesions   Hearing deficits notnoted  CN II - XII intact  NECK: Supple, symmetrical, trachea midline   Carotid bruit notnoted  LUNGS:  No increased work of breathing                 Clear to auscultation  CARDIOVASCULAR:  regular rate and rhythm   ABDOMEN:  soft, non-distended, non-tender   Aorta is not palpable  SKIN:   Normal skin color   Texture and turgor normal, no induration  EXTREMITIES:   R UE  5/5 strength , flexion, extension  L UE 5/5 strength, , flexion, extension  R LE Edema absent   5/5 DF/PF  L LE Edema absent   5/5 DF/PF  RADIOLOGY:    A/P Sx L ICA Stenosis  · Discussed with patient and sons re findings of L ICA stenosis ~ 70%  · cva etiology is likely related to L ICA stenosis  · continue asa plavix statin  · All options, risks and benefits were explained including non-intervention with observation  · The risks, benefits, options and alternatives were explained in detail including but not limited to complications of extracranial nerve injury, hoarseness of voice, difficulty swallowing, deviation of the tongue, stroke, paralysis, cardiopulmonary complications and death with surgical intervention  · Will proceed with L CEA in near future  · They understand no expectation of improvement in symptoms  · Being done to prevent future strokes  · No expectation will change vascular dementia     Theodore

## 2020-10-23 RX ORDER — LORATADINE 10 MG/1
10 CAPSULE, LIQUID FILLED ORAL DAILY PRN
COMMUNITY

## 2020-10-23 NOTE — PROGRESS NOTES
[address] [facility] PRE-ADMISSION TESTING GENERAL INSTRUCTIONS- [facility]-phone number:[phone]    GENERAL INSTRUCTIONS  [x] Antibacterial Soap shower Night before and/or AM of Surgery  [x] Nothing by mouth after midnight, including gum, candy, mints, or water. [x] You may brush your teeth, gargle, but do NOT swallow water. [x]No smoking, chewing tobacco, illegal drugs, or alcohol within 24 hours of your surgery. [x] Jewelry, valuables or body piercing's should not be brought to the hospital. All body and/or tongue piercing's must be removed prior to arriving to hospital.  ALL hair pins must be removed. [x] Do not wear makeup, lotions, powders, deodorant. Nail polish as directed by the nurse. [x] Bring insurance card and photo ID. PARKING INSTRUCTIONS:   [x] Arrival Time: 0500. MAIN ENTRANCE, WEAR MASK          [x] To reach the [place] from [address], [address], upon entering the hospital, take elevator B to the 3rd floor. MEDICATION INSTRUCTIONS:   [x]Bring a complete list of your medications, please write the last time you took the medicine, give this list to the nurse. [x] Take the following medications the morning of surgery with 1-2 ounces of water: SEE LIST  [] Stop herbal supplements and vitamins 5 days before your surgery. [x] DO NOT take any diabetic medicine the morning of surgery. Follow instructions for insulin the day before surgery. [x] If you are diabetic and your blood sugar is low or you feel symptomatic, you may drink 1-2 ounces of apple juice or take a glucose tablet. The morning of your procedure, you may call the pre-op area if you have concerns about your blood sugar [phone]. [] Use your inhalers the morning of surgery. Bring your emergency inhaler with you day of surgery. [x] Follow physician instructions regarding any blood thinners you may be taking.     WHAT TO EXPECT:   [x]  Delays may occur with surgery and staff will make a sincere effort to keep you informed of delays. If any delays occur with your procedure, we apologize ahead of time for your inconvenience as we recognize the value of your time.

## 2020-10-28 ENCOUNTER — ANESTHESIA EVENT (OUTPATIENT)
Dept: OPERATING ROOM | Age: 65
DRG: 039 | End: 2020-10-28
Payer: MEDICARE

## 2020-10-29 ENCOUNTER — ANESTHESIA (OUTPATIENT)
Dept: OPERATING ROOM | Age: 65
DRG: 039 | End: 2020-10-29
Payer: MEDICARE

## 2020-10-29 ENCOUNTER — APPOINTMENT (OUTPATIENT)
Dept: GENERAL RADIOLOGY | Age: 65
DRG: 039 | End: 2020-10-29
Attending: SURGERY
Payer: MEDICARE

## 2020-10-29 ENCOUNTER — HOSPITAL ENCOUNTER (INPATIENT)
Age: 65
LOS: 1 days | Discharge: NURSING, REHAB OR LONG-TERM CARE | DRG: 039 | End: 2020-10-30
Attending: SURGERY | Admitting: SURGERY
Payer: MEDICARE

## 2020-10-29 VITALS — OXYGEN SATURATION: 97 %

## 2020-10-29 PROBLEM — I65.22 CAROTID ARTERY STENOSIS, SYMPTOMATIC, LEFT: Status: ACTIVE | Noted: 2020-10-29

## 2020-10-29 LAB
ABO/RH: NORMAL
ANION GAP SERPL CALCULATED.3IONS-SCNC: 10 MMOL/L (ref 7–16)
ANION GAP SERPL CALCULATED.3IONS-SCNC: 9 MMOL/L (ref 7–16)
ANTIBODY SCREEN: NORMAL
BASOPHILS ABSOLUTE: 0.02 E9/L (ref 0–0.2)
BASOPHILS RELATIVE PERCENT: 0.2 % (ref 0–2)
BUN BLDV-MCNC: 20 MG/DL (ref 8–23)
BUN BLDV-MCNC: 20 MG/DL (ref 8–23)
CALCIUM SERPL-MCNC: 10 MG/DL (ref 8.6–10.2)
CALCIUM SERPL-MCNC: 8.8 MG/DL (ref 8.6–10.2)
CHLORIDE BLD-SCNC: 103 MMOL/L (ref 98–107)
CHLORIDE BLD-SCNC: 98 MMOL/L (ref 98–107)
CO2: 24 MMOL/L (ref 22–29)
CO2: 24 MMOL/L (ref 22–29)
CREAT SERPL-MCNC: 0.8 MG/DL (ref 0.7–1.2)
CREAT SERPL-MCNC: 1 MG/DL (ref 0.7–1.2)
EOSINOPHILS ABSOLUTE: 0.04 E9/L (ref 0.05–0.5)
EOSINOPHILS RELATIVE PERCENT: 0.3 % (ref 0–6)
GFR AFRICAN AMERICAN: >60
GFR AFRICAN AMERICAN: >60
GFR NON-AFRICAN AMERICAN: >60 ML/MIN/1.73
GFR NON-AFRICAN AMERICAN: >60 ML/MIN/1.73
GLUCOSE BLD-MCNC: 120 MG/DL (ref 74–99)
GLUCOSE BLD-MCNC: 169 MG/DL (ref 74–99)
HCT VFR BLD CALC: 41.2 % (ref 37–54)
HCT VFR BLD CALC: 45.9 % (ref 37–54)
HEMOGLOBIN: 13.7 G/DL (ref 12.5–16.5)
HEMOGLOBIN: 15.3 G/DL (ref 12.5–16.5)
IMMATURE GRANULOCYTES #: 0.1 E9/L
IMMATURE GRANULOCYTES %: 0.8 % (ref 0–5)
INR BLD: 0.9
INR BLD: 1
LYMPHOCYTES ABSOLUTE: 1.07 E9/L (ref 1.5–4)
LYMPHOCYTES RELATIVE PERCENT: 8.7 % (ref 20–42)
MCH RBC QN AUTO: 30.1 PG (ref 26–35)
MCH RBC QN AUTO: 30.2 PG (ref 26–35)
MCHC RBC AUTO-ENTMCNC: 33.3 % (ref 32–34.5)
MCHC RBC AUTO-ENTMCNC: 33.3 % (ref 32–34.5)
MCV RBC AUTO: 90.4 FL (ref 80–99.9)
MCV RBC AUTO: 90.7 FL (ref 80–99.9)
METER GLUCOSE: 126 MG/DL (ref 74–99)
METER GLUCOSE: 139 MG/DL (ref 74–99)
METER GLUCOSE: 140 MG/DL (ref 74–99)
METER GLUCOSE: 162 MG/DL (ref 74–99)
METER GLUCOSE: 189 MG/DL (ref 74–99)
MONOCYTES ABSOLUTE: 0.27 E9/L (ref 0.1–0.95)
MONOCYTES RELATIVE PERCENT: 2.2 % (ref 2–12)
NEUTROPHILS ABSOLUTE: 10.75 E9/L (ref 1.8–7.3)
NEUTROPHILS RELATIVE PERCENT: 87.8 % (ref 43–80)
PDW BLD-RTO: 14.9 FL (ref 11.5–15)
PDW BLD-RTO: 15 FL (ref 11.5–15)
PLATELET # BLD: 113 E9/L (ref 130–450)
PLATELET # BLD: 141 E9/L (ref 130–450)
PMV BLD AUTO: 10.8 FL (ref 7–12)
PMV BLD AUTO: 11.2 FL (ref 7–12)
POC ACT LR: 145 SECONDS
POC ACT LR: 163 SECONDS
POC ACT LR: 309 SECONDS
POC ACT LR: 341 SECONDS
POTASSIUM REFLEX MAGNESIUM: 4.5 MMOL/L (ref 3.5–5)
POTASSIUM SERPL-SCNC: 4.4 MMOL/L (ref 3.5–5)
PROTHROMBIN TIME: 10.9 SEC (ref 9.3–12.4)
PROTHROMBIN TIME: 9.9 SEC (ref 9.3–12.4)
RBC # BLD: 4.54 E12/L (ref 3.8–5.8)
RBC # BLD: 5.08 E12/L (ref 3.8–5.8)
SODIUM BLD-SCNC: 131 MMOL/L (ref 132–146)
SODIUM BLD-SCNC: 137 MMOL/L (ref 132–146)
WBC # BLD: 12.3 E9/L (ref 4.5–11.5)
WBC # BLD: 8.5 E9/L (ref 4.5–11.5)

## 2020-10-29 PROCEDURE — 2580000003 HC RX 258

## 2020-10-29 PROCEDURE — 85610 PROTHROMBIN TIME: CPT

## 2020-10-29 PROCEDURE — 2500000003 HC RX 250 WO HCPCS

## 2020-10-29 PROCEDURE — 88304 TISSUE EXAM BY PATHOLOGIST: CPT

## 2020-10-29 PROCEDURE — 03CL0ZZ EXTIRPATION OF MATTER FROM LEFT INTERNAL CAROTID ARTERY, OPEN APPROACH: ICD-10-PCS | Performed by: SURGERY

## 2020-10-29 PROCEDURE — 35301 RECHANNELING OF ARTERY: CPT | Performed by: SURGERY

## 2020-10-29 PROCEDURE — 85027 COMPLETE CBC AUTOMATED: CPT

## 2020-10-29 PROCEDURE — 3600000005 HC SURGERY LEVEL 5 BASE: Performed by: SURGERY

## 2020-10-29 PROCEDURE — 6360000002 HC RX W HCPCS: Performed by: NURSE PRACTITIONER

## 2020-10-29 PROCEDURE — 82962 GLUCOSE BLOOD TEST: CPT

## 2020-10-29 PROCEDURE — 03UL0KZ SUPPLEMENT LEFT INTERNAL CAROTID ARTERY WITH NONAUTOLOGOUS TISSUE SUBSTITUTE, OPEN APPROACH: ICD-10-PCS | Performed by: SURGERY

## 2020-10-29 PROCEDURE — 6360000002 HC RX W HCPCS: Performed by: SURGERY

## 2020-10-29 PROCEDURE — 85025 COMPLETE CBC W/AUTO DIFF WBC: CPT

## 2020-10-29 PROCEDURE — 2000000000 HC ICU R&B

## 2020-10-29 PROCEDURE — 7100000000 HC PACU RECOVERY - FIRST 15 MIN: Performed by: SURGERY

## 2020-10-29 PROCEDURE — 3700000001 HC ADD 15 MINUTES (ANESTHESIA): Performed by: SURGERY

## 2020-10-29 PROCEDURE — 7100000001 HC PACU RECOVERY - ADDTL 15 MIN: Performed by: SURGERY

## 2020-10-29 PROCEDURE — 3600000015 HC SURGERY LEVEL 5 ADDTL 15MIN: Performed by: SURGERY

## 2020-10-29 PROCEDURE — 3700000000 HC ANESTHESIA ATTENDED CARE: Performed by: SURGERY

## 2020-10-29 PROCEDURE — 85347 COAGULATION TIME ACTIVATED: CPT

## 2020-10-29 PROCEDURE — C1768 GRAFT, VASCULAR: HCPCS | Performed by: SURGERY

## 2020-10-29 PROCEDURE — 36620 INSERTION CATHETER ARTERY: CPT | Performed by: ANESTHESIOLOGY

## 2020-10-29 PROCEDURE — 6370000000 HC RX 637 (ALT 250 FOR IP): Performed by: NURSE PRACTITIONER

## 2020-10-29 PROCEDURE — 80048 BASIC METABOLIC PNL TOTAL CA: CPT

## 2020-10-29 PROCEDURE — 2580000003 HC RX 258: Performed by: NURSE PRACTITIONER

## 2020-10-29 PROCEDURE — C9248 INJ, CLEVIDIPINE BUTYRATE: HCPCS | Performed by: NURSE PRACTITIONER

## 2020-10-29 PROCEDURE — 86850 RBC ANTIBODY SCREEN: CPT

## 2020-10-29 PROCEDURE — 2500000003 HC RX 250 WO HCPCS: Performed by: SURGERY

## 2020-10-29 PROCEDURE — 71045 X-RAY EXAM CHEST 1 VIEW: CPT

## 2020-10-29 PROCEDURE — 87081 CULTURE SCREEN ONLY: CPT

## 2020-10-29 PROCEDURE — 86900 BLOOD TYPING SEROLOGIC ABO: CPT

## 2020-10-29 PROCEDURE — 2580000003 HC RX 258: Performed by: SURGERY

## 2020-10-29 PROCEDURE — 86901 BLOOD TYPING SEROLOGIC RH(D): CPT

## 2020-10-29 PROCEDURE — 36415 COLL VENOUS BLD VENIPUNCTURE: CPT

## 2020-10-29 PROCEDURE — 6360000002 HC RX W HCPCS

## 2020-10-29 PROCEDURE — 2700000000 HC OXYGEN THERAPY PER DAY

## 2020-10-29 PROCEDURE — 2709999900 HC NON-CHARGEABLE SUPPLY: Performed by: SURGERY

## 2020-10-29 DEVICE — XENOSURE BIOLOGIC PATCH, 0.8CM X 8CM, EIFU
Type: IMPLANTABLE DEVICE | Site: CAROTID | Status: FUNCTIONAL
Brand: XENOSURE BIOLOGIC PATCH

## 2020-10-29 RX ORDER — MORPHINE SULFATE 2 MG/ML
2 INJECTION, SOLUTION INTRAMUSCULAR; INTRAVENOUS
Status: DISCONTINUED | OUTPATIENT
Start: 2020-10-29 | End: 2020-10-30 | Stop reason: HOSPADM

## 2020-10-29 RX ORDER — GLYCOPYRROLATE 1 MG/5 ML
SYRINGE (ML) INTRAVENOUS PRN
Status: DISCONTINUED | OUTPATIENT
Start: 2020-10-29 | End: 2020-10-29 | Stop reason: SDUPTHER

## 2020-10-29 RX ORDER — ASPIRIN 81 MG/1
81 TABLET ORAL DAILY
Status: DISCONTINUED | OUTPATIENT
Start: 2020-10-30 | End: 2020-10-30 | Stop reason: HOSPADM

## 2020-10-29 RX ORDER — ONDANSETRON 2 MG/ML
4 INJECTION INTRAMUSCULAR; INTRAVENOUS
Status: DISCONTINUED | OUTPATIENT
Start: 2020-10-29 | End: 2020-10-29 | Stop reason: HOSPADM

## 2020-10-29 RX ORDER — OXYCODONE HYDROCHLORIDE AND ACETAMINOPHEN 5; 325 MG/1; MG/1
1 TABLET ORAL EVERY 4 HOURS PRN
Status: DISCONTINUED | OUTPATIENT
Start: 2020-10-29 | End: 2020-10-30 | Stop reason: HOSPADM

## 2020-10-29 RX ORDER — SODIUM CHLORIDE 9 MG/ML
INJECTION, SOLUTION INTRAVENOUS CONTINUOUS PRN
Status: DISCONTINUED | OUTPATIENT
Start: 2020-10-29 | End: 2020-10-29 | Stop reason: SDUPTHER

## 2020-10-29 RX ORDER — BUPIVACAINE HYDROCHLORIDE 2.5 MG/ML
INJECTION, SOLUTION EPIDURAL; INFILTRATION; INTRACAUDAL PRN
Status: DISCONTINUED | OUTPATIENT
Start: 2020-10-29 | End: 2020-10-29 | Stop reason: ALTCHOICE

## 2020-10-29 RX ORDER — MORPHINE SULFATE 4 MG/ML
4 INJECTION, SOLUTION INTRAMUSCULAR; INTRAVENOUS
Status: DISCONTINUED | OUTPATIENT
Start: 2020-10-29 | End: 2020-10-30 | Stop reason: HOSPADM

## 2020-10-29 RX ORDER — MORPHINE SULFATE 2 MG/ML
2 INJECTION, SOLUTION INTRAMUSCULAR; INTRAVENOUS EVERY 5 MIN PRN
Status: DISCONTINUED | OUTPATIENT
Start: 2020-10-29 | End: 2020-10-29 | Stop reason: HOSPADM

## 2020-10-29 RX ORDER — ATORVASTATIN CALCIUM 40 MG/1
40 TABLET, FILM COATED ORAL NIGHTLY
Status: DISCONTINUED | OUTPATIENT
Start: 2020-10-30 | End: 2020-10-30 | Stop reason: HOSPADM

## 2020-10-29 RX ORDER — NICOTINE POLACRILEX 4 MG
15 LOZENGE BUCCAL PRN
Status: DISCONTINUED | OUTPATIENT
Start: 2020-10-29 | End: 2020-10-30 | Stop reason: HOSPADM

## 2020-10-29 RX ORDER — SODIUM CHLORIDE 0.9 % (FLUSH) 0.9 %
10 SYRINGE (ML) INJECTION EVERY 12 HOURS SCHEDULED
Status: DISCONTINUED | OUTPATIENT
Start: 2020-10-29 | End: 2020-10-30 | Stop reason: HOSPADM

## 2020-10-29 RX ORDER — SODIUM CHLORIDE 0.9 % (FLUSH) 0.9 %
10 SYRINGE (ML) INJECTION EVERY 12 HOURS SCHEDULED
Status: DISCONTINUED | OUTPATIENT
Start: 2020-10-29 | End: 2020-10-29 | Stop reason: HOSPADM

## 2020-10-29 RX ORDER — MORPHINE SULFATE 2 MG/ML
1 INJECTION, SOLUTION INTRAMUSCULAR; INTRAVENOUS EVERY 5 MIN PRN
Status: DISCONTINUED | OUTPATIENT
Start: 2020-10-29 | End: 2020-10-29 | Stop reason: HOSPADM

## 2020-10-29 RX ORDER — SODIUM CHLORIDE 9 MG/ML
INJECTION, SOLUTION INTRAVENOUS CONTINUOUS
Status: DISCONTINUED | OUTPATIENT
Start: 2020-10-29 | End: 2020-10-30

## 2020-10-29 RX ORDER — NITROGLYCERIN 5 MG/ML
INJECTION, SOLUTION INTRAVENOUS PRN
Status: DISCONTINUED | OUTPATIENT
Start: 2020-10-29 | End: 2020-10-29 | Stop reason: SDUPTHER

## 2020-10-29 RX ORDER — CLOPIDOGREL BISULFATE 75 MG/1
75 TABLET ORAL DAILY
Status: DISCONTINUED | OUTPATIENT
Start: 2020-10-30 | End: 2020-10-30 | Stop reason: HOSPADM

## 2020-10-29 RX ORDER — NEOSTIGMINE METHYLSULFATE 1 MG/ML
INJECTION, SOLUTION INTRAVENOUS PRN
Status: DISCONTINUED | OUTPATIENT
Start: 2020-10-29 | End: 2020-10-29 | Stop reason: SDUPTHER

## 2020-10-29 RX ORDER — PROPOFOL 10 MG/ML
INJECTION, EMULSION INTRAVENOUS PRN
Status: DISCONTINUED | OUTPATIENT
Start: 2020-10-29 | End: 2020-10-29 | Stop reason: SDUPTHER

## 2020-10-29 RX ORDER — LIDOCAINE HYDROCHLORIDE 20 MG/ML
INJECTION, SOLUTION INTRAVENOUS PRN
Status: DISCONTINUED | OUTPATIENT
Start: 2020-10-29 | End: 2020-10-29 | Stop reason: SDUPTHER

## 2020-10-29 RX ORDER — DEXAMETHASONE SODIUM PHOSPHATE 10 MG/ML
INJECTION, SOLUTION INTRAMUSCULAR; INTRAVENOUS PRN
Status: DISCONTINUED | OUTPATIENT
Start: 2020-10-29 | End: 2020-10-29 | Stop reason: SDUPTHER

## 2020-10-29 RX ORDER — SODIUM CHLORIDE 0.9 % (FLUSH) 0.9 %
10 SYRINGE (ML) INJECTION PRN
Status: DISCONTINUED | OUTPATIENT
Start: 2020-10-29 | End: 2020-10-29 | Stop reason: HOSPADM

## 2020-10-29 RX ORDER — DOCUSATE SODIUM 100 MG/1
100 CAPSULE, LIQUID FILLED ORAL 2 TIMES DAILY
Status: DISCONTINUED | OUTPATIENT
Start: 2020-10-29 | End: 2020-10-30 | Stop reason: HOSPADM

## 2020-10-29 RX ORDER — SODIUM CHLORIDE 0.9 % (FLUSH) 0.9 %
10 SYRINGE (ML) INJECTION PRN
Status: DISCONTINUED | OUTPATIENT
Start: 2020-10-29 | End: 2020-10-30 | Stop reason: HOSPADM

## 2020-10-29 RX ORDER — PROTAMINE SULFATE 10 MG/ML
INJECTION, SOLUTION INTRAVENOUS PRN
Status: DISCONTINUED | OUTPATIENT
Start: 2020-10-29 | End: 2020-10-29 | Stop reason: SDUPTHER

## 2020-10-29 RX ORDER — DEXTROSE MONOHYDRATE 25 G/50ML
12.5 INJECTION, SOLUTION INTRAVENOUS PRN
Status: DISCONTINUED | OUTPATIENT
Start: 2020-10-29 | End: 2020-10-30 | Stop reason: HOSPADM

## 2020-10-29 RX ORDER — ACETAMINOPHEN 325 MG/1
650 TABLET ORAL EVERY 4 HOURS PRN
Status: DISCONTINUED | OUTPATIENT
Start: 2020-10-29 | End: 2020-10-30 | Stop reason: HOSPADM

## 2020-10-29 RX ORDER — ONDANSETRON 2 MG/ML
INJECTION INTRAMUSCULAR; INTRAVENOUS PRN
Status: DISCONTINUED | OUTPATIENT
Start: 2020-10-29 | End: 2020-10-29 | Stop reason: SDUPTHER

## 2020-10-29 RX ORDER — DEXTROSE MONOHYDRATE 50 MG/ML
100 INJECTION, SOLUTION INTRAVENOUS PRN
Status: DISCONTINUED | OUTPATIENT
Start: 2020-10-29 | End: 2020-10-30 | Stop reason: HOSPADM

## 2020-10-29 RX ORDER — ROCURONIUM BROMIDE 10 MG/ML
INJECTION, SOLUTION INTRAVENOUS PRN
Status: DISCONTINUED | OUTPATIENT
Start: 2020-10-29 | End: 2020-10-29 | Stop reason: SDUPTHER

## 2020-10-29 RX ORDER — HEPARIN SODIUM 1000 [USP'U]/ML
INJECTION, SOLUTION INTRAVENOUS; SUBCUTANEOUS PRN
Status: DISCONTINUED | OUTPATIENT
Start: 2020-10-29 | End: 2020-10-29 | Stop reason: SDUPTHER

## 2020-10-29 RX ORDER — PROMETHAZINE HYDROCHLORIDE 25 MG/ML
6.25 INJECTION, SOLUTION INTRAMUSCULAR; INTRAVENOUS
Status: DISCONTINUED | OUTPATIENT
Start: 2020-10-29 | End: 2020-10-29 | Stop reason: HOSPADM

## 2020-10-29 RX ORDER — FENTANYL CITRATE 50 UG/ML
INJECTION, SOLUTION INTRAMUSCULAR; INTRAVENOUS PRN
Status: DISCONTINUED | OUTPATIENT
Start: 2020-10-29 | End: 2020-10-29 | Stop reason: SDUPTHER

## 2020-10-29 RX ORDER — OXYCODONE HYDROCHLORIDE AND ACETAMINOPHEN 5; 325 MG/1; MG/1
2 TABLET ORAL EVERY 4 HOURS PRN
Status: DISCONTINUED | OUTPATIENT
Start: 2020-10-29 | End: 2020-10-30 | Stop reason: HOSPADM

## 2020-10-29 RX ADMIN — INSULIN LISPRO 1 UNITS: 100 INJECTION, SOLUTION INTRAVENOUS; SUBCUTANEOUS at 18:21

## 2020-10-29 RX ADMIN — CLEVIPIDINE 2 MG/HR: 0.5 EMULSION INTRAVENOUS at 11:05

## 2020-10-29 RX ADMIN — SODIUM CHLORIDE: 9 INJECTION, SOLUTION INTRAVENOUS at 07:40

## 2020-10-29 RX ADMIN — PROTAMINE SULFATE 50 MG: 10 INJECTION, SOLUTION INTRAVENOUS at 09:41

## 2020-10-29 RX ADMIN — SODIUM CHLORIDE: 9 INJECTION, SOLUTION INTRAVENOUS at 07:25

## 2020-10-29 RX ADMIN — ONDANSETRON HYDROCHLORIDE 4 MG: 2 INJECTION, SOLUTION INTRAMUSCULAR; INTRAVENOUS at 10:08

## 2020-10-29 RX ADMIN — Medication 2 G: at 17:06

## 2020-10-29 RX ADMIN — Medication 0.6 MG: at 09:59

## 2020-10-29 RX ADMIN — Medication 2 G: at 23:50

## 2020-10-29 RX ADMIN — INSULIN LISPRO 1 UNITS: 100 INJECTION, SOLUTION INTRAVENOUS; SUBCUTANEOUS at 12:31

## 2020-10-29 RX ADMIN — FENTANYL CITRATE 50 MCG: 50 INJECTION, SOLUTION INTRAMUSCULAR; INTRAVENOUS at 09:32

## 2020-10-29 RX ADMIN — ROCURONIUM BROMIDE 50 MG: 10 INJECTION, SOLUTION INTRAVENOUS at 07:35

## 2020-10-29 RX ADMIN — HEPARIN SODIUM 10000 UNITS: 1000 INJECTION INTRAVENOUS; SUBCUTANEOUS at 08:24

## 2020-10-29 RX ADMIN — DEXAMETHASONE SODIUM PHOSPHATE 10 MG: 10 INJECTION, SOLUTION INTRAMUSCULAR; INTRAVENOUS at 07:47

## 2020-10-29 RX ADMIN — FENTANYL CITRATE 50 MCG: 50 INJECTION, SOLUTION INTRAMUSCULAR; INTRAVENOUS at 09:18

## 2020-10-29 RX ADMIN — SODIUM CHLORIDE, PRESERVATIVE FREE 10 ML: 5 INJECTION INTRAVENOUS at 20:33

## 2020-10-29 RX ADMIN — Medication 3 MG: at 09:59

## 2020-10-29 RX ADMIN — PROPOFOL 200 MG: 10 INJECTION, EMULSION INTRAVENOUS at 07:35

## 2020-10-29 RX ADMIN — FENTANYL CITRATE 50 MCG: 50 INJECTION, SOLUTION INTRAMUSCULAR; INTRAVENOUS at 08:39

## 2020-10-29 RX ADMIN — ROCURONIUM BROMIDE 30 MG: 10 INJECTION, SOLUTION INTRAVENOUS at 08:00

## 2020-10-29 RX ADMIN — LIDOCAINE HYDROCHLORIDE 100 MG: 20 INJECTION, SOLUTION INTRAVENOUS at 07:35

## 2020-10-29 RX ADMIN — NITROGLYCERIN 50 MCG: 5 INJECTION, SOLUTION INTRAVENOUS at 07:42

## 2020-10-29 RX ADMIN — HEPARIN SODIUM 4000 UNITS: 1000 INJECTION INTRAVENOUS; SUBCUTANEOUS at 08:32

## 2020-10-29 RX ADMIN — SODIUM CHLORIDE: 9 INJECTION, SOLUTION INTRAVENOUS at 12:16

## 2020-10-29 RX ADMIN — PHENYLEPHRINE HYDROCHLORIDE 20 MCG/MIN: 10 INJECTION INTRAVENOUS at 08:26

## 2020-10-29 RX ADMIN — ENOXAPARIN SODIUM 40 MG: 40 INJECTION SUBCUTANEOUS at 12:30

## 2020-10-29 RX ADMIN — SODIUM CHLORIDE: 9 INJECTION, SOLUTION INTRAVENOUS at 18:21

## 2020-10-29 RX ADMIN — Medication 2 G: at 07:47

## 2020-10-29 RX ADMIN — FENTANYL CITRATE 100 MCG: 50 INJECTION, SOLUTION INTRAMUSCULAR; INTRAVENOUS at 07:35

## 2020-10-29 ASSESSMENT — PULMONARY FUNCTION TESTS
PIF_VALUE: 26
PIF_VALUE: 26
PIF_VALUE: 25
PIF_VALUE: 24
PIF_VALUE: 18
PIF_VALUE: 18
PIF_VALUE: 2
PIF_VALUE: 25
PIF_VALUE: 18
PIF_VALUE: 26
PIF_VALUE: 25
PIF_VALUE: 25
PIF_VALUE: 26
PIF_VALUE: 18
PIF_VALUE: 16
PIF_VALUE: 26
PIF_VALUE: 2
PIF_VALUE: 2
PIF_VALUE: 26
PIF_VALUE: 25
PIF_VALUE: 25
PIF_VALUE: 23
PIF_VALUE: 25
PIF_VALUE: 2
PIF_VALUE: 17
PIF_VALUE: 28
PIF_VALUE: 18
PIF_VALUE: 17
PIF_VALUE: 2
PIF_VALUE: 2
PIF_VALUE: 25
PIF_VALUE: 25
PIF_VALUE: 16
PIF_VALUE: 25
PIF_VALUE: 20
PIF_VALUE: 0
PIF_VALUE: 18
PIF_VALUE: 25
PIF_VALUE: 2
PIF_VALUE: 25
PIF_VALUE: 2
PIF_VALUE: 24
PIF_VALUE: 25
PIF_VALUE: 1
PIF_VALUE: 24
PIF_VALUE: 1
PIF_VALUE: 24
PIF_VALUE: 2
PIF_VALUE: 25
PIF_VALUE: 26
PIF_VALUE: 23
PIF_VALUE: 17
PIF_VALUE: 19
PIF_VALUE: 17
PIF_VALUE: 25
PIF_VALUE: 17
PIF_VALUE: 25
PIF_VALUE: 3
PIF_VALUE: 19
PIF_VALUE: 26
PIF_VALUE: 25
PIF_VALUE: 33
PIF_VALUE: 25
PIF_VALUE: 18
PIF_VALUE: 12
PIF_VALUE: 19
PIF_VALUE: 25
PIF_VALUE: 26
PIF_VALUE: 25
PIF_VALUE: 16
PIF_VALUE: 25
PIF_VALUE: 3
PIF_VALUE: 16
PIF_VALUE: 21
PIF_VALUE: 16
PIF_VALUE: 25
PIF_VALUE: 24
PIF_VALUE: 17
PIF_VALUE: 19
PIF_VALUE: 25
PIF_VALUE: 1
PIF_VALUE: 14
PIF_VALUE: 25
PIF_VALUE: 1
PIF_VALUE: 25
PIF_VALUE: 24
PIF_VALUE: 25
PIF_VALUE: 2
PIF_VALUE: 25
PIF_VALUE: 17
PIF_VALUE: 3
PIF_VALUE: 2
PIF_VALUE: 25
PIF_VALUE: 18
PIF_VALUE: 2
PIF_VALUE: 25
PIF_VALUE: 18
PIF_VALUE: 25
PIF_VALUE: 20
PIF_VALUE: 1
PIF_VALUE: 26
PIF_VALUE: 25
PIF_VALUE: 20
PIF_VALUE: 25
PIF_VALUE: 26
PIF_VALUE: 25
PIF_VALUE: 2
PIF_VALUE: 26
PIF_VALUE: 17
PIF_VALUE: 17
PIF_VALUE: 2
PIF_VALUE: 2
PIF_VALUE: 17
PIF_VALUE: 16
PIF_VALUE: 19
PIF_VALUE: 19
PIF_VALUE: 25
PIF_VALUE: 18
PIF_VALUE: 19
PIF_VALUE: 25
PIF_VALUE: 18
PIF_VALUE: 24
PIF_VALUE: 25
PIF_VALUE: 18
PIF_VALUE: 25
PIF_VALUE: 26
PIF_VALUE: 25
PIF_VALUE: 19
PIF_VALUE: 17
PIF_VALUE: 19
PIF_VALUE: 16
PIF_VALUE: 14
PIF_VALUE: 27
PIF_VALUE: 20
PIF_VALUE: 25
PIF_VALUE: 16
PIF_VALUE: 17
PIF_VALUE: 18
PIF_VALUE: 17
PIF_VALUE: 19
PIF_VALUE: 24
PIF_VALUE: 18
PIF_VALUE: 14
PIF_VALUE: 26
PIF_VALUE: 25
PIF_VALUE: 23
PIF_VALUE: 22
PIF_VALUE: 25
PIF_VALUE: 25
PIF_VALUE: 26
PIF_VALUE: 11
PIF_VALUE: 21
PIF_VALUE: 25
PIF_VALUE: 2
PIF_VALUE: 25
PIF_VALUE: 1
PIF_VALUE: 16
PIF_VALUE: 24
PIF_VALUE: 25
PIF_VALUE: 20
PIF_VALUE: 25
PIF_VALUE: 25
PIF_VALUE: 17
PIF_VALUE: 1
PIF_VALUE: 20
PIF_VALUE: 18
PIF_VALUE: 17

## 2020-10-29 ASSESSMENT — PAIN SCALES - GENERAL
PAINLEVEL_OUTOF10: 0

## 2020-10-29 ASSESSMENT — PAIN - FUNCTIONAL ASSESSMENT: PAIN_FUNCTIONAL_ASSESSMENT: 0-10

## 2020-10-29 NOTE — CARE COORDINATION
10/29 Care Coordination:Michael post Lt CEA today. PTA he is from [facility] at [employer]. They have 24hr nursing so no HHC is needed per Cheryl. At time of discharge the facility will transport back. call [phone]. CM/SW will continue to follow for discharge planning.    Cherri WILSON,RN--[phone]

## 2020-10-29 NOTE — PROGRESS NOTES
Admitted to Same Day Surgery. Preop instructions given to patient. COVID testing completed on: 10/23/20  Results of the test: not detected  The patient verbally confirms that they did adhere to the self-quarantine guidelines. No signs or symptoms expressed or observed.

## 2020-10-29 NOTE — OP NOTE
Makayla Salgado  1955      DATE OF PROCEDURE: 10/29/2020     SURGEON: Doris Shaikh M.D.     ASSISTANT: Veena MONTAÑO     PREOPERATIVE DIAGNOSIS: Symptomatic 70% left internal carotid artery stenosis. POSTOPERATIVE DIAGNOSIS: Same      Ulcerated plaque    OPERATION: left carotid endarterectomy with bovine patch angioplasty. ANESTHESIA: General     ESTIMATED BLOOD LOSS: less than 232 ml     COMPLICATIONS: None     DESCRIPTION OF PROCEDURE: The patient was identified and the procedure was confirmed. The left neck was prepped and draped in the usual sterile fashion. A skin incision was made along the anterior border of the sternocleidomastoid muscle and carried down through the subcutaneous tissue. Dissection continued through the platysma and along the anterior border of the sternocleidomastoid muscle. The common facial vein was divided between silk ties. The common carotid artery was identified and dissected free from the surrounding tissues. It was surrounded proximally in the soft portion with an umbilical tape. The vagus nerve was deep to the artery and preserved. Dissection continued along the carotid artery and the external carotid artery and its superior thyroid branch were dissected free from the surrounding tissues and surrounded with vessel loops for control. The patient was then heparinized, maintaining an activated clotting time greater than 300 seconds. Dissection continued along the internal carotid artery, which was freed from the surrounding tissues and surrounded with a vessel loop for control. The hypoglossal nerve was identified and preserved. The vessel loops on the external carotid artery branches were controlled and the internal carotid artery was clamped. The common carotid artery was clamped and then a longitudinal arteriotomy was made in the common carotid artery and extended through the bulb and onto the internal carotid artery.  There was a 70 % stenosis in the origin of the internal carotid artery. Ulcerated plaque was also noted. A romero inohara shunt was placed into the internal carotid artery and balloon inflated. There was good back-bleeding from the shunt. The shunt was clamped. The shunt was then placed into the common carotid artery and secured with a Rumel tourniquet and balloon was inflated. It was drawn back multiple times alternating between common and internal.  Than the shunt was unclamped. A standard endarterectomy was then performed of the obtaining smooth end points on the distal common and internal carotid arteries. Loose fibrinous debris was removed from the vessel bed, which was flushed with heparinized saline solution. 7.0 tacking sutures were placed at distal endpoint. A bovine patch was obtained and cut to the appropriate length and sewn to the artery using a running 6-0 Prolene suture. Prior to completing the closure, the shunt was removed from the internal carotid after allowing the balloon to deflate. It was clamped on the common carotid side. Excellent back bleeding noted from the internal carotid. The shunt was than removed from the common carotid after allowing balloon to deflate. The common carotid artery was flushed then clamped. The closure was completed and flow was restored initially through the external carotid artery followed by the internal carotid artery. Flow through the vessels was confirmed with the handheld Doppler probe. The patient was given protamine and hemostasis was obtained. The incision was irrigated with antibiotic saline solution. The platysma was approximated with interrupted 3-0 Vicryl sutures and 0.25% Marcaine was injected into the subcutaneous tissue surrounding the incision. The skin was closed with a subcuticular Vicryl stitch and Dermabond was applied to the skin incision in the operating room. Needle, sponge, and instrument counts were reported as correct x2.  The patient tolerated

## 2020-10-29 NOTE — LETTER
Beneficiary Notification Letter  BPCI Advanced     Your Doctor or [address],    We wanted to let you know that your health care provider, [address] [place] [address] Maria Esther, has volunteered to take part in our [facility] for [facility] & Medicaid Services (CMS) Bundled Payments for [facility] (BPCI Advanced). This doesnt change your Medicare rights or benefits and you dont need to do anything. What are bundled payments? A bundled payment combines, or bundles together, payments that Medicare makes to your health care providers for the many different kinds of medical services you might get in a specific time period. In BPCI Advanced, this time period could include a hospital inpatient stay or outpatient procedure, plus 90 days. Why would Medicare bundle payments? Bundled payments are thought of as a value-based way to pay because health care providers are responsible for both the quality and cost of medical care they give. This is a relatively new way of paying health care providers compared to thefee-for-service way Medicare has traditionally paid, where providers are paid separately for each service they provide. Bundled payments encourage these providers to work together to provide better, more coordinated care during your hospital stay, or outpatient procedure, and through your recovery. What does BPCI Advance mean for me? Youre more likely to get even better care when hospitals, doctors, and other health care providers work together. In BPCI Advanced, hospitals, doctors, and other health care providers may be rewarded for providing better, more coordinated health care. Medicare will watch BPCI Advanced participants closely to make sure that you and other patients keep getting efficient, high quality care. What do I need to know about BPCI Advanced? Whats most important for you to know is that your Medicare rights and benefits wont change because your health care provider is participating in [address]. Medicare will keep covering all of your medically necessary services. Even though Medicare will pay your doctor in a different way under BPCI Advanced, how much you have to pay wont change. Health care providers and suppliers who are enrolled in Medicare will submit their Medicare claims like they always have. Youll have all the same Medicare rights and protections, including the right to choose which hospital, doctor, or other health care provider you see. If you dont want to get care from a health care provider whos participating in [address], then youll have to choose a different health care provider whos not participating in the Model. How can I give feedback about my health care? Medicare might ask you to take a voluntary survey about the services and care you received from [address] [place] [address] during your hospital stay or outpatient procedure and for a specific period of time afterwards. You can decide whether you want to take the voluntary survey, but if you do, itll help Medicare make BPCI Advanced and the care of other Medicare patients better. If you have concerns or complaints about your care, you can:   · Talk to your doctor or health care provider. · Contact your Beneficiary and Family Centered Care Quality Improvement   Organization [facility]). You can get your [facility]s phone number  at  Medicare.gov/contacts or by calling 1-800-MEDICARE. TTY users can call  3-621.978.4947. Where can I learn more about BPCI Advanced? Learn more about BPCI Advanced at https://innovation.cms.gov/initiatives/bpci-advanced/:  · A list of all the hospitals and physician group practices in the country participating in [address].

## 2020-10-29 NOTE — H&P
Vascular Surgery History & Physical Exam    Chief Complaint: Hx Carotid disease    HISTORY OF PRESENT ILLNESS:    The patient is a 77 y.o. male who presents to the hospital for elective left carotid endarterectomy.   There is a hx of CVA/TIA in the recent past.      ROS : All others Negative if blank [], Positive if [x]  General   [] Fevers   [] Chills   [] Weight Loss   Skin   [] Tissue Loss   Eyes   [] Wears Glasses/Contacts   [] Vision Changes   Respiratory    [] Shortness of breath   Cardiovascular   [] Chest Pain   [] Shortness of breath with exertion   Gastrointestinal   [] Abdominal Pain     Past Medical History:   Diagnosis Date    Diabetes mellitus ([facility] [address]     Seasonal allergies     Stroke due to stenosis of left carotid artery ([facility] [address] 9/2/2020    Vascular dementia [facility])      Past Surgical History:   Procedure Laterality Date    CARDIOVASCULAR STRESS TEST  09/03/2020    Lexiscan stress test    HERNIA REPAIR      TONSILLECTOMY       Current Medications:     Current Facility-Administered Medications:     sodium chloride flush 0.9 % injection 10 mL, 10 mL, Intravenous, 2 times per day, Julisa Vences MD    sodium chloride flush 0.9 % injection 10 mL, 10 mL, Intravenous, PRN, Julisa Vences MD    ceFAZolin (ANCEF) 2 g in sterile water 20 mL IV syringe, 2 g, Intravenous, On Call to OR, Julisa Vences MD    morphine (PF) injection 1 mg, 1 mg, Intravenous, Q5 Min PRN, Gisselle Ruiz, DO    morphine (PF) injection 2 mg, 2 mg, Intravenous, Q5 Min PRN, Gisselle Ruiz, DO    morphine (PF) injection 1 mg, 1 mg, Intravenous, Q5 Min PRN, Gisselle Gamma, DO    morphine (PF) injection 2 mg, 2 mg, Intravenous, Q5 Min PRN, Althea Waterman,     [facility]etron [facility]) injection 4 mg, 4 mg, Intravenous, Once PRN, Gisselle Gamma, DO    promethazine (PHENERGAN) injection 6.25 mg, 6.25 mg, Intramuscular, Once PRN, Gisselle Gamma, DO  Allergies: Patient has no known allergies. Social History     Socioeconomic History    Marital status:      Spouse name: Not on file    Number of children: 1    Years of education: Not on file    Highest education level: Not on file   Occupational History    Not on file   Social Needs    Financial resource strain: Not on file    Food insecurity     Worry: Not on file     Inability: Not on file   [employer] needs     Medical: Not on file     Non-medical: Not on file   Tobacco Use    Smoking status: Former Smoker     Packs/day: 1.00     Types: Cigarettes     Last attempt to quit: 2020     Years since quittin.1    Smokeless tobacco: Never Used   Substance and Sexual Activity    Alcohol use: Not Currently     Alcohol/week: 1.0 standard drinks     Types: 1 Cans of beer per week     Comment: on occassion    Drug use: No    Sexual activity: Not on file   Lifestyle    Physical activity     Days per week: Not on file     Minutes per session: Not on file    Stress: Not on file   Relationships    Social connections     Talks on phone: Not on file     Gets together: Not on file     Attends [employer] service: Not on file     Active member of club or organization: Not on file     Attends meetings of clubs or organizations: Not on file     Relationship status: Not on file    Intimate partner violence     Fear of current or ex partner: Not on file     Emotionally abused: Not on file     Physically abused: Not on file     Forced sexual activity: Not on file   Other Topics Concern    Not on file   Social History Narrative    Not on file     History reviewed. No pertinent family history.   PHYSICAL EXAM:    Vitals:    10/29/20 0548   BP: 133/78   Pulse: 70   Resp: 17   Temp: 97.6 °F (36.4 °C)   SpO2: 94%     CONSTITUTIONAL:  awake, alert, cooperative, no apparent distress, and appears stated age  NECK:  supple, symmetrical, trachea midline  CN II - XII   LUNGS:  no increased work of breathing, good resp

## 2020-10-29 NOTE — ANESTHESIA POSTPROCEDURE EVALUATION
Department of Anesthesiology  Postprocedure Note    Patient: Rosamaria Lal  MRN: [id number]  YOB: 1955  Date of evaluation: 10/29/2020  Time:  3:23 PM     Procedure Summary     Date:  10/29/20 Room / Location:  [facility] / [facility]    Anesthesia Start:  0725 Anesthesia Stop:  0287    Procedure:  LEFT CAROTID ENDARTERECTOMY -- FACILITY (Left ) Diagnosis:  (CAROTID STENOSIS)    Surgeon:  Theodore Price MD Responsible Provider:  Juan Daniel Willoughby DO    Anesthesia Type:  general ASA Status:  4          Anesthesia Type: general    Deni Phase I: Deni Score: 9    Deni Phase II:      Last vitals: Reviewed and per EMR flowsheets.        Anesthesia Post Evaluation    Patient location during evaluation: PACU  Patient participation: complete - patient participated  Level of consciousness: awake and alert  Pain score: 1  Airway patency: patent  Nausea & Vomiting: no nausea and no vomiting  Complications: no  Cardiovascular status: hemodynamically stable  Respiratory status: acceptable  Hydration status: euvolemic

## 2020-10-29 NOTE — ANESTHESIA PRE PROCEDURE
Department of Anesthesiology  Preprocedure Note       Name:  Jillian Patel   Age:  77 y.o.  :  1955                                          MRN:  [id number]         Date:  10/29/2020      Surgeon: Fernando Hahn):  Dionisio Claire MD    Procedure: Procedure(s):  LEFT CAROTID ENDARTERECTOMY -- FACILITY    Medications prior to admission:   Prior to Admission medications    Medication Sig Start Date End Date Taking? Authorizing Provider   docusate sodium (COLACE) 100 MG capsule Take 100 mg by mouth 2 times daily   Yes Historical Provider, MD   acetaminophen (TYLENOL) 325 MG tablet Take 650 mg by mouth every 6 hours as needed for Pain   Yes Historical Provider, MD   aspirin 81 MG EC tablet Take 1 tablet by mouth daily 9/15/20  Yes Michelle Nieto MD   glipiZIDE (GLUCOTROL) 5 MG tablet Take 1 tablet by mouth every morning (before breakfast) 9/15/20  Yes Michelle Nieto MD   metFORMIN (GLUCOPHAGE) 500 MG tablet Take 1 tablet by mouth 2 times daily (with meals)  Patient taking differently: Take 1,000 mg by mouth 2 times daily (with meals)  20  Yes Michelle Nieto MD   atorvastatin (LIPITOR) 40 MG tablet Take 1 tablet by mouth nightly 20  Yes Michelle Nieto MD   clopidogrel (PLAVIX) 75 MG tablet Take 1 tablet by mouth daily 9/15/20  Yes Michelle Nieto MD   albuterol sulfate HFA (PROVENTIL HFA) 108 (90 BASE) MCG/ACT inhaler Inhale 2 puffs into the lungs every 4 hours as needed for Wheezing 3/11/17 10/23/20 Yes Amira Martinez DO   loratadine (CLARITIN) 10 MG capsule Take 10 mg by mouth daily as needed    Historical Provider, MD   hydrOXYzine (ATARAX) 25 MG tablet Take 25 mg by mouth 2 times daily as needed for Itching     Historical Provider, MD   HYDROcodone-acetaminophen (NORCO) 5-325 MG per tablet Take 1 tablet by mouth every 6 hours as needed for Pain.     Historical Provider, MD   aluminum & magnesium hydroxide-simethicone (MAALOX) 200-200-20 MG/5ML SUSP suspension Take 5 mLs by mouth every 6 hours as needed for Indigestion    Historical Provider, MD   Dextromethorphan-guaiFENesin  MG/5ML SYRP Take 5 mLs by mouth every 4 hours as needed for Cough    Historical Provider, MD   magnesium hydroxide (MILK OF MAGNESIA) 400 MG/5ML suspension Take by mouth daily as needed for Constipation    Historical Provider, MD   loperamide (IMODIUM) 2 MG capsule Take 2 mg by mouth 4 times daily as needed for Diarrhea    Historical Provider, MD       Current medications:    Current Facility-Administered Medications   Medication Dose Route Frequency Provider Last Rate Last Dose    sodium chloride flush 0.9 % injection 10 mL  10 mL Intravenous 2 times per day Nadeen Byrne MD        sodium chloride flush 0.9 % injection 10 mL  10 mL Intravenous PRN Nadeen Byrne MD        ceFAZolin (ANCEF) 2 g in sterile water 20 mL IV syringe  2 g Intravenous On Call to Zander Patino MD           Allergies:  No Known Allergies    Problem List:    Patient Active Problem List   Diagnosis Code    Acute ischemic left MCA stroke ([facility]) G10.708    Middle cerebral artery stenosis, left I66.02    Dysarthria R47.1    Uncontrolled type 2 diabetes mellitus with hyperglycemia ([facility]) E11.65    Tobacco dependence F17.200    COPD (chronic obstructive pulmonary disease) ([facility] J44.9    Stroke due to stenosis of left carotid artery ([facility]) X82.493    Acute CVA (cerebrovascular accident) ([facility] I63.9    Symptomatic stenosis of left carotid artery I65.22    Carotid artery stenosis, symptomatic, left I65.22       Past Medical History:        Diagnosis Date    Diabetes mellitus ([facility]     Seasonal allergies     Stroke due to stenosis of left carotid artery ([facility] 9/2/2020    Vascular dementia [facility])        Past Surgical History:        Procedure Laterality Date    CARDIOVASCULAR STRESS TEST  09/03/2020    Lexiscan stress test    HERNIA REPAIR      TONSILLECTOMY         Social History:    Social History Lab Results   Component Value Date    PROTIME 9.9 10/29/2020    INR 0.9 10/29/2020    APTT 35.3 08/31/2020       HCG (If Applicable): No results found for: PREGTESTUR, PREGSERUM, HCG, HCGQUANT     ABGs: No results found for: PHART, PO2ART, FYE4VOQ, LFI2XVP, BEART, B4AXNJPQ     Type & Screen (If Applicable):  No results found for: LABABO, LABRH    Drug/Infectious Status (If Applicable):  No results found for: HIV, HEPCAB    COVID-19 Screening (If Applicable):   Lab Results   Component Value Date    COVID19 Not Detected 09/11/2020         Anesthesia Evaluation  Patient summary reviewed and Nursing notes reviewed no history of anesthetic complications:   Airway: Mallampati: III        Dental:      Comment: Some missing  None loose    Pulmonary: breath sounds clear to auscultation  (+) COPD:                            ROS comment: Quit smoking 8 weeks ago   Cardiovascular:Negative CV ROS            Rhythm: regular  Rate: normal                    Neuro/Psych:   (+) CVA (Recent Stroke Lt. MCA distribution):,             GI/Hepatic/Renal:             Endo/Other:    (+) Diabetes ()Type II DM, , electrolyte abnormalities (Hyponatremia), . Abdominal:           Vascular: negative vascular ROS. ROS comment: Lt internal carotid stenosis see report. Clinical indications:        RUIZ alert  RUIZ alert        Exposure control: This examination and all examinations utilizing ionizing radiation at    this facility done so according to the ALARA (as low as reasonably    achievable) principal for radiation dose reduction. TECHNIQUE:        Axial, sagittal, and coronal computed tomography of the head and neck    was performed following intravenous administration of 100 mL of    Isovue-370. 3-D post processing. Three-dimensional reconstructions of    the arterial tree. MIP imaging.         FINDINGS:        Soft and calcific atherosclerotic plaque results in approximately 90%    stenosis of the origin of the left internal carotid artery. A focus of hypoattenuation in the left middle cerebral artery    territory involving the insula, frontal operculum, basal ganglia and    external capsule compatible with subacute infarct is once again    present as seen on prior CTs earlier today. There is mild prominence of ventricles, sulci and cisterns    bilaterally. There is mild hypoattenuation in the periventricular deep    white matter bilaterally. There is calcific and soft plaque elsewhere    within the distal common and proximal internal carotid arteries    bilaterally and the bilateral carotid siphons. The brain parenchyma is otherwise unremarkable. There is no evidence    for mass or lymphadenopathy within the neck. There is mild mucosal    thickening within the paranasal sinuses but no fluid levels are    evident. There are degenerative changes of the cervical spine. There is no additional evidence for aneurysm or stenosis within the    distal internal carotid, distal vertebral, basilar, anterior, middle,    and posterior cerebral arteries as well as the respective visualized    branches. Lung apices are clear with mild dependent atelectasis. There is no other evidence for stenosis or aneurysm within the aortic    arch, the innominate artery, subclavian arteries, the common carotid    arteries, vertebral arteries, the internal carotid arteries, the    external carotid arteries or their respective visualized branches. Impression        Soft and calcific atherosclerotic plaque results in approximately 90%    stenosis of the origin of the left internal carotid artery. A focus of hypoattenuation in the left middle cerebral artery    territory involving the insula, frontal operculum, basal ganglia and    external capsule compatible with subacute infarct is once again    present as seen on prior CTs earlier today. Cortical atrophy and chronic periventricular microangiopathy. No other evidence for stenosis, aneurysm or dissection on CTA of the    head and neck. Real-time and Doppler sonography of the carotid and vertebrobasilar    arterial systems. Grayscale, color Doppler, and spectral Doppler    analysis. Heddy  FINDINGS:        Peak systolic velocity of the left internal carotid artery is 175    cm/s. Peak systolic velocity of left common carotid artery is 84 cm/s. The ratio of ICA to CCA peak systolic velocities on the left is 2.1. The        Excelling velocity of right internal carotid artery is 84 cm/s. Peak    systolic velocity right common carotid artery is 87 cm/s. Ratio of ICA    to CCA peak systolic velocities on the right is 1.0. There is antegrade flow within the vertebral arteries bilaterally. There is atherosclerotic plaque within the distal common and proximal    internal carotid arteries bilaterally. There is no visible evidence    for hemodynamically significant stenosis. Antegrade flow is seen    within the vertebral arteries bilaterally. Evaluation of peak systolic    velocities and ratios of ICA and CCA reveals no numeric evidence for    hemodynamically significant stenosis. Peak systolic velocity of ICA to    CCA on the right is 0.6, on the left, 0.5. Impression        50-79% stenosis left ICA based on NASCET criteria (0-49%). No hemodynamically significant stenosis of right internal carotid    artery. No evidence for subclavian steal.          Echo Complete   Study Date: 09/01/2020   Maryla Cogan  77 y.o.     Reading Provider  Daren Zuniga MD; Unknown Provider Result    Ordering Provider  Norbert Prather DO    Result Information     Status: Edited Result - FINAL (Resulted: 9/1/2020)  Provider Status: Ordered    Echo Complete   Order: [id number]   Status:  Edited Result - FINAL   Visible to patient:  No (not released) Next Tricuspid Valve   The tricuspid valve appears structurally normal.      Aortic Valve   The aortic valve leaflets were not well visualized. Pulmonic Valve   The pulmonic valve was not well visualized. Pericardial Effusion   No evidence of pericardial effusion. Aorta   Aorta was not clearly visualized. Miscellaneous   No intracardiac shunt via saline contrast injection including valsalva      Conclusions      Summary   Technically suboptimal and limited study, adequate images limited to   subcostal projection. Left ventricular internal dimensions were normal in diastole and systole. No regional wall motion abnormalities seen. Low normal left ventricular systolic function. No intracardiac shunt via saline contrast injection including valsalva      Signature      ----------------------------------------------------------------   Electronically signed by Noemi Sepulveda MD(Interpreting   physician) on 2020 04:37 PM   ----------------------------------------------------------------              Patient MRN:  [id number]    : 1955    Age: 77 years    Gender: Male    Order Date:  9/3/2020 2:06 PM        EXAM: NM MYOCARDIAL SPECT REST EXERCISE OR RX        Number of Images: 9 imaging sequence and composite views        INDICATION: Preoperative evaluation, smoker, type II diabetic, COPD,    CVA        COMPARISON: None        TECHNIQUE:    10.8  mCi of Tc-99m MIBI was injected intravenously at rest and    cardiac SPECT images were performed. In addition, 35 mCi of Tc-99m    MIBI was injected intravenously at maximum stress by using Lexiscan    chemical stress (0.4 mg/5 mL). Stress SPECT images and gated study    were performed.         FINDINGS:    Reported vital signs were 159/80 mmHg and 60 BPM at baseline, and feet    measurements were 156/80 mmHg, and 94 BPM. Three-minute    resting/recovery vital signs were reported as 136/80 mmHg and 82 bpm.        Initial  tomographic images show no significant    motion artifact. Perfusion images demonstrate no reversible perfusion defect. Subtle    diminished activity in the inferior wall near the base appears fixed,    and could be in part due to diaphragmatic attenuation artifact. Wall motion is within normal limits. The end diastolic volume is 66 ml. The end systolic volume is 21 ml. The estimated ejection fraction is 68 %. Impression    1. No reversible perfusion defect    2. Ejection fraction is 68 %. 3. No significant wall motion abnormality            Order History     Open Order Details      Narrative    Patient MRN:  [id number]    : 1955    Age: 77 years    Gender: Male        Order Date:  2020 12:28 PM        EXAM: CT HEAD WO CONTRAST        INDICATION:  recent stroke, worsening dysarthria    recent stroke, worsening dysarthria . TECHNIQUE: Axial images from the skull base to the vertex without IV    contrast. Axial sagittal and coronal 2-D reconstructions. Dose    reduction techniques with automated exposure control. Contrast is none. Dose is total DLP 2005 MG Y CM.        COMPARISON: Head CT without contrast 2020. MRI brain 2020. FINDINGS:     There is no acute intracranial bleed. Enlarging area of edema    involving the medial left temporal lobe, left external capsule,    extending into the left putamen and now involving the margins of the    sylvian fissure. Trace edema in the adjacent posterior left frontal    lobe. A small infarct has developed in the deep left frontal white    matter seen on axial image 22. Small focus of edema in the left    occipital lobe appears to have resolved. Minimal effacement on the    left frontal horn. Ventricles are mostly normal in size. No midline    shift. Mild underlying diffuse cortical atrophy. Normal position of    the cerebellar tonsils. Pituitary is small in size.  No skull fracture,    scalp hematoma or subdural hematoma. The visualized paranasal sinuses    do not show any acute fluid levels. A 1.2 cm mucous cyst in the    anterior inferior right maxillary sinus. The mastoid air cells are    aplastic. Impression        1. No acute bleed. 2. Increasing zone of edema involving the medial left temporal lobe    and left basal ganglia with some adjacent extension into the left    frontal lobe. No herniation. 2. New small white matter infarct anterior left frontal lobe. Anesthesia Plan      general     ASA 4         arterial line    Anesthetic plan and risks discussed with patient and child/children. Use of blood products discussed with patient and child/children whom consented to blood products. Plan discussed with CRNA. Twin Downs DO   10/29/2020      Patient seen and examined, chart reviewed, agree with above findings. Anesthetic plan, risks, benefits, alternatives, and personnel involved discussed with patient and his son. Patient verbalized an understanding and agreed to proceed. NPO status confirmed. Anesthetic plan discussed with care team members and agreed upon.     Twin Downs DO   10/29/2020  7:00 AM

## 2020-10-29 NOTE — ANESTHESIA PROCEDURE NOTES
Arterial Line:    An arterial line was placed using ultrasound guidance and surface landmarks, in the procedure area for the following indication(s): continuous blood pressure monitoring and blood sampling needed. A 20 gauge (size), 1 and 3/8 inch (length), Arrow (type) catheter was placed, Seldinger technique not used, into the right radial artery, secured by tape. Anesthesia type: Local  Local infiltration: Injection    Events:  patient tolerated procedure well with no complications. 10/29/2020 7:00 AM10/29/2020 7:10 AM  Anesthesiologist: Twin Downs DO  Resident/CRNA: CARROLL Tirado CRNA  Other anesthesia staff: Linette Trejo RN  Performed:  Other anesthesia staff   Preanesthetic Checklist  Completed: patient identified, site marked, surgical consent, pre-op evaluation, timeout performed, IV checked, risks and benefits discussed, monitors and equipment checked, anesthesia consent given, oxygen available and patient being monitored

## 2020-10-30 VITALS
HEART RATE: 80 BPM | WEIGHT: 218.7 LBS | DIASTOLIC BLOOD PRESSURE: 65 MMHG | RESPIRATION RATE: 16 BRPM | TEMPERATURE: 98 F | OXYGEN SATURATION: 90 % | SYSTOLIC BLOOD PRESSURE: 146 MMHG | HEIGHT: 75 IN | BODY MASS INDEX: 27.19 KG/M2

## 2020-10-30 LAB
ANION GAP SERPL CALCULATED.3IONS-SCNC: 15 MMOL/L (ref 7–16)
BASOPHILS ABSOLUTE: 0.02 E9/L (ref 0–0.2)
BASOPHILS RELATIVE PERCENT: 0.1 % (ref 0–2)
BUN BLDV-MCNC: 16 MG/DL (ref 8–23)
CALCIUM SERPL-MCNC: 9.1 MG/DL (ref 8.6–10.2)
CHLORIDE BLD-SCNC: 99 MMOL/L (ref 98–107)
CO2: 21 MMOL/L (ref 22–29)
CREAT SERPL-MCNC: 0.7 MG/DL (ref 0.7–1.2)
EOSINOPHILS ABSOLUTE: 0.01 E9/L (ref 0.05–0.5)
EOSINOPHILS RELATIVE PERCENT: 0.1 % (ref 0–6)
GFR AFRICAN AMERICAN: >60
GFR NON-AFRICAN AMERICAN: >60 ML/MIN/1.73
GLUCOSE BLD-MCNC: 150 MG/DL (ref 74–99)
HCT VFR BLD CALC: 42.1 % (ref 37–54)
HEMOGLOBIN: 14 G/DL (ref 12.5–16.5)
IMMATURE GRANULOCYTES #: 0.09 E9/L
IMMATURE GRANULOCYTES %: 0.6 % (ref 0–5)
LYMPHOCYTES ABSOLUTE: 1.57 E9/L (ref 1.5–4)
LYMPHOCYTES RELATIVE PERCENT: 9.8 % (ref 20–42)
MCH RBC QN AUTO: 29.9 PG (ref 26–35)
MCHC RBC AUTO-ENTMCNC: 33.3 % (ref 32–34.5)
MCV RBC AUTO: 90 FL (ref 80–99.9)
METER GLUCOSE: 149 MG/DL (ref 74–99)
MONOCYTES ABSOLUTE: 1.28 E9/L (ref 0.1–0.95)
MONOCYTES RELATIVE PERCENT: 8 % (ref 2–12)
MRSA CULTURE ONLY: NORMAL
NEUTROPHILS ABSOLUTE: 12.97 E9/L (ref 1.8–7.3)
NEUTROPHILS RELATIVE PERCENT: 81.4 % (ref 43–80)
PDW BLD-RTO: 14.8 FL (ref 11.5–15)
PLATELET # BLD: 149 E9/L (ref 130–450)
PMV BLD AUTO: 11.4 FL (ref 7–12)
POTASSIUM SERPL-SCNC: 3.8 MMOL/L (ref 3.5–5)
RBC # BLD: 4.68 E12/L (ref 3.8–5.8)
SODIUM BLD-SCNC: 135 MMOL/L (ref 132–146)
WBC # BLD: 15.9 E9/L (ref 4.5–11.5)

## 2020-10-30 PROCEDURE — 82962 GLUCOSE BLOOD TEST: CPT

## 2020-10-30 PROCEDURE — 6360000002 HC RX W HCPCS: Performed by: NURSE PRACTITIONER

## 2020-10-30 PROCEDURE — C9248 INJ, CLEVIDIPINE BUTYRATE: HCPCS | Performed by: NURSE PRACTITIONER

## 2020-10-30 PROCEDURE — 2580000003 HC RX 258: Performed by: NURSE PRACTITIONER

## 2020-10-30 PROCEDURE — 6370000000 HC RX 637 (ALT 250 FOR IP): Performed by: NURSE PRACTITIONER

## 2020-10-30 PROCEDURE — 85025 COMPLETE CBC W/AUTO DIFF WBC: CPT

## 2020-10-30 PROCEDURE — 2700000000 HC OXYGEN THERAPY PER DAY

## 2020-10-30 PROCEDURE — 80048 BASIC METABOLIC PNL TOTAL CA: CPT

## 2020-10-30 RX ORDER — HYDROCODONE BITARTRATE AND ACETAMINOPHEN 5; 325 MG/1; MG/1
1 TABLET ORAL EVERY 6 HOURS PRN
Qty: 10 TABLET | Refills: 0 | Status: SHIPPED | OUTPATIENT
Start: 2020-10-30 | End: 2020-10-30 | Stop reason: SDUPTHER

## 2020-10-30 RX ORDER — HYDROCODONE BITARTRATE AND ACETAMINOPHEN 5; 325 MG/1; MG/1
1 TABLET ORAL EVERY 6 HOURS PRN
Qty: 10 TABLET | Refills: 0 | Status: SHIPPED | OUTPATIENT
Start: 2020-10-30 | End: 2020-11-02

## 2020-10-30 RX ADMIN — SODIUM CHLORIDE, PRESERVATIVE FREE 10 ML: 5 INJECTION INTRAVENOUS at 07:42

## 2020-10-30 RX ADMIN — SODIUM CHLORIDE: 9 INJECTION, SOLUTION INTRAVENOUS at 02:41

## 2020-10-30 RX ADMIN — ASPIRIN 81 MG: 81 TABLET, COATED ORAL at 07:42

## 2020-10-30 RX ADMIN — DOCUSATE SODIUM 100 MG: 100 CAPSULE, LIQUID FILLED ORAL at 07:42

## 2020-10-30 RX ADMIN — ENOXAPARIN SODIUM 40 MG: 40 INJECTION SUBCUTANEOUS at 07:42

## 2020-10-30 RX ADMIN — CLEVIPIDINE 2 MG/HR: 0.5 EMULSION INTRAVENOUS at 03:37

## 2020-10-30 RX ADMIN — CLOPIDOGREL 75 MG: 75 TABLET, FILM COATED ORAL at 07:42

## 2020-10-30 ASSESSMENT — PAIN SCALES - GENERAL
PAINLEVEL_OUTOF10: 0

## 2020-10-30 NOTE — PROGRESS NOTES
Entered pt room for bed alarm. Pt was standing at bedside. NC was removed. Pt was stating that he did not need to be attached to his ivf, pox, oxygen and did not need a catheter. Pt was trying to go into the bathroom. BC given to pt. Eduardo removed per pt request, also was no longer needed. Appears to be confused. Pt did not void after removal and did not have a bm. Requested to have underwear. Applied for pt, placed back in bed with alarm activated. Pt given call light and re-instructed on use. Pt verbalizes understanding.

## 2020-10-30 NOTE — PROGRESS NOTES
Pt and son given discharge instructions. All questions answered. Discharge instructions handed to [facility] at [address] [place] [address].

## 2020-10-30 NOTE — PROGRESS NOTES
ICU Progress Note    Name: Candice Adler  MRN: [id number]    CC: Postoperative Critical Care Management        Indication for Surgery/Procedure: Symptomatic 70% left internal carotid artery stenosis.     Important/Relevant PMH/PSH: recent Left MCA stroke 9/2020 (presented with right facial droop, loss of speech, and inability to follow commands)---per notes now with resolution of right facial droop, able to speak though some struggle with comprehension. DMII, vascular dementia, hernia repair      Procedure/Surgeries: 10/29/2020 left carotid endarterectomy with bovine patch angioplasty        Intake/Output Summary (Last 24 hours) at 10/30/2020 0906  Last data filed at 10/30/2020 0800  Gross per 24 hour   Intake 4921 ml   Output 2935 ml   Net 1986 ml       Recent Labs     10/29/20  0600 10/29/20  1100 10/30/20  0457   WBC 8.5 12.3* 15.9*   HGB 15.3 13.7 14.0   HCT 45.9 41.2 42.1    113* 149      Recent Labs     10/29/20  0600 10/29/20  1045 10/30/20  0457   * 137 135   K 4.5 4.4 3.8   CL 98 103 99   CO2 24 24 21*   BUN 20 20 16   CREATININE 0.8 1.0 0.7   GLUCOSE 120* 169* 150*   CALCIUM 10.0 8.8 9.1         Physical Exam:    BP (!) 146/65   Pulse 80   Temp 98 °F (36.7 °C) (Oral)   Resp 16   Ht 6' 3\" (1.905 m)   Wt 218 lb 11.2 oz (99.2 kg)   SpO2 90%   BMI 27.34 kg/m²       General: Awake, events noted overnight. Pt with baseline dementia and confusion. On low dose Cleviprex gtt for HTN. Denies difficulty swallowing, changes in vision. Eyes: PERRL, anicteric   Pulmonary: Diminished bibasilar.  No wheezes, no accessory muscle use noted   Cardiovascular:  RRR, no heaves or thrills on palpation  Tele: SR   Abdomen: Soft, nontender, + BS   Extremities: Palpable pulses all extremities, no edema   Neurologic/Psych: A&Ox1-2 per baseline, ALFONSO to command with equal strength bilaterally 5/5, tongue midline  Skin: Warm and dry  Incisions: Left neck incision well approximated, swelling noted upper

## 2020-10-30 NOTE — PROGRESS NOTES
Pt requested to speak to clinical nurse manager. Confused at this time. Disoriented to place and thinks he is in garage. Refusing O2. Pt educated on importance of medication compliance in order to progress clinically.

## 2020-10-30 NOTE — PROGRESS NOTES
Pt refused to get up to chair this AM. Pt education provided on the benefits of ambulating post op. Pt verbalizes understanding but still defers at this time.

## 2020-10-31 ENCOUNTER — CARE COORDINATION (OUTPATIENT)
Dept: CASE MANAGEMENT | Age: 65
End: 2020-10-31

## 2020-10-31 NOTE — CARE COORDINATION
[facility] Transitions Initial Follow Up Call    Call within 2 business days of discharge: Yes    Patient: Chava Blackman Patient : 1955   MRN: [id number]  Reason for Admission: s/p left carotid endarterectomy  Discharge Date: 10/30/20 RARS: Readmission Risk Score: 14      Last Discharge [facility]       Complaint Diagnosis Description Type Department Provider    10/29/20  Pre-operative laboratory examination . .. Admission (Discharged) Shaggy Mart MD        Multiple attempts made to contact [facility] at [address] regarding BPCI-A/hospital discharge. Unable to make contact at  as phone keeps ringing with no answer and unable to leave message. CTN will continue with outreach.       CARROLL Rivera

## 2020-11-02 ENCOUNTER — CARE COORDINATION (OUTPATIENT)
Dept: CASE MANAGEMENT | Age: 65
End: 2020-11-02

## 2020-11-09 ENCOUNTER — CARE COORDINATION (OUTPATIENT)
Dept: CARE COORDINATION | Age: 65
End: 2020-11-09

## 2020-11-09 NOTE — CARE COORDINATION
[facility] Transitions Follow Up Call    2020    Patient: Wilder Diane  Patient : 1955   MRN: <[id number]>  Reason for Admission:   Discharge Date: 10/30/20 RARS: Readmission Risk Score: 15         Spoke with: Attempted to contact patient for follow up BPCI-A transition call. Left HIPAA Compliant message on Voice mail to call office (number given) with any questions and an update on your condition since discharge. Will continue to follow. Cynthia Syed LPN    [phone]  [address] [place] [address] / [facility] Transitions Subsequent and Final Call    Subsequent and Final Calls  Care Transitions Interventions  Other Interventions:             Follow Up  Future Appointments   Date Time Provider Alexander Powers   2020  1:00 PM Santo Camacho MD [facility]       Cynthia Syed LPN

## 2020-11-13 ENCOUNTER — TELEPHONE (OUTPATIENT)
Dept: VASCULAR SURGERY | Age: 65
End: 2020-11-13

## 2020-11-16 ENCOUNTER — OFFICE VISIT (OUTPATIENT)
Dept: VASCULAR SURGERY | Age: 65
End: 2020-11-16

## 2020-11-16 VITALS — WEIGHT: 205 LBS | RESPIRATION RATE: 16 BRPM | HEIGHT: 75 IN | BODY MASS INDEX: 25.49 KG/M2

## 2020-11-16 PROBLEM — I65.21 ASYMPTOMATIC STENOSIS OF RIGHT CAROTID ARTERY: Status: ACTIVE | Noted: 2020-10-29

## 2020-11-16 PROBLEM — Z98.890 HISTORY OF LEFT-SIDED CAROTID ENDARTERECTOMY: Status: ACTIVE | Noted: 2020-11-16

## 2020-11-16 PROCEDURE — 99024 POSTOP FOLLOW-UP VISIT: CPT | Performed by: SURGERY

## 2020-11-16 NOTE — PROGRESS NOTES
11/16/2020    Cecily Montoya  1955      Vascular Surgery Outpatient Followup    PCP : Arturo Arenas MD    Previous Vascular Procedures  10/29/20 left Carotid Endarterectomy for sx     HPI :   77 y.o. male who presents in regards to fu re his carotid stenosis. He denies new sxs of slurred speech, focal weakness, or amaurosis fugax    Patient returns for post operative evaluation. The patient denies any unexpected problems since hospital discharge. Patient denies new hx of stroke, TIA, focal weakness, slurred speech or amaurosis fugax. He has had some improvement in cognition per son since his surgery. He was first seen in hospital 9 of 2020. He had presented with right facial droop, loss of speech, and inability to follow commands.  CT head showed an ischemic event in the left MCA distribution.  Right fascial droop did resolved. He is able to speak though does struggle sometimes with comprehension. He is able to follow basic commands. He is struggling with multiple commands at same time. As difficulty of reading he also struggles to progress.     Currently living in memory unit at [facility].       Concerns for vascular dementia also. Significant issues with mid and longer term memory. Doing better with short term memory.     Physical Exam  Gen Awake and Alert  CN II - XII intact, no noted deficits   left  neck incision is healing without evidence of infection  CVS S1S2  Lungs Ctab/l  Radial pulse are appreciated bilaterally  R UE 5/5 strength  L UE 5/5 strength  R LE 5/5 DF/PF  L LE 5/5 DF/PF    Assessment/Plan  S/p left  CEA for sx  - I reviewed with the patient that normal activities can be resumed as tolerated  - Continue ASA, statin  - Stop plavix  - He understands to call/go to ER if develops signs of stroke, TIA, focal weakness, slurred speech or amaurosis fugax  - Return in 6 months for follow-up carotid ultrasound.     Nany Rodgers

## 2020-12-03 ENCOUNTER — CARE COORDINATION (OUTPATIENT)
Dept: CARE COORDINATION | Age: 65
End: 2020-12-03

## 2020-12-03 NOTE — CARE COORDINATION
[facility] Transitions Follow Up Call    12/3/2020    Patient: Cristal Hernandez Sr.  Patient : 1955   MRN: <[id number]>  Reason for Admission:   Discharge Date: 10/30/20 RARS: Readmission Risk Score: 15         Spoke with: Attempted to contact patient for follow up BPCI-A transition call. Left HIPAA Compliant message on Voice mail to call office (number given) with any questions and an update on your condition since discharge. Left HIPAA Compliant message that this is the Final Call BPCI (Patient has been followed for 90 days) and to call PCP/Specialist for any problems or issues that occur. Crystal Hagen LPN    [phone]  [address] [place] [address] / [address] [place] [address] Transitions Subsequent and Final Call    Subsequent and Final Calls  Care Transitions Interventions  Other Interventions:             Follow Up  Future Appointments   Date Time Provider Alexander Powers   2021 10:40 AM Lucretia Grider MD [facility]   2021 10:30 AM [facility]   2021 11:00 AM Nadeen Byrne MD VASC/MED [facility]       Crystal Hagen LPN

## 2021-01-01 NOTE — PROGRESS NOTES
CVICU Admission Note    Name: Andrew Phillip  MRN: [id number]    CC: Postoperative Critical Care Management     Indication for Surgery/Procedure: Symptomatic 70% left internal carotid artery stenosis. Important/Relevant PMH/PSH: recent Left MCA stroke 9/2020 (presented with right facial droop, loss of speech, and inability to follow commands)---per notes now with resolution of right facial droop, able to speak though some struggle with comprehension. DMII, vascular dementia, hernia repair     Procedure/Surgeries: 10/29/2020 left carotid endarterectomy with bovine patch angioplasty    Physical Exam:    BP (!) 140/69   Pulse 81   Temp 97.1 °F (36.2 °C) (Temporal)   Resp 16   Ht 6' 3\" (1.905 m)   Wt 195 lb (88.5 kg)   SpO2 96%   BMI 24.37 kg/m²     Recent Labs     10/29/20  0600   WBC 8.5   RBC 5.08   HGB 15.3   HCT 45.9   MCV 90.4   MCH 30.1   MCHC 33.3   RDW 14.9      MPV 11.2     Recent Labs     10/29/20  0600   *   K 4.5   CL 98   CO2 24   BUN 20   CREATININE 0.8   GLUCOSE 120*   CALCIUM 10.0     General Appearance: Arrived to PACU in stable condition, Cleviprex for BP control   Eyes: PERRL  Pulmonary: Diminished bibasilar. No wheezes, no accessory muscle use noted on simple face mask  Cardiovascular: RRR, no heaves or thrills palpated   Telemetry: SR  Abdomen: Soft, nontender, nondistended   Extremities: Palpable pulses all extremities, no edema   Neurologic/Psych: Following commands appropriately, equal in strength bilaterally 5/5, tongue midline   Skin: Warm and dry   Incision: left neck incision well approximated       Assessment/Plan: Day of Surgery     1.   S/p left CEA   - Frequent neurovascular checks, monitor incision for signs of swelling/hematoma   -DAPT (Aspirin /Plavix)   - NPO, IVF @125cc/hr  - Eduardo catheter to GD  - Bedrest  - Ancef  -HTN- Cleviprex for target SBP<160  -DMII- SSI while in hospital, reorder oral meds prior at discharge     Electronically signed by Asha Aponte APRN - CNP on 10/29/2020 at 10:40 AM I will SWITCH the dose or number of times a day I take the medications listed below when I get home from the hospital:  None

## 2021-01-05 ENCOUNTER — OFFICE VISIT (OUTPATIENT)
Dept: NEUROLOGY | Age: 66
End: 2021-01-05
Payer: MEDICARE

## 2021-01-05 VITALS
HEART RATE: 77 BPM | BODY MASS INDEX: 27.35 KG/M2 | SYSTOLIC BLOOD PRESSURE: 141 MMHG | OXYGEN SATURATION: 94 % | DIASTOLIC BLOOD PRESSURE: 84 MMHG | HEIGHT: 75 IN | TEMPERATURE: 98.1 F | WEIGHT: 220 LBS | RESPIRATION RATE: 16 BRPM

## 2021-01-05 DIAGNOSIS — F03.90 DEMENTIA WITHOUT BEHAVIORAL DISTURBANCE, UNSPECIFIED DEMENTIA TYPE: Primary | ICD-10-CM

## 2021-01-05 PROCEDURE — G8417 CALC BMI ABV UP PARAM F/U: HCPCS | Performed by: PSYCHIATRY & NEUROLOGY

## 2021-01-05 PROCEDURE — G8427 DOCREV CUR MEDS BY ELIG CLIN: HCPCS | Performed by: PSYCHIATRY & NEUROLOGY

## 2021-01-05 PROCEDURE — 4040F PNEUMOC VAC/ADMIN/RCVD: CPT | Performed by: PSYCHIATRY & NEUROLOGY

## 2021-01-05 PROCEDURE — 1123F ACP DISCUSS/DSCN MKR DOCD: CPT | Performed by: PSYCHIATRY & NEUROLOGY

## 2021-01-05 PROCEDURE — G8484 FLU IMMUNIZE NO ADMIN: HCPCS | Performed by: PSYCHIATRY & NEUROLOGY

## 2021-01-05 PROCEDURE — 99205 OFFICE O/P NEW HI 60 MIN: CPT | Performed by: PSYCHIATRY & NEUROLOGY

## 2021-01-05 PROCEDURE — 4004F PT TOBACCO SCREEN RCVD TLK: CPT | Performed by: PSYCHIATRY & NEUROLOGY

## 2021-01-05 PROCEDURE — 3017F COLORECTAL CA SCREEN DOC REV: CPT | Performed by: PSYCHIATRY & NEUROLOGY

## 2021-01-05 RX ORDER — DONEPEZIL HYDROCHLORIDE 5 MG/1
5 TABLET, FILM COATED ORAL NIGHTLY
Qty: 30 TABLET | Refills: 2 | Status: SHIPPED
Start: 2021-01-05 | End: 2021-04-05 | Stop reason: SDUPTHER

## 2021-01-05 RX ORDER — MULTIVIT WITH MINERALS/LUTEIN
250 TABLET ORAL DAILY
Status: ON HOLD | COMMUNITY
End: 2022-10-21 | Stop reason: HOSPADM

## 2021-01-05 ASSESSMENT — ENCOUNTER SYMPTOMS
PHOTOPHOBIA: 0
NAUSEA: 0
TROUBLE SWALLOWING: 0
SHORTNESS OF BREATH: 0
VOMITING: 0

## 2021-01-05 NOTE — PROGRESS NOTES
NEUROLOGY NEW PATIENT NOTE     Date: 1/5/2021  Name: Roxana Khanna. MRN: [id number]  Patient's PCP: Turner Starks DO     Dear, Dr. Turner Starks DO    REASON FOR VISIT/CHIEF COMPLAINT: Memory loss and for stroke follow-up. HISTORY OF PRESENT ILLNESS: Sherice Lewis Sr. is a 77 y.o.  male past medical history of diabetes, stroke in August 2020 is coming in with complaints of memory loss. The patient is accompanied by his son who helps in the history. The patient had a left MCA stroke secondary to carotid artery stenosis status post CVA with expressive onset of aphasia. Modified [place] scale: 1  The son reports that he has been having problems with his memory. The problems are ongoing for about 2 years and started before his stroke. He has been forgetting words, forgetting conversations, difficulty remembering names. In the past he also had multiple instances where he was not paying his bills, not updating his credit cards. He was unable to manage his finances. Misplacing things,  Mostly memory problem has affected his short-term memory, attention and concentration. He also has history of major depressive disorder episode more than 20 years ago. Currently not any antidepressant medications. No other aggravating or any factors  No other associated symptoms  Currently on aspirin and Lipitor for stroke. I have personally reviewed the images of MRI studies     MoCA: 18/30.     PAST MEDICAL HISTORY:   Past Medical History:   Diagnosis Date    Diabetes mellitus ([facility] [address]     Seasonal allergies     Stroke due to stenosis of left carotid artery ([facility] [address] 9/2/2020    Vascular dementia ([facility] [address]      PAST SURGICAL HISTORY:   Past Surgical History:   Procedure Laterality Date    CARDIOVASCULAR STRESS TEST  09/03/2020    Lexiscan stress test    CAROTID ENDARTERECTOMY Left 10/29/2020    LEFT CAROTID ENDARTERECTOMY -- FACILITY performed by Kang Brewer MD at [address] [facility] TONSILLECTOMY       FAMILY MEDICAL HISTORY:   Family History   Problem Relation Age of Onset    Emphysema Mother      SOCIAL HISTORY:   Social History     Socioeconomic History    Marital status:      Spouse name: None    Number of children: 1    Years of education: None    Highest education level: None   Occupational History    None   Social Needs    Financial resource strain: None    Food insecurity     Worry: None     Inability: None    Transportation needs     Medical: None     Non-medical: None   Tobacco Use    Smoking status: Former Smoker     Packs/day: 1.00     Years: 50.00     Pack years: 50.00     Types: Cigarettes     Quit date: 2020     Years since quittin.3    Smokeless tobacco: Current User   Substance and Sexual Activity    Alcohol use: Not Currently     Alcohol/week: 1.0 standard drinks     Types: 1 Cans of beer per week     Comment: on occassion    Drug use: No    Sexual activity: None   Lifestyle    Physical activity     Days per week: None     Minutes per session: None    Stress: None   Relationships    Social connections     Talks on phone: None     Gets together: None     Attends Faith service: None     Active member of club or organization: None     Attends meetings of clubs or organizations: None     Relationship status: None    Intimate partner violence     Fear of current or ex partner: None     Emotionally abused: None     Physically abused: None     Forced sexual activity: None   Other Topics Concern    None   Social History Narrative    None      E-Cigarettes/Vaping Use     Questions Responses    E-Cigarette/Vaping Use Never User    Start Date     Passive Exposure     Quit Date     Counseling Given     Comments          Allergy: No Known Allergies  MEDS:   Current Outpatient Medications:     Ascorbic Acid (VITAMIN C) 250 MG tablet, Take 250 mg by mouth daily, Disp: , Rfl:     donepezil (ARICEPT) 5 MG tablet, Take 1 tablet by mouth nightly, Disp: 30 tablet, Rfl: 2    loratadine (CLARITIN) 10 MG capsule, Take 10 mg by mouth daily as needed, Disp: , Rfl:     hydrOXYzine (ATARAX) 25 MG tablet, Take 25 mg by mouth 2 times daily as needed for Itching , Disp: , Rfl:     aluminum & magnesium hydroxide-simethicone (MAALOX) 200-200-20 MG/5ML SUSP suspension, Take 5 mLs by mouth every 6 hours as needed for Indigestion, Disp: , Rfl:     Dextromethorphan-guaiFENesin  MG/5ML SYRP, Take 5 mLs by mouth every 4 hours as needed for Cough, Disp: , Rfl:     magnesium hydroxide (MILK OF MAGNESIA) 400 MG/5ML suspension, Take by mouth daily as needed for Constipation, Disp: , Rfl:     loperamide (IMODIUM) 2 MG capsule, Take 2 mg by mouth 4 times daily as needed for Diarrhea, Disp: , Rfl:     docusate sodium (COLACE) 100 MG capsule, Take 100 mg by mouth 2 times daily, Disp: , Rfl:     acetaminophen (TYLENOL) 325 MG tablet, Take 650 mg by mouth every 6 hours as needed for Pain, Disp: , Rfl:     aspirin 81 MG EC tablet, Take 1 tablet by mouth daily, Disp: 30 tablet, Rfl: 0    glipiZIDE (GLUCOTROL) 5 MG tablet, Take 1 tablet by mouth every morning (before breakfast), Disp: 30 tablet, Rfl: 0    metFORMIN (GLUCOPHAGE) 500 MG tablet, Take 1 tablet by mouth 2 times daily (with meals) (Patient taking differently: Take 1,000 mg by mouth 2 times daily (with meals) ), Disp: 60 tablet, Rfl: 0    atorvastatin (LIPITOR) 40 MG tablet, Take 1 tablet by mouth nightly, Disp: 30 tablet, Rfl: 0    albuterol sulfate HFA (PROVENTIL HFA) 108 (90 BASE) MCG/ACT inhaler, Inhale 2 puffs into the lungs every 4 hours as needed for Wheezing, Disp: 1 Inhaler, Rfl: zero    REVIEW OF SYSTEMS  Review of Systems   Constitutional: Negative for appetite change, fatigue and unexpected weight change. HENT: Negative for drooling, hearing loss, tinnitus and trouble swallowing. Eyes: Negative for photophobia and visual disturbance. Respiratory: Negative for shortness of breath.     Cardiovascular: Negative for palpitations. Gastrointestinal: Negative for nausea and vomiting. Endocrine: Negative for polyuria. Genitourinary: Negative for flank pain. Musculoskeletal: Negative for neck pain and neck stiffness. Skin: Negative for rash. Allergic/Immunologic: Negative for food allergies. Neurological: Negative for dizziness, tremors, seizures, syncope, speech difficulty, weakness, light-headedness, numbness and headaches. Hematological: Negative for adenopathy. Psychiatric/Behavioral: Positive for confusion and decreased concentration. Negative for agitation, behavioral problems and sleep disturbance. PHYSICAL EXAM:   BP (!) 141/84   Pulse 77   Temp 98.1 °F (36.7 °C) (Temporal)   Resp 16   Ht 6' 3\" (1.905 m)   Wt 220 lb (99.8 kg)   SpO2 94%   BMI 27.50 kg/m²   GENERAL APPEARANCE: Alert, well-developed, well-nourished male in no acute distress. HEENT: Normocephalic and atraumatic. PERRL. Oropharynx unremarkable. PULM: Normal respiratory effort. No accessory muscle use. CV: RRR. ABDOMEN: Soft, nontender. EXTREMITIES: No obvious signs of vascular compromise. Pulses present. No cyanosis, clubbing or edema. SKIN: Clear; no rashes, lesions or skin breaks in exposed areas. NEURO:     Neurological examination     MENTAL STATUS: Patient awake and oriented to time, place, and person. Recent memory is decreased, attention concentration is decreased, comprehension, naming repetition is impaired. Dysarthric, expressive and receptive aphasia. Affect normal.    CRANIAL NERVES:  CN I: Not tested. CN II: Fundoscopic exam not performed. CN III, IV, VI: Pupils equal, round and reactive to light; extra ocular movements full and intact. CN V: Facial sensation normal.  CN VII: No facial asymmetry. CN VIII:  Hearing grossly normal bilaterally. No pathologic nystagmus or skew deviation. CN IX, X: Palate elevates symmetrically.   CN XI: Shoulder shrug and chin rotation equal with intact sq.m.  Results valid for patients 18 years and older. 10/29/2020 >60 >=60 mL/min/1.73 Final     Comment:     Chronic Kidney Disease: less than 60 ml/min/1.73 sq.m. Kidney Failure: less than 15 ml/min/1.73 sq.m. Results valid for patients 18 years and older. 10/29/2020 >60 >=60 mL/min/1.73 Final     Comment:     Chronic Kidney Disease: less than 60 ml/min/1.73 sq.m. Kidney Failure: less than 15 ml/min/1.73 sq.m. Results valid for patients 18 years and older.        Calcium   Date Value Ref Range Status   10/30/2020 9.1 8.6 - 10.2 mg/dL Final   10/29/2020 8.8 8.6 - 10.2 mg/dL Final   10/29/2020 10.0 8.6 - 10.2 mg/dL Final     Magnesium   Date Value Ref Range Status   09/03/2020 2.1 1.6 - 2.6 mg/dL Final     WBC   Date Value Ref Range Status   10/30/2020 15.9 (H) 4.5 - 11.5 E9/L Final   10/29/2020 12.3 (H) 4.5 - 11.5 E9/L Final   10/29/2020 8.5 4.5 - 11.5 E9/L Final     Hemoglobin   Date Value Ref Range Status   10/30/2020 14.0 12.5 - 16.5 g/dL Final   10/29/2020 13.7 12.5 - 16.5 g/dL Final   10/29/2020 15.3 12.5 - 16.5 g/dL Final     Hematocrit   Date Value Ref Range Status   10/30/2020 42.1 37.0 - 54.0 % Final   10/29/2020 41.2 37.0 - 54.0 % Final   10/29/2020 45.9 37.0 - 54.0 % Final     Platelets   Date Value Ref Range Status   10/30/2020 149 130 - 450 E9/L Final   10/29/2020 113 (L) 130 - 450 E9/L Final   10/29/2020 141 130 - 450 E9/L Final     Neutrophils %   Date Value Ref Range Status   10/30/2020 81.4 (H) 43.0 - 80.0 % Final   10/29/2020 87.8 (H) 43.0 - 80.0 % Final   09/05/2020 59.8 43.0 - 80.0 % Final     Monocytes %   Date Value Ref Range Status   10/30/2020 8.0 2.0 - 12.0 % Final   10/29/2020 2.2 2.0 - 12.0 % Final   09/05/2020 9.6 2.0 - 12.0 % Final     Total Protein   Date Value Ref Range Status   08/31/2020 6.9 6.4 - 8.3 g/dL Final   03/29/2018 7.3 6.4 - 8.3 g/dL Final   05/16/2015 7.2 6.4 - 8.3 g/dL Final     Total Bilirubin   Date Value Ref Range Status   08/31/2020 0.7 0.0 - 1.2 mg/dL Final   03/29/2018 0.7 0.0 - 1.2 mg/dL Final   05/16/2015 1.1 0.0 - 1.2 mg/dL Final     Alkaline Phosphatase   Date Value Ref Range Status   08/31/2020 72 40 - 129 U/L Final   03/29/2018 83 40 - 129 U/L Final   05/16/2015 74 40 - 129 U/L Final     ALT   Date Value Ref Range Status   08/31/2020 11 0 - 40 U/L Final   03/29/2018 30 0 - 40 U/L Final   05/16/2015 16 0 - 40 U/L Final     AST   Date Value Ref Range Status   08/31/2020 10 0 - 39 U/L Final   03/29/2018 19 0 - 39 U/L Final   05/16/2015 13 0 - 39 U/L Final     Lab Results   Component Value Date    INR 1.0 10/29/2020     Lab Results   Component Value Date    TRIG 120 09/01/2020    HDL 36 09/01/2020     No components found for: HGBA1C  No results found for: PROTEINCSF, GLUCCSF, WBCCSF    Controlled Substance Monitoring:    Acute and Chronic Pain Monitoring:   No flowsheet data found. MEDICAL DECISION MAKING  ASSESSMENT/PLAN    Shaneka Basurto was seen today for cerebrovascular accident. Diagnoses and all orders for this visit:    Dementia without behavioral disturbance, unspecified dementia type ([facility] [address]    Left MCA stroke secondary to ICA stenosis status post CEA. -     MRI BRAIN WO CONTRAST; Future  -     Vitamin B12; Future  -     TSH without Reflex; Future  -     Neuropsychological testing; Future  -     donepezil (ARICEPT) 5 MG tablet; Take 1 tablet by mouth nightly    · Etiology of the memory loss: Undetermined, the patient has been having progressive memory loss ongoing for about 2 years, currently he has been in speech therapy and on speech therapy evaluation he has short-term memory deficits. He has also had a [place] examination on which she scored 18/30. Unable to manage finances. The patient has expressive and receptive aphasia with dysarthria residual effects from the stroke. · Check MRI brain without contrast to look for any atrophy or signs for vascular dementia.   · Start on Aricept 5 mg daily  · Check vitamin B12 and TSH levels  · He walking may slow the decline of your mental abilities. Try to stay active mentally too. Read and work crossword puzzles if you enjoy these activities. · If you have trouble sleeping, do not nap during the day. Get regular exercise (but not within several hours of bedtime). Drink a glass of warm milk or caffeine-free herbal tea before going to bed. · Be patient. You may find that a task takes you longer than it used to. · If you have not already done so, make a list of advance directives. Advance directives are instructions to your doctor and family members about what kind of care you want if you become unable to speak or express yourself. Talk to a  about making a will, if you do not already have one. Keeping schedules  · Develop a routine. You will feel less frustrated or confused if you have a clear, simple plan of what to do every day. ? Make lists of your medicines and when to take them. ? Write down appointments and other tasks in a calendar. ? Put sticky notes around the house to help you remember events and other things you have to do. ? Schedule activities and tasks for times of the day when you are best able to handle them. Staying safe  · Tell someone when you are going out and where you are going. Let the person know when you will be back. Before you go out alone, write down where you are going, how to get there, and how to get back home. Do this even if you have gone there many times before. Take someone along with you when possible. · Make your home safe. Tack down rugs, put no-slip tape in the tub, use handrails, and put safety switches on stoves and appliances. · Use strong lighting, especially at night. Put night-lights in bedrooms, hallways, and bathrooms. · Lower the hot water temperature setting to 120°F or lower to avoid burns. · Avoid Driving if you feel unsafe to drive. Thank you for involving me in the care of your patient.     I have personally spent a total time of 65 mins.  This includes face-to-face and nonface-to-face time in reviewing patient's chart, outside provider notes, imaging and  test results, discussing etiology management and diagnosis of the patient's condition, natural course of the disease, medication side effects and charting, ordering labs, imaging, medications, and coordination of care. Patient's current medication list, allergies, problem list and results of all previously ordered testing and scans were reviewed at today's visit.       Grady Wiley MD    [facility] - [facility] Neurology  [facility] [address] [place], [place]

## 2021-01-07 ENCOUNTER — TELEPHONE (OUTPATIENT)
Dept: NEUROLOGY | Age: 66
End: 2021-01-07

## 2021-01-07 DIAGNOSIS — F03.90 DEMENTIA WITHOUT BEHAVIORAL DISTURBANCE, UNSPECIFIED DEMENTIA TYPE: ICD-10-CM

## 2021-01-07 DIAGNOSIS — E53.8 B12 DEFICIENCY: Primary | ICD-10-CM

## 2021-01-07 LAB
TSH SERPL DL<=0.05 MIU/L-ACNC: 1.1 UIU/ML
VITAMIN B-12: 152

## 2021-01-07 RX ORDER — LANOLIN ALCOHOL/MO/W.PET/CERES
1000 CREAM (GRAM) TOPICAL DAILY
Qty: 30 TABLET | Refills: 3 | Status: SHIPPED | OUTPATIENT
Start: 2021-01-07 | End: 2021-04-30 | Stop reason: SDUPTHER

## 2021-01-07 RX ORDER — CYANOCOBALAMIN 1000 UG/ML
INJECTION INTRAMUSCULAR; SUBCUTANEOUS
Qty: 10 ML | Refills: 1 | Status: SHIPPED | OUTPATIENT
Start: 2021-01-07 | End: 2022-05-23 | Stop reason: ALTCHOICE

## 2021-01-07 NOTE — TELEPHONE ENCOUNTER
Spoke with patients son and informed him that the patients vitamin B12 levels are very low. It is possible that this could be attributing to his memory loss. Dr. Radha Naik will start him on B12 supplementation. He should start with vitamin B-12 injections, this can be given to him at this facility. Repeat levels in 1 month.  Will fax orders to the [facility] at [facility]

## 2021-01-07 NOTE — TELEPHONE ENCOUNTER
----- Message from Wilmar Lovett MD sent at 1/7/2021 12:13 PM EST -----  Please inform the patient that his vitamin B12 levels are very low. It is possible that this could be attributing to his memory loss. We will start him on B12 supplementation. He should start with vitamin B-12 injections, this can be given to him at this facility. Repeat levels in 1 month.

## 2021-01-15 ENCOUNTER — HOSPITAL ENCOUNTER (OUTPATIENT)
Dept: MRI IMAGING | Age: 66
Discharge: HOME | End: 2021-01-17
Payer: MEDICARE

## 2021-01-15 DIAGNOSIS — F03.90 DEMENTIA WITHOUT BEHAVIORAL DISTURBANCE, UNSPECIFIED DEMENTIA TYPE: ICD-10-CM

## 2021-01-15 PROCEDURE — 70551 MRI BRAIN STEM W/O DYE: CPT

## 2021-01-18 ENCOUNTER — TELEPHONE (OUTPATIENT)
Dept: NEUROLOGY | Age: 66
End: 2021-01-18

## 2021-01-18 NOTE — TELEPHONE ENCOUNTER
----- Message from Rony Scott MD sent at 1/18/2021  8:58 AM EST -----  Please inform the patient that the brain MRI shows scar tissue from his old stroke. There is mild shrinkage which is age-related.

## 2021-02-22 ENCOUNTER — TELEPHONE (OUTPATIENT)
Dept: NEUROLOGY | Age: 66
End: 2021-02-22

## 2021-02-22 DIAGNOSIS — E53.8 B12 DEFICIENCY: ICD-10-CM

## 2021-02-22 LAB — VITAMIN B-12: 722

## 2021-02-22 NOTE — TELEPHONE ENCOUNTER
----- Message from Khanh Lozano MD sent at 2/22/2021  8:40 AM EST -----  Vitamin B12 level is normal.

## 2021-03-08 DIAGNOSIS — F03.90 DEMENTIA WITHOUT BEHAVIORAL DISTURBANCE, UNSPECIFIED DEMENTIA TYPE: ICD-10-CM

## 2021-03-11 ENCOUNTER — OFFICE VISIT (OUTPATIENT)
Dept: NEUROLOGY | Age: 66
End: 2021-03-11
Payer: MEDICARE

## 2021-03-11 VITALS
HEART RATE: 91 BPM | RESPIRATION RATE: 16 BRPM | WEIGHT: 232 LBS | BODY MASS INDEX: 28.85 KG/M2 | SYSTOLIC BLOOD PRESSURE: 142 MMHG | DIASTOLIC BLOOD PRESSURE: 86 MMHG | TEMPERATURE: 97.3 F | HEIGHT: 75 IN | OXYGEN SATURATION: 93 %

## 2021-03-11 DIAGNOSIS — F02.81 ALZHEIMER'S DEMENTIA WITH BEHAVIORAL DISTURBANCE, UNSPECIFIED TIMING OF DEMENTIA ONSET: Primary | ICD-10-CM

## 2021-03-11 DIAGNOSIS — E53.8 VITAMIN B 12 DEFICIENCY: ICD-10-CM

## 2021-03-11 DIAGNOSIS — F41.9 ANXIETY: ICD-10-CM

## 2021-03-11 DIAGNOSIS — G30.9 ALZHEIMER'S DEMENTIA WITH BEHAVIORAL DISTURBANCE, UNSPECIFIED TIMING OF DEMENTIA ONSET: Primary | ICD-10-CM

## 2021-03-11 DIAGNOSIS — I63.9 CEREBROVASCULAR ACCIDENT (CVA), UNSPECIFIED MECHANISM (HCC): ICD-10-CM

## 2021-03-11 PROCEDURE — 99215 OFFICE O/P EST HI 40 MIN: CPT | Performed by: PSYCHIATRY & NEUROLOGY

## 2021-03-11 PROCEDURE — G8484 FLU IMMUNIZE NO ADMIN: HCPCS | Performed by: PSYCHIATRY & NEUROLOGY

## 2021-03-11 PROCEDURE — 1123F ACP DISCUSS/DSCN MKR DOCD: CPT | Performed by: PSYCHIATRY & NEUROLOGY

## 2021-03-11 PROCEDURE — 1036F TOBACCO NON-USER: CPT | Performed by: PSYCHIATRY & NEUROLOGY

## 2021-03-11 PROCEDURE — 3017F COLORECTAL CA SCREEN DOC REV: CPT | Performed by: PSYCHIATRY & NEUROLOGY

## 2021-03-11 PROCEDURE — G8427 DOCREV CUR MEDS BY ELIG CLIN: HCPCS | Performed by: PSYCHIATRY & NEUROLOGY

## 2021-03-11 PROCEDURE — G8417 CALC BMI ABV UP PARAM F/U: HCPCS | Performed by: PSYCHIATRY & NEUROLOGY

## 2021-03-11 PROCEDURE — 4040F PNEUMOC VAC/ADMIN/RCVD: CPT | Performed by: PSYCHIATRY & NEUROLOGY

## 2021-03-11 ASSESSMENT — ENCOUNTER SYMPTOMS
NAUSEA: 0
TROUBLE SWALLOWING: 0
VOMITING: 0
SHORTNESS OF BREATH: 0
PHOTOPHOBIA: 0

## 2021-03-11 NOTE — PROGRESS NOTES
NEUROLOGY FOLLOW UP NOTE     Date: 3/11/2021  Name: Manny Jasmine. MRN: [id number]  Patient's PCP: Donna Napier DO     Dear, Dr. Donna Napier DO    REASON FOR VISIT/CHIEF COMPLAINT: Memory loss and for stroke follow-up. Interval History:  The patient is coming in for follow-up visit. He is currently in assisted living facility  The son reports that he has been having.'s of aggression, agitation short-term memory is significantly getting worse. Long-term memory waxes and wanes. He had an episode where he twisted and nurse aides hand. I have personally reviewed MRI brain images    MRI brain: 01/2021  No acute ischemia. Encephalomalacia within the posterior left frontal lobe and left occipital   lobe. Mild atrophy. I have personally reviewed blood work    Vitamin B12: 152---> 755    TSH: Normal    I have personally reviewed neuropsychological testing results. Neuropsychological testing: Demonstrated slightly below expected levels in the area of attention but has impairments in the areas of semantic verbal fluency, visual organization constructional skills, visual memory, verbal learning, verbal memory and executive functioning. Test results are consistent with vascular dementia moderate to severe level. Disease Course:     Eusebio Carey Sr. is a 77 y.o.  male past medical history of diabetes, stroke in August 2020 is coming in with complaints of memory loss. The patient is accompanied by his son who helps in the history. The patient had a left MCA stroke secondary to carotid artery stenosis status post CVA with expressive onset of aphasia. Modified Marta scale: 1  The son reports that he has been having problems with his memory. The problems are ongoing for about 2 years and started before his stroke. He has been forgetting words, forgetting conversations, difficulty remembering names.   In the past he also had multiple instances where he was not paying his bills, not updating his credit cards. He was unable to manage his finances. Misplacing things,  Mostly memory problem has affected his short-term memory, attention and concentration. He also has history of major depressive disorder episode more than 20 years ago. Currently not any antidepressant medications. Currently on aspirin and Lipitor for stroke. MoCA: .     CDR: 2    PAST MEDICAL HISTORY:   Past Medical History:   Diagnosis Date    [facility] mellitus ([facility]     Seasonal allergies     Stroke due to stenosis of left carotid artery ([facility] 2020    Vascular dementia ([facility]      PAST SURGICAL HISTORY:   Past Surgical History:   Procedure Laterality Date    CARDIOVASCULAR STRESS TEST  2020    Lexiscan stress test    CAROTID ENDARTERECTOMY Left 10/29/2020    LEFT CAROTID ENDARTERECTOMY -- FACILITY performed by Alissa Stevens MD at [address] [place] [address] HISTORY:   Family History   Problem Relation Age of Onset    Emphysema Mother      SOCIAL HISTORY:   Social History     Socioeconomic History    Marital status:      Spouse name: None    Number of children: 1    Years of education: None    Highest education level: None   Occupational History    None   Social Needs    Financial resource strain: None    Food insecurity     Worry: None     Inability: None    Transportation needs     Medical: None     Non-medical: None   Tobacco Use    Smoking status: Former Smoker     Packs/day: 1.00     Years: 50.00     Pack years: 50.00     Types: Cigarettes     Quit date: 2020     Years since quittin.5    Smokeless tobacco: Former User   Substance and Sexual Activity    Alcohol use: Not Currently     Alcohol/week: 1.0 standard drinks     Types: 1 Cans of beer per week     Comment: on occassion    Drug use: No    Sexual activity: None   Lifestyle    Physical activity     Days per week: None     Minutes per session: None capsule, Take 100 mg by mouth 2 times daily, Disp: , Rfl:     acetaminophen (TYLENOL) 325 MG tablet, Take 650 mg by mouth every 6 hours as needed for Pain, Disp: , Rfl:     aspirin 81 MG EC tablet, Take 1 tablet by mouth daily, Disp: 30 tablet, Rfl: 0    glipiZIDE (GLUCOTROL) 5 MG tablet, Take 1 tablet by mouth every morning (before breakfast), Disp: 30 tablet, Rfl: 0    metFORMIN (GLUCOPHAGE) 500 MG tablet, Take 1 tablet by mouth 2 times daily (with meals) (Patient taking differently: Take 1,000 mg by mouth 2 times daily (with meals) ), Disp: 60 tablet, Rfl: 0    atorvastatin (LIPITOR) 40 MG tablet, Take 1 tablet by mouth nightly, Disp: 30 tablet, Rfl: 0    albuterol sulfate HFA (PROVENTIL HFA) 108 (90 BASE) MCG/ACT inhaler, Inhale 2 puffs into the lungs every 4 hours as needed for Wheezing, Disp: 1 Inhaler, Rfl: zero    REVIEW OF SYSTEMS  Review of Systems   Constitutional: Negative for appetite change, fatigue and unexpected weight change. HENT: Negative for drooling, hearing loss, tinnitus and trouble swallowing. Eyes: Negative for photophobia and visual disturbance. Respiratory: Negative for shortness of breath. Cardiovascular: Negative for palpitations. Gastrointestinal: Negative for nausea and vomiting. Endocrine: Negative for polyuria. Genitourinary: Negative for flank pain. Musculoskeletal: Negative for neck pain and neck stiffness. Skin: Negative for rash. Allergic/Immunologic: Negative for food allergies. Neurological: Negative for dizziness, tremors, seizures, syncope, speech difficulty, weakness, light-headedness, numbness and headaches. Hematological: Negative for adenopathy. Psychiatric/Behavioral: Positive for confusion and decreased concentration. Negative for agitation, behavioral problems and sleep disturbance.          PHYSICAL EXAM:   Temp 97.3 °F (36.3 °C) (Temporal)   Resp 16   Ht 6' 3\" (1.905 m)   Wt 232 lb (105.2 kg)   BMI 29.00 kg/m²   GENERAL APPEARANCE: Alert, well-developed, well-nourished male in no acute distress. HEENT: Normocephalic and atraumatic. PERRL. Oropharynx unremarkable. PULM: Normal respiratory effort. No accessory muscle use. CV: RRR. ABDOMEN: Soft, nontender. EXTREMITIES: No obvious signs of vascular compromise. Pulses present. No cyanosis, clubbing or edema. SKIN: Clear; no rashes, lesions or skin breaks in exposed areas. NEURO:     Neurological examination     MENTAL STATUS: Patient awake and oriented to time, place, and person. Recent memory is decreased, attention concentration is decreased, comprehension, naming repetition is impaired. Dysarthric, expressive and receptive aphasia. Affect normal.    CRANIAL NERVES:  CN I: Not tested. CN II: Fundoscopic exam not performed. CN III, IV, VI: Pupils equal, round and reactive to light; extra ocular movements full and intact. CN V: Facial sensation normal.  CN VII: No facial asymmetry. CN VIII:  Hearing grossly normal bilaterally. No pathologic nystagmus or skew deviation. CN IX, X: Palate elevates symmetrically. CN XI: Shoulder shrug and chin rotation equal with intact strength. CN XII: Tongue protrusion midline. MOTOR: Normal bulk. Tone normal and symmetric throughout. Strength 5/5 throughout. ABNORMAL MOVEMENTS/TREMORS: No     REFLEXES: DTRs 2+; normal and symmetric throughout. Plantar response downgoing. SENSATION: Sensation grossly intact to fine touch, pain/temperature, vibration and position. COORDINATION: Finger-to-nose and heel to shin normal for age and symmetric. Finger tapping and alternating movements normal.    STATION: Negative Romberg. GAIT:  Normal heel, toe and tandem; no ataxia.      DIAGNOSTIC TESTS:     I have personally reviewed the most recent lab results:    Sodium   Date Value Ref Range Status   10/30/2020 135 132 - 146 mmol/L Final   10/29/2020 137 132 - 146 mmol/L Final   10/29/2020 131 (L) 132 - 146 mmol/L Final Potassium   Date Value Ref Range Status   10/30/2020 3.8 3.5 - 5.0 mmol/L Final   10/29/2020 4.4 3.5 - 5.0 mmol/L Final   09/13/2020 4.3 3.5 - 5.0 mmol/L Final     Potassium reflex Magnesium   Date Value Ref Range Status   10/29/2020 4.5 3.5 - 5.0 mmol/L Final   09/05/2020 4.5 3.5 - 5.0 mmol/L Final     Chloride   Date Value Ref Range Status   10/30/2020 99 98 - 107 mmol/L Final   10/29/2020 103 98 - 107 mmol/L Final   10/29/2020 98 98 - 107 mmol/L Final     CO2   Date Value Ref Range Status   10/30/2020 21 (L) 22 - 29 mmol/L Final   10/29/2020 24 22 - 29 mmol/L Final   10/29/2020 24 22 - 29 mmol/L Final     BUN   Date Value Ref Range Status   10/30/2020 16 8 - 23 mg/dL Final   10/29/2020 20 8 - 23 mg/dL Final   10/29/2020 20 8 - 23 mg/dL Final     CREATININE   Date Value Ref Range Status   10/30/2020 0.7 0.7 - 1.2 mg/dL Final   10/29/2020 1.0 0.7 - 1.2 mg/dL Final   10/29/2020 0.8 0.7 - 1.2 mg/dL Final     GFR Non-   Date Value Ref Range Status   10/30/2020 >60 >=60 mL/min/1.73 Final     Comment:     Chronic Kidney Disease: less than 60 ml/min/1.73 sq.m. Kidney Failure: less than 15 ml/min/1.73 sq.m. Results valid for patients 18 years and older. 10/29/2020 >60 >=60 mL/min/1.73 Final     Comment:     Chronic Kidney Disease: less than 60 ml/min/1.73 sq.m. Kidney Failure: less than 15 ml/min/1.73 sq.m. Results valid for patients 18 years and older. 10/29/2020 >60 >=60 mL/min/1.73 Final     Comment:     Chronic Kidney Disease: less than 60 ml/min/1.73 sq.m. Kidney Failure: less than 15 ml/min/1.73 sq.m. Results valid for patients 18 years and older.        Calcium   Date Value Ref Range Status   10/30/2020 9.1 8.6 - 10.2 mg/dL Final   10/29/2020 8.8 8.6 - 10.2 mg/dL Final   10/29/2020 10.0 8.6 - 10.2 mg/dL Final     Magnesium   Date Value Ref Range Status   09/03/2020 2.1 1.6 - 2.6 mg/dL Final     WBC   Date Value Ref Range Status   10/30/2020 15.9 (H) 4.5 - 11.5 E9/L Final   10/29/2020 12.3 (H) 4.5 - 11.5 E9/L Final   10/29/2020 8.5 4.5 - 11.5 E9/L Final     Hemoglobin   Date Value Ref Range Status   10/30/2020 14.0 12.5 - 16.5 g/dL Final   10/29/2020 13.7 12.5 - 16.5 g/dL Final   10/29/2020 15.3 12.5 - 16.5 g/dL Final     Hematocrit   Date Value Ref Range Status   10/30/2020 42.1 37.0 - 54.0 % Final   10/29/2020 41.2 37.0 - 54.0 % Final   10/29/2020 45.9 37.0 - 54.0 % Final     Platelets   Date Value Ref Range Status   10/30/2020 149 130 - 450 E9/L Final   10/29/2020 113 (L) 130 - 450 E9/L Final   10/29/2020 141 130 - 450 E9/L Final     Neutrophils %   Date Value Ref Range Status   10/30/2020 81.4 (H) 43.0 - 80.0 % Final   10/29/2020 87.8 (H) 43.0 - 80.0 % Final   09/05/2020 59.8 43.0 - 80.0 % Final     Monocytes %   Date Value Ref Range Status   10/30/2020 8.0 2.0 - 12.0 % Final   10/29/2020 2.2 2.0 - 12.0 % Final   09/05/2020 9.6 2.0 - 12.0 % Final     Total Protein   Date Value Ref Range Status   08/31/2020 6.9 6.4 - 8.3 g/dL Final   03/29/2018 7.3 6.4 - 8.3 g/dL Final   05/16/2015 7.2 6.4 - 8.3 g/dL Final     Total Bilirubin   Date Value Ref Range Status   08/31/2020 0.7 0.0 - 1.2 mg/dL Final   03/29/2018 0.7 0.0 - 1.2 mg/dL Final   05/16/2015 1.1 0.0 - 1.2 mg/dL Final     Alkaline Phosphatase   Date Value Ref Range Status   08/31/2020 72 40 - 129 U/L Final   03/29/2018 83 40 - 129 U/L Final   05/16/2015 74 40 - 129 U/L Final     ALT   Date Value Ref Range Status   08/31/2020 11 0 - 40 U/L Final   03/29/2018 30 0 - 40 U/L Final   05/16/2015 16 0 - 40 U/L Final     AST   Date Value Ref Range Status   08/31/2020 10 0 - 39 U/L Final   03/29/2018 19 0 - 39 U/L Final   05/16/2015 13 0 - 39 U/L Final     Lab Results   Component Value Date    INR 1.0 10/29/2020     Lab Results   Component Value Date    TRIG 120 09/01/2020    HDL 36 09/01/2020     No components found for: HGBA1C  No results found for: PROTEINCSF, GLUCCSF, WBCCSF    Controlled Substance patient is willing to proceed with further treatment. Care Instructions    Alzheimer's disease is a type of dementia. It causes memory loss and affects judgment, language, and behavior. You may have trouble making decisions or may get lost in places that you used to know well. Alzheimer's disease is different than mild memory loss that occurs with aging. It is not clear what causes Alzheimer's disease, but it is the most common form of dementia in older adults. Finding out that you have this disease is a shock. You may be afraid and worried about how the condition will change your life. Although there is no cure at this time, medicine in some cases may slow memory loss for a while. Other medicines may be able to help you sleep or cope with depression and behavior changes. Alzheimer's disease is different for everyone. It may take many years to develop. In some cases, people can function well for a long time. In the early stage of the disease, you can do things at home to make life easier and safer. You also can keep doing your hobbies and other activities. Many people find comfort in planning now for their future needs. Follow-up care is a key part of your treatment and safety. Be sure to make and go to all appointments, and call your doctor if you are having problems. It's also a good idea to know your test results and keep a list of the medicines you take. Taking care of yourself  · If your doctor gives you medicines, take them exactly as prescribed. Call if you think you are having a problem with your medicine. · Eat a balanced diet. Get plenty of whole grains, fruits, and vegetables every day. If you are not hungry at mealtimes, eat snacks at midmorning and in the afternoon. Try drinks such as Boost, Ensure, or Sustacal if you are having trouble keeping your weight up. · Stay active. Exercise such as walking may slow the decline of your mental abilities. Try to stay active mentally too.  Read and work crossword puzzles if you enjoy these activities. · If you have trouble sleeping, do not nap during the day. Get regular exercise (but not within several hours of bedtime). Drink a glass of warm milk or caffeine-free herbal tea before going to bed. · Be patient. You may find that a task takes you longer than it used to. · If you have not already done so, make a list of advance directives. Advance directives are instructions to your doctor and family members about what kind of care you want if you become unable to speak or express yourself. Talk to a  about making a will, if you do not already have one. Keeping schedules  · Develop a routine. You will feel less frustrated or confused if you have a clear, simple plan of what to do every day. ? Make lists of your medicines and when to take them. ? Write down appointments and other tasks in a calendar. ? Put sticky notes around the house to help you remember events and other things you have to do. ? Schedule activities and tasks for times of the day when you are best able to handle them. Staying safe  · Tell someone when you are going out and where you are going. Let the person know when you will be back. Before you go out alone, write down where you are going, how to get there, and how to get back home. Do this even if you have gone there many times before. Take someone along with you when possible. · Make your home safe. Tack down rugs, put no-slip tape in the tub, use handrails, and put safety switches on stoves and appliances. · Use strong lighting, especially at night. Put night-lights in bedrooms, hallways, and bathrooms. · Lower the hot water temperature setting to 120°F or lower to avoid burns. · Avoid Driving if you feel unsafe to drive. Thank you for involving me in the care of your patient. I have personally spent a total time of 40 mins.   This includes face-to-face and nonface-to-face time in reviewing patient's chart, outside provider notes, imaging and  test results, discussing etiology management and diagnosis of the patient's condition, natural course of the disease, medication side effects and charting, ordering labs, imaging, medications, and coordination of care. Patient's current medication list, allergies, problem list and results of all previously ordered testing and scans were reviewed at today's visit.       Jr Fulton MD    [facility]  [address] [place] [address]

## 2021-03-29 DIAGNOSIS — F03.90 DEMENTIA WITHOUT BEHAVIORAL DISTURBANCE, UNSPECIFIED DEMENTIA TYPE: ICD-10-CM

## 2021-04-05 RX ORDER — DONEPEZIL HYDROCHLORIDE 5 MG/1
5 TABLET, FILM COATED ORAL NIGHTLY
Qty: 30 TABLET | Refills: 2 | Status: ON HOLD | OUTPATIENT
Start: 2021-04-05 | End: 2022-10-21 | Stop reason: HOSPADM

## 2021-04-30 DIAGNOSIS — E53.8 B12 DEFICIENCY: ICD-10-CM

## 2021-04-30 RX ORDER — LANOLIN ALCOHOL/MO/W.PET/CERES
1000 CREAM (GRAM) TOPICAL DAILY
Qty: 30 TABLET | Refills: 3 | Status: SHIPPED
Start: 2021-04-30 | End: 2021-09-01 | Stop reason: SDUPTHER

## 2021-05-14 ENCOUNTER — TELEPHONE (OUTPATIENT)
Dept: VASCULAR SURGERY | Age: 66
End: 2021-05-14

## 2021-05-17 ENCOUNTER — OFFICE VISIT (OUTPATIENT)
Dept: VASCULAR SURGERY | Age: 66
End: 2021-05-17
Payer: COMMERCIAL

## 2021-05-17 ENCOUNTER — HOSPITAL ENCOUNTER (OUTPATIENT)
Dept: CARDIOLOGY | Age: 66
Discharge: HOME | End: 2021-05-17
Payer: COMMERCIAL

## 2021-05-17 VITALS — BODY MASS INDEX: 28.6 KG/M2 | HEIGHT: 75 IN | WEIGHT: 230 LBS

## 2021-05-17 DIAGNOSIS — Z98.890 HISTORY OF LEFT-SIDED CAROTID ENDARTERECTOMY: Primary | ICD-10-CM

## 2021-05-17 DIAGNOSIS — I65.21 ASYMPTOMATIC STENOSIS OF RIGHT CAROTID ARTERY: ICD-10-CM

## 2021-05-17 DIAGNOSIS — Z98.890 HISTORY OF LEFT-SIDED CAROTID ENDARTERECTOMY: ICD-10-CM

## 2021-05-17 PROCEDURE — 99213 OFFICE O/P EST LOW 20 MIN: CPT | Performed by: SURGERY

## 2021-05-17 PROCEDURE — 1036F TOBACCO NON-USER: CPT | Performed by: SURGERY

## 2021-05-17 PROCEDURE — 3017F COLORECTAL CA SCREEN DOC REV: CPT | Performed by: SURGERY

## 2021-05-17 PROCEDURE — G8417 CALC BMI ABV UP PARAM F/U: HCPCS | Performed by: SURGERY

## 2021-05-17 PROCEDURE — 1123F ACP DISCUSS/DSCN MKR DOCD: CPT | Performed by: SURGERY

## 2021-05-17 PROCEDURE — 93880 EXTRACRANIAL BILAT STUDY: CPT

## 2021-05-17 PROCEDURE — G8427 DOCREV CUR MEDS BY ELIG CLIN: HCPCS | Performed by: SURGERY

## 2021-05-17 PROCEDURE — 4040F PNEUMOC VAC/ADMIN/RCVD: CPT | Performed by: SURGERY

## 2021-05-17 RX ORDER — HYDROCODONE BITARTRATE AND ACETAMINOPHEN 5; 325 MG/1; MG/1
1 TABLET ORAL EVERY 6 HOURS PRN
Status: ON HOLD | COMMUNITY
End: 2022-10-21 | Stop reason: HOSPADM

## 2021-05-17 NOTE — PROGRESS NOTES
(ALLEGRA-D 12 HOUR PO) Take by mouth      vitamin B-12 (CYANOCOBALAMIN) 1000 MCG tablet Take 1 tablet by mouth daily 30 tablet 3    donepezil (ARICEPT) 5 MG tablet Take 1 tablet by mouth nightly 30 tablet 2    cyanocobalamin 1000 MCG/ML injection 1 mL SubQ inj every day for 7 days, followed by 1 mL every week for 4 weeks.  10 mL 1    Ascorbic Acid (VITAMIN C) 250 MG tablet Take 250 mg by mouth daily      loratadine (CLARITIN) 10 MG capsule Take 10 mg by mouth daily as needed      hydrOXYzine (ATARAX) 25 MG tablet Take 25 mg by mouth 2 times daily as needed for Itching       aluminum & magnesium hydroxide-simethicone (MAALOX) 200-200-20 MG/5ML SUSP suspension Take 5 mLs by mouth every 6 hours as needed for Indigestion      Dextromethorphan-guaiFENesin  MG/5ML SYRP Take 5 mLs by mouth every 4 hours as needed for Cough      magnesium hydroxide (MILK OF MAGNESIA) 400 MG/5ML suspension Take by mouth daily as needed for Constipation      loperamide (IMODIUM) 2 MG capsule Take 2 mg by mouth 4 times daily as needed for Diarrhea      docusate sodium (COLACE) 100 MG capsule Take 100 mg by mouth 2 times daily      acetaminophen (TYLENOL) 325 MG tablet Take 650 mg by mouth every 6 hours as needed for Pain      aspirin 81 MG EC tablet Take 1 tablet by mouth daily 30 tablet 0    glipiZIDE (GLUCOTROL) 5 MG tablet Take 1 tablet by mouth every morning (before breakfast) (Patient taking differently: Take 10 mg by mouth every morning (before breakfast) ) 30 tablet 0    metFORMIN (GLUCOPHAGE) 500 MG tablet Take 1 tablet by mouth 2 times daily (with meals) (Patient taking differently: Take 1,000 mg by mouth 2 times daily (with meals) ) 60 tablet 0    atorvastatin (LIPITOR) 40 MG tablet Take 1 tablet by mouth nightly 30 tablet 0    albuterol sulfate HFA (PROVENTIL HFA) 108 (90 BASE) MCG/ACT inhaler Inhale 2 puffs into the lungs every 4 hours as needed for Wheezing 1 Inhaler zero     No current facility-administered medications for this visit. Allergies:  Patient has no known allergies. Social History     Socioeconomic History    Marital status:      Spouse name: Not on file    Number of children: 1    Years of education: Not on file    Highest education level: Not on file   Occupational History    Not on file   Tobacco Use    Smoking status: Former Smoker     Packs/day: 1.00     Years: 50.00     Pack years: 50.00     Types: Cigarettes     Quit date: 2020     Years since quittin.7    Smokeless tobacco: Former User   Vaping Use    Vaping Use: Never used   Substance and Sexual Activity    Alcohol use: Not Currently     Alcohol/week: 1.0 standard drinks     Types: 1 Cans of beer per week     Comment: on occassion    Drug use: No    Sexual activity: Not on file   Other Topics Concern    Not on file   Social History Narrative    Not on file     Social Determinants of Health     Financial Resource Strain:     Difficulty of Paying Living Expenses:    Food Insecurity:     Worried About [address] in the Last Year:     [address] in the Last Year:    Transportation Needs:     Lack of Transportation (Medical):      Lack of Transportation (Non-Medical):    Physical Activity:     Days of Exercise per Week:     Minutes of Exercise per Session:    Stress:     Feeling of Stress :    Social Connections:     Frequency of Communication with Friends and Family:     Frequency of Social Gatherings with Friends and Family:     Attends [employer] Services:     Active Member of Clubs or Organizations:     Attends Club or Organization Meetings:     Marital Status:    Intimate Partner Violence:     Fear of Current or Ex-Partner:     Emotionally Abused:     Physically Abused:     Sexually Abused:      Family History   Problem Relation Age of Onset    Emphysema Mother      Labs  Lab Results   Component Value Date    WBC 15.9 (H) 10/30/2020    HGB 14.0 10/30/2020    HCT 42.1 10/30/2020  10/30/2020    PROTIME 10.9 10/29/2020    INR 1.0 10/29/2020    APTT 35.3 (H) 08/31/2020    K 3.8 10/30/2020    BUN 16 10/30/2020    CREATININE 0.7 10/30/2020     PHYSICAL EXAM:    Ht 6' 3\" (1.905 m)   Wt 230 lb (104.3 kg)   BMI 28.75 kg/m²   CONSTITUTIONAL:  awake, alert, cooperative  CN II - XII intact  Glasses used   Hearing deficits are notnoted  EYES:  lids and lashes normal  ENT: external ears and nose without lesions  NECK:  supple, symmetrical, trachea midline  Carotid Bruits are absent  LUNGS:  No increased work of breathing                 Clear to auscultation  CARDIOVASCULAR:  regular rate and rhythm   ABDOMEN:  soft, non-distended, non-tender   Aorta is not palpable  SKIN:  normal skin color, texture, turgor  EXTREMITIES:   R UE 5/5 Strength  L UE 5/5 Strength  R LE Edema absent  L LE Edema absent    RADIOLOGY:    A/P Asymptomatic Bilateral Carotid Stenosis  Hx of L CEA for sx disease  · US from today reveals   · R ICA 1-49% stable tas compared to previous  · L ICA 1-49% stenosis which is improved   · Continue medical management with asa and statin  · Emphasized importance of continued Tobacco cessation  · No indication for surgical intervention at this time  · Discussed with patient pathophysiology of carotid stenosis and all ?s answered  · Discussed with patient symptoms of stroke, TIA and they understood to go to ER immediately if any symptoms developed  · F/U as outpatient in 12 Months with repeat carotid duplex    Gifty Gasca MD

## 2021-05-17 NOTE — PROGRESS NOTES
[facility] Vascular Lab - [facility]    This is a pre read worksheet - prior to official physician interpretation    Jeremy Preston Sr.  1955  Date of study: 5/17/21    Indication for study:  Carotid artery stenosis  Study : Bilateral Carotid Artery Duplex Examination    Duplex examination of the RIGHT carotid artery system identifies atherosclerotic plaque. The peak systolic velocity in internal carotid artery was 83 centimeters / second. The maximum end diastolic velocity was 27 centimeters / second. The ICA/CCA ratio is 1.2. The right vertebral artery has antegrade flow. Duplex examination of the LEFT carotid artery system identifies atherosclerotic plaque. The peak systolic velocity in internal carotid artery was 89 centimeters / second. The maximum end diastolic velocity was 23 centimeters / second. The ICA/CCA ratio is 0.8. The left vertebral artery has antegrade flow.         LAST STUDY  9/1/2020

## 2021-05-17 NOTE — PATIENT INSTRUCTIONS
Patient Education        Carotid Stenosis: Care Instructions  Your Care Instructions     Carotid stenosis is narrowing of one or both of the carotid arteries. These arteries take blood from the heart to the brain. There is one on each side of the neck. A substance called plaque builds up inside an artery. This makes it too narrow. Plaque comes from damage to the artery over time. This damage may be caused by high blood pressure, high cholesterol, diabetes, or smoking. Sometimes plaque can break loose from the carotid artery and move to the brain. This can cause a stroke or transient ischemic attack (TIA). The goal of treatment is to lower your risk of having a stroke or TIA. You can lower your risk by making healthy lifestyle changes and taking medicine. Sometimes a surgery or procedure is done. Follow-up care is a key part of your treatment and safety. Be sure to make and go to all appointments, and call your doctor if you are having problems. It's also a good idea to know your test results and keep a list of the medicines you take. How can you care for yourself at home? · Take your medicines exactly as prescribed. Call your doctor if you think you are having a problem with your medicine. You may take medicine to lower your blood pressure, to lower your cholesterol, or to prevent blood clots. · If you take a blood thinner, such as aspirin, be sure to get instructions about how to take your medicine safely. Blood thinners can cause serious bleeding problems. · Do not smoke. People who smoke have a higher chance of stroke than those who quit. If you need help quitting, talk to your doctor about stop-smoking programs and medicines. These can increase your chances of quitting for good. · Eat a healthy diet that is low in saturated fat and salt. Eat lots of fresh fruits and vegetables and foods high in fiber. · Stay at a healthy weight. Lose weight if you need to.   · Talk to your doctor about starting an exercise program. Regular exercise lowers your chance of stroke. · Limit alcohol to 2 drinks a day for men and 1 drink a day for women. Too much alcohol can cause health problems. · Work with your doctor to control high blood pressure, high cholesterol, diabetes, and other conditions that increase your chance of a stroke. A healthy diet, exercise, weight loss (if needed), and medicines can help. · Avoid colds and flu. Get the flu vaccine every year. When should you call for help? Call 911 anytime you think you may need emergency care. For example, call if:    · You passed out (lost consciousness).     · You have symptoms of a stroke. These may include:  ? Sudden numbness, tingling, weakness, or loss of movement in your face, arm, or leg, especially on only one side of your body. ? Sudden vision changes. ? Sudden trouble speaking. ? Sudden confusion or trouble understanding simple statements. ? Sudden problems with walking or balance. ? A sudden, severe headache that is different from past headaches. Call your doctor now or seek immediate medical care if:    · You are dizzy or lightheaded, or you feel like you may faint. Watch closely for changes in your health, and be sure to contact your doctor if you have any problems. Where can you learn more? Go to https://[employer]. org and sign in to your [employer] account. Enter E865 in the [employer] box to learn more about \"Carotid Stenosis: Care Instructions. \"     If you do not have an account, please click on the \"Sign Up Now\" link. Current as of: August 31, 2020               Content Version: 12.8  © 0571-5793 Healthwise, Incorporated. Care instructions adapted under license by [facility] ([facility]). If you have questions about a medical condition or this instruction, always ask your healthcare professional. [place] [address] any warranty or liability for your use of this information.

## 2021-09-01 DIAGNOSIS — E53.8 B12 DEFICIENCY: ICD-10-CM

## 2021-09-01 RX ORDER — LANOLIN ALCOHOL/MO/W.PET/CERES
1000 CREAM (GRAM) TOPICAL DAILY
Qty: 30 TABLET | Refills: 3 | Status: SHIPPED | OUTPATIENT
Start: 2021-09-01

## 2022-01-01 ENCOUNTER — OFFICE VISIT (OUTPATIENT)
Dept: NEUROLOGY | Age: 67
End: 2022-01-01
Payer: COMMERCIAL

## 2022-01-01 ENCOUNTER — TELEPHONE (OUTPATIENT)
Dept: ADMINISTRATIVE | Age: 67
End: 2022-01-01

## 2022-01-01 VITALS
SYSTOLIC BLOOD PRESSURE: 127 MMHG | DIASTOLIC BLOOD PRESSURE: 73 MMHG | OXYGEN SATURATION: 96 % | TEMPERATURE: 97.7 F | WEIGHT: 220 LBS | HEART RATE: 62 BPM | HEIGHT: 75 IN | BODY MASS INDEX: 27.35 KG/M2

## 2022-01-01 DIAGNOSIS — R47.01 APHASIA: ICD-10-CM

## 2022-01-01 DIAGNOSIS — I69.359 HISTORY OF HEMORRHAGIC STROKE WITH RESIDUAL HEMIPARESIS (HCC): Primary | ICD-10-CM

## 2022-01-01 PROCEDURE — 1036F TOBACCO NON-USER: CPT | Performed by: NURSE PRACTITIONER

## 2022-01-01 PROCEDURE — 3017F COLORECTAL CA SCREEN DOC REV: CPT | Performed by: NURSE PRACTITIONER

## 2022-01-01 PROCEDURE — 99214 OFFICE O/P EST MOD 30 MIN: CPT | Performed by: NURSE PRACTITIONER

## 2022-01-01 PROCEDURE — G8484 FLU IMMUNIZE NO ADMIN: HCPCS | Performed by: NURSE PRACTITIONER

## 2022-01-01 PROCEDURE — G8417 CALC BMI ABV UP PARAM F/U: HCPCS | Performed by: NURSE PRACTITIONER

## 2022-01-01 PROCEDURE — G8427 DOCREV CUR MEDS BY ELIG CLIN: HCPCS | Performed by: NURSE PRACTITIONER

## 2022-01-01 PROCEDURE — 1123F ACP DISCUSS/DSCN MKR DOCD: CPT | Performed by: NURSE PRACTITIONER

## 2022-01-01 PROCEDURE — 1111F DSCHRG MED/CURRENT MED MERGE: CPT | Performed by: NURSE PRACTITIONER

## 2022-05-20 ENCOUNTER — TELEPHONE (OUTPATIENT)
Dept: VASCULAR SURGERY | Age: 67
End: 2022-05-20

## 2022-05-20 NOTE — TELEPHONE ENCOUNTER
Confirmed ultrasound appt with elisabeth Barron Pay for 5- with Dr Deysi Jacobsen. He will call facility.

## 2022-05-23 ENCOUNTER — OFFICE VISIT (OUTPATIENT)
Dept: VASCULAR SURGERY | Age: 67
End: 2022-05-23
Payer: COMMERCIAL

## 2022-05-23 ENCOUNTER — HOSPITAL ENCOUNTER (OUTPATIENT)
Dept: CARDIOLOGY | Age: 67
Discharge: HOME | End: 2022-05-23
Payer: COMMERCIAL

## 2022-05-23 DIAGNOSIS — I65.21 ASYMPTOMATIC STENOSIS OF RIGHT CAROTID ARTERY: Primary | ICD-10-CM

## 2022-05-23 DIAGNOSIS — Z98.890 HISTORY OF LEFT-SIDED CAROTID ENDARTERECTOMY: ICD-10-CM

## 2022-05-23 PROCEDURE — G8421 BMI NOT CALCULATED: HCPCS | Performed by: PHYSICIAN ASSISTANT

## 2022-05-23 PROCEDURE — G8427 DOCREV CUR MEDS BY ELIG CLIN: HCPCS | Performed by: PHYSICIAN ASSISTANT

## 2022-05-23 PROCEDURE — 93880 EXTRACRANIAL BILAT STUDY: CPT

## 2022-05-23 PROCEDURE — 1123F ACP DISCUSS/DSCN MKR DOCD: CPT | Performed by: PHYSICIAN ASSISTANT

## 2022-05-23 PROCEDURE — 1036F TOBACCO NON-USER: CPT | Performed by: PHYSICIAN ASSISTANT

## 2022-05-23 PROCEDURE — 3017F COLORECTAL CA SCREEN DOC REV: CPT | Performed by: PHYSICIAN ASSISTANT

## 2022-05-23 PROCEDURE — 99213 OFFICE O/P EST LOW 20 MIN: CPT | Performed by: PHYSICIAN ASSISTANT

## 2022-05-23 RX ORDER — MIRTAZAPINE 15 MG/1
15 TABLET, FILM COATED ORAL DAILY
COMMUNITY
Start: 2022-05-06

## 2022-05-23 RX ORDER — RIVASTIGMINE 9.5 MG/24H
PATCH, EXTENDED RELEASE TRANSDERMAL
COMMUNITY
Start: 2022-04-15

## 2022-05-23 NOTE — PROGRESS NOTES
5/23/2022    Carlene Javed Sr.  1955     Vascular Surgery Outpatient Followup    PCP : Katia Benson DO   Neurologist : Dr. Dominic Wilson    Previous Vascular Procedures  10/29/20 left Carotid Endarterectomy for sx     HPI :   74 y.o. male who presents in regards to fu re his carotid stenosis, with hx significant for L CEA for sx carotid disease. Pt has Alzheimer's disease so his son, Alia Merritt, is supplementing the history. The patient and his son deny new sxs of slurred speech, focal weakness, or amaurosis fugax    He was first seen in hospital 9 of 2020. He had presented with right facial droop, loss of speech, and inability to follow commands.  CT head showed an ischemic event in the left MCA distribution.  Right facial droop did resolved. He is able to speak though does struggle sometimes with comprehension. He is able to follow basic commands. He is struggling with multiple commands at same time. As difficulty of reading he also struggles to progress.     Currently living in memory unit at [facility]. He continues to have issues with memory. Dr. Dominic Wilson neurology believes he has alzheimer's vs vascular dementia contributing to some of these issue. He no longer follows with neurology. Son feels his symptoms of memory loss are continuing to progress. He has been having issues with bilateral lower extremity weakness, which causes him to shuffle. He states he is not very active overall.       Past Medical History:        Diagnosis Date    [facility] [address] [facility]     Diabetes mellitus [facility] [address]     Seasonal allergies     Stroke due to stenosis of left carotid artery ([facility] [address] 9/2/2020    Vascular dementia [facility])      Past Surgical History:        Procedure Laterality Date    CARDIOVASCULAR STRESS TEST  09/03/2020    Lexiscan stress test    CAROTID ENDARTERECTOMY Left 10/29/2020    LEFT CAROTID ENDARTERECTOMY -- FACILITY performed by Devon Fortune MD at [place] [address] Current Medications:   Current Outpatient Medications   Medication Sig Dispense Refill    rivastigmine (EXELON) 9.5 MG/24HR       mirtazapine (REMERON) 15 MG tablet Take 15 mg by mouth daily      vitamin B-12 (CYANOCOBALAMIN) 1000 MCG tablet Take 1 tablet by mouth daily 30 tablet 3    HYDROcodone-acetaminophen (NORCO) 5-325 MG per tablet Take 1 tablet by mouth every 6 hours as needed for Pain.       Fexofenadine-Pseudoephedrine (ALLEGRA-D 12 HOUR PO) Take by mouth      loratadine (CLARITIN) 10 MG capsule Take 10 mg by mouth daily as needed      hydrOXYzine (ATARAX) 25 MG tablet Take 25 mg by mouth 2 times daily as needed for Itching       aluminum & magnesium hydroxide-simethicone (MAALOX) 200-200-20 MG/5ML SUSP suspension Take 5 mLs by mouth every 6 hours as needed for Indigestion      Dextromethorphan-guaiFENesin  MG/5ML SYRP Take 5 mLs by mouth every 4 hours as needed for Cough      magnesium hydroxide (MILK OF MAGNESIA) 400 MG/5ML suspension Take by mouth daily as needed for Constipation      loperamide (IMODIUM) 2 MG capsule Take 2 mg by mouth 4 times daily as needed for Diarrhea      docusate sodium (COLACE) 100 MG capsule Take 100 mg by mouth 2 times daily      acetaminophen (TYLENOL) 325 MG tablet Take 650 mg by mouth every 6 hours as needed for Pain      aspirin 81 MG EC tablet Take 1 tablet by mouth daily 30 tablet 0    glipiZIDE (GLUCOTROL) 5 MG tablet Take 1 tablet by mouth every morning (before breakfast) (Patient taking differently: Take 10 mg by mouth every morning (before breakfast) ) 30 tablet 0    metFORMIN (GLUCOPHAGE) 500 MG tablet Take 1 tablet by mouth 2 times daily (with meals) (Patient taking differently: Take 1,000 mg by mouth 2 times daily (with meals) ) 60 tablet 0    atorvastatin (LIPITOR) 40 MG tablet Take 1 tablet by mouth nightly 30 tablet 0    albuterol sulfate HFA (PROVENTIL HFA) 108 (90 BASE) MCG/ACT inhaler Inhale 2 puffs into the lungs every 4 hours as needed for Wheezing 1 Inhaler zero    donepezil (ARICEPT) 5 MG tablet Take 1 tablet by mouth nightly (Patient not taking: Reported on 2022) 30 tablet 2    Ascorbic Acid (VITAMIN C) 250 MG tablet Take 250 mg by mouth daily (Patient not taking: Reported on 2022)       No current facility-administered medications for this visit. Allergies:  Patient has no known allergies. Social History     Socioeconomic History    Marital status:      Spouse name: Not on file    Number of children: 1    Years of education: Not on file    Highest education level: Not on file   Occupational History    Not on file   Tobacco Use    Smoking status: Former Smoker     Packs/day: 1.00     Years: 50.00     Pack years: 50.00     Types: Cigarettes     Quit date: 2020     Years since quittin.7    Smokeless tobacco: Former User   Vaping Use    Vaping Use: Never used   Substance and Sexual Activity    Alcohol use: Not Currently     Alcohol/week: 1.0 standard drink     Types: 1 Cans of beer per week     Comment: on occassion    Drug use: No    Sexual activity: Not on file   Other Topics Concern    Not on file   Social History Narrative    Not on file     Social Determinants of Health     Financial Resource Strain:     Difficulty of Paying Living Expenses: Not on file   Food Insecurity:     Worried About [address] in the Last Year: Not on file    [address] in the Last Year: Not on file   Transportation Needs:     Lack of Transportation (Medical): Not on file    Lack of Transportation (Non-Medical):  Not on file   Physical Activity:     Days of Exercise per Week: Not on file    Minutes of Exercise per Session: Not on file   Stress:     Feeling of Stress : Not on file   Social Connections:     Frequency of Communication with Friends and Family: Not on file    Frequency of Social Gatherings with Friends and Family: Not on file    Attends Evangelical Services: Not on file   [place] Active Member of Clubs or Organizations: Not on file    Attends Club or Organization Meetings: Not on file    Marital Status: Not on file   Intimate Partner Violence:     Fear of Current or Ex-Partner: Not on file    Emotionally Abused: Not on file    Physically Abused: Not on file    Sexually Abused: Not on file   Housing Stability:     Unable to Pay for Housing in the Last Year: Not on file    Number of [place] in the Last Year: Not on file    Unstable Housing in the Last Year: Not on file     Family History   Problem Relation Age of Onset    Emphysema Mother      Labs  Lab Results   Component Value Date    WBC 15.9 (H) 10/30/2020    HGB 14.0 10/30/2020    HCT 42.1 10/30/2020     10/30/2020    PROTIME 10.9 10/29/2020    INR 1.0 10/29/2020    APTT 35.3 (H) 08/31/2020    K 3.8 10/30/2020    BUN 16 10/30/2020    CREATININE 0.7 10/30/2020     PHYSICAL EXAM:    There were no vitals taken for this visit.   CONSTITUTIONAL:  awake, alert, cooperative  CN II - XII intact  Glasses used   Hearing deficits are notnoted  EYES:  lids and lashes normal  ENT: external ears and nose without lesions  NECK:  supple, symmetrical, trachea midline  Carotid Bruits are absent  LUNGS:  No increased work of breathing                 Clear to auscultation  CARDIOVASCULAR:  regular rate and rhythm   ABDOMEN:  soft, non-distended, non-tender   Aorta is not palpable  SKIN:  normal skin color, texture, turgor  EXTREMITIES:   R UE 5/5 Strength  L UE 5/5 Strength  R LE Edema absent, 2+ PT  L LE Edema absent, 2+ PT    RADIOLOGY: Carotid US reviewed demonstrating < 50 % stenosis in bilateral ICAs with bilateral vertebral arteries demonstrating antegrade flow    A/P Asymptomatic Bilateral Carotid Stenosis  Hx of L CEA for sx disease  · US from today reveals   · R ICA 1-49% stable tas compared to previous  · L ICA 1-49% stenosis which is improved   · Continue medical management with asa and statin (nursing home medication list reviewed and he is taking asa and statin daily)  · Emphasized importance of continued Tobacco cessation  · No indication for surgical intervention at this time  · Discussed with patient pathophysiology of carotid stenosis and all ?s answered  · Discussed with patient symptoms of stroke, TIA and they understood to go to ER immediately if any symptoms developed  · F/U as outpatient in 12 Months with repeat carotid duplex    Pt seen and plan reviewed with Dr. Dion Rubalcava.      Hermilo Cardona PA-C

## 2022-05-23 NOTE — PROGRESS NOTES
[facility] - [facility]    This is a pre read worksheet - prior to official physician interpretation    Nelsy Frederick Sr.  1955  Date of study: 5/23/22    Indication for study:  Carotid artery stenosis  Study : Bilateral Carotid Artery Duplex Examination    Duplex examination of the RIGHT carotid artery system identifies atherosclerotic plaque. The peak systolic velocity in internal carotid artery was 124 centimeters / second. The maximum end diastolic velocity was 34 centimeters / second. The ICA/CCA ratio is 1.3. The right vertebral artery has antegrade flow. Duplex examination of the LEFT carotid artery system identifies atherosclerotic plaque. The peak systolic velocity in internal carotid artery was 108 centimeters / second. The maximum end diastolic velocity was 38 centimeters / second. The ICA/CCA ratio is 0.9. The left vertebral artery has antegrade flow.     RIGHT  psv        LAST STUDY  5/17/2021  Rt 1-49  Lt 1-49

## 2022-10-08 ENCOUNTER — ANESTHESIA EVENT (OUTPATIENT)
Dept: CT IMAGING | Age: 67
End: 2022-10-08

## 2022-10-08 ENCOUNTER — HOSPITAL ENCOUNTER (EMERGENCY)
Age: 67
Discharge: ANOTHER HOSPITAL | End: 2022-10-08
Attending: EMERGENCY MEDICINE
Payer: COMMERCIAL

## 2022-10-08 ENCOUNTER — HOSPITAL ENCOUNTER (INPATIENT)
Age: 67
LOS: 13 days | Discharge: NURSING, REHAB OR LONG-TERM CARE | DRG: 023 | End: 2022-10-21
Attending: EMERGENCY MEDICINE | Admitting: INTERNAL MEDICINE
Payer: COMMERCIAL

## 2022-10-08 ENCOUNTER — APPOINTMENT (OUTPATIENT)
Dept: CT IMAGING | Age: 67
End: 2022-10-08
Payer: COMMERCIAL

## 2022-10-08 ENCOUNTER — APPOINTMENT (OUTPATIENT)
Dept: INTERVENTIONAL RADIOLOGY/VASCULAR | Age: 67
DRG: 023 | End: 2022-10-08
Payer: COMMERCIAL

## 2022-10-08 ENCOUNTER — ANESTHESIA (OUTPATIENT)
Dept: CT IMAGING | Age: 67
End: 2022-10-08

## 2022-10-08 VITALS
OXYGEN SATURATION: 99 % | DIASTOLIC BLOOD PRESSURE: 93 MMHG | HEART RATE: 74 BPM | TEMPERATURE: 97.1 F | HEIGHT: 75 IN | WEIGHT: 240 LBS | SYSTOLIC BLOOD PRESSURE: 164 MMHG | RESPIRATION RATE: 18 BRPM | BODY MASS INDEX: 29.84 KG/M2

## 2022-10-08 DIAGNOSIS — I63.9 ACUTE CVA (CEREBROVASCULAR ACCIDENT) (HCC): Primary | ICD-10-CM

## 2022-10-08 DIAGNOSIS — I63.532 CEREBROVASCULAR ACCIDENT (CVA) DUE TO OCCLUSION OF LEFT POSTERIOR CEREBRAL ARTERY (HCC): Primary | ICD-10-CM

## 2022-10-08 PROBLEM — E11.9 TYPE 2 DIABETES MELLITUS (HCC): Status: ACTIVE | Noted: 2022-01-01

## 2022-10-08 PROBLEM — R29.708 NIHSS SCORE 8: Status: ACTIVE | Noted: 2022-10-08

## 2022-10-08 PROBLEM — Z86.73 HISTORY OF STROKE: Status: ACTIVE | Noted: 2022-10-08

## 2022-10-08 PROBLEM — G81.91 ACUTE RIGHT HEMIPARESIS (HCC): Status: ACTIVE | Noted: 2022-10-08

## 2022-10-08 PROBLEM — F03.90 DEMENTIA (HCC): Status: ACTIVE | Noted: 2022-10-08

## 2022-10-08 LAB
ANION GAP SERPL CALCULATED.3IONS-SCNC: 12 MMOL/L (ref 7–16)
B.E.: 1.6 MMOL/L (ref -3–3)
BASOPHILS ABSOLUTE: 0.03 E9/L (ref 0–0.2)
BASOPHILS RELATIVE PERCENT: 0.5 % (ref 0–2)
BETA-HYDROXYBUTYRATE: 0.12 MMOL/L (ref 0.02–0.27)
BUN BLDV-MCNC: 14 MG/DL (ref 6–23)
CALCIUM SERPL-MCNC: 9.7 MG/DL (ref 8.6–10.2)
CHLORIDE BLD-SCNC: 94 MMOL/L (ref 98–107)
CO2: 25 MMOL/L (ref 22–29)
COHB: 1.5 % (ref 0–1.5)
CREAT SERPL-MCNC: 0.8 MG/DL (ref 0.7–1.2)
CRITICAL: ABNORMAL
DATE ANALYZED: ABNORMAL
DATE OF COLLECTION: ABNORMAL
EOSINOPHILS ABSOLUTE: 0.1 E9/L (ref 0.05–0.5)
EOSINOPHILS RELATIVE PERCENT: 1.6 % (ref 0–6)
GFR AFRICAN AMERICAN: >60
GFR NON-AFRICAN AMERICAN: >60 ML/MIN/1.73
GLUCOSE BLD-MCNC: 402 MG/DL (ref 74–99)
HCO3: 26.6 MMOL/L (ref 22–26)
HCT VFR BLD CALC: 48.1 % (ref 37–54)
HEMOGLOBIN: 15.7 G/DL (ref 12.5–16.5)
HHB: 5.3 % (ref 0–5)
IMMATURE GRANULOCYTES #: 0.03 E9/L
IMMATURE GRANULOCYTES %: 0.5 % (ref 0–5)
LAB: ABNORMAL
LYMPHOCYTES ABSOLUTE: 1.58 E9/L (ref 1.5–4)
LYMPHOCYTES RELATIVE PERCENT: 25.4 % (ref 20–42)
Lab: ABNORMAL
MCH RBC QN AUTO: 29.5 PG (ref 26–35)
MCHC RBC AUTO-ENTMCNC: 32.6 % (ref 32–34.5)
MCV RBC AUTO: 90.2 FL (ref 80–99.9)
METER GLUCOSE: 371 MG/DL (ref 74–99)
METHB: 0.3 % (ref 0–1.5)
MODE: ABNORMAL
MONOCYTES ABSOLUTE: 0.62 E9/L (ref 0.1–0.95)
MONOCYTES RELATIVE PERCENT: 10 % (ref 2–12)
NEUTROPHILS ABSOLUTE: 3.85 E9/L (ref 1.8–7.3)
NEUTROPHILS RELATIVE PERCENT: 62 % (ref 43–80)
O2 CONTENT: 19.7 ML/DL
O2 SATURATION: 94.6 % (ref 92–98.5)
O2HB: 92.9 % (ref 94–97)
OPERATOR ID: 166
PATIENT TEMP: 37 C
PCO2: 43 MMHG (ref 35–45)
PDW BLD-RTO: 13.6 FL (ref 11.5–15)
PH BLOOD GAS: 7.41 (ref 7.35–7.45)
PLATELET # BLD: 136 E9/L (ref 130–450)
PMV BLD AUTO: 11.8 FL (ref 7–12)
PO2: 73.4 MMHG (ref 75–100)
POTASSIUM SERPL-SCNC: 5 MMOL/L (ref 3.5–5)
RBC # BLD: 5.33 E12/L (ref 3.8–5.8)
SARS-COV-2, NAAT: NOT DETECTED
SODIUM BLD-SCNC: 131 MMOL/L (ref 132–146)
SOURCE, BLOOD GAS: ABNORMAL
THB: 15.1 G/DL (ref 11.5–16.5)
TIME ANALYZED: 1620
TROPONIN, HIGH SENSITIVITY: 28 NG/L (ref 0–11)
WBC # BLD: 6.2 E9/L (ref 4.5–11.5)

## 2022-10-08 PROCEDURE — 99285 EMERGENCY DEPT VISIT HI MDM: CPT

## 2022-10-08 PROCEDURE — 2500000003 HC RX 250 WO HCPCS: Performed by: EMERGENCY MEDICINE

## 2022-10-08 PROCEDURE — 87635 SARS-COV-2 COVID-19 AMP PRB: CPT

## 2022-10-08 PROCEDURE — A4216 STERILE WATER/SALINE, 10 ML: HCPCS | Performed by: PSYCHIATRY & NEUROLOGY

## 2022-10-08 PROCEDURE — 6360000002 HC RX W HCPCS: Performed by: EMERGENCY MEDICINE

## 2022-10-08 PROCEDURE — 2500000003 HC RX 250 WO HCPCS: Performed by: PSYCHIATRY & NEUROLOGY

## 2022-10-08 PROCEDURE — 93005 ELECTROCARDIOGRAM TRACING: CPT | Performed by: EMERGENCY MEDICINE

## 2022-10-08 PROCEDURE — 3700000001 HC ADD 15 MINUTES (ANESTHESIA)

## 2022-10-08 PROCEDURE — 99222 1ST HOSP IP/OBS MODERATE 55: CPT | Performed by: PSYCHIATRY & NEUROLOGY

## 2022-10-08 PROCEDURE — 96374 THER/PROPH/DIAG INJ IV PUSH: CPT

## 2022-10-08 PROCEDURE — 2580000003 HC RX 258: Performed by: EMERGENCY MEDICINE

## 2022-10-08 PROCEDURE — 6360000004 HC RX CONTRAST MEDICATION: Performed by: PSYCHIATRY & NEUROLOGY

## 2022-10-08 PROCEDURE — B31R1ZZ FLUOROSCOPY OF INTRACRANIAL ARTERIES USING LOW OSMOLAR CONTRAST: ICD-10-PCS | Performed by: PSYCHIATRY & NEUROLOGY

## 2022-10-08 PROCEDURE — 2000000000 HC ICU R&B

## 2022-10-08 PROCEDURE — 0042T CT BRAIN PERFUSION: CPT

## 2022-10-08 PROCEDURE — 82010 KETONE BODYS QUAN: CPT

## 2022-10-08 PROCEDURE — 96375 TX/PRO/DX INJ NEW DRUG ADDON: CPT

## 2022-10-08 PROCEDURE — 6360000002 HC RX W HCPCS: Performed by: PSYCHIATRY & NEUROLOGY

## 2022-10-08 PROCEDURE — 03CG3ZZ EXTIRPATION OF MATTER FROM INTRACRANIAL ARTERY, PERCUTANEOUS APPROACH: ICD-10-PCS | Performed by: PSYCHIATRY & NEUROLOGY

## 2022-10-08 PROCEDURE — C1769 GUIDE WIRE: HCPCS

## 2022-10-08 PROCEDURE — 96365 THER/PROPH/DIAG IV INF INIT: CPT

## 2022-10-08 PROCEDURE — 61645 PERQ ART M-THROMBECT &/NFS: CPT | Performed by: PSYCHIATRY & NEUROLOGY

## 2022-10-08 PROCEDURE — 76377 3D RENDER W/INTRP POSTPROCES: CPT

## 2022-10-08 PROCEDURE — 99291 CRITICAL CARE FIRST HOUR: CPT | Performed by: SURGERY

## 2022-10-08 PROCEDURE — 36415 COLL VENOUS BLD VENIPUNCTURE: CPT

## 2022-10-08 PROCEDURE — 96376 TX/PRO/DX INJ SAME DRUG ADON: CPT

## 2022-10-08 PROCEDURE — 70450 CT HEAD/BRAIN W/O DYE: CPT

## 2022-10-08 PROCEDURE — 61645 PERQ ART M-THROMBECT &/NFS: CPT

## 2022-10-08 PROCEDURE — 6370000000 HC RX 637 (ALT 250 FOR IP): Performed by: PSYCHIATRY & NEUROLOGY

## 2022-10-08 PROCEDURE — 2700000000 HC OXYGEN THERAPY PER DAY

## 2022-10-08 PROCEDURE — 70498 CT ANGIOGRAPHY NECK: CPT

## 2022-10-08 PROCEDURE — 3700000000 HC ANESTHESIA ATTENDED CARE

## 2022-10-08 PROCEDURE — 6360000004 HC RX CONTRAST MEDICATION: Performed by: RADIOLOGY

## 2022-10-08 PROCEDURE — 37799 UNLISTED PX VASCULAR SURGERY: CPT

## 2022-10-08 PROCEDURE — 82805 BLOOD GASES W/O2 SATURATION: CPT

## 2022-10-08 PROCEDURE — 85025 COMPLETE CBC W/AUTO DIFF WBC: CPT

## 2022-10-08 PROCEDURE — 70496 CT ANGIOGRAPHY HEAD: CPT

## 2022-10-08 PROCEDURE — 84484 ASSAY OF TROPONIN QUANT: CPT

## 2022-10-08 PROCEDURE — 80048 BASIC METABOLIC PNL TOTAL CA: CPT

## 2022-10-08 PROCEDURE — 2580000003 HC RX 258: Performed by: PSYCHIATRY & NEUROLOGY

## 2022-10-08 RX ORDER — HYDRALAZINE HYDROCHLORIDE 20 MG/ML
INJECTION INTRAMUSCULAR; INTRAVENOUS PRN
Status: DISCONTINUED | OUTPATIENT
Start: 2022-10-08 | End: 2022-10-08 | Stop reason: SDUPTHER

## 2022-10-08 RX ORDER — SODIUM CHLORIDE 9 MG/ML
INJECTION, SOLUTION INTRAVENOUS PRN
Status: DISCONTINUED | OUTPATIENT
Start: 2022-10-08 | End: 2022-10-21 | Stop reason: HOSPADM

## 2022-10-08 RX ORDER — HEPARIN SODIUM 10000 [USP'U]/ML
INJECTION, SOLUTION INTRAVENOUS; SUBCUTANEOUS
Status: COMPLETED | OUTPATIENT
Start: 2022-10-08 | End: 2022-10-08

## 2022-10-08 RX ORDER — ONDANSETRON 2 MG/ML
4 INJECTION INTRAMUSCULAR; INTRAVENOUS EVERY 6 HOURS PRN
Status: DISCONTINUED | OUTPATIENT
Start: 2022-10-08 | End: 2022-10-21 | Stop reason: HOSPADM

## 2022-10-08 RX ORDER — SODIUM CHLORIDE 0.9 % (FLUSH) 0.9 %
5-40 SYRINGE (ML) INJECTION PRN
Status: DISCONTINUED | OUTPATIENT
Start: 2022-10-08 | End: 2022-10-08 | Stop reason: HOSPADM

## 2022-10-08 RX ORDER — SODIUM CHLORIDE 0.9 % (FLUSH) 0.9 %
5-40 SYRINGE (ML) INJECTION PRN
Status: DISCONTINUED | OUTPATIENT
Start: 2022-10-08 | End: 2022-10-21 | Stop reason: HOSPADM

## 2022-10-08 RX ORDER — LABETALOL HYDROCHLORIDE 5 MG/ML
10 INJECTION, SOLUTION INTRAVENOUS ONCE
Status: COMPLETED | OUTPATIENT
Start: 2022-10-08 | End: 2022-10-08

## 2022-10-08 RX ORDER — LABETALOL HYDROCHLORIDE 5 MG/ML
INJECTION, SOLUTION INTRAVENOUS PRN
Status: DISCONTINUED | OUTPATIENT
Start: 2022-10-08 | End: 2022-10-08 | Stop reason: SDUPTHER

## 2022-10-08 RX ORDER — METOCLOPRAMIDE HYDROCHLORIDE 5 MG/ML
INJECTION INTRAMUSCULAR; INTRAVENOUS PRN
Status: DISCONTINUED | OUTPATIENT
Start: 2022-10-08 | End: 2022-10-08 | Stop reason: SDUPTHER

## 2022-10-08 RX ORDER — SODIUM CHLORIDE 9 MG/ML
INJECTION, SOLUTION INTRAVENOUS PRN
Status: DISCONTINUED | OUTPATIENT
Start: 2022-10-08 | End: 2022-10-08 | Stop reason: HOSPADM

## 2022-10-08 RX ORDER — SODIUM CHLORIDE 0.9 % (FLUSH) 0.9 %
5-40 SYRINGE (ML) INJECTION EVERY 12 HOURS SCHEDULED
Status: DISCONTINUED | OUTPATIENT
Start: 2022-10-08 | End: 2022-10-08 | Stop reason: HOSPADM

## 2022-10-08 RX ORDER — FAMOTIDINE 10 MG/ML
INJECTION, SOLUTION INTRAVENOUS PRN
Status: DISCONTINUED | OUTPATIENT
Start: 2022-10-08 | End: 2022-10-08 | Stop reason: SDUPTHER

## 2022-10-08 RX ORDER — LABETALOL HYDROCHLORIDE 5 MG/ML
5 INJECTION, SOLUTION INTRAVENOUS EVERY 10 MIN PRN
Status: DISCONTINUED | OUTPATIENT
Start: 2022-10-08 | End: 2022-10-21 | Stop reason: HOSPADM

## 2022-10-08 RX ORDER — SODIUM CHLORIDE 0.9 % (FLUSH) 0.9 %
10 SYRINGE (ML) INJECTION ONCE
Status: COMPLETED | OUTPATIENT
Start: 2022-10-08 | End: 2022-10-08

## 2022-10-08 RX ORDER — SODIUM CHLORIDE 0.9 % (FLUSH) 0.9 %
5-40 SYRINGE (ML) INJECTION EVERY 12 HOURS SCHEDULED
Status: DISCONTINUED | OUTPATIENT
Start: 2022-10-08 | End: 2022-10-21 | Stop reason: HOSPADM

## 2022-10-08 RX ORDER — ATORVASTATIN CALCIUM 40 MG/1
40 TABLET, FILM COATED ORAL NIGHTLY
Status: DISCONTINUED | OUTPATIENT
Start: 2022-10-08 | End: 2022-10-11

## 2022-10-08 RX ORDER — ACETAMINOPHEN 325 MG/1
650 TABLET ORAL EVERY 4 HOURS PRN
Status: DISCONTINUED | OUTPATIENT
Start: 2022-10-08 | End: 2022-10-21 | Stop reason: HOSPADM

## 2022-10-08 RX ORDER — FENTANYL CITRATE 50 UG/ML
INJECTION, SOLUTION INTRAMUSCULAR; INTRAVENOUS PRN
Status: DISCONTINUED | OUTPATIENT
Start: 2022-10-08 | End: 2022-10-08 | Stop reason: SDUPTHER

## 2022-10-08 RX ORDER — HYDRALAZINE HYDROCHLORIDE 20 MG/ML
5 INJECTION INTRAMUSCULAR; INTRAVENOUS EVERY 10 MIN PRN
Status: DISCONTINUED | OUTPATIENT
Start: 2022-10-08 | End: 2022-10-12

## 2022-10-08 RX ORDER — DEXTROSE MONOHYDRATE 25 G/50ML
12.5 INJECTION, SOLUTION INTRAVENOUS
Status: DISCONTINUED | OUTPATIENT
Start: 2022-10-08 | End: 2022-10-08 | Stop reason: HOSPADM

## 2022-10-08 RX ORDER — ONDANSETRON 4 MG/1
4 TABLET, ORALLY DISINTEGRATING ORAL EVERY 8 HOURS PRN
Status: DISCONTINUED | OUTPATIENT
Start: 2022-10-08 | End: 2022-10-21 | Stop reason: HOSPADM

## 2022-10-08 RX ORDER — PROPOFOL 10 MG/ML
INJECTION, EMULSION INTRAVENOUS CONTINUOUS PRN
Status: DISCONTINUED | OUTPATIENT
Start: 2022-10-08 | End: 2022-10-08 | Stop reason: SDUPTHER

## 2022-10-08 RX ORDER — SODIUM CHLORIDE 9 MG/ML
INJECTION, SOLUTION INTRAVENOUS CONTINUOUS PRN
Status: DISCONTINUED | OUTPATIENT
Start: 2022-10-08 | End: 2022-10-08 | Stop reason: SDUPTHER

## 2022-10-08 RX ORDER — SODIUM CHLORIDE 0.9 % (FLUSH) 0.9 %
10 SYRINGE (ML) INJECTION ONCE
Status: DISCONTINUED | OUTPATIENT
Start: 2022-10-08 | End: 2022-10-08 | Stop reason: HOSPADM

## 2022-10-08 RX ADMIN — LABETALOL HYDROCHLORIDE 10 MG: 5 INJECTION INTRAVENOUS at 16:32

## 2022-10-08 RX ADMIN — Medication 5000 UNITS: at 18:55

## 2022-10-08 RX ADMIN — SODIUM CHLORIDE: 9 INJECTION, SOLUTION INTRAVENOUS at 18:30

## 2022-10-08 RX ADMIN — SODIUM CHLORIDE 5 MG/HR: 9 INJECTION, SOLUTION INTRAVENOUS at 23:45

## 2022-10-08 RX ADMIN — Medication 1000 ML: at 18:54

## 2022-10-08 RX ADMIN — FAMOTIDINE 20 MG: 10 INJECTION INTRAVENOUS at 21:05

## 2022-10-08 RX ADMIN — Medication 5000 UNITS: at 18:54

## 2022-10-08 RX ADMIN — HYDRALAZINE HYDROCHLORIDE 5 MG: 20 INJECTION INTRAMUSCULAR; INTRAVENOUS at 19:40

## 2022-10-08 RX ADMIN — SODIUM CHLORIDE, PRESERVATIVE FREE 10 ML: 5 INJECTION INTRAVENOUS at 21:08

## 2022-10-08 RX ADMIN — HYDRALAZINE HYDROCHLORIDE 5 MG: 20 INJECTION INTRAMUSCULAR; INTRAVENOUS at 19:36

## 2022-10-08 RX ADMIN — LABETALOL HYDROCHLORIDE 5 MG: 5 INJECTION, SOLUTION INTRAVENOUS at 19:30

## 2022-10-08 RX ADMIN — ATORVASTATIN CALCIUM 40 MG: 40 TABLET, FILM COATED ORAL at 21:05

## 2022-10-08 RX ADMIN — SODIUM CHLORIDE, PRESERVATIVE FREE 10 ML: 5 INJECTION INTRAVENOUS at 17:28

## 2022-10-08 RX ADMIN — LABETALOL HYDROCHLORIDE 10 MG: 5 INJECTION INTRAVENOUS at 17:15

## 2022-10-08 RX ADMIN — LABETALOL HYDROCHLORIDE 5 MG: 5 INJECTION, SOLUTION INTRAVENOUS at 19:20

## 2022-10-08 RX ADMIN — PROPOFOL 50 MCG/KG/MIN: 10 INJECTION, EMULSION INTRAVENOUS at 18:38

## 2022-10-08 RX ADMIN — FAMOTIDINE 20 MG: 10 INJECTION, SOLUTION INTRAVENOUS at 18:38

## 2022-10-08 RX ADMIN — SODIUM CHLORIDE 5 MG/HR: 9 INJECTION, SOLUTION INTRAVENOUS at 17:02

## 2022-10-08 RX ADMIN — SODIUM CHLORIDE 2 MG/HR: 9 INJECTION, SOLUTION INTRAVENOUS at 18:24

## 2022-10-08 RX ADMIN — Medication 25 MG: at 17:28

## 2022-10-08 RX ADMIN — FENTANYL CITRATE 50 MCG: 50 INJECTION, SOLUTION INTRAMUSCULAR; INTRAVENOUS at 18:38

## 2022-10-08 RX ADMIN — LABETALOL HYDROCHLORIDE 10 MG: 5 INJECTION INTRAVENOUS at 16:47

## 2022-10-08 RX ADMIN — METOCLOPRAMIDE HYDROCHLORIDE 5 MG: 5 INJECTION INTRAMUSCULAR; INTRAVENOUS at 18:38

## 2022-10-08 RX ADMIN — IOPAMIDOL 40 ML: 612 INJECTION, SOLUTION INTRAVENOUS at 19:10

## 2022-10-08 RX ADMIN — IOPAMIDOL 100 ML: 755 INJECTION, SOLUTION INTRAVENOUS at 15:40

## 2022-10-08 ASSESSMENT — PAIN - FUNCTIONAL ASSESSMENT: PAIN_FUNCTIONAL_ASSESSMENT: NONE - DENIES PAIN

## 2022-10-08 ASSESSMENT — ENCOUNTER SYMPTOMS
EYE PAIN: 0
NAUSEA: 0
SHORTNESS OF BREATH: 0
SINUS PAIN: 0
ABDOMINAL PAIN: 0
BACK PAIN: 0
DIARRHEA: 0
COUGH: 0
CONSTIPATION: 0
VOMITING: 0

## 2022-10-08 NOTE — ED PROVIDER NOTES
HPI     Patient is a 69 y.o. male presents with a chief complaint of speech difficulty   This has been occurring for since 1:15 pm.  Patient states that it gets better with nothing. Patient states that it gets worse with nothing. Patient states that it is moderate in severity. Patient states it was gradual in onset. Patient arrived from nursing home memory unit after he was found to be more confused and having increased speech difficulty and slurring. He is a poor historian but is accompanied by his son who states that the symptoms began around 1:15 PM at the [facility]. Patient has a PMH of a stroke in August 2020. His son states that his prior stroke started with similar symptoms that he is experiencing today. Patient had an endarterectomy after his first stroke and was checked in May of this year and found to have good blood flow in his carotids. A stroke alert was called. Review of Systems   Constitutional:  Negative for chills and fever. HENT:  Negative for ear pain and sinus pain. Eyes:  Negative for pain. Respiratory:  Negative for cough and shortness of breath. Cardiovascular:  Negative for chest pain. Gastrointestinal:  Negative for abdominal pain, constipation, diarrhea, nausea and vomiting. Genitourinary:  Negative for difficulty urinating, dysuria and flank pain. Musculoskeletal:  Negative for back pain. Skin:  Negative for rash. Neurological:  Positive for speech difficulty and weakness. Negative for dizziness, light-headedness and headaches. Psychiatric/Behavioral:  Negative for confusion. Physical Exam  Constitutional:       General: He is not in acute distress. Appearance: Normal appearance. He is not ill-appearing. HENT:      Head: Normocephalic. Right Ear: External ear normal.      Left Ear: External ear normal.      Nose: Nose normal. No congestion or rhinorrhea.       Mouth/Throat:      Mouth: Mucous membranes are moist.   Eyes: Conjunctiva/sclera: Conjunctivae normal.      Pupils: Pupils are equal, round, and reactive to light. Cardiovascular:      Rate and Rhythm: Normal rate and regular rhythm. Pulses: Normal pulses. Pulmonary:      Effort: Pulmonary effort is normal. No respiratory distress. Breath sounds: Normal breath sounds. No stridor. No wheezing or rales. Abdominal:      General: Abdomen is flat. Bowel sounds are normal. There is no distension. Palpations: Abdomen is soft. Tenderness: There is no abdominal tenderness. There is no guarding. Musculoskeletal:         General: No tenderness. Normal range of motion. Cervical back: Normal range of motion and neck supple. Skin:     General: Skin is warm. Findings: No erythema, lesion or rash. Neurological:      General: No focal deficit present. Mental Status: He is alert. Sensory: No sensory deficit. Motor: No weakness. Comments: NIH scale/score - 8 (right arm and leg drift, speech difficulty, disorientation, aphasia)  AO to self only   Psychiatric:         Behavior: Behavior normal.        Procedures     [facility]       ED Course as of 10/08/22 2034   Sat Oct 08, 2022   1520 NIH Stroke Scale/Score at time of initial evaluation:  1A: Level of Consciousness 0 - alert; keenly responsive  1B: Ask Month and Age 2 - answers neither question correctly  1C:  Tell Patient To Open and Close Eyes, then Hand  Squeeze 0 - performs both tasks correctly  2: Test Horizontal Extraocular Movements 0 - normal  3: Test Visual Fields 0 - no visual loss  4: Test Facial Palsy 1 - minor paralysis (flattened nasolabial fold, asymmetric on smiling)  5A: Test Left Arm Motor Drift 0 - no drift, limb holds 90 (or 45) degrees for full 10 seconds  5B: Test Right Arm Motor Drift 1 - drift, limb holds 90 (or 45) degrees but drifts down before full 10 seconds: does not hit bed  6A: Test Left Leg Motor Drift 0 - no drift; leg holds 30 degree position for full 5 seconds  6B: Test Right Leg Motor Drift 2 - some effort against gravity; leg falls to bed by 5 seconds but has some effort against gravity  7: Test Limb Ataxia   (FNF/Heel-Shin) 1 - present in one limb  8: Test Sensation 0 - normal; no sensory loss  9: Test Language/Aphasia 1 - mild to moderate aphasia; some obvious loss of fluency or facility of comprehension without significant limitation on ideas expressed or form of expression. Reduction of speech and/or comprehension, however, makes conversation about provided materials difficult or impossible. For example, in conversation about provided materials, examiner can identify picture or naming card content from patient's response. 10: Test Dysarthria 1 - mild to moderate, patient slurs at least some words and at worst, can be understood with some difficulty  11: Test Extinction/Inattention 0 - no abnormality  Total Score: 8  10/8/22 at 3:20 PM EDT.     [CF]   2029 Stroke alert called. Patient's last known well was believed to be either between [phone] [CF]   1609 Still awaiting contact with stroke team [CF]   1630 Discussed case with radiologist who noticed left PCA clot. I did update the neurologist who is currently talking to family about this and discussing the possible tPA use. Discussing risks and benefits with the patient's son. In the meantime we are contacting interventional neurology for possible intervention to the left PCA obstruction. [CF]   26 Family understand bleeding risks of tPA especially with hx of stroke but are agreeable. Labetolol given to help lower blood pressure. Cardene ordered as well. [CF]   3697 EKG: This EKG is signed and interpreted by the EP. Time: 16:43  Rate: 74  Rhythm: Sinus  Interpretation: non-specific EKG  Comparison: stable as compared to patient's most recent EKG   [CF]   0209 Spoke with Dr. Shila Patino he recommends emergent transfer for neuro intervention. We are working on neuro intervention transport now. [CF]   1 Spoke with [facility]. Nicole [facility] at [facility] who is excepted patient for transfer to ER. [CF]   1707 Blood pressure has remained elevated despite to dose labetalol Cardene drip initiated [CF]   1707 At this time TNK cannot be started due to elevated blood pressure. [CF]   1730 TNK started after good blood pressures on 2 separate reads transport coming in the next 10 minutes. Updated Dr. Nicole Mondragon. [CF]   3481 Patient has had no change in mental status and no change in strokelike symptoms since tnk was initiated helicopter team has arrived to transport patient. [CF]      ED Course User Index  [CF] Jonathon Durham       Patient is a 69 y.o. male presenting with strokelike symptoms including slurred speech, right-sided weakness, aphasia. History of prior stroke in 2020. Came in via EMS from a memory unit [facility]. Symptoms began around 1:15 PM today. Stroke alert was called. CT showed left PCA clot. Spoke with Dr. Fanny Higgins who recommended emergent transfer for neuro intervention. Spoke with Dr. Marv Grady at [address] [place] [address] who accepted the patient for transfer to the ED. Blood pressure was quite elevated so he was given labetalol and Cardene to bring it down prior to getting tPA which she received after his blood pressure is finally came down around 1700. No change in mental status 30 minutes after tPA was given no change in strokelike symptoms. Helicopter arrived around [phone] and the patient was transported. --------------------------------------------- PAST HISTORY ---------------------------------------------  Past Medical History:  has a past medical history of Dementia ([facility] [address] [facility] Seasonal allergies, Stroke due to stenosis of left carotid artery ([facility] and Vascular dementia ([facility] Past Surgical History:  has a past surgical history that includes hernia repair; Tonsillectomy; cardiovascular stress test (09/03/2020); and Carotid endarterectomy (Left, 10/29/2020).     Social History:  reports that he quit smoking about 2 years ago. His smoking use included cigarettes. He has a 50.00 pack-year smoking history. He has quit using smokeless tobacco. He reports that he does not currently use alcohol after a past usage of about 1.0 standard drink per week. He reports that he does not use drugs. Family History: family history includes Emphysema in his mother. The patients home medications have been reviewed. Allergies: Patient has no known allergies.     -------------------------------------------------- RESULTS -------------------------------------------------    LABS:  Results for orders placed or performed during the hospital encounter of 10/08/22   CBC with Auto Differential   Result Value Ref Range    WBC 6.2 4.5 - 11.5 E9/L    RBC 5.33 3.80 - 5.80 E12/L    Hemoglobin 15.7 12.5 - 16.5 g/dL    Hematocrit 48.1 37.0 - 54.0 %    MCV 90.2 80.0 - 99.9 fL    MCH 29.5 26.0 - 35.0 pg    MCHC 32.6 32.0 - 34.5 %    RDW 13.6 11.5 - 15.0 fL    Platelets 230 692 - 462 E9/L    MPV 11.8 7.0 - 12.0 fL    Neutrophils % 62.0 43.0 - 80.0 %    Immature Granulocytes % 0.5 0.0 - 5.0 %    Lymphocytes % 25.4 20.0 - 42.0 %    Monocytes % 10.0 2.0 - 12.0 %    Eosinophils % 1.6 0.0 - 6.0 %    Basophils % 0.5 0.0 - 2.0 %    Neutrophils Absolute 3.85 1.80 - 7.30 E9/L    Immature Granulocytes # 0.03 E9/L    Lymphocytes Absolute 1.58 1.50 - 4.00 E9/L    Monocytes Absolute 0.62 0.10 - 0.95 E9/L    Eosinophils Absolute 0.10 0.05 - 0.50 E9/L    Basophils Absolute 0.03 0.00 - 0.20 E6/V   Basic Metabolic Panel   Result Value Ref Range    Sodium 131 (L) 132 - 146 mmol/L    Potassium 5.0 3.5 - 5.0 mmol/L    Chloride 94 (L) 98 - 107 mmol/L    CO2 25 22 - 29 mmol/L    Anion Gap 12 7 - 16 mmol/L    Glucose 402 (H) 74 - 99 mg/dL    BUN 14 6 - 23 mg/dL    Creatinine 0.8 0.7 - 1.2 mg/dL    GFR Non-African American >60 >=60 mL/min/1.73    GFR African American >60     Calcium 9.7 8.6 - 10.2 mg/dL   Troponin   Result Value Ref Range    Troponin, High Sensitivity 28 (H) 0 - 11 ng/L   Beta-Hydroxybutyrate   Result Value Ref Range    Beta-Hydroxybutyrate 0.12 0.02 - 0.27 mmol/L   Blood Gas, Arterial   Result Value Ref Range    Date Analyzed [id number]     Time Analyzed 1620     Source: Blood Arterial     pH, Blood Gas 7.409 7.350 - 7.450    PCO2 43.0 35.0 - 45.0 mmHg    PO2 73.4 (L) 75.0 - 100.0 mmHg    HCO3 26.6 (H) 22.0 - 26.0 mmol/L    B.E. 1.6 -3.0 - 3.0 mmol/L    O2 Sat 94.6 92.0 - 98.5 %    O2Hb 92.9 (L) 94.0 - 97.0 %    COHb 1.5 0.0 - 1.5 %    MetHb 0.3 0.0 - 1.5 %    O2 Content 19.7 mL/dL    HHb 5.3 (H) 0.0 - 5.0 %    tHb (est) 15.1 11.5 - 16.5 g/dL    Mode RA     Date Of Collection      Time Collected      Pt Temp 37.0 C     ID [id number]     Lab [id number]     Critical(s) Notified . No Critical Values    POCT Glucose   Result Value Ref Range    Meter Glucose 371 (H) 74 - 99 mg/dL   EKG 12 Lead   Result Value Ref Range    Ventricular Rate 74 BPM    Atrial Rate 74 BPM    P-R Interval 166 ms    QRS Duration 92 ms    Q-T Interval 390 ms    QTc Calculation (Bazett) 432 ms    P Axis 96 degrees    R Axis 75 degrees    T Axis 72 degrees       RADIOLOGY:  CT HEAD WO CONTRAST   Final Result   Addendum (preliminary) 1 of 1   ADDENDUM:   Results were called by Dr. Lua Handler to Dr. Sirena Lopez on 10/8/2022 at    16:25. Final   1. No acute intracranial hemorrhage or edema. 2. Areas of encephalomalacia suggesting chronic stroke involving left frontal   lobe, left insula, and left occipital lobe. 3. High-grade stenosis or occlusion involving P2 segment of left PCA. 4. No stenosis involving internal carotid arteries or vertebral arteries. 5. Abnormal perfusion involving left occipital lobe concerning for   infarction. MRI brain with diffusion-weighted imaging could be helpful for   further evaluation.             CTA HEAD W CONTRAST   Final Result   Addendum (preliminary) 1 of 1   ADDENDUM:   Results were called by Dr. Chanell Wall to Dr. Jamil Galdamez on 10/8/2022 at    16:25. Final   1. No acute intracranial hemorrhage or edema. 2. Areas of encephalomalacia suggesting chronic stroke involving left frontal   lobe, left insula, and left occipital lobe. 3. High-grade stenosis or occlusion involving P2 segment of left PCA. 4. No stenosis involving internal carotid arteries or vertebral arteries. 5. Abnormal perfusion involving left occipital lobe concerning for   infarction. MRI brain with diffusion-weighted imaging could be helpful for   further evaluation. CTA NECK W CONTRAST   Final Result   Addendum (preliminary) 1 of 1   ADDENDUM:   Results were called by Dr. Celi Ortega to Dr. Jamil Galdamez on 10/8/2022 at    16:25. Final   1. No acute intracranial hemorrhage or edema. 2. Areas of encephalomalacia suggesting chronic stroke involving left frontal   lobe, left insula, and left occipital lobe. 3. High-grade stenosis or occlusion involving P2 segment of left PCA. 4. No stenosis involving internal carotid arteries or vertebral arteries. 5. Abnormal perfusion involving left occipital lobe concerning for   infarction. MRI brain with diffusion-weighted imaging could be helpful for   further evaluation. CT BRAIN PERFUSION   Final Result   Addendum (preliminary) 1 of 1   ADDENDUM:   Results were called by Dr. Celi Ortega to Dr. Jamil Galdamez on 10/8/2022 at    16:25. Final   1. No acute intracranial hemorrhage or edema. 2. Areas of encephalomalacia suggesting chronic stroke involving left frontal   lobe, left insula, and left occipital lobe. 3. High-grade stenosis or occlusion involving P2 segment of left PCA. 4. No stenosis involving internal carotid arteries or vertebral arteries. 5. Abnormal perfusion involving left occipital lobe concerning for   infarction. MRI brain with diffusion-weighted imaging could be helpful for   further evaluation.                 EKG:  This EKG is signed and interpreted by me. Rate: 74  Rhythm: Sinus  Interpretation: no acute changes  Comparison: stable as compared to patient's most recent EKG      ------------------------- NURSING NOTES AND VITALS REVIEWED ---------------------------  Date / Time Roomed:  10/8/2022  2:19 PM  ED Bed Assignment:  19/19    The nursing notes within the ED encounter and vital signs as below have been reviewed. Patient Vitals for the past 24 hrs:   BP Temp Pulse Resp SpO2 Height Weight   10/08/22 1731 (!) 164/93 -- -- -- -- -- --   10/08/22 1730 (!) 168/89 -- -- -- -- -- --   10/08/22 1725 (!) 162/86 -- 74 -- -- -- --   10/08/22 1724 (!) 174/93 -- 74 -- -- -- --   10/08/22 1720 (!) 199/107 -- -- -- -- -- --   10/08/22 1715 (!) 209/113 97.1 °F (36.2 °C) 89 -- -- -- --   10/08/22 1700 (!) 216/114 -- -- -- -- -- --   10/08/22 1649 (!) 189/91 -- -- -- -- -- --   10/08/22 1636 (!) 193/83 -- -- -- -- 6' 3\" (1.905 m) 240 lb (108.9 kg)   10/08/22 1544 (!) 217/116 -- -- -- -- -- --   10/08/22 1445 -- 96.9 °F (36.1 °C) -- -- -- -- --   10/08/22 1444 (!) 215/112 -- 91 18 99 % -- --       Oxygen Saturation Interpretation: Normal    ------------------------------------------ PROGRESS NOTES ------------------------------------------  Re-evaluation(s):  Time: [phone]  Patients symptoms show no change  Repeat physical examination is not changed    Counseling:  I have spoken with the patient and discussed todays results, in addition to providing specific details for the plan of care and counseling regarding the diagnosis and prognosis. Their questions are answered at this time and they are agreeable with the plan of admission.    --------------------------------- ADDITIONAL PROVIDER NOTES ---------------------------------  Consultations:  Time: 1700. Spoke with Dr. Reba Thurston. Discussed case. They will admit the patient.   This patient's ED course included: a personal history and physicial examination, re-evaluation prior to disposition, multiple bedside re-evaluations, IV medications, cardiac monitoring, continuous pulse oximetry, and complex medical decision making and emergency management    This patient has remained hemodynamically stable during their ED course. Diagnosis:  1. Cerebrovascular accident (CVA) due to occlusion of left posterior cerebral artery ([address]        Disposition:  Patient's disposition: ED to ED transfer to [facility] via air. Patient's condition is critical.    Patient was given return precautions. Labs were interpreted by me. Patient will follow up with their primary care provider. Patient is agreeable to this plan. Patient has remained stable throughout their stay in the ED. Patient was seen and evaluated by myself and my attending No att. providers found. Assessment and Plan discussed with attending provider, please see attestation for final plan of care. This note was done using dictation software and there may be some grammatical errors associated with this. DO Adonis Miller DO  Resident  10/08/22 DO Camron  10/08/22 2033    ATTENDING PROVIDER ATTESTATION:     I have personally performed and/or participated in the history, exam, medical decision making, and procedures and agree with all pertinent clinical information unless otherwise noted. I have also reviewed and agree with the past medical, family and social history unless otherwise noted. I have discussed this patient in detail with the resident and provided the instruction and education regarding the evidence-based evaluation and treatment of Stroke like symptoms. Any EKG that may have been performed has been personally reviewed by me and I agree with the documentation as noted by the resident. My plan: Symptomatic and supportive care. Patient seen and examined. Concern for stroke NIH stroke score 8.   Stroke team was immediately called imaging revealed PCA occlusion at P2 on the left side case discussed with neurology they recommended TNK initially the patient's blood pressure was too high for prior multiple doses of labetalol and Cardene drip before blood pressure was able to be initiated on TNK. Eventually blood pressure was controlled there is started on TN K. Case discussed with interventional neurology Dr. Sofia Green who agreed to take patient to interventional radiology. Case discussed with Dr. Fab Blood. Patient received TNKase in the department and had no clear new neurologic deficits. Transported downtown by helicopter for further evaluation    Please note that the withdrawal or failure to initiate urgent interventions for this patient would likely result in a life threatening deterioration or permanent disability. Accordingly this patient received 90 minutes of critical care time, excluding separately billable procedures.       Electronically signed by Shannan Thakkar DO on 10/8/22 at 8:34 PM EDT           Shannan Thakkar DO  10/08/22 2034

## 2022-10-08 NOTE — ANESTHESIA PROCEDURE NOTES
Arterial Line:    An arterial line was placed using surface landmarks, in the procedure area for the following indication(s): continuous blood pressure monitoring and blood sampling needed. A 20 gauge (size), 4.45 cm (length), Arrow (type) catheter was placed, Seldinger technique not used, into the left radial artery, secured by tape and CHG impregnated dressing. Anesthesia type: Local    Events:  patient tolerated procedure well with no complications and EBL < 5mL. 10/8/2022 6:40 PM10/8/2022 6:50 PM  Anesthesiologist: Ritesh Gil MD  Resident/CRNA: Mary Hubbard APRN - CRNA  Other anesthesia staff: Ritu Hicks RN  Performed: Anesthesiologist   Preanesthetic Checklist  Completed: patient identified, IV checked, site marked, risks and benefits discussed, surgical/procedural consents, equipment checked, pre-op evaluation, timeout performed, anesthesia consent given, oxygen available, monitors applied/VS acknowledged, fire risk safety assessment completed and verbalized and blood product R/B/A discussed and consented

## 2022-10-08 NOTE — CONSULTS
about provided materials, examiner can identify picture or naming card content from patient's response. 10: Test Dysarthria 1 - mild to moderate, patient slurs at least some words and at worst, can be understood with some difficulty   11: Test Extinction/Inattention 2 - profound srikanth-inattention or srikanth- inattention to more than one modality. Does not recognize own hand or orients only to one side of space    Total 15     SAYS my hand is the nurses hand  Is not aware of anyone on the right side of environment         Labs:      Reviewed      The patient's record including history and physical, available labs and procedures, medications and allergies has been reviewed. PRE-PROCEDURE ATTESTATION STATEMENT  I have explained the potential risks, benefits, and side effects of the proposed procedure/treatment, including the risk of death to the patient and/or surrogate. Also, the possibility for the transfusion of blood or blood components (only if potential for transfusion is applicable) was discussed with risks, benefits, and alternatives. This explanation included discussion of the likelihood of the patient achieving his or her goals and any potential problems that might occur during recuperation. Reasonable alternatives to the proposed procedure/treatment including risks, benefits, and side effects were also discussed, as were the risks related to not receiving the proposed procedure/treatment. The patient and/or surrogate have elected to proceed with the proposed procedure/treatment. .      Sedation and/or anesthesia risk, benefits, and alternatives discussed and questions answered.      Vital Signs:  Vitals:    10/08/22 1811   BP: (!) 158/100   Pulse: (!) 7   Resp: 20   SpO2: 96%         GENERAL: NPO: YES  ASA: ASA 2 - Patient with mild systemic disease with no functional limitations  MALLAMPATI: II (soft palate, uvula, fauces visible)    Anesthesia Plan:  Plan for conscious sedation. / Please see anesthesia plan      Impression & Plan:   1.   Acute Right PCA stroke    Plan   Proceed with EVT    Post tnk protocol

## 2022-10-08 NOTE — ED NOTES
Name: Deny Pearson.  : 1955  MRN: [id number]    Date: 10/8/2022    Benefits of immediately proceeding with Radiology exam outweigh the risks and therefore the following is being waived:      [] Pregnancy test    [] Protocol for Iodine allergy    [] MRI questionnaire    [x] BUN/Creatinine        DO Robert Villa DO  10/08/22 0163

## 2022-10-08 NOTE — PROGRESS NOTES
NEUROINTERVENTION PROCEDURE NOTE    PATIENT NAME: Britney Quiroga Sr. MRN: [id number]  : 1955  DATE OF PROCEDURE: 10/08/22    Stroke Metrics  NIHSS prior to procedure: 15  IV TNK Administered: [x] Yes  []  No  Consent obtained: [x] Yes  []  No  by verbal consent from patient. Neurointerventionalist: Doc Cortez MD  1st assistant Fina Thomas      Time Event Device Notes   5742 Access site puncture   Right femoral        1st pass suction TICI Reperfusion ndgndrndanddndend:nd nd2nd  Access site closure  Sheath pulled and  Angioseal used to close arterial puncture. Puncture site cleansed and dry dressing applied. No bleeding, swelling or complications noted, no change in pulses. IA tPA adminstered: [] Yes  [x]  No       Post-procedure NIHSS unable to assess due to anesthesia/sedation        CT head completed. [address]  Patient transported to NICU and handoff report given to RN at beside.     Family updated: [x] Yes  []  No

## 2022-10-08 NOTE — PROGRESS NOTES
@9834 access center notified of Stroke alert/ speak will telestroke physican  AMS/ Slurred speech  [facility] [address]. Joni Guaman spoke with Dr. Von Grubbs neuro  @4338 access center notified to contact neuro interventional  @8483 Dr. Joni Guaman spoke with Dr. Tierney Covert Dr. Joni Guaman spoke with Dr. Kadi Jack- requests transfer to [facility] ([facility])  Access center aware to arrange stat transport  @1702 Dr. Joni Guaman spoke with Dr Geno Dawkins accepting ER physician  @8492 Stat medevac ETA 12 minutes @ [phone]

## 2022-10-08 NOTE — PROGRESS NOTES
Neurointerventional POST Procedure Note      Date of Service: 10/08/22      Patient Name: Josi Fosterer: 1955  Medical record number:  [id number]        Procedure: Left PCA thrombectomy  Physician: Suha Stokes MD  Assistant: Fina Thomas  Access:Right Fem art  Vessels injected: Left vert, Left PCA  Hemostasis: 8F angio seal  Anesthesia: Please see flowchart  Specimens: None  Blood loss: 50 ml  Contrast Material:  please see dictation in PACS  Fluoro time: Please see dictation in PACS      Diagnosis/Findings:   1. Acute Left  PCA Occlusion , Slow Flow vs Pseudo-occlusion of basilar tip  2. S/p Successful thrombectomy with TICI 3 flow  3. Intraop CT negative for bleed. Plan:  1. Q15 min neuro checks x2 hours, Q30 for 6 hours and q1h for 24 hours and then q4h/q6h till discharge   2. Maintain SBP <150  MAP always greater than 65 unless otherwise specified by us in updated note elsewhere. 3. Neurovascular checks   4. 24 hour CT head   5. STAT CT head for any neurological decline and contact me immediately  6.  Antiplatelet Recs HOLD FOR 24 hours post TNK

## 2022-10-08 NOTE — LETTER
[place] Department Medicaid  CERTIFICATION OF NECESSITY  FOR NON-EMERGENCY TRANSPORTATION   BY GROUND AMBULANCE      Individual Information   1. Name: Janki Garcia Sr. 2. [facility] Billing Number:    3. Address: [facility] [address] [place] [address]      Transportation Provider Information   4. Provider Name:      5. [place] Medicaid Provider Number:  National Provider Identifier (NPI):      Certification  7. Criteria:  During transport, this individual requires:  [x] Medical treatment or continuous     supervision by an EMT. [] The administration or regulation of oxygen by another person. [] Supervised protective restraint. 8. Period Beginning Date:      5. Length  [x] Not more than 1 day(s)  [] One Year     Additional Information Relevant to Certification   10. Comments or Explanations, If Necessary or Appropriate     Acute stroke s/p thrombectomy and TNK, Alert to person only, Vascular dementia, HTN, Decreased coordination and safety awarness     Certifying Practitioner Information   11. Name of Practitioner:  Dr Wilver Boyce MD   12. [place] Medicaid Provider Number, If Applicable:   [address] Provider Identifier (NPI):       Signature Information   14. Date of Signature:   13. Name of Person Signin. Signature and Professional Designation:        M [id number]  Rev. 2015  [address] Encounter Date/Time: 10/8/2022 [address] Account: [de-identified]    MRN: [id number]    Patient: Aishwarya Reina Serial #: [id number]      ENCOUNTER          Patient Class: I Private Enc? No Unit RM BD: SEYZ [address] 3139/8780-B   Hospital Service: [facility]   Encounter DX: Acute CVA (cerebrovascul*   ADM Provider: Wilver Boyce MD   Procedure:     ATT Provider: Wilver Boyce MD   REF Provider:        Admission DX: Acute CVA (cerebrovascular accident) [facility]) and DX codes: I63.9      PATIENT                 Name: Wendy Nagy. : 1955 (67 yrs)   Address:  [address] [facility] [address] Sex: Male   City: [facility] [address]         Marital Status:    Employer: RETIRED         Adventism: Hoahaoism   Primary Care Provider: Ashwin Jean-Baptiste DO         Primary Phone: [phone] [address]   Contact Name Legal Guardian? Relationship to Patient Home Phone Work Phone   1. Michael Quiroga Jr  2. KimberTacho No    Child  Brother/Sister [phone]              GUARANTOR            Guarantor: Luis Quiroga     : 1955   Address: [address] [place] [address] Sex: [place]     [place] [address]     Relation to Patient: Self       Home Phone: [phone]   Guarantor ID: [id number]       Work Phone:     Guarantor Employer: RETIRED         Status: RETIRED      COVERAGE        PRIMARY INSURANCE   Payor: Genia TIRADO* Plan: Genia BYNUM D*   Payor Address: [place] [address], [address] [facility]       [id number] Type: INDEMNITY   Subscriber Name: Whitney CHAU SR. Subscriber : 1955   Subscriber ID: [id number] Pat. Rel. to Sub: Self   SECONDARY INSURANCE   Payor:   Plan:     Payor Address:  ,           Group Number:   Insurance Type:     Subscriber Name:   Subscriber :     Subscriber ID:   Pat.  Rel. to Sub:           CSN: [id number]

## 2022-10-08 NOTE — ANESTHESIA PRE PROCEDURE
Department of Anesthesiology  Preprocedure Note       Name:  Wendy Nagy.   Age:  69 y.o.  :  1955                                          MRN:  [id number]         Date:  10/8/2022      Surgeon: Elisabeth Nuñez    Procedure: Thrombectomy  Medications prior to admission:   Prior to Admission medications    Medication Sig Start Date End Date Taking? Authorizing Provider   rivastigmine (EXELON) 9.5 MG/24HR  4/15/22   Historical Provider, MD   mirtazapine (REMERON) 15 MG tablet Take 15 mg by mouth daily 22   Historical Provider, MD   vitamin B-12 (CYANOCOBALAMIN) 1000 MCG tablet Take 1 tablet by mouth daily 21   Flores Bravo MD   HYDROcodone-acetaminophen (NORCO) 5-325 MG per tablet Take 1 tablet by mouth every 6 hours as needed for Pain.     Historical Provider, MD   Fexofenadine-Pseudoephedrine (ALLEGRA-D 12 HOUR PO) Take by mouth    Historical Provider, MD   donepezil (ARICEPT) 5 MG tablet Take 1 tablet by mouth nightly  Patient not taking: Reported on 2022   Flores Bravo MD   Ascorbic Acid (VITAMIN C) 250 MG tablet Take 250 mg by mouth daily  Patient not taking: Reported on 2022    Historical Provider, MD   loratadine (CLARITIN) 10 MG capsule Take 10 mg by mouth daily as needed    Historical Provider, MD   hydrOXYzine (ATARAX) 25 MG tablet Take 25 mg by mouth 2 times daily as needed for Itching     Historical Provider, MD   aluminum & magnesium hydroxide-simethicone (MAALOX) 200-200-20 MG/5ML SUSP suspension Take 5 mLs by mouth every 6 hours as needed for Indigestion    Historical Provider, MD   Dextromethorphan-guaiFENesin  MG/5ML SYRP Take 5 mLs by mouth every 4 hours as needed for Cough    Historical Provider, MD   magnesium hydroxide (MILK OF MAGNESIA) 400 MG/5ML suspension Take by mouth daily as needed for Constipation    Historical Provider, MD   loperamide (IMODIUM) 2 MG capsule Take 2 mg by mouth 4 times daily as needed for Diarrhea    Historical Provider, MD   docusate sodium (COLACE) 100 MG capsule Take 100 mg by mouth 2 times daily    Historical Provider, MD   acetaminophen (TYLENOL) 325 MG tablet Take 650 mg by mouth every 6 hours as needed for Pain    Historical Provider, MD   aspirin 81 MG EC tablet Take 1 tablet by mouth daily 9/15/20   Olamide Cooper MD   glipiZIDE (GLUCOTROL) 5 MG tablet Take 1 tablet by mouth every morning (before breakfast)  Patient taking differently: Take 10 mg by mouth every morning (before breakfast)  9/15/20   Olamide Cooper MD   metFORMIN (GLUCOPHAGE) 500 MG tablet Take 1 tablet by mouth 2 times daily (with meals)  Patient taking differently: Take 1,000 mg by mouth 2 times daily (with meals)  9/14/20   Olamide Cooper MD   atorvastatin (LIPITOR) 40 MG tablet Take 1 tablet by mouth nightly 9/14/20   Olamide Cooper MD   albuterol sulfate HFA (PROVENTIL HFA) 108 (90 BASE) MCG/ACT inhaler Inhale 2 puffs into the lungs every 4 hours as needed for Wheezing 3/11/17 5/23/22  John Bending, DO       Current medications:    Current Facility-Administered Medications   Medication Dose Route Frequency Provider Last Rate Last Admin    niCARdipine (CARDENE) 50 mg in sodium chloride 0.9 % 250 mL infusion  2.5-15 mg/hr IntraVENous Continuous Katerina Trotter MD 10 mL/hr at 10/08/22 1830 2 mg/hr at 10/08/22 1830     Current Outpatient Medications   Medication Sig Dispense Refill    rivastigmine (EXELON) 9.5 MG/24HR       mirtazapine (REMERON) 15 MG tablet Take 15 mg by mouth daily      vitamin B-12 (CYANOCOBALAMIN) 1000 MCG tablet Take 1 tablet by mouth daily 30 tablet 3    HYDROcodone-acetaminophen (NORCO) 5-325 MG per tablet Take 1 tablet by mouth every 6 hours as needed for Pain.       Fexofenadine-Pseudoephedrine (ALLEGRA-D 12 HOUR PO) Take by mouth      donepezil (ARICEPT) 5 MG tablet Take 1 tablet by mouth nightly (Patient not taking: Reported on 5/23/2022) 30 tablet 2    Ascorbic Acid (VITAMIN C) 250 MG tablet Take 250 mg by mouth daily (Patient not taking: Reported on 5/23/2022)      loratadine (CLARITIN) 10 MG capsule Take 10 mg by mouth daily as needed      hydrOXYzine (ATARAX) 25 MG tablet Take 25 mg by mouth 2 times daily as needed for Itching       aluminum & magnesium hydroxide-simethicone (MAALOX) 200-200-20 MG/5ML SUSP suspension Take 5 mLs by mouth every 6 hours as needed for Indigestion      Dextromethorphan-guaiFENesin  MG/5ML SYRP Take 5 mLs by mouth every 4 hours as needed for Cough      magnesium hydroxide (MILK OF MAGNESIA) 400 MG/5ML suspension Take by mouth daily as needed for Constipation      loperamide (IMODIUM) 2 MG capsule Take 2 mg by mouth 4 times daily as needed for Diarrhea      docusate sodium (COLACE) 100 MG capsule Take 100 mg by mouth 2 times daily      acetaminophen (TYLENOL) 325 MG tablet Take 650 mg by mouth every 6 hours as needed for Pain      aspirin 81 MG EC tablet Take 1 tablet by mouth daily 30 tablet 0    glipiZIDE (GLUCOTROL) 5 MG tablet Take 1 tablet by mouth every morning (before breakfast) (Patient taking differently: Take 10 mg by mouth every morning (before breakfast) ) 30 tablet 0    metFORMIN (GLUCOPHAGE) 500 MG tablet Take 1 tablet by mouth 2 times daily (with meals) (Patient taking differently: Take 1,000 mg by mouth 2 times daily (with meals) ) 60 tablet 0    atorvastatin (LIPITOR) 40 MG tablet Take 1 tablet by mouth nightly 30 tablet 0    albuterol sulfate HFA (PROVENTIL HFA) 108 (90 BASE) MCG/ACT inhaler Inhale 2 puffs into the lungs every 4 hours as needed for Wheezing 1 Inhaler zero     Facility-Administered Medications Ordered in Other Encounters   Medication Dose Route Frequency Provider Last Rate Last Admin    propofol injection   IntraVENous Continuous PRN Tasha Smith APRN - CRNA 32.67 mL/hr at 10/08/22 1838 50 mcg/kg/min at 10/08/22 1838    fentaNYL (SUBLIMAZE) injection   IntraVENous PRN Tasha Smith, APRN - CRNA   50 mcg at 10/08/22 1838    0.9 % sodium chloride infusion   IntraVENous Continuous PRN Payam Sidreynold, APRN - CRNA   New Bag at 10/08/22 1830    metoclopramide (REGLAN) injection   IntraVENous PRN Payam Sidles, APRN - CRNA   5 mg at 10/08/22 1838    famotidine (PEPCID) injection   IntraVENous PRN Payam Sidles, APRN - CRNA   20 mg at 10/08/22 0582       Allergies:  No Known Allergies    Problem List:    Patient Active Problem List   Diagnosis Code    Acute ischemic left MCA stroke ([facility]) I63.512    Middle cerebral artery stenosis, left I66.02    Dysarthria R47.1    Uncontrolled type 2 diabetes mellitus with hyperglycemia ([facility]) E11.65    Tobacco dependence F17.200    COPD (chronic obstructive pulmonary disease) ([facility] J44.9    Stroke due to stenosis of left carotid artery ([facility]) B31.049    Acute CVA (cerebrovascular accident) ([facility] I63.9    Symptomatic stenosis of left carotid artery I65.22    Asymptomatic stenosis of right carotid artery I65.21    History of left-sided carotid endarterectomy Z98.890    History of stroke Z86.73    Dementia ([facility] F03.90    Type 2 diabetes mellitus ([facility] E11.9       Past Medical History:        Diagnosis Date    Dementia ([facility]     Diabetes mellitus ([facility]     Seasonal allergies     Stroke due to stenosis of left carotid artery ([facility] 2020    Vascular dementia [facility])        Past Surgical History:        Procedure Laterality Date    CARDIOVASCULAR STRESS TEST  2020    Lexiscan stress test    CAROTID ENDARTERECTOMY Left 10/29/2020    LEFT CAROTID ENDARTERECTOMY -- FACILITY performed by [facility] at [facility]      TONSILLECTOMY         Social History:    Social History     Tobacco Use    Smoking status: Former     Packs/day: 1.00     Years: 50.00     Pack years: 50.00     Types: Cigarettes     Quit date: 2020     Years since quittin.1    Smokeless tobacco: Former   Substance Use Topics    Alcohol use: Not Currently Alcohol/week: 1.0 standard drink     Types: 1 Cans of beer per week     Comment: on occassion                                Counseling given: Not Answered      Vital Signs (Current):   Vitals:    10/08/22 1811   BP: (!) 158/100   Pulse: (!) 7   Resp: 20   SpO2: 96%                                              BP Readings from Last 3 Encounters:   10/08/22 (!) 158/100   10/08/22 (!) 164/93   03/11/21 (!) 142/86       NPO Status: NPO >8 hrs prior to procedure                CT Brain Perfusion 10/8/2022         1.  No acute intracranial hemorrhage or edema. 2. Areas of encephalomalacia suggesting chronic stroke involving left frontal   lobe, left insula, and left occipital lobe. 3. High-grade stenosis or occlusion involving P2 segment of left PCA. 4. No stenosis involving internal carotid arteries or vertebral arteries. 5. Abnormal perfusion involving left occipital lobe concerning for   infarction.  MRI brain with diffusion-weighted imaging could be helpful for   further evaluation.         CT Head 10/8/2022  1.  No acute intracranial hemorrhage or edema. 2. Areas of encephalomalacia suggesting chronic stroke involving left frontal   lobe, left insula, and left occipital lobe. 3. High-grade stenosis or occlusion involving P2 segment of left PCA. 4. No stenosis involving internal carotid arteries or vertebral arteries. 5. Abnormal perfusion involving left occipital lobe concerning for   infarction.  MRI brain with diffusion-weighted imaging could be helpful for   further evaluation.        BMI:   Wt Readings from Last 3 Encounters:   10/08/22 240 lb (108.9 kg)   05/17/21 230 lb (104.3 kg)   03/11/21 232 lb (105.2 kg)     There is no height or weight on file to calculate BMI.    CBC:   Lab Results   Component Value Date/Time    WBC 6.2 10/08/2022 03:19 PM    RBC 5.33 10/08/2022 03:19 PM    HGB 15.7 10/08/2022 03:19 PM    HCT 48.1 10/08/2022 03:19 PM    MCV 90.2 10/08/2022 03:19 PM    RDW 13.6 10/08/2022 03:19 PM     10/08/2022 03:19 PM       CMP:   Lab Results   Component Value Date/Time     10/08/2022 03:19 PM    K 5.0 10/08/2022 03:19 PM    K 4.5 10/29/2020 06:00 AM    CL 94 10/08/2022 03:19 PM    CO2 25 10/08/2022 03:19 PM    BUN 14 10/08/2022 03:19 PM    CREATININE 0.8 10/08/2022 03:19 PM    GFRAA >60 10/08/2022 03:19 PM    LABGLOM >60 10/08/2022 03:19 PM    GLUCOSE 402 10/08/2022 03:19 PM    PROT 6.9 08/31/2020 01:16 PM    CALCIUM 9.7 10/08/2022 03:19 PM    BILITOT 0.7 08/31/2020 01:16 PM    ALKPHOS 72 08/31/2020 01:16 PM    AST 10 08/31/2020 01:16 PM    ALT 11 08/31/2020 01:16 PM       POC Tests: No results for input(s): POCGLU, POCNA, POCK, POCCL, POCBUN, POCHEMO, POCHCT in the last 72 hours.     Coags:   Lab Results   Component Value Date/Time    PROTIME 10.9 10/29/2020 10:45 AM    INR 1.0 10/29/2020 10:45 AM    APTT 35.3 08/31/2020 01:16 PM       HCG (If Applicable): No results found for: PREGTESTUR, PREGSERUM, HCG, HCGQUANT     ABGs: No results found for: PHART, PO2ART, UYV0OSB, ELJ5KMT, BEART, L0WCKVQB     Type & Screen (If Applicable):  No results found for: LABABO, LABRH    Drug/Infectious Status (If Applicable):  No results found for: HIV, HEPCAB    COVID-19 Screening (If Applicable):   Lab Results   Component Value Date/Time    COVID19 Not Detected 09/11/2020 04:08 PM           Anesthesia Evaluation  Patient summary reviewed and Nursing notes reviewed no history of anesthetic complications:   Airway: Mallampati: III  TM distance: <3 FB   Neck ROM: full  Mouth opening: > = 3 FB   Dental:      Comment: Blood noted in oropharynx  Multiple missing    Pulmonary:   (+) COPD:                             Cardiovascular:            Rhythm: regular                   ROS comment: Upon admission sbp's 200's currently on nicardipine gtt     Neuro/Psych:   (+) CVA (2020):, psychiatric history (Vascular dementia):             ROS comment: Right PCA supplies MCA territory , old left mca stroke, collateral failure - pt here today for left PCA thrombectomy GI/Hepatic/Renal:             Endo/Other:    (+) DiabetesType II DM, poorly controlled, , .          Pt had no PAT visit        ROS comment: TNK at 1728 Abdominal:             Vascular: Other Findings:           Anesthesia Plan      MAC     ASA 4 - emergent       Induction: intravenous. arterial line  MIPS: Postoperative opioids intended. Anesthetic plan and risks discussed with patient. Plan discussed with attending.                     CARROLL Sofia - CRNA   10/8/2022

## 2022-10-08 NOTE — VIRTUAL HEALTH
Consults  Patient Location:  [address] [facility] Emergency Department    Provider Location (City/State): [address] [place] [address] Stroke and Vascular Neurology Consult for  [address] Stroke Alert through [address] @ 4:08 pm   10/8/2022 4:19 PM  Pt Name: Sophia Flores MRN: [id number]  YOB: 1955  Date of evaluation: 10/8/2022  Primary Care Physician: Sukumar Mahmood DO  Reason for Evaluation: Stroke Evaluation with Discussion with Ed or primary team with Telemedicine and stroke evaluation with Review of imaging and labs    Sophia Flores is a 69 y.o. male with pmh significant for Htn, DM, prior left MCA stroke, unclear etiology with residual right-sided weakness, aphasia, presented today with acute onset worsening of aphasia, confusion and worsening right upper and lower extremity weakness. Patient lives in assisted living facility. Per the staff at assisted-living facility, patient was apparently at his baseline at around 1 PM when symptoms started. NIH stroke scale in ER was 8. Stroke alert was called and I evaluated the patient personally over telehealth robot. LKW: 13:00  NIH:  8    Allergies  has No Known Allergies. Medications  Prior to Admission medications    Medication Sig Start Date End Date Taking? Authorizing Provider   rivastigmine (EXELON) 9.5 MG/24HR  4/15/22   Historical Provider, MD   mirtazapine (REMERON) 15 MG tablet Take 15 mg by mouth daily 5/6/22   Historical Provider, MD   vitamin B-12 (CYANOCOBALAMIN) 1000 MCG tablet Take 1 tablet by mouth daily 9/1/21   Martha Cobos MD   HYDROcodone-acetaminophen (NORCO) 5-325 MG per tablet Take 1 tablet by mouth every 6 hours as needed for Pain.     Historical Provider, MD   Fexofenadine-Pseudoephedrine (ALLEGRA-D 12 HOUR PO) Take by mouth    Historical Provider, MD   donepezil (ARICEPT) 5 MG tablet Take 1 tablet by mouth nightly  Patient not taking: Reported on 5/23/2022 4/5/21   Hayes Matute MD   Ascorbic Acid (VITAMIN C) 250 MG tablet Take 250 mg by mouth daily  Patient not taking: Reported on 5/23/2022    Historical Provider, MD   loratadine (CLARITIN) 10 MG capsule Take 10 mg by mouth daily as needed    Historical Provider, MD   hydrOXYzine (ATARAX) 25 MG tablet Take 25 mg by mouth 2 times daily as needed for Itching     Historical Provider, MD   aluminum & magnesium hydroxide-simethicone (MAALOX) 200-200-20 MG/5ML SUSP suspension Take 5 mLs by mouth every 6 hours as needed for Indigestion    Historical Provider, MD   Dextromethorphan-guaiFENesin  MG/5ML SYRP Take 5 mLs by mouth every 4 hours as needed for Cough    Historical Provider, MD   magnesium hydroxide (MILK OF MAGNESIA) 400 MG/5ML suspension Take by mouth daily as needed for Constipation    Historical Provider, MD   loperamide (IMODIUM) 2 MG capsule Take 2 mg by mouth 4 times daily as needed for Diarrhea    Historical Provider, MD   docusate sodium (COLACE) 100 MG capsule Take 100 mg by mouth 2 times daily    Historical Provider, MD   acetaminophen (TYLENOL) 325 MG tablet Take 650 mg by mouth every 6 hours as needed for Pain    Historical Provider, MD   aspirin 81 MG EC tablet Take 1 tablet by mouth daily 9/15/20   Romina Pate MD   glipiZIDE (GLUCOTROL) 5 MG tablet Take 1 tablet by mouth every morning (before breakfast)  Patient taking differently: Take 10 mg by mouth every morning (before breakfast)  9/15/20   Romina Pate MD   metFORMIN (GLUCOPHAGE) 500 MG tablet Take 1 tablet by mouth 2 times daily (with meals)  Patient taking differently: Take 1,000 mg by mouth 2 times daily (with meals)  9/14/20   Romina Pate MD   atorvastatin (LIPITOR) 40 MG tablet Take 1 tablet by mouth nightly 9/14/20   Romina Pate MD   albuterol sulfate HFA (PROVENTIL HFA) 108 (90 BASE) MCG/ACT inhaler Inhale 2 puffs into the lungs every 4 hours as needed for Wheezing 3/11/17 5/23/22  Nette Ruvalcaba, DO    Scheduled Meds:  Continuous Infusions:   niCARdipene (CARDENE) 50 mg in 250 mL 0.9 % sodium chloride infusion       PRN Meds:.  Past Medical History   has a past medical history of Dementia ([facility] Diabetes mellitus ([facility] Seasonal allergies, Stroke due to stenosis of left carotid artery ([facility] and Vascular dementia ([facility] Social History  Social History     Socioeconomic History    Marital status:      Spouse name: Not on file    Number of children: 1    Years of education: Not on file    Highest education level: Not on file   Occupational History    Not on file   Tobacco Use    Smoking status: Former     Packs/day: 1.00     Years: 50.00     Pack years: 50.00     Types: Cigarettes     Quit date: 2020     Years since quittin.1    Smokeless tobacco: Former   Vaping Use    Vaping Use: Never used   Substance and Sexual Activity    Alcohol use: Not Currently     Alcohol/week: 1.0 standard drink     Types: 1 Cans of beer per week     Comment: on occassion    Drug use: No    Sexual activity: Not on file   Other Topics Concern    Not on file   Social History Narrative    Not on file     Social Determinants of Health     Financial Resource Strain: Not on file   Food Insecurity: Not on file   Transportation Needs: Not on file   Physical Activity: Not on file   Stress: Not on file   Social Connections: Not on file   Intimate Partner Violence: Not on file   Housing Stability: Not on file     Family History      Problem Relation Age of Onset    Emphysema Mother        OBJECTIVE  BP (!) 217/116   Pulse 91   Temp 96.9 °F (36.1 °C)   Resp 18   SpO2 99%        Pre-Morbid mRS: 04    Imaging:  Images were personally reviewed with ARLETTE RICHARDSON used to review images including:  CT brain without contrast: No evidence of any acute internal abnormality  CT perfusion: Left medial temporal lobe occlusion deficit  CTA imaging: Left PCA acute P2 occlusion.     Assessment    67 y.o. male with pmh significant for Htn, DM, prior left MCA stroke, unclear etiology with residual right-sided weakness, aphasia, presented today with acute onset worsening of aphasia, confusion and worsening right upper and lower extremity weakness. NIH stroke scale was 8. Last known normal was 1 PM.  Initial systolic blood pressure was 215, BGL was 353. CT angiogram head and neck, personally reviewed, showed left PCA P2 occlusion with perfusion deficit in left PCA distribution area. Recommendations:    Patient is an IV-TNK candidate:    No history of recent bleeding, no history of brain bleed/aneurysm/brain tumor, or recent surgery/trauma. IV-TNK Consent: I have informed the patient and/or family of all the associated risks including 1% of sich/death, benefits of potential improved thrombolysis, and alternative of using IV tenectaplase instead of IV t-PA. The patient and/or family voluntarily consent to the administration of IV tenectaplase. --Admit the patient to an intensive care or stroke unit for monitoring. --If the patient develops severe headache, acute hypertension, nausea, or vomiting or has a worsening neurological examination, discontinue the infusion and obtain emergent CT scan. --Measure blood pressure and perform neurological assessments every 15 minutes during and after IV rtPA infusion for 2 hours, then every 30 minutes for 6 hours, then hourly until 24 hours after IV rtPA treatment. --Increase the frequency of blood pressure measurements if systolic blood pressure is >180 mmHg or if diastolic blood pressure is >105 mmHg; administer antihypertensive medications to maintain blood pressure at or below these levels  --Delay placement of nasogastric tubes, indwelling bladder catheters, or intra-arterial pressure catheters if the patient can be safely managed without them.    --Obtain a follow-up CT or MRI scan at 24 hours after IV rtPA before starting anticoagulants or antiplatelet agents. No antiplatelet agent or SQ heparinoids for 24 hrs. --Keep HOB < 15 degrees  --Keep NPO unless passes bedside dysphagia screen or swallow evaluation. --Obtain MRI brain w/o contrast, MRA or CTA of head and neck (carotid u/s if MRA and CTA not available), transthoracic echo-to r/o structural heart disease that can lead to source of emboli, fasting lipid panel, HgbA1c.   --Smoking cessation education and nicotine patch if indicated  --IVF with NS @ 100cc per hour  --PT/OT/SLP     --Interventional neurologist Dr. Phillip Rangel was contacted, decision for thrombectomy per Dr. Phillip Rangel. Discussed with ED Physician    At least 35 min of critical care time spent through telemedicine robot at patient bedside (via interactive/real-time software) as patient is in imminent and life threatening deterioration with further treatment and evaluation. This Virtual Visit was conducted with patient's (and/or legal guardian's) consent, to provide telestroke consultation and necessary medical care. Time spent examining patient, reviewing the images personally, reviewing the chart, perform high complexity decision making and speaking with the nursing staff regarding recommendations        Yandy Parikh MD  Stroke, Neurocritical Care And/or [address] [place] [address] Stroke [address] [place] [address]   Electronically signed 10/8/2022 at 4:19 PM     This virtual visit was conducted via interactive/real-time audio/video.

## 2022-10-09 ENCOUNTER — APPOINTMENT (OUTPATIENT)
Dept: CT IMAGING | Age: 67
DRG: 023 | End: 2022-10-09
Payer: COMMERCIAL

## 2022-10-09 LAB
ALBUMIN SERPL-MCNC: 4.2 G/DL (ref 3.5–5.2)
ALP BLD-CCNC: 98 U/L (ref 40–129)
ALT SERPL-CCNC: 23 U/L (ref 0–40)
ANION GAP SERPL CALCULATED.3IONS-SCNC: 17 MMOL/L (ref 7–16)
AST SERPL-CCNC: 12 U/L (ref 0–39)
BILIRUB SERPL-MCNC: 1 MG/DL (ref 0–1.2)
BILIRUBIN DIRECT: 0.2 MG/DL (ref 0–0.3)
BILIRUBIN, INDIRECT: 0.8 MG/DL (ref 0–1)
BUN BLDV-MCNC: 14 MG/DL (ref 6–23)
CALCIUM SERPL-MCNC: 9.3 MG/DL (ref 8.6–10.2)
CHLORIDE BLD-SCNC: 95 MMOL/L (ref 98–107)
CO2: 19 MMOL/L (ref 22–29)
CREAT SERPL-MCNC: 0.7 MG/DL (ref 0.7–1.2)
EKG ATRIAL RATE: 74 BPM
EKG P AXIS: 96 DEGREES
EKG P-R INTERVAL: 166 MS
EKG Q-T INTERVAL: 390 MS
EKG QRS DURATION: 92 MS
EKG QTC CALCULATION (BAZETT): 432 MS
EKG R AXIS: 75 DEGREES
EKG T AXIS: 72 DEGREES
EKG VENTRICULAR RATE: 74 BPM
GFR AFRICAN AMERICAN: >60
GFR SERPL CREATININE-BSD FRML MDRD: 112 ML/MIN/1.73
GLUCOSE BLD-MCNC: 340 MG/DL (ref 74–99)
HBA1C MFR BLD: 11.3 % (ref 4–5.6)
HCT VFR BLD CALC: 46 % (ref 37–54)
HEMOGLOBIN: 15.2 G/DL (ref 12.5–16.5)
MCH RBC QN AUTO: 28.8 PG (ref 26–35)
MCHC RBC AUTO-ENTMCNC: 33 % (ref 32–34.5)
MCV RBC AUTO: 87.3 FL (ref 80–99.9)
METER GLUCOSE: 243 MG/DL (ref 74–99)
METER GLUCOSE: 263 MG/DL (ref 74–99)
METER GLUCOSE: 297 MG/DL (ref 74–99)
PDW BLD-RTO: 13.7 FL (ref 11.5–15)
PLATELET # BLD: 175 E9/L (ref 130–450)
PMV BLD AUTO: 11.5 FL (ref 7–12)
POTASSIUM SERPL-SCNC: 4.2 MMOL/L (ref 3.5–5)
RBC # BLD: 5.27 E12/L (ref 3.8–5.8)
SODIUM BLD-SCNC: 131 MMOL/L (ref 132–146)
TOTAL PROTEIN: 7.1 G/DL (ref 6.4–8.3)
WBC # BLD: 12.4 E9/L (ref 4.5–11.5)

## 2022-10-09 PROCEDURE — 85027 COMPLETE CBC AUTOMATED: CPT

## 2022-10-09 PROCEDURE — 2700000000 HC OXYGEN THERAPY PER DAY

## 2022-10-09 PROCEDURE — 2000000000 HC ICU R&B

## 2022-10-09 PROCEDURE — 70450 CT HEAD/BRAIN W/O DYE: CPT

## 2022-10-09 PROCEDURE — 82248 BILIRUBIN DIRECT: CPT

## 2022-10-09 PROCEDURE — A4216 STERILE WATER/SALINE, 10 ML: HCPCS | Performed by: PSYCHIATRY & NEUROLOGY

## 2022-10-09 PROCEDURE — 6370000000 HC RX 637 (ALT 250 FOR IP): Performed by: NURSE PRACTITIONER

## 2022-10-09 PROCEDURE — 82962 GLUCOSE BLOOD TEST: CPT

## 2022-10-09 PROCEDURE — 2580000003 HC RX 258: Performed by: PSYCHIATRY & NEUROLOGY

## 2022-10-09 PROCEDURE — S5553 INSULIN LONG ACTING 5 U: HCPCS | Performed by: INTERNAL MEDICINE

## 2022-10-09 PROCEDURE — 99291 CRITICAL CARE FIRST HOUR: CPT | Performed by: NURSE PRACTITIONER

## 2022-10-09 PROCEDURE — 80053 COMPREHEN METABOLIC PANEL: CPT

## 2022-10-09 PROCEDURE — 37799 UNLISTED PX VASCULAR SURGERY: CPT

## 2022-10-09 PROCEDURE — 6370000000 HC RX 637 (ALT 250 FOR IP): Performed by: INTERNAL MEDICINE

## 2022-10-09 PROCEDURE — 2500000003 HC RX 250 WO HCPCS: Performed by: PSYCHIATRY & NEUROLOGY

## 2022-10-09 PROCEDURE — 83036 HEMOGLOBIN GLYCOSYLATED A1C: CPT

## 2022-10-09 RX ORDER — AMLODIPINE BESYLATE 5 MG/1
5 TABLET ORAL DAILY
Status: DISCONTINUED | OUTPATIENT
Start: 2022-10-09 | End: 2022-10-10

## 2022-10-09 RX ORDER — DEXTROSE MONOHYDRATE 100 MG/ML
INJECTION, SOLUTION INTRAVENOUS CONTINUOUS PRN
Status: DISCONTINUED | OUTPATIENT
Start: 2022-10-09 | End: 2022-10-21 | Stop reason: HOSPADM

## 2022-10-09 RX ORDER — INSULIN LISPRO 100 [IU]/ML
0-16 INJECTION, SOLUTION INTRAVENOUS; SUBCUTANEOUS
Status: DISCONTINUED | OUTPATIENT
Start: 2022-10-09 | End: 2022-10-11

## 2022-10-09 RX ORDER — ASPIRIN 300 MG/1
300 SUPPOSITORY RECTAL DAILY
Status: DISCONTINUED | OUTPATIENT
Start: 2022-10-09 | End: 2022-10-10

## 2022-10-09 RX ORDER — INSULIN GLARGINE-YFGN 100 [IU]/ML
10 INJECTION, SOLUTION SUBCUTANEOUS NIGHTLY
Status: DISCONTINUED | OUTPATIENT
Start: 2022-10-09 | End: 2022-10-10

## 2022-10-09 RX ORDER — INSULIN LISPRO 100 [IU]/ML
0-4 INJECTION, SOLUTION INTRAVENOUS; SUBCUTANEOUS NIGHTLY
Status: DISCONTINUED | OUTPATIENT
Start: 2022-10-09 | End: 2022-10-11

## 2022-10-09 RX ORDER — MIRTAZAPINE 15 MG/1
15 TABLET, FILM COATED ORAL NIGHTLY
Status: DISCONTINUED | OUTPATIENT
Start: 2022-10-09 | End: 2022-10-11

## 2022-10-09 RX ORDER — RIVASTIGMINE 9.5 MG/24H
1 PATCH, EXTENDED RELEASE TRANSDERMAL DAILY
Status: DISCONTINUED | OUTPATIENT
Start: 2022-10-09 | End: 2022-10-21 | Stop reason: HOSPADM

## 2022-10-09 RX ADMIN — FAMOTIDINE 20 MG: 10 INJECTION INTRAVENOUS at 22:23

## 2022-10-09 RX ADMIN — INSULIN LISPRO 8 UNITS: 100 INJECTION, SOLUTION INTRAVENOUS; SUBCUTANEOUS at 11:47

## 2022-10-09 RX ADMIN — LABETALOL HYDROCHLORIDE 5 MG: 5 INJECTION, SOLUTION INTRAVENOUS at 08:46

## 2022-10-09 RX ADMIN — INSULIN LISPRO 4 UNITS: 100 INJECTION, SOLUTION INTRAVENOUS; SUBCUTANEOUS at 16:32

## 2022-10-09 RX ADMIN — LABETALOL HYDROCHLORIDE 5 MG: 5 INJECTION, SOLUTION INTRAVENOUS at 09:04

## 2022-10-09 RX ADMIN — INSULIN GLARGINE-YFGN 10 UNITS: 100 INJECTION, SOLUTION SUBCUTANEOUS at 22:23

## 2022-10-09 RX ADMIN — LABETALOL HYDROCHLORIDE 5 MG: 5 INJECTION, SOLUTION INTRAVENOUS at 18:43

## 2022-10-09 RX ADMIN — AMLODIPINE BESYLATE 5 MG: 5 TABLET ORAL at 10:05

## 2022-10-09 RX ADMIN — LABETALOL HYDROCHLORIDE 5 MG: 5 INJECTION, SOLUTION INTRAVENOUS at 05:24

## 2022-10-09 RX ADMIN — LABETALOL HYDROCHLORIDE 5 MG: 5 INJECTION, SOLUTION INTRAVENOUS at 07:13

## 2022-10-09 RX ADMIN — FAMOTIDINE 20 MG: 10 INJECTION INTRAVENOUS at 08:46

## 2022-10-09 RX ADMIN — LABETALOL HYDROCHLORIDE 5 MG: 5 INJECTION, SOLUTION INTRAVENOUS at 11:29

## 2022-10-09 RX ADMIN — SODIUM CHLORIDE, PRESERVATIVE FREE 10 ML: 5 INJECTION INTRAVENOUS at 22:27

## 2022-10-09 RX ADMIN — SODIUM CHLORIDE, PRESERVATIVE FREE 10 ML: 5 INJECTION INTRAVENOUS at 09:04

## 2022-10-09 NOTE — CONSULTS
[facility]   INTENSIVE CARE UNIT     CRITICAL CARE ATTENDING PROGRESS NOTE    I have examined the patient, reviewed the record, and discussed the case with the APN/  Resident. I have reviewed all relevant labs and imaging data. Please refer to the  APN/ resident's note. I agree with the  assessment and plan with the following corrections/ additions. The following summarizes my clinical findings and independent assessment. CC: Speech difficulty    HOSPITAL COURSE:  78-year-old who came from a nursing home memory unit to be confused and increased speech difficulty and slurring words. Patient has a significant past medical history of a stroke back in 2020. Patient received TN K per telestroke. Patient underwent a left PCA thrombectomy for occlusion on 10/8    EXAM:  Mild left-sided facial droop  Confused  Currently in restraints after pulling out his IV  Breathing comfortably  Abdomen soft nontender nondistended  Slightly weaker  strength on left compared to right    ASSESSMENT:  Principal Problem:    Acute CVA (cerebrovascular accident) ([facility]  Active Problems:    History of stroke    Dementia ([facility]    Type 2 diabetes mellitus ([facility]  Resolved Problems:    * No resolved hospital problems. *       PLAN:  Acute left PCA occlusion, status post thrombectomy  Keep systolic less than 514 and MAP greater than 65  Serial neurovascular checks  Hold antiplatelets for 24 hours  CT head 24 hours-tomorrow-evaluate for delayed hemorrhage    N.p.o.-swallow study    History of dementia-resume home meds tomorrow    Endo: Monitor Blood Sugars. Target blood glucose less than 180 in the ICU.       DVT prophylaxis--SCDS     Lines--peripheral IV  CODE: FULL    DISPOSITION-Continue ICU Care    Critical care time exclusive of teaching and procedures = 35 min     I provided critical care to a patient with neurological insult (acute left PCA occlusion status post thrombectomy) requiring frequent and emergent imaging, lab studies, intensive monitoring and data review as well as urgent coordination with multiple specialists. Pt needs continuous ICU monitoring because the patient is at risk for deterioration from a neurological standpoint. Lencho Schultz MD, FACS  10/8/2022  10:35 PM    NOTE: This report was transcribed using voice recognition software. Every effort was made to ensure accuracy; however, inadvertent computerized transcription errors may be present.

## 2022-10-09 NOTE — PROGRESS NOTES
Patient's son, Harsh Sullivan, updated on patient care.  All questions and concerns addressed at this time

## 2022-10-09 NOTE — H&P
[facility]  History and Physical      CHIEF COMPLAINT:    Chief Complaint   Patient presents with    Cerebrovascular Accident     [place] transfer; pt to go to IR        Patient of Ashwin Jean-Baptiste DO presents with:  Acute CVA (cerebrovascular accident) [facility])    History of Present Illness:   Patient is a 64-year-old male with a past medical history of dementia, diabetes, CVA, and vascular dementia. Patient arrived via EMS from facility after he was found to be more confused and having increased speech difficulty and slurring. Patient is a poor historian information obtained through EHR. Patient had a PMH of a stroke in August 2020. His son stated that his prior stroke started with similar symptoms that he was experiencing yesterday. Patient had an endarterectomy after his for stroke was checked in May of this year was found to have good flow in his carotids. ER work-up a stroke alert was called NIHSS score 15 and the recommendation was initiate IV tenecteplase, admit patient to the neuro ICU, initiate fluids, patient taken to interventional radiology for a left PCA thrombectomy and admitted to the ICU. On evaluation, he is unable to answer any questions appropriately. He does have a significant droop on the left face  Pressure intermittently elevated since admission to the floor requiring IV labetalol  Case discussed with RN at bedside      REVIEW OF SYSTEMS:  Pertinent negatives are above in HPI. 10 point ROS otherwise negative.       Past Medical History:   Diagnosis Date    Dementia ([address]     Diabetes mellitus ([facility]     Seasonal allergies     Stroke due to stenosis of left carotid artery ([facility] 9/2/2020    Vascular dementia [facility])          Past Surgical History:   Procedure Laterality Date    CARDIOVASCULAR STRESS TEST  09/03/2020    Lexiscan stress test    CAROTID ENDARTERECTOMY Left 10/29/2020    LEFT CAROTID ENDARTERECTOMY -- FACILITY performed by Qing Swenson MD at [facility] OR    HERNIA REPAIR      TONSILLECTOMY         Medications Prior to Admission:    Medications Prior to Admission: rivastigmine (EXELON) 9.5 MG/24HR,   mirtazapine (REMERON) 15 MG tablet, Take 15 mg by mouth daily  vitamin B-12 (CYANOCOBALAMIN) 1000 MCG tablet, Take 1 tablet by mouth daily  HYDROcodone-acetaminophen (NORCO) 5-325 MG per tablet, Take 1 tablet by mouth every 6 hours as needed for Pain.   Fexofenadine-Pseudoephedrine (ALLEGRA-D 12 HOUR PO), Take by mouth  donepezil (ARICEPT) 5 MG tablet, Take 1 tablet by mouth nightly (Patient not taking: Reported on 5/23/2022)  Ascorbic Acid (VITAMIN C) 250 MG tablet, Take 250 mg by mouth daily (Patient not taking: Reported on 5/23/2022)  loratadine (CLARITIN) 10 MG capsule, Take 10 mg by mouth daily as needed  hydrOXYzine (ATARAX) 25 MG tablet, Take 25 mg by mouth 2 times daily as needed for Itching   aluminum & magnesium hydroxide-simethicone (MAALOX) 200-200-20 MG/5ML SUSP suspension, Take 5 mLs by mouth every 6 hours as needed for Indigestion  Dextromethorphan-guaiFENesin  MG/5ML SYRP, Take 5 mLs by mouth every 4 hours as needed for Cough  magnesium hydroxide (MILK OF MAGNESIA) 400 MG/5ML suspension, Take by mouth daily as needed for Constipation  loperamide (IMODIUM) 2 MG capsule, Take 2 mg by mouth 4 times daily as needed for Diarrhea  docusate sodium (COLACE) 100 MG capsule, Take 100 mg by mouth 2 times daily  acetaminophen (TYLENOL) 325 MG tablet, Take 650 mg by mouth every 6 hours as needed for Pain  aspirin 81 MG EC tablet, Take 1 tablet by mouth daily  glipiZIDE (GLUCOTROL) 5 MG tablet, Take 1 tablet by mouth every morning (before breakfast) (Patient taking differently: Take 10 mg by mouth every morning (before breakfast) )  metFORMIN (GLUCOPHAGE) 500 MG tablet, Take 1 tablet by mouth 2 times daily (with meals) (Patient taking differently: Take 1,000 mg by mouth 2 times daily (with meals) )  atorvastatin (LIPITOR) 40 MG tablet, Take 1 tablet by mouth nightly  albuterol sulfate HFA (PROVENTIL HFA) 108 (90 BASE) MCG/ACT inhaler, Inhale 2 puffs into the lungs every 4 hours as needed for Wheezing    Note that the patient's home medications were reviewed and the above list is accurate to the best of my knowledge at the time of the exam.    Allergies:    Patient has no known allergies. Social History:    reports that he quit smoking about 2 years ago. His smoking use included cigarettes. He has a 50.00 pack-year smoking history. He has quit using smokeless tobacco. He reports that he does not currently use alcohol after a past usage of about 1.0 standard drink per week. He reports that he does not use drugs. Family History:   family history includes Emphysema in his mother. PHYSICAL EXAM:    Vitals:  BP (!) 165/76   Pulse 79   Temp 98.4 °F (36.9 °C) (Axillary)   Resp 18   SpO2 95%       General appearance: NAD, conversant but confused  Eyes: Sclerae anicteric, PERRLA  HEENT: AT/NC, MMM  Neck: FROM, supple, no thyromegaly  Lymph: No cervical / supraclavicular lymphadenopathy  Lungs: Clear to auscultation, WOB normal  CV: RRR, no MRGs, no lower extremity edema  Abdomen: Soft, non-tender; no masses or HSM, +BS  Extremities: FROM without synovitis. No clubbing or cyanosis of the hands. Skin: no rash, induration, lesions, or ulcers  Psych: Calm and cooperative. Normal judgement and insight. Normal mood and affect. Neuro: Awake, answers questions but incorrectly    LABS:  All labs reviewed.   Of note:  CBC with Differential:    Lab Results   Component Value Date/Time    WBC 12.4 10/09/2022 05:01 AM    RBC 5.27 10/09/2022 05:01 AM    HGB 15.2 10/09/2022 05:01 AM    HCT 46.0 10/09/2022 05:01 AM     10/09/2022 05:01 AM    MCV 87.3 10/09/2022 05:01 AM    MCH 28.8 10/09/2022 05:01 AM    MCHC 33.0 10/09/2022 05:01 AM    RDW 13.7 10/09/2022 05:01 AM    NRBC 1.3 03/11/2017 04:40 PM    LYMPHOPCT 25.4 10/08/2022 03:19 PM    MONOPCT 10.0 10/08/2022 03:19 PM BASOPCT 0.5 10/08/2022 03:19 PM    MONOSABS 0.62 10/08/2022 03:19 PM    LYMPHSABS 1.58 10/08/2022 03:19 PM    EOSABS 0.10 10/08/2022 03:19 PM    BASOSABS 0.03 10/08/2022 03:19 PM     CMP:    Lab Results   Component Value Date/Time     10/09/2022 05:01 AM    K 4.2 10/09/2022 05:01 AM    K 4.5 10/29/2020 06:00 AM    CL 95 10/09/2022 05:01 AM    CO2 19 10/09/2022 05:01 AM    BUN 14 10/09/2022 05:01 AM    CREATININE 0.7 10/09/2022 05:01 AM    GFRAA >60 10/09/2022 05:01 AM    LABGLOM >60 10/09/2022 05:01 AM    GLUCOSE 340 10/09/2022 05:01 AM    PROT 7.1 10/09/2022 05:01 AM    LABALBU 4.2 10/09/2022 05:01 AM    CALCIUM 9.3 10/09/2022 05:01 AM    BILITOT 1.0 10/09/2022 05:01 AM    ALKPHOS 98 10/09/2022 05:01 AM    AST 12 10/09/2022 05:01 AM    ALT 23 10/09/2022 05:01 AM       Imaging:  CT head: No acute intracranial hemorrhage or edema. Areas of encephalomalacia suggesting chronic stroke involving left frontal lobe, left insula, and left occipital lobe. High-grade stenosis or occlusion involving P2 segment of the left PCA. No stenosis involving internal carotid arteries or vertebral arteries. Abnormal perfusion involving left occipital lobe concerning for infarction. EKG:  I've personally reviewed the patient's EKG:  NSR    Telemetry:  I've personally reviewed the patient's telemetry:    ASSESSMENT/PLAN:  Principal Problem:    Acute CVA (cerebrovascular accident) [facility])  Active Problems:    History of stroke    Dementia ([facility]    Type 2 diabetes mellitus ([facility]  Resolved Problems:    * No resolved hospital problems.  *    64-year-old male with a history of a CVA in 2020 presented to the ED with altered mental status and slurred speech and patient was admitted postop to ICU with    Acute CVA with left PCA occlusion  10/8/2022 successful thrombectomy of left PCA  MRI pending  Postprocedure CT head pending  NIHSS score q shift  Hold antiplatelets for 24 hours post TN K  Monitor blood pressure-maintain SBP less than 150 and MAP greater than 65 adjust medications as needed.-As needed IV labetalol  Discussed risk factor modification. ICU vitals  Echo-pending  Neurology service to follow      Diabetes Mellitus  -Monitor labs  -ISS glucose control  -Hypoglycemia protocol initiated  HgbA1c - 11.3-noncompliance with medications  Start Lantus 10 units at bedtime and titrate towards discharge  Establish with endocrinology as outpatient  Education to maintain a hgbA1c less than 7, once patient more alert    Case discussed with nursing at bedside  Medication for other comorbidities continue as appropriate dose adjustment as necessary.     DVT prophylaxis  PT OT  Discharge planning      Code status: Full  Requires inpatient level of care    Caitlyn Clayton MD  12:18 PM  10/9/2022

## 2022-10-09 NOTE — PROGRESS NOTES
Pt refused to participate in the NIH assessment. He giggled and said \"no. \" Pt also refused water.

## 2022-10-09 NOTE — PROGRESS NOTES
Patient confused and became aggressive physically with staff. Patient started to pull out IV and art line. Patient placed in 4 point restraints. Clinical nurse manager notified. Dr. Jailene Millan notified of patient status and restraint orders requested.

## 2022-10-09 NOTE — PLAN OF CARE
Problem: Discharge Planning  Goal: Discharge to home or other facility with appropriate resources  10/9/2022 1220 by Kiran Posadas RN  Outcome: Progressing  Flowsheets (Taken 10/9/2022 0800)  Discharge to home or other facility with appropriate resources: Identify barriers to discharge with patient and caregiver  10/9/2022 0124 by Darline Webb RN  Outcome: Kat Fritz (Taken 10/8/2022 1938)  Discharge to home or other facility with appropriate resources: Identify barriers to discharge with patient and caregiver     Problem: Respiratory - Adult  Goal: Achieves optimal ventilation and oxygenation  10/9/2022 1220 by Kiran Posadas RN  Outcome: Progressing  10/9/2022 0124 by Darline Webb RN  Outcome: Progressing  Flowsheets (Taken 10/8/2022 1938)  Achieves optimal ventilation and oxygenation:   Assess for changes in respiratory status   Assess for changes in mentation and behavior   Position to facilitate oxygenation and minimize respiratory effort   Oxygen supplementation based on oxygen saturation or arterial blood gases     Problem: Cardiovascular - Adult  Goal: Maintains optimal cardiac output and hemodynamic stability  10/9/2022 1220 by Kiran Posadas RN  Outcome: Progressing  10/9/2022 0124 by Darline Webb RN  Outcome: Progressing  [address] (Taken 10/8/2022 1938)  Maintains optimal cardiac output and hemodynamic stability:   Monitor blood pressure and heart rate   Monitor urine output and notify Licensed Independent Practitioner for values outside of normal range  Goal: Absence of cardiac dysrhythmias or at baseline  10/9/2022 1220 by Kiran Posadas RN  Outcome: Progressing  10/9/2022 0124 by Darline Webb RN  Outcome: Progressing  Flowsheets (Taken 10/8/2022 1938)  Absence of cardiac dysrhythmias or at baseline:   Monitor cardiac rate and rhythm   Assess for signs of decreased cardiac output     Problem: Musculoskeletal - Adult  Goal: Return mobility to safest level of function  10/9/2022 1220 by Chele Dover RN  Outcome: Progressing  Flowsheets (Taken 10/9/2022 0800)  Return Mobility to Safest Level of Function: Assess patient stability and activity tolerance for standing, transferring and ambulating with or without assistive devices  10/9/2022 0124 by Sophie Christianson RN  Outcome: Progressing  Flowsheets (Taken 10/8/2022 1938)  Return Mobility to Safest Level of Function: Assess patient stability and activity tolerance for standing, transferring and ambulating with or without assistive devices  Goal: Maintain proper alignment of affected body part  10/9/2022 1220 by Chele Dover RN  Outcome: Progressing  Flowsheets (Taken 10/9/2022 0800)  Maintain proper alignment of affected body part: Support and protect limb and body alignment per provider's orders  10/9/2022 0124 by Sophie Christianson RN  Outcome: Progressing  Flowsheets (Taken 10/8/2022 1938)  Maintain proper alignment of affected body part: Support and protect limb and body alignment per provider's orders  Goal: Return ADL status to a safe level of function  10/9/2022 1220 by Chele Dover RN  Outcome: Progressing  Flowsheets (Taken 10/9/2022 0800)  Return ADL Status to a Safe Level of Function: Administer medication as ordered  10/9/2022 0124 by Sophie Christianson RN  Outcome: Progressing  Flowsheets (Taken 10/8/2022 1938)  Return ADL Status to a Safe Level of Function:   Administer medication as ordered   Assess activities of daily living deficits and provide assistive devices as needed     Problem: Gastrointestinal - Adult  Goal: Minimal or absence of nausea and vomiting  10/9/2022 1220 by hCele Dover RN  Outcome: Progressing  10/9/2022 0124 by Sophie Christianson RN  Outcome: Progressing  Flowsheets (Taken 10/8/2022 1938)  Minimal or absence of nausea and vomiting: Administer IV fluids as ordered to ensure adequate hydration     Problem: Genitourinary - Adult  Goal: Urinary catheter remains patent  10/9/2022 1220 by Chele Dover RN  Outcome: Progressing  Flowsheets (Taken 10/9/2022 0800)  Urinary catheter remains patent: Assess patency of urinary catheter  10/9/2022 0124 by Benoit Blunt RN  Outcome: Progressing  Flowsheets (Taken 10/8/2022 1938)  Urinary catheter remains patent: Assess patency of urinary catheter     Problem: Hematologic - Adult  Goal: Maintains hematologic stability  10/9/2022 1220 by Rosemarie Doran RN  Outcome: Progressing  Flowsheets (Taken 10/9/2022 0800)  Maintains hematologic stability: Assess for signs and symptoms of bleeding or hemorrhage  10/9/2022 0124 by Benoit Blunt RN  Outcome: Progressing  Flowsheets (Taken 10/8/2022 1938)  Maintains hematologic stability: Assess for signs and symptoms of bleeding or hemorrhage     Problem: Safety - Medical Restraint  Goal: Remains free of injury from restraints (Restraint for Interference with Medical Device)  Description: INTERVENTIONS:  1. Determine that other, less restrictive measures have been tried or would not be effective before applying the restraint  2. Evaluate the patient's condition at the time of restraint application  3. Inform patient/family regarding the reason for restraint  4.  Q2H: Monitor safety, psychosocial status, comfort, nutrition and hydration  10/9/2022 1220 by Rosemarie Doran RN  Outcome: Progressing  Flowsheets  Taken 10/9/2022 1220  Remains free of injury from restraints (restraint for interference with medical device):   Determine that other, less restrictive measures have been tried or would not be effective before applying the restraint   Evaluate the patient's condition at the time of restraint application   Every 2 hours: Monitor safety, psychosocial status, comfort, nutrition and hydration  Taken 10/9/2022 0800  Remains free of injury from restraints (restraint for interference with medical device): Determine that other, less restrictive measures have been tried or would not be effective before applying the restraint  10/9/2022 0124 by Benoit Blunt RN  Outcome: Progressing  Flowsheets (Taken 10/9/2022 0112)  Remains free of injury from restraints (restraint for interference with medical device):   Determine that other, less restrictive measures have been tried or would not be effective before applying the restraint   Evaluate the patient's condition at the time of restraint application   Inform patient/family regarding the reason for restraint   Every 2 hours: Monitor safety, psychosocial status, comfort, nutrition and hydration     Problem: Neurosensory - Adult  Goal: Achieves stable or improved neurological status  10/9/2022 1220 by Rosemarie Doran RN  Outcome: Progressing  Flowsheets  Taken 10/9/2022 1220  Achieves stable or improved neurological status:   Assess for and report changes in neurological status   Initiate measures to prevent increased intracranial pressure  Taken 10/9/2022 0800  Achieves stable or improved neurological status: Assess for and report changes in neurological status  10/9/2022 0124 by Benoit Blunt RN  Outcome: Progressing  Flowsheets (Taken 10/8/2022 1938)  Achieves stable or improved neurological status:   Assess for and report changes in neurological status   Maintain blood pressure and fluid volume within ordered parameters to optimize cerebral perfusion and minimize risk of hemorrhage  Goal: Absence of seizures  10/9/2022 1220 by Rosemarie Doran RN  Outcome: Progressing  Flowsheets (Taken 10/9/2022 0800)  Absence of seizures: Monitor for seizure activity. If seizure occurs, document type and location of movements and any associated apnea  10/9/2022 0124 by Benoit Blunt RN  Outcome: Progressing  Flowsheets (Taken 10/8/2022 1938)  Absence of seizures: Monitor for seizure activity.   If seizure occurs, document type and location of movements and any associated apnea  Goal: Remains free of injury related to seizures activity  10/9/2022 1220 by Rosemarie Doran RN  Outcome: Progressing  Flowsheets (Taken 10/9/2022 0800)  Remains free of injury related to seizure activity: Maintain airway, patient safety  and administer oxygen as ordered  10/9/2022 0124 by Darline Webb RN  Outcome: Progressing  Flowsheets (Taken 10/8/2022 1938)  Remains free of injury related to seizure activity: Maintain airway, patient safety  and administer oxygen as ordered  Goal: Achieves maximal functionality and self care  10/9/2022 1220 by Kiran Posadas RN  Outcome: Progressing  Flowsheets (Taken 10/9/2022 0800)  Achieves maximal functionality and self care: Monitor swallowing and airway patency with patient fatigue and changes in neurological status  10/9/2022 0124 by Darline Webb RN  Outcome: Progressing  Flowsheets (Taken 10/8/2022 1938)  Achieves maximal functionality and self care: Monitor swallowing and airway patency with patient fatigue and changes in neurological status     Problem: Skin/Tissue Integrity - Adult  Goal: Skin integrity remains intact  10/9/2022 1220 by Kiran Posadas RN  Outcome: Progressing  Flowsheets (Taken 10/9/2022 0800)  Skin Integrity Remains Intact: Monitor for areas of redness and/or skin breakdown  10/9/2022 0124 by Darline Webb RN  Outcome: Progressing  Flowsheets (Taken 10/8/2022 1938)  Skin Integrity Remains Intact:   Monitor for areas of redness and/or skin breakdown   Assess vascular access sites hourly  Goal: Incisions, wounds, or drain sites healing without S/S of infection  10/9/2022 1220 by Kiran Posadas RN  Outcome: Progressing  Flowsheets (Taken 10/9/2022 0800)  Incisions, Wounds, or Drain Sites Healing Without Sign and Symptoms of Infection: TWICE DAILY: Assess and document skin integrity  10/9/2022 0124 by Darline Webb RN  Outcome: Progressing  Flowsheets (Taken 10/8/2022 1938)  Incisions, Wounds, or Drain Sites Healing Without Sign and Symptoms of Infection:   ADMISSION and DAILY: Assess and document risk factors for pressure ulcer development   TWICE DAILY: Assess and document skin integrity   TWICE DAILY: Assess and document dressing/incision, wound bed, drain sites and surrounding tissue  Goal: Oral mucous membranes remain intact  10/9/2022 1220 by Terrie Henriquez RN  Outcome: Progressing  Flowsheets (Taken 10/9/2022 0800)  Oral Mucous Membranes Remain Intact: Assess oral mucosa and hygiene practices  10/9/2022 0124 by Alyssa Fox RN  Outcome: Progressing  Flowsheets (Taken 10/8/2022 1938)  Oral Mucous Membranes Remain Intact: Assess oral mucosa and hygiene practices

## 2022-10-09 NOTE — ANESTHESIA POSTPROCEDURE EVALUATION
Department of Anesthesiology  Postprocedure Note    Patient: Anna Burns MRN: [id number]  YOB: 1955  Date of evaluation: 10/8/2022      Procedure Summary     Date: 10/08/22 Room / Location: [address] [place] [address]; [facility] Radiology; [facility] CT Scan    Anesthesia Start: 4079 Anesthesia Stop: 1942    Procedures:       CT HEAD WO CONTRAST      CTA HEAD W CONTRAST      CTA NECK W CONTRAST      CT BRAIN PERFUSION      IR MECHANICAL ART THROMBECTOMY INTRACRANIAL Diagnosis:       (slurred speach)      (stroke like symptom)      (stroke)      (stroke)      (STROKE)    Scheduled Providers: [facility] General Radiologist Responsible Provider: Radha Pugh MD    Anesthesia Type: MAC ASA Status: 4 - Emergent          Anesthesia Type: No value filed.     Deni Phase I:      Deni Phase II:        Anesthesia Post Evaluation    Patient location during evaluation: PACU  Patient participation: complete - patient participated  Level of consciousness: awake  Pain score: 0  Airway patency: patent  Nausea & Vomiting: no nausea  Complications: no  Cardiovascular status: hemodynamically stable  Respiratory status: acceptable  Hydration status: stable

## 2022-10-09 NOTE — PLAN OF CARE
Problem: Discharge Planning  Goal: Discharge to home or other facility with appropriate resources  Outcome: Progressing  Flowsheets (Taken 10/8/2022 1938)  Discharge to home or other facility with appropriate resources: Identify barriers to discharge with patient and caregiver     Problem: Safety - Violent/Self-destructive Restraint  Goal: Remains free of injury from restraints (Restraint for Violent/Self-Destructive Behavior)  Description: INTERVENTIONS:  1. Determine that de-escalation and other, less restrictive measures have been tried or would not be effective before applying the restraint  2. Identify and document the criteria for restraint  3. Evaluate the patient's condition at the time of restraint application  4. Inform patient/family regarding the reason for restraint/seclusion  5. Q2H: Monitor comfort, nutrition and hydration needs  6. Q15M: Perform safety checks including skin, circulation, sensory, respiratory and psychological status  7. Ensure continuous observation  8.  Identify and implement measures to help patient regain control, assess readiness for release and initiate progressive release per policy  Outcome: Completed  Flowsheets (Taken 10/8/2022 2231)  Remains Free of Injury from Restraints (Restraint for Violent/Self-destructive Behavior):   Determine that de-escalation and other, less restrictive measures have been tried or would not be effective before applying the restraint   Every 2 hours: Monitor comfort, nutrition and hydration needs   Every 15 minutes: Perform safety checks including skin, circulation, sensory, respiratory and psychological status   Evaluate the patient's condition at the time of restraint application     Problem: Respiratory - Adult  Goal: Achieves optimal ventilation and oxygenation  Outcome: Progressing  Flowsheets (Taken 10/8/2022 1938)  Achieves optimal ventilation and oxygenation:   Assess for changes in respiratory status   Assess for changes in mentation and behavior   Position to facilitate oxygenation and minimize respiratory effort   Oxygen supplementation based on oxygen saturation or arterial blood gases     Problem: Cardiovascular - Adult  Goal: Maintains optimal cardiac output and hemodynamic stability  Outcome: Progressing  Flowsheets (Taken 10/8/2022 1938)  Maintains optimal cardiac output and hemodynamic stability:   Monitor blood pressure and heart rate   Monitor urine output and notify Licensed Independent Practitioner for values outside of normal range  Goal: Absence of cardiac dysrhythmias or at baseline  Outcome: Progressing  Flowsheets (Taken 10/8/2022 1938)  Absence of cardiac dysrhythmias or at baseline:   Monitor cardiac rate and rhythm   Assess for signs of decreased cardiac output     Problem: Musculoskeletal - Adult  Goal: Return mobility to safest level of function  Outcome: Progressing  Flowsheets (Taken 10/8/2022 1938)  Return Mobility to Safest Level of Function: Assess patient stability and activity tolerance for standing, transferring and ambulating with or without assistive devices  Goal: Maintain proper alignment of affected body part  Outcome: Progressing  Flowsheets (Taken 10/8/2022 1938)  Maintain proper alignment of affected body part: Support and protect limb and body alignment per provider's orders  Goal: Return ADL status to a safe level of function  Outcome: Progressing  Flowsheets (Taken 10/8/2022 1938)  Return ADL Status to a Safe Level of Function:   Administer medication as ordered   Assess activities of daily living deficits and provide assistive devices as needed     Problem: Gastrointestinal - Adult  Goal: Minimal or absence of nausea and vomiting  Outcome: Progressing  Flowsheets (Taken 10/8/2022 1938)  Minimal or absence of nausea and vomiting: Administer IV fluids as ordered to ensure adequate hydration     Problem: Genitourinary - Adult  Goal: Urinary catheter remains patent  Outcome: Progressing  Flowsheets (Taken 10/8/2022 1938)  Urinary catheter remains patent: Assess patency of urinary catheter     Problem: Hematologic - Adult  Goal: Maintains hematologic stability  Outcome: Progressing  Flowsheets (Taken 10/8/2022 1938)  Maintains hematologic stability: Assess for signs and symptoms of bleeding or hemorrhage     Problem: Neurosensory - Adult  Goal: Achieves stable or improved neurological status  Outcome: Progressing  Flowsheets (Taken 10/8/2022 1938)  Achieves stable or improved neurological status:   Assess for and report changes in neurological status   Maintain blood pressure and fluid volume within ordered parameters to optimize cerebral perfusion and minimize risk of hemorrhage  Goal: Absence of seizures  Outcome: Progressing  Flowsheets (Taken 10/8/2022 1938)  Absence of seizures: Monitor for seizure activity.   If seizure occurs, document type and location of movements and any associated apnea  Goal: Remains free of injury related to seizures activity  Outcome: Progressing  Flowsheets (Taken 10/8/2022 1938)  Remains free of injury related to seizure activity: Maintain airway, patient safety  and administer oxygen as ordered  Goal: Achieves maximal functionality and self care  Outcome: Progressing  Flowsheets (Taken 10/8/2022 1938)  Achieves maximal functionality and self care: Monitor swallowing and airway patency with patient fatigue and changes in neurological status     Problem: Skin/Tissue Integrity - Adult  Goal: Skin integrity remains intact  Outcome: Progressing  Flowsheets (Taken 10/8/2022 1938)  Skin Integrity Remains Intact:   Monitor for areas of redness and/or skin breakdown   Assess vascular access sites hourly  Goal: Incisions, wounds, or drain sites healing without S/S of infection  Outcome: Progressing  Flowsheets (Taken 10/8/2022 1938)  Incisions, Wounds, or Drain Sites Healing Without Sign and Symptoms of Infection:   ADMISSION and DAILY: Assess and document risk factors for pressure ulcer development   TWICE DAILY: Assess and document skin integrity   TWICE DAILY: Assess and document dressing/incision, wound bed, drain sites and surrounding tissue  Goal: Oral mucous membranes remain intact  Outcome: Progressing  Flowsheets (Taken 10/8/2022 1938)  Oral Mucous Membranes Remain Intact: Assess oral mucosa and hygiene practices     Problem: Safety - Medical Restraint  Goal: Remains free of injury from restraints (Restraint for Interference with Medical Device)  Description: INTERVENTIONS:  1. Determine that other, less restrictive measures have been tried or would not be effective before applying the restraint  2. Evaluate the patient's condition at the time of restraint application  3. Inform patient/family regarding the reason for restraint  4.  Q2H: Monitor safety, psychosocial status, comfort, nutrition and hydration  Outcome: Progressing  Flowsheets (Taken 10/9/2022 0112)  Remains free of injury from restraints (restraint for interference with medical device):   Determine that other, less restrictive measures have been tried or would not be effective before applying the restraint   Evaluate the patient's condition at the time of restraint application   Inform patient/family regarding the reason for restraint   Every 2 hours: Monitor safety, psychosocial status, comfort, nutrition and hydration

## 2022-10-09 NOTE — PROGRESS NOTES
Neuro Science Intensive Care Unit  Critical Care  Daily Progress Note 10/9/2022    Date of Admission: 10/8/2022    CC: Critical care management of ischemic stroke status post TN K and thrombectomy      HOSPITAL EVENTS  10 8 presented to the ED with confusion speech difficulty. TN K administered. Had left PCA thrombectomy for occlusion. Admitted to an SICU  10/9 confused and restless overnight requiring four-point restraints  OVERNIGHT EVENTS: T max  98.1 F. Nicardipine drip stopped overnight. Required 3  additional doses of antihypertensive agents in the previous 24 hours. Did not require  insulin in the previous 24 hours. PHYSICAL EXAM:    BP (!) 165/76   Pulse 94   Temp 98.4 °F (36.9 °C) (Axillary)   Resp 21   SpO2 97%       Intake/Output Summary (Last 24 hours) at 10/9/2022 1928  Last data filed at 10/9/2022 1900  Gross per 24 hour   Intake 360.55 ml   Output 3085 ml   Net -2724.45 ml       General appearance:  Comfortable. NEUROLOGIC:        GCS:    4 - Opens eyes on own   6 - Follows simple motor commands  4 - Seems confused, disoriented       Pupil size:  Left 3 mm  Right 3 mm  Pupil reaction: Yes   PERRLA  Wiggles fingers: Left Yes Right Yes  Hand grasp:   Left present     Right present  Wiggles toes: Left Yes    Right Yes  Plantar flexion: Left present    Right present  Facial droop:   No   speech: Has trouble finding words. Speech slow      CONSTITUTIONAL: No acute distress  CARDIOVASCULAR: S1 S2, regular rate, regular rhythm,Monitor: Normal sinus rhythm  PULMONARY:  Respirations unlabored. No rhonchi/rales/wheezes. RENAL:  Eduardo to gravity, clear yellow urine. ABDOMEN: Soft, nontender, nondistended, nontympanic, normal bowel sounds. MUSCULOSKELETAL: Moves all extremities. SKIN/EXTREMITIES: No rashes/ecchymosis, no edema/clubbing, warm/dry, good capillary refill. IV ACCESS:   PIV.       ASSESSMENT/PLAN:     Principal Problem:    Acute CVA (cerebrovascular accident) [facility])  Active Problems:    History of stroke    Dementia ([facility] [address] [facility]    Type 2 diabetes mellitus ([facility] [address] [facility]  Resolved Problems:    * No resolved hospital problems. *        Neuro: Stroke status post TN K, PCA thrombectomy. Hx dementia neurology following. Monitor neuro status. Exelon Remeron. Speech  CV: Hypertension/improving. BP goal 130-160 mm Hg. amlodipine started Lipitor. As needed hydralazine and labetalol. Echo  Pulm: No acute issues. Monitor respiratory status  GI: Passed bedside swallow. Diet. monitor bowel status  Renal: No acute issues monitor uop, renal function and electrolytes  ID: Leukocytosis. Afebrile. Endocrine:   Hyperglycemia. Insulin sliding scale. MSK:. Deconditioned. PT OT. Heme: No acute issues. Monitor CBC        Bowel regime: Dulcolax  Pain control/Sedation:  Acetaminophen  DVT prophylaxis: SCDs. GI prophylaxis: Diet  Glucose protocol: Insulin sliding scale no  Mouth/Eye care: As needed  Eduardo: Keep in place for critical care monitoring of fluid balance. Consults:   Medicine. Neurology. Patient/Family update: We will update as available  Code status: Full full    The patient is at significant risk for life-threatening deterioration and death and requires ongoing critical care management. Critical care time exclusive of teaching and procedures 38 minutes        Disposition:  NSICU.         CHEYANNE Mccarthy  10/9/2022  9:03 AM

## 2022-10-10 ENCOUNTER — APPOINTMENT (OUTPATIENT)
Dept: MRI IMAGING | Age: 67
DRG: 023 | End: 2022-10-10
Payer: COMMERCIAL

## 2022-10-10 LAB
ANION GAP SERPL CALCULATED.3IONS-SCNC: 14 MMOL/L (ref 7–16)
BASOPHILS ABSOLUTE: 0.03 E9/L (ref 0–0.2)
BASOPHILS RELATIVE PERCENT: 0.3 % (ref 0–2)
BUN BLDV-MCNC: 20 MG/DL (ref 6–23)
CALCIUM SERPL-MCNC: 9.3 MG/DL (ref 8.6–10.2)
CHLORIDE BLD-SCNC: 101 MMOL/L (ref 98–107)
CHOLESTEROL, TOTAL: 171 MG/DL (ref 0–199)
CO2: 21 MMOL/L (ref 22–29)
CREAT SERPL-MCNC: 0.7 MG/DL (ref 0.7–1.2)
EOSINOPHILS ABSOLUTE: 0.07 E9/L (ref 0.05–0.5)
EOSINOPHILS RELATIVE PERCENT: 0.7 % (ref 0–6)
GFR AFRICAN AMERICAN: >60
GFR SERPL CREATININE-BSD FRML MDRD: 112 ML/MIN/1.73
GLUCOSE BLD-MCNC: 297 MG/DL (ref 74–99)
HCT VFR BLD CALC: 45.6 % (ref 37–54)
HDLC SERPL-MCNC: 36 MG/DL
HEMOGLOBIN: 14.9 G/DL (ref 12.5–16.5)
IMMATURE GRANULOCYTES #: 0.04 E9/L
IMMATURE GRANULOCYTES %: 0.4 % (ref 0–5)
LDL CHOLESTEROL CALCULATED: 103 MG/DL (ref 0–99)
LYMPHOCYTES ABSOLUTE: 1.29 E9/L (ref 1.5–4)
LYMPHOCYTES RELATIVE PERCENT: 13 % (ref 20–42)
MAGNESIUM: 2 MG/DL (ref 1.6–2.6)
MCH RBC QN AUTO: 28.7 PG (ref 26–35)
MCHC RBC AUTO-ENTMCNC: 32.7 % (ref 32–34.5)
MCV RBC AUTO: 87.9 FL (ref 80–99.9)
METER GLUCOSE: 218 MG/DL (ref 74–99)
METER GLUCOSE: 223 MG/DL (ref 74–99)
METER GLUCOSE: 239 MG/DL (ref 74–99)
METER GLUCOSE: 290 MG/DL (ref 74–99)
MONOCYTES ABSOLUTE: 0.72 E9/L (ref 0.1–0.95)
MONOCYTES RELATIVE PERCENT: 7.2 % (ref 2–12)
NEUTROPHILS ABSOLUTE: 7.8 E9/L (ref 1.8–7.3)
NEUTROPHILS RELATIVE PERCENT: 78.4 % (ref 43–80)
PDW BLD-RTO: 14.3 FL (ref 11.5–15)
PHOSPHORUS: 2.9 MG/DL (ref 2.5–4.5)
PLATELET # BLD: 171 E9/L (ref 130–450)
PMV BLD AUTO: 11.7 FL (ref 7–12)
POTASSIUM SERPL-SCNC: 4.2 MMOL/L (ref 3.5–5)
RBC # BLD: 5.19 E12/L (ref 3.8–5.8)
SODIUM BLD-SCNC: 136 MMOL/L (ref 132–146)
TRIGL SERPL-MCNC: 158 MG/DL (ref 0–149)
VLDLC SERPL CALC-MCNC: 32 MG/DL
WBC # BLD: 10 E9/L (ref 4.5–11.5)

## 2022-10-10 PROCEDURE — 6360000002 HC RX W HCPCS: Performed by: PSYCHIATRY & NEUROLOGY

## 2022-10-10 PROCEDURE — 92507 TX SP LANG VOICE COMM INDIV: CPT | Performed by: SPEECH-LANGUAGE PATHOLOGIST

## 2022-10-10 PROCEDURE — 83735 ASSAY OF MAGNESIUM: CPT

## 2022-10-10 PROCEDURE — 80048 BASIC METABOLIC PNL TOTAL CA: CPT

## 2022-10-10 PROCEDURE — 85025 COMPLETE CBC W/AUTO DIFF WBC: CPT

## 2022-10-10 PROCEDURE — 6370000000 HC RX 637 (ALT 250 FOR IP): Performed by: NURSE PRACTITIONER

## 2022-10-10 PROCEDURE — 2500000003 HC RX 250 WO HCPCS: Performed by: PSYCHIATRY & NEUROLOGY

## 2022-10-10 PROCEDURE — 92523 SPEECH SOUND LANG COMPREHEN: CPT | Performed by: SPEECH-LANGUAGE PATHOLOGIST

## 2022-10-10 PROCEDURE — 92526 ORAL FUNCTION THERAPY: CPT | Performed by: SPEECH-LANGUAGE PATHOLOGIST

## 2022-10-10 PROCEDURE — 37799 UNLISTED PX VASCULAR SURGERY: CPT

## 2022-10-10 PROCEDURE — 84100 ASSAY OF PHOSPHORUS: CPT

## 2022-10-10 PROCEDURE — 92610 EVALUATE SWALLOWING FUNCTION: CPT | Performed by: SPEECH-LANGUAGE PATHOLOGIST

## 2022-10-10 PROCEDURE — 2580000003 HC RX 258: Performed by: SURGERY

## 2022-10-10 PROCEDURE — 99232 SBSQ HOSP IP/OBS MODERATE 35: CPT | Performed by: NURSE PRACTITIONER

## 2022-10-10 PROCEDURE — S5553 INSULIN LONG ACTING 5 U: HCPCS | Performed by: NURSE PRACTITIONER

## 2022-10-10 PROCEDURE — 6360000002 HC RX W HCPCS: Performed by: INTERNAL MEDICINE

## 2022-10-10 PROCEDURE — 6370000000 HC RX 637 (ALT 250 FOR IP): Performed by: PSYCHIATRY & NEUROLOGY

## 2022-10-10 PROCEDURE — 93308 TTE F-UP OR LMTD: CPT

## 2022-10-10 PROCEDURE — 2580000003 HC RX 258: Performed by: PSYCHIATRY & NEUROLOGY

## 2022-10-10 PROCEDURE — 70551 MRI BRAIN STEM W/O DYE: CPT

## 2022-10-10 PROCEDURE — 6360000002 HC RX W HCPCS: Performed by: NURSE PRACTITIONER

## 2022-10-10 PROCEDURE — 82962 GLUCOSE BLOOD TEST: CPT

## 2022-10-10 PROCEDURE — 2000000000 HC ICU R&B

## 2022-10-10 PROCEDURE — 80061 LIPID PANEL: CPT

## 2022-10-10 PROCEDURE — 36415 COLL VENOUS BLD VENIPUNCTURE: CPT

## 2022-10-10 PROCEDURE — 2700000000 HC OXYGEN THERAPY PER DAY

## 2022-10-10 RX ORDER — SODIUM CHLORIDE 9 MG/ML
INJECTION, SOLUTION INTRAVENOUS CONTINUOUS
Status: DISCONTINUED | OUTPATIENT
Start: 2022-10-10 | End: 2022-10-11

## 2022-10-10 RX ORDER — INSULIN GLARGINE-YFGN 100 [IU]/ML
15 INJECTION, SOLUTION SUBCUTANEOUS NIGHTLY
Status: DISCONTINUED | OUTPATIENT
Start: 2022-10-10 | End: 2022-10-10

## 2022-10-10 RX ORDER — ASPIRIN 300 MG/1
300 SUPPOSITORY RECTAL DAILY
Status: DISCONTINUED | OUTPATIENT
Start: 2022-10-10 | End: 2022-10-21 | Stop reason: HOSPADM

## 2022-10-10 RX ORDER — HALOPERIDOL 5 MG/ML
3 INJECTION INTRAMUSCULAR 3 TIMES DAILY PRN
Status: DISCONTINUED | OUTPATIENT
Start: 2022-10-10 | End: 2022-10-21 | Stop reason: HOSPADM

## 2022-10-10 RX ORDER — LORAZEPAM 2 MG/ML
1 INJECTION INTRAMUSCULAR ONCE
Status: COMPLETED | OUTPATIENT
Start: 2022-10-10 | End: 2022-10-10

## 2022-10-10 RX ORDER — ASPIRIN 81 MG/1
81 TABLET ORAL DAILY
Status: DISCONTINUED | OUTPATIENT
Start: 2022-10-10 | End: 2022-10-21 | Stop reason: HOSPADM

## 2022-10-10 RX ORDER — AMLODIPINE BESYLATE 10 MG/1
10 TABLET ORAL DAILY
Status: DISCONTINUED | OUTPATIENT
Start: 2022-10-10 | End: 2022-10-11

## 2022-10-10 RX ORDER — LORAZEPAM 2 MG/ML
0.5 INJECTION INTRAMUSCULAR ONCE
Status: DISCONTINUED | OUTPATIENT
Start: 2022-10-10 | End: 2022-10-10

## 2022-10-10 RX ORDER — ENOXAPARIN SODIUM 100 MG/ML
40 INJECTION SUBCUTANEOUS DAILY
Status: DISCONTINUED | OUTPATIENT
Start: 2022-10-10 | End: 2022-10-21 | Stop reason: HOSPADM

## 2022-10-10 RX ORDER — INSULIN GLARGINE-YFGN 100 [IU]/ML
20 INJECTION, SOLUTION SUBCUTANEOUS NIGHTLY
Status: DISCONTINUED | OUTPATIENT
Start: 2022-10-10 | End: 2022-10-11

## 2022-10-10 RX ADMIN — LABETALOL HYDROCHLORIDE 5 MG: 5 INJECTION, SOLUTION INTRAVENOUS at 16:00

## 2022-10-10 RX ADMIN — ASPIRIN 300 MG: 300 SUPPOSITORY RECTAL at 02:20

## 2022-10-10 RX ADMIN — SODIUM CHLORIDE: 9 INJECTION, SOLUTION INTRAVENOUS at 20:50

## 2022-10-10 RX ADMIN — HYDRALAZINE HYDROCHLORIDE 5 MG: 20 INJECTION INTRAMUSCULAR; INTRAVENOUS at 00:20

## 2022-10-10 RX ADMIN — INSULIN LISPRO 4 UNITS: 100 INJECTION, SOLUTION INTRAVENOUS; SUBCUTANEOUS at 13:03

## 2022-10-10 RX ADMIN — HYDRALAZINE HYDROCHLORIDE 5 MG: 20 INJECTION INTRAMUSCULAR; INTRAVENOUS at 01:02

## 2022-10-10 RX ADMIN — ASPIRIN 81 MG: 81 TABLET, COATED ORAL at 10:31

## 2022-10-10 RX ADMIN — LABETALOL HYDROCHLORIDE 5 MG: 5 INJECTION, SOLUTION INTRAVENOUS at 03:48

## 2022-10-10 RX ADMIN — LORAZEPAM 1 MG: 2 INJECTION INTRAMUSCULAR; INTRAVENOUS at 15:45

## 2022-10-10 RX ADMIN — INSULIN LISPRO 4 UNITS: 100 INJECTION, SOLUTION INTRAVENOUS; SUBCUTANEOUS at 18:57

## 2022-10-10 RX ADMIN — LABETALOL HYDROCHLORIDE 5 MG: 5 INJECTION, SOLUTION INTRAVENOUS at 02:18

## 2022-10-10 RX ADMIN — INSULIN LISPRO 8 UNITS: 100 INJECTION, SOLUTION INTRAVENOUS; SUBCUTANEOUS at 10:29

## 2022-10-10 RX ADMIN — AMLODIPINE BESYLATE 10 MG: 10 TABLET ORAL at 10:32

## 2022-10-10 RX ADMIN — SODIUM CHLORIDE, PRESERVATIVE FREE 10 ML: 5 INJECTION INTRAVENOUS at 21:10

## 2022-10-10 RX ADMIN — LABETALOL HYDROCHLORIDE 5 MG: 5 INJECTION, SOLUTION INTRAVENOUS at 07:35

## 2022-10-10 RX ADMIN — HYDRALAZINE HYDROCHLORIDE 5 MG: 20 INJECTION INTRAMUSCULAR; INTRAVENOUS at 03:34

## 2022-10-10 RX ADMIN — LABETALOL HYDROCHLORIDE 5 MG: 5 INJECTION, SOLUTION INTRAVENOUS at 08:26

## 2022-10-10 RX ADMIN — SODIUM CHLORIDE, PRESERVATIVE FREE 10 ML: 5 INJECTION INTRAVENOUS at 10:32

## 2022-10-10 RX ADMIN — LABETALOL HYDROCHLORIDE 5 MG: 5 INJECTION, SOLUTION INTRAVENOUS at 21:05

## 2022-10-10 RX ADMIN — ENOXAPARIN SODIUM 40 MG: 100 INJECTION SUBCUTANEOUS at 18:57

## 2022-10-10 RX ADMIN — INSULIN GLARGINE-YFGN 20 UNITS: 100 INJECTION, SOLUTION SUBCUTANEOUS at 20:34

## 2022-10-10 ASSESSMENT — PAIN SCALES - GENERAL
PAINLEVEL_OUTOF10: 0

## 2022-10-10 NOTE — PROGRESS NOTES
Speech Language Pathology      NAME:  Scott Quiroga .  :  1955  DATE: 10/10/2022  ROOM:  0623/5581-J    Attempted speech/language/cognitive assessment. Pt not attending to SLP or attempting to participate. Upon chart review, it was noted that Pt DC'd from ARU at [facility] in 2020 with moderate receptive/expressive language deficits and mild cognitive defecits. Unclear what baseline Pt was at just prior to admission.     Acute CVA (cerebrovascular accident) [facility]) [I63.9]    Tyrell Gupta., [address] Pathologist  [id number]  10/10/2022

## 2022-10-10 NOTE — PROGRESS NOTES
OCCUPATIONAL THERAPY    Date:10/10/2022  Patient Name: Tigist Vasquez. MRN: [id number]  : 1955  Room: [address] [place] [address]-A              Chart reviewed. Pt with agitation; not following commands at this time. Will re-attempt at later time. Thank you for consult.     Jose A Shoemaker, OTR/L [id number]

## 2022-10-10 NOTE — PROGRESS NOTES
Patient more agitated compared to this AM, all 6 leads were removed by pt despite being 4 point restraints. When reattaching leads, pt broke L wrist restraint and attempted to hit this RN but was unsuccessful.

## 2022-10-10 NOTE — PROGRESS NOTES
Norvasc, Lipitor  Pulm: No acute issues  Monitor RR & SpO2. Pulmonary hygiene   GI: No acute issues. Diet. Monitor bowel function. Renal: . Monitor BUN & Cr. Monitor electrolytes & replace as needed. Monitor urine output. ID: No acute issues   Endocrine: Hyperglycemia. Monitor BS. ISS. Increase Lantus   MSK: No acute issues. ROM. turn & reposition. PT/OT when able  Heme: No acute issues. Monitor CBC. Bowel regime: Dulcolax   Pain control/Sedation: Tylenol  DVT prophylaxis: SCDs. Lovenox  GI prophylaxis: Pepcid, Diet  Mouth/Eye care: as needed  Eduardo: Remove  Family update:  Will update when available    Code status:  Full  Disposition:  transfer       Total  time: 45 minutes     Electronically signed by CARROLL Chang CNP on 10/10/2022 at 1:00 PM

## 2022-10-10 NOTE — PROGRESS NOTES
[facility]   Progress note      Subjective: The patient is agitated  Unable to answer any questions appropriately  In four-point restraints, not following commands  Objective:    BP (!) 140/63   Pulse 88   Temp 98.3 °F (36.8 °C) (Axillary)   Resp 21   Wt 225 lb (102.1 kg)   SpO2 96%   BMI 28.12 kg/m²     In: 120 [P.O.:120]  Out: 1400   In: 120   Out: 1400 [Urine:1400]    General appearance: NAD, conversant  HEENT: AT/NC, MMM  Neck: FROM, supple  Lungs: Clear to auscultation  CV: RRR, no MRGs  Vasc: Radial pulses 2+  Abdomen: Soft, non-tender; no masses or HSM  Extremities: No peripheral edema or digital cyanosis  Skin: no rash, lesions or ulcers  Psych: Alert and oriented to person, place and time  Neuro: Alert and interactive     Recent Labs     10/08/22  1519 10/09/22  0501 10/10/22  0600   WBC 6.2 12.4* 10.0   HGB 15.7 15.2 14.9   HCT 48.1 46.0 45.6    175 171       Recent Labs     10/08/22  1519 10/09/22  0501 10/10/22  0535   * 131* 136   K 5.0 4.2 4.2   CL 94* 95* 101   CO2 25 19* 21*   BUN 14 14 20   CREATININE 0.8 0.7 0.7   CALCIUM 9.7 9.3 9.3       Assessment:    Principal Problem:    Acute CVA (cerebrovascular accident) ([facility] [address]  Active Problems:    History of stroke    Dementia (HCC)    Type 2 diabetes mellitus ([facility] [address]  Resolved Problems:    * No resolved hospital problems.  *      Plan:  75-year-old male with a history of a CVA in 2020 presented to the ED with altered mental status and slurred speech and patient was admitted postop to ICU with     Acute ischemic CVA with left PCA occlusion    10/8/2022 successful thrombectomy of left PCA  MRI pending  CT head completed with no intracranial hemorrhage, evolution of acute or early subacute infarcts in the left occipital lobe  Postprocedure CT head pending  NIHSS score q shift  Hold antiplatelets for 24 hours post TN K  Monitor blood pressure-maintain SBP less than 150 and MAP greater than 65 adjust medications as needed.-As needed IV labetalol  Discussed risk factor modification.   ICU vitals  Echo-pending    10/10/2022  Remains completely confused, extremely agitated today with nursing  MRI pending-Will need medication  As needed Haldol every 12 for severe agitation  Echocardiogram essentially completely uninformative-discussed with echo tech at bedside  May benefit from CECI given appearance of multiple infarcts to rule out thrombus  Resume antiplatelet agents at discretion of neurology   blood pressure management-currently optimally controlled on multiple agents-amlodipine, as needed hydralazine and labetalol, add diuretic  Insulin sliding scale plus Lantus insulin on board with Noble control, uptitrate Lantus tonight if persistent hyperglycemia      Code Status: Full  Consultants:  Neurointensive care team, neurology, neuro interventional radiology  DVT Prophylaxis   PT/OT  Discharge Anabelle Woo MD  5:51 PM  10/10/2022

## 2022-10-10 NOTE — PROGRESS NOTES
SPEECH/LANGUAGE PATHOLOGY  CLINICAL ASSESSMENT OF SWALLOWING FUNCTION   and PLAN OF CARE    PATIENT NAME:  Eliud Romero Sr.  (male)     MRN:  [id number]    :  1955  (69 y.o.)  STATUS:  Inpatient: Room 4521/4521-A    TODAY'S DATE:  10/10/2022  REFERRING PROVIDER:   Dr. Tom Medina: SLP eval and treat Date of order:  10/8/22  REASON FOR REFERRAL: assess swallow function s/p IR   EVALUATING THERAPIST: ALEX Sal                 RESULTS:    DYSPHAGIA DIAGNOSIS:   Clinical indicators of severe  oral phase dysphagia, unable to assess pharyngeal stage dysphagia secondary to Pt's unwillingness to consume PO       DIET RECOMMENDATIONS:defer to physician, SLP unable to recommend diet at this time secondary to inability assess     FEEDING RECOMMENDATIONS:     Assistance level:  Full assistance is needed during all oral intake      Compensatory strategies recommended: Small bites/sips      Discussed recommendations with nursing and/or faxed report to referring provider: Yes    SPEECH THERAPY  PLAN OF CARE   The dysphagia POC is established based on physician order, dysphagia diagnosis and results of clinical assessment     Meal time assessment for 1-2 sessions to provide diet modification and compensatory strategy implementation due to inconsistent episodes of clinical indicators of dysphagia during intake (oral phase)    Conditions Requiring Skilled Therapeutic Intervention for dysphagia:    Patient is performing below functional baseline d/t  current acute condition, Multiple diagnoses, multiple medications, and increased dependency upon caregivers.     Specific dysphagia interventions to include:     ongoing skilled PO analysis to determine if PO diet can be initiated     Specific instructions for next treatment:  ongoing skilled PO analysis to determine if PO diet can be initiated   Patient Treatment Goals:    Short Term Goals:  Pt will participate in ongoing evaluation of swallow function to determine when PO diet can be safely initiated    Long Term Goals:   Pt will maintain adequate nutrition/hydration via PO intake of the least restrictive oral diet with implementation of safe swallow/ compensatory strategies and decrease signs/symptoms of aspiration to less than 1 x/day. Patient/family Goal:    Patient not able to accurately state due to impaired cognition and/or communication at time of eval     Plan of care discussed with Patient   The Patient did not demonstrate complete understanding of the diagnosis, prognosis and plan of care     Rehabilitation Potential/Prognosis: fair                    ADMITTING DIAGNOSIS: Acute CVA (cerebrovascular accident) ([facility] [address] [I63.9]    VISIT DIAGNOSIS:      PATIENT REPORT/COMPLAINT: patient not able to accurately report  RN cleared patient for participation in assessment     yes and meal tray present during evaluation     PRIOR LEVEL OF SWALLOW FUNCTION:    PAST HISTORY OF DYSPHAGIA?: none reported    Home diet: Regular consistency solids (IDDSI level 7) with  thin liquids (IDDSI level 0)  Current Diet Order:  ADULT DIET; Easy to Chew; 3 carb choices (45 gm/meal)    PROCEDURE:  Consistencies Administered During the Evaluation   Presented broth, popsicle, pudding and ice cream     Method of Intake:   spoon  Fed by clinician      Position:   Seated, upright    CLINICAL ASSESSMENT:  Oral Stage:       Reduced oral acceptance from spoon, coated spoon      Pharyngeal Stage:    Unable to assess    Cognition:   Did not follow commands and Confusion noted    Oral Peripheral Examination   Left labiobuccal weakness    Current Respiratory Status    nasal cannula     Parameters of Speech Production  Respiration:  Adequate for speech production  Quality:   Could not test  Intensity: Quiet    Volitional Swallow: not able to elicit     Volitional Cough:   not able to elicit     Pain: No pain reported.     EDUCATION:   The Speech Language Pathologist (SLP) completed education regarding results of evaluation and that intervention is warranted at this time. Learner: Patient  Education: Reviewed results and recommendations of this evaluation  Evaluation of Education:  No evidence of learning    This plan may be re-evaluated and revised as warranted. Evaluation Time includes thorough review of current medical information, gathering information on past medical history/social history and prior level of function, completion of standardized testing/informal observation of tasks, assessment of data and education on plan of care and goals. [x]The admitting diagnosis and active problem list, have been reviewed prior to initiation of this evaluation. ACTIVE PROBLEM LIST:   Patient Active Problem List   Diagnosis    Acute ischemic left MCA stroke ([facility] [address]    Middle cerebral artery stenosis, left    Dysarthria    Uncontrolled type 2 diabetes mellitus with hyperglycemia ([facility])    Tobacco dependence    COPD (chronic obstructive pulmonary disease) ([facility])    Stroke due to stenosis of left carotid artery ([facility])    Acute CVA (cerebrovascular accident) ([facility] [address]    Symptomatic stenosis of left carotid artery    Asymptomatic stenosis of right carotid artery    History of left-sided carotid endarterectomy    History of stroke    Dementia ([facility])    Type 2 diabetes mellitus ([facility] [address]    Cerebrovascular accident (CVA) due to occlusion of left posterior cerebral artery ([facility])    Nihss score 8    Acute right hemiparesis ([facility] [address]         CPT code:  28911  bedside swallow eval    INTERVENTION  CPT Code: 77474  dysphagia tx    SLP attempted ongoing edu with Pt r/t importance of participating in clinical swallow eval for adequate PO intake. Pt did not appear to understand however did attend to food items. Food items presented to oral cavity with poor outcome. Encouragement provided, limited assessment.      Harry Hatfield M.S., [address] Pathologist  [id number]  10/10/2022

## 2022-10-10 NOTE — CARE COORDINATION
Care Coordination  The patient was admitted from the [facility] at [address] [facility] where he was receiving  total care as he has had a prior stoke in 2020. The patient was admitted for an acute left Pca occlusion. The patient was taken to surgery for a thrombectomy with tici 3 flow. Ct scan negative for a bleed. Post neuro's check q2 hrs. Blod pressure is elevated 158/100, tacy at 113. Therapies have been ordered, Mri if the brain, 2decho, neurology consulted. Labs wbc 12.4,hg A1c is 11.3, blood sugar was 340, na 131. The patient is on Iv hydralazine 5 mg q 10 min prn, iv trandate 5 mg q10 minute prn. Impression acute ischemic stroke in the left pca occlusion with slow flow to the basilar tip. Post thrombectomy with TICI 3 10/8/22. Per the patient son Lois Richardson the patient has been to acute rehab here in [facility] and would like his dad to go there again is possible. Will need to get an Acute rehab order. He declines evan was there before had a bad experience. Has had no hhc in the past. His ultimate plan is to get his father back to the [facility] at [facility] after acute rehab if accepted. Await therapies.

## 2022-10-10 NOTE — ACP (ADVANCE CARE PLANNING)
.Advance Care Planning   Healthcare Decision Maker:    Primary Decision Maker: Perfecto Amanda - [facility] - [phone]    Click here to complete Healthcare Decision Makers including selection of the Healthcare Decision Maker Relationship (ie \"Primary\").

## 2022-10-10 NOTE — PROGRESS NOTES
STROKE FOLLOW UP      Impression:  #1  Acute ischemic stroke in left PCA with left PCA occlusion with slow flow to basilar tip - s/p thrombectomy with TICI 3 10/8/22    #2 s/p TNK   #3 Recurrent strokes, etiology undetermined. Plan:    Interventions  Tnk yes  Endovascular - Yes    Imaging  MRI  24 hours CT head pending- update reviewed at 9pm - NO BLEED- thalamic/BG stroke on the left and some occipital areas of ischemia , significant area preserved. ECHO pending    Medications  Antiplatelet: holding  Update reviewed- start ASA  Anticoagulation:not indicated , may use off label needs cardiac monitoring Possibly LINQ  Recommend cardiology consult for LINQ    Statin: Lipitor   Blood Pressure Goals:  Can slowly target normotensive  SBP <160  DVT prophylaxis: SCD    Primary Team  NIH neuro checks q4h. Call stroke team immediatly if increase of NIH score by 4, sudden HA or decreased LOC. Stat CT w/o contrast recommended.   Cardiac Telemetry  CBC/BMP/PT/INR  HbA1c/Lipid panel  Glucose goal < 180 mg/dl  ST/ PT/OT to eval and treat    Stroke Discharge Plan  Antithrombotic/Antiplatelet: TBD  Statin: YES  Event Monitor at discharge: Jossy Lo AT  F/u appointments: NIS        Interval History:    Patient was seen and examined this AM.   Doing slightly better  Has apseudobulbar affect        REVIEW OF SYSTEMS:    Cannot be obtained APhasia    Social History  Social History       Tobacco History       Smoking Status  Former Quit Date  8/31/2020 Smoking Frequency  1 pack/day for 50.00 years (50.00 pk-yrs) Smoking Tobacco Type  Cigarettes quit in 8/31/2020      Smokeless Tobacco Use  Former              Alcohol History       Alcohol Use Status  Not Currently Drinks/Week  1 Cans of beer per week Amount  1.0 standard drink of alcohol/wk Comment  on occassion              Drug Use       Drug Use Status  No              Sexual Activity       Sexually Active  Not Asked                    Tobacco Use  Tobacco Use: Medium Risk    Smoking Tobacco Use: Former    Smokeless Tobacco Use: Former       Substance Use Topics  Denies to me at my eval    NEUROLOGICAL EXAM    Constitutional: Well developed, well nourished and in no acute distress. Respiratory: Clear to auscultation bilaterally with no use of accessory muscles during respiration. Cardiovascular:  No murmurs auscultated. Carotid arteries without bruits. Pedal pulses and radial pulses 2+ bilaterally. No edema in all four extremities. Mental Status:    Alert a  Recent and remote memory: Intact  Attention and concentration: Intact  Speech and language :THALAMIC APHASIA      NIH Stroke Scale/Score at time of initial evaluation:    1A: Level of Consciousness 0 - alert; keenly responsive   1B: Ask Month and Age 2 - answers neither question correctly   1C: Tell Patient To Open and Close Eyes, then Hand  Squeeze 1 - performs one task correctly   2: Test Horizontal Extraocular Movements 0 - normal   3: Test Visual Fields 1 - partial hemianopia   4: Test Facial Palsy 0 - normal symmetric movement   5A: Test Left Arm Motor Drift 0 - no drift, limb holds 90 (or 45) degrees for full 10 seconds   5B: Test Right Arm Motor Drift 0 - no drift, limb holds 90 (or 45) degrees for full 10 seconds   6A: Test Left Leg Motor Drift 0 - no drift; leg holds 30 degree position for full 5 seconds   6B: Test Right Leg Motor Drift 0 - no drift; leg holds 30 degree position for full 5 seconds   7: Test Limb Ataxia (FNF/Heel-Shin) 0 - absent   8: Test Sensation 1 - mild to moderate sensory loss; patient feels pinprick is less sharp or is dull on the affected side; there is a loss of superficial pain with pinprick but patient is aware of being touched    9: Test Language/Aphasia 2 - severe aphasia; all communication is through fragmentary expression; great need for inference, questioning, and guessing by the listener.   Range of information that can be exchanged is limited; listener carries burden of communication. Examiner cannot identify materials provided from patient response. 10: Test Dysarthria 1 - mild to moderate, patient slurs at least some words and at worst, can be understood with some difficulty   11: Test Extinction/Inattention 1 - visual, tactile, auditory, spatial or personal inattention or extinction to bilateral simultaneous stimulation in one of the sensory modalities    Total 9                Imaging  CT HEAD WO CONTRAST    Addendum Date: 10/8/2022    ADDENDUM: Results were called by Dr. Emelia Alonso to Dr. Victor Hugo Montoya on 10/8/2022 at 16:25. Result Date: 10/8/2022  EXAMINATION: CTA OF THE HEAD WITH CONTRAST WITH PERFUSION; CT OF THE HEAD WITHOUT CONTRAST; CTA OF THE HEAD WITH CONTRAST; CTA OF THE NECK 10/8/2022 3:27 pm: TECHNIQUE: CTA of the head/brain was performed with the administration of intravenous contrast. Multiplanar reformatted images are provided for review. MIP images are provided for review. Automated exposure control, iterative reconstruction, and/or weight based adjustment of the mA/kV was utilized to reduce the radiation dose to as low as reasonably achievable.; CT of the head was performed without the administration of intravenous contrast. Automated exposure control, iterative reconstruction, and/or weight based adjustment of the mA/kV was utilized to reduce the radiation dose to as low as reasonably achievable.; CTA of the neck was performed with the administration of intravenous contrast. Multiplanar reformatted images are provided for review. MIP images are provided for review. Stenosis of the internal carotid arteries measured using NASCET criteria. Automated exposure control, iterative reconstruction, and/or weight based adjustment of the mA/kV was utilized to reduce the radiation dose to as low as reasonably achievable. COMPARISON: None.  HISTORY: ORDERING SYSTEM PROVIDED HISTORY: stroke TECHNOLOGIST PROVIDED HISTORY: Reason for exam:->stroke Has a \"code stroke\" or \"stroke alert\" been called? ->Yes Decision Support Exception - unselect if not a suspected or confirmed emergency medical condition->Emergency Medical Condition (MA) FINDINGS: Unenhanced CT head: No acute intracranial hemorrhage or edema. No abnormal extra-axial fluid collections. Area of encephalomalacia involving left frontal lobe as well as left occipital lobe. There is no hydrocephalus. CTA head: High-grade stenosis or occlusion involving P2 segment of left PCA. Right posterior cerebral artery is patent without evidence of stenosis. No stenosis involving anterior cerebral arteries or middle cerebral arteries. No evidence of stenosis. There is a normal vertebrobasilar junction. Basilar artery is patent without evidence of stenosis. No evidence of intracranial aneurysm or AVM. CTA neck: Common carotid arteries are patent without evidence of stenosis. Moderate calcified and noncalcified plaque associated with right carotid bulb and proximal right ICA. No evidence of stenosis involving proximal or distal cervical internal carotid arteries. Both vertebral arteries are patent without evidence of stenosis. CT brain perfusion: Increased mean transit time with corresponding decreased perfusion and decreased cerebral volume at site of chronic left occipital stroke. Additional decreased cerebral blood flow and volume along medial aspect of left occipital lobe. 1.  No acute intracranial hemorrhage or edema. 2. Areas of encephalomalacia suggesting chronic stroke involving left frontal lobe, left insula, and left occipital lobe. 3. High-grade stenosis or occlusion involving P2 segment of left PCA. 4. No stenosis involving internal carotid arteries or vertebral arteries. 5. Abnormal perfusion involving left occipital lobe concerning for infarction. MRI brain with diffusion-weighted imaging could be helpful for further evaluation.      CTA NECK W CONTRAST    Addendum Date: 10/8/2022    ADDENDUM: Results were called by Dr. Rhiannon Goss to Dr. Maycol Alexis on 10/8/2022 at 16:25. Result Date: 10/8/2022  EXAMINATION: CTA OF THE HEAD WITH CONTRAST WITH PERFUSION; CT OF THE HEAD WITHOUT CONTRAST; CTA OF THE HEAD WITH CONTRAST; CTA OF THE NECK 10/8/2022 3:27 pm: TECHNIQUE: CTA of the head/brain was performed with the administration of intravenous contrast. Multiplanar reformatted images are provided for review. MIP images are provided for review. Automated exposure control, iterative reconstruction, and/or weight based adjustment of the mA/kV was utilized to reduce the radiation dose to as low as reasonably achievable.; CT of the head was performed without the administration of intravenous contrast. Automated exposure control, iterative reconstruction, and/or weight based adjustment of the mA/kV was utilized to reduce the radiation dose to as low as reasonably achievable.; CTA of the neck was performed with the administration of intravenous contrast. Multiplanar reformatted images are provided for review. MIP images are provided for review. Stenosis of the internal carotid arteries measured using NASCET criteria. Automated exposure control, iterative reconstruction, and/or weight based adjustment of the mA/kV was utilized to reduce the radiation dose to as low as reasonably achievable. COMPARISON: None. HISTORY: ORDERING SYSTEM PROVIDED HISTORY: stroke TECHNOLOGIST PROVIDED HISTORY: Reason for exam:->stroke Has a \"code stroke\" or \"stroke alert\" been called? ->Yes Decision Support Exception - unselect if not a suspected or confirmed emergency medical condition->Emergency Medical Condition (MA) FINDINGS: Unenhanced CT head: No acute intracranial hemorrhage or edema. No abnormal extra-axial fluid collections. Area of encephalomalacia involving left frontal lobe as well as left occipital lobe. There is no hydrocephalus. CTA head: High-grade stenosis or occlusion involving P2 segment of left PCA.   Right posterior cerebral artery is patent without evidence of stenosis. No stenosis involving anterior cerebral arteries or middle cerebral arteries. No evidence of stenosis. There is a normal vertebrobasilar junction. Basilar artery is patent without evidence of stenosis. No evidence of intracranial aneurysm or AVM. CTA neck: Common carotid arteries are patent without evidence of stenosis. Moderate calcified and noncalcified plaque associated with right carotid bulb and proximal right ICA. No evidence of stenosis involving proximal or distal cervical internal carotid arteries. Both vertebral arteries are patent without evidence of stenosis. CT brain perfusion: Increased mean transit time with corresponding decreased perfusion and decreased cerebral volume at site of chronic left occipital stroke. Additional decreased cerebral blood flow and volume along medial aspect of left occipital lobe. 1.  No acute intracranial hemorrhage or edema. 2. Areas of encephalomalacia suggesting chronic stroke involving left frontal lobe, left insula, and left occipital lobe. 3. High-grade stenosis or occlusion involving P2 segment of left PCA. 4. No stenosis involving internal carotid arteries or vertebral arteries. 5. Abnormal perfusion involving left occipital lobe concerning for infarction. MRI brain with diffusion-weighted imaging could be helpful for further evaluation. CT BRAIN PERFUSION    Addendum Date: 10/8/2022    ADDENDUM: Results were called by Dr. Vannessa Champagne to Dr. Yolanda Canchola on 10/8/2022 at 16:25. Result Date: 10/8/2022  EXAMINATION: CTA OF THE HEAD WITH CONTRAST WITH PERFUSION; CT OF THE HEAD WITHOUT CONTRAST; CTA OF THE HEAD WITH CONTRAST; CTA OF THE NECK 10/8/2022 3:27 pm: TECHNIQUE: CTA of the head/brain was performed with the administration of intravenous contrast. Multiplanar reformatted images are provided for review. MIP images are provided for review.  Automated exposure control, iterative reconstruction, and/or weight based adjustment of the mA/kV was utilized to reduce the radiation dose to as low as reasonably achievable.; CT of the head was performed without the administration of intravenous contrast. Automated exposure control, iterative reconstruction, and/or weight based adjustment of the mA/kV was utilized to reduce the radiation dose to as low as reasonably achievable.; CTA of the neck was performed with the administration of intravenous contrast. Multiplanar reformatted images are provided for review. MIP images are provided for review. Stenosis of the internal carotid arteries measured using NASCET criteria. Automated exposure control, iterative reconstruction, and/or weight based adjustment of the mA/kV was utilized to reduce the radiation dose to as low as reasonably achievable. COMPARISON: None. HISTORY: ORDERING SYSTEM PROVIDED HISTORY: stroke TECHNOLOGIST PROVIDED HISTORY: Reason for exam:->stroke Has a \"code stroke\" or \"stroke alert\" been called? ->Yes Decision Support Exception - unselect if not a suspected or confirmed emergency medical condition->Emergency Medical Condition (MA) FINDINGS: Unenhanced CT head: No acute intracranial hemorrhage or edema. No abnormal extra-axial fluid collections. Area of encephalomalacia involving left frontal lobe as well as left occipital lobe. There is no hydrocephalus. CTA head: High-grade stenosis or occlusion involving P2 segment of left PCA. Right posterior cerebral artery is patent without evidence of stenosis. No stenosis involving anterior cerebral arteries or middle cerebral arteries. No evidence of stenosis. There is a normal vertebrobasilar junction. Basilar artery is patent without evidence of stenosis. No evidence of intracranial aneurysm or AVM. CTA neck: Common carotid arteries are patent without evidence of stenosis. Moderate calcified and noncalcified plaque associated with right carotid bulb and proximal right ICA.   No evidence of stenosis involving proximal or distal cervical internal carotid arteries. Both vertebral arteries are patent without evidence of stenosis. CT brain perfusion: Increased mean transit time with corresponding decreased perfusion and decreased cerebral volume at site of chronic left occipital stroke. Additional decreased cerebral blood flow and volume along medial aspect of left occipital lobe. 1.  No acute intracranial hemorrhage or edema. 2. Areas of encephalomalacia suggesting chronic stroke involving left frontal lobe, left insula, and left occipital lobe. 3. High-grade stenosis or occlusion involving P2 segment of left PCA. 4. No stenosis involving internal carotid arteries or vertebral arteries. 5. Abnormal perfusion involving left occipital lobe concerning for infarction. MRI brain with diffusion-weighted imaging could be helpful for further evaluation. CTA HEAD W CONTRAST    Addendum Date: 10/8/2022    ADDENDUM: Results were called by Dr. Chun Kate to Dr. Joan Horner on 10/8/2022 at 16:25. Result Date: 10/8/2022  EXAMINATION: CTA OF THE HEAD WITH CONTRAST WITH PERFUSION; CT OF THE HEAD WITHOUT CONTRAST; CTA OF THE HEAD WITH CONTRAST; CTA OF THE NECK 10/8/2022 3:27 pm: TECHNIQUE: CTA of the head/brain was performed with the administration of intravenous contrast. Multiplanar reformatted images are provided for review. MIP images are provided for review. Automated exposure control, iterative reconstruction, and/or weight based adjustment of the mA/kV was utilized to reduce the radiation dose to as low as reasonably achievable.; CT of the head was performed without the administration of intravenous contrast. Automated exposure control, iterative reconstruction, and/or weight based adjustment of the mA/kV was utilized to reduce the radiation dose to as low as reasonably achievable.; CTA of the neck was performed with the administration of intravenous contrast. Multiplanar reformatted images are provided for review.  MIP images are provided for review. Stenosis of the internal carotid arteries measured using NASCET criteria. Automated exposure control, iterative reconstruction, and/or weight based adjustment of the mA/kV was utilized to reduce the radiation dose to as low as reasonably achievable. COMPARISON: None. HISTORY: ORDERING SYSTEM PROVIDED HISTORY: stroke TECHNOLOGIST PROVIDED HISTORY: Reason for exam:->stroke Has a \"code stroke\" or \"stroke alert\" been called? ->Yes Decision Support Exception - unselect if not a suspected or confirmed emergency medical condition->Emergency Medical Condition (MA) FINDINGS: Unenhanced CT head: No acute intracranial hemorrhage or edema. No abnormal extra-axial fluid collections. Area of encephalomalacia involving left frontal lobe as well as left occipital lobe. There is no hydrocephalus. CTA head: High-grade stenosis or occlusion involving P2 segment of left PCA. Right posterior cerebral artery is patent without evidence of stenosis. No stenosis involving anterior cerebral arteries or middle cerebral arteries. No evidence of stenosis. There is a normal vertebrobasilar junction. Basilar artery is patent without evidence of stenosis. No evidence of intracranial aneurysm or AVM. CTA neck: Common carotid arteries are patent without evidence of stenosis. Moderate calcified and noncalcified plaque associated with right carotid bulb and proximal right ICA. No evidence of stenosis involving proximal or distal cervical internal carotid arteries. Both vertebral arteries are patent without evidence of stenosis. CT brain perfusion: Increased mean transit time with corresponding decreased perfusion and decreased cerebral volume at site of chronic left occipital stroke. Additional decreased cerebral blood flow and volume along medial aspect of left occipital lobe. 1.  No acute intracranial hemorrhage or edema.  2. Areas of encephalomalacia suggesting chronic stroke involving left frontal lobe, left insula, and left occipital lobe. 3. High-grade stenosis or occlusion involving P2 segment of left PCA. 4. No stenosis involving internal carotid arteries or vertebral arteries. 5. Abnormal perfusion involving left occipital lobe concerning for infarction. MRI brain with diffusion-weighted imaging could be helpful for further evaluation.

## 2022-10-10 NOTE — PROGRESS NOTES
Pt cooperated and participated in first NIH assessment this AM but refuses any additional assessment and refuses medication and breakfast. This RN will continue to attempt reassessment and medication administration.

## 2022-10-10 NOTE — PROGRESS NOTES
SPEECH/LANGUAGE PATHOLOGY  SPEECH/LANGUAGE/COGNITIVE EVALUATION   and PLAN OF CARE      PATIENT NAME:  Dayday Blunt Sr.  (male)     MRN:  [id number]    :  1955  (69 y.o.)  STATUS:  Inpatient: Room 4521/4521-A    TODAY'S DATE:  10/10/2022  REFERRING PROVIDER:   Dr. Pete Brumfield: SLP eval and treat  Date of order:  10/8/22  REASON FOR REFERRAL:  s/p IR  EVALUATING THERAPIST: ALEX Leger    ADMITTING DIAGNOSIS: Acute CVA (cerebrovascular accident) [facility]) [I63.9]    VISIT DIAGNOSIS:        SPEECH THERAPY  PLAN OF CARE   The speech therapy  POC is established based on physician order, speech pathology diagnosis and results of clinical assessment     SPEECH PATHOLOGY DIAGNOSIS:    Profound Cognitive Deficits, Aphasia    Limited participation secondary to agitation at time of eval    Speech Pathology intervention is recommended up to 6 times per week for LOS or when goals are met with emphasis on the following:      Conditions Requiring Skilled Therapeutic Intervention for speech, language and/or cognition    Cognitive linguistic impairment    Specific Speech Therapy Interventions to Include:   Receptive language training   Expressive language training   Therapeutic tasks for Cognition    Specific instructions for next treatment: To initiate POC    SHORT/LONG TERM GOALS  Pt will improve orientation to spatial and temporal surroundings with use of external memory aides.   Pt will improve problem solving/thought organization during structured and unstructured tasks with 50% accuracy   Pt will Improve receptive language skills for comprehension of simple level yes/no questions, basic commands (auditory/written format), and for comprehension of basic conversation with 50% accuracy  Pt will improve expressive language skills to improve accuracy of completion of automatic speech tasks, object/picture naming, phrase completion, and phrasal expression of basic wants and needs with 50% accuracy    Patient goals: Patient/family involved in developing goals and treatment plan:   Treatment goals discussed with Patient    The Patient did not demonstrate complete understanding of the diagnosis, prognosis and plan of care   The patient/family Could not state,     This plan may be re-evaluated and revised as warranted. Rehabilitation Potential/Prognosis: pending ability to participate                 CLINICAL ASSESSMENT:  MOTOR SPEECH       Oral Peripheral Examination   Left labiobuccal weakness    Parameters of Speech Production  Respiration:  Adequate for speech production  Articulation:  TBA  Resonance:  Could not test  Quality:   Could not test  Pitch:    Could not test  Intensity: Quiet  Fluency:  Dysfluent  Prosody Monotone    RECEPTIVE LANGUAGE    Comprehension of Yes/No Questions:   Impaired    Process  Simple Verbal Commands:   Impaired  Process Intermediate Verbal Commands:   Could not test  Process Complex Verbal Commands:     Could not test    Comprehension of Conversation:      Impaired      EXPRESSIVE LANGUAGE     Serials: To be assessed    Imitation:  Words   To be assessed   Sentences To be assessed    Naming:  (Modality used:  Verbal)  Confrontation Naming  To be assessed  Functional Description  Impaired  Response Naming: Impaired    Conversation:      Confusion was noted during conversation, Link Gins was present, Literal paraphasic errors were noted, and Semantic paraphasic errors were noted    COGNITION     Attention/Orientation  Attention: Easily Distracted  Orientation:  Oriented to Person inconsistently    Memory   Immediate Recall: TBA    Delayed Recall:   TBA    Long Term Recall:   TBA    Organization/Problem Solving/Reasoning   Verbal Sequencing: To be assessed        Verbal Problem solving:    To be assessed          CLINICAL OBSERVATIONS NOTED DURING THE EVALUATION  Latent responses, Inconsistent responses, Perseveration errors, Anomic errors, Paraphasic errors, Cueing was required, Flat affect, and Reduced eye contact                  EDUCATION:   The Speech Language Pathologist (SLP) completed education regarding results of evaluation and that intervention is warranted at this time. Learner: Patient  Education: Reviewed results and recommendations of this evaluation  Evaluation of Education:  No evidence of learning    Evaluation Time includes thorough review of current medical information, gathering information on past medical history/social history and prior level of function, completion of standardized testing/informal observation of tasks, assessment of data and education on plan of care and goals. CPT code:    64723  eval speech sound lang comprehension      The admitting diagnosis and active problem list, as listed below have been reviewed prior to initiation of this evaluation. ACTIVE PROBLEM LIST:   Patient Active Problem List   Diagnosis    Acute ischemic left MCA stroke ([facility] [address]    Middle cerebral artery stenosis, left    Dysarthria    Uncontrolled type 2 diabetes mellitus with hyperglycemia ([facility])    Tobacco dependence    COPD (chronic obstructive pulmonary disease) ([facility])    Stroke due to stenosis of left carotid artery ([facility])    Acute CVA (cerebrovascular accident) ([address]    Symptomatic stenosis of left carotid artery    Asymptomatic stenosis of right carotid artery    History of left-sided carotid endarterectomy    History of stroke    Dementia ([facility])    Type 2 diabetes mellitus ([address]    Cerebrovascular accident (CVA) due to occlusion of left posterior cerebral artery ([facility])    Nihss score 8    Acute right hemiparesis ([address]       INTERVENTION  CPT Code: 60852  speech/language tx    SLP attempted to increase Pt comfort/ reduce agitation via simple Wh- questions (open ended and multiple choice) r/t personal preferences and dislikes. Pt did not appear to benefit from one overt he other. Speech was neologistic, paraphasic, and overall limited.  Encouraged Pt to

## 2022-10-10 NOTE — CONSULTS
NEUROLOGY CONSULT NOTE      Requesting Physician: Baby Seip, MD    Reason for Consult:  Evaluate for acute stroke s/p intervention    History of Present Illness:  Tesha Calvo Sr. is a 69 y.o. male  with h/o vascular dementia, DM, previous CVA Aug 2020, s/p endarterectomy May 2022 who was admitted to [facility] on 10/8/2022 with presentation of confusion and slurred speech. Pt is unable to communicate beyond nodding and shaking head with saying \"nah\" in positive in negative inflections. Therefore, history is obtained from chart. Pt presented with progressive confusion and developed slurred speech at facility and was brought by EMS to [facility]. He was found to have left sided facial weakness and right sided motor drift on exam in the ED. Initial imaging was concerning for ischemia in the left occiptal lobe. Pt became combative and resistant and would not take oral meds. Therefore, aspirin loading was given rectally and patient was placed on heparin drip. This morning, he is actively fighting his 4 point restraints. He was able to be redirected and calmed down. He states \"nah\" when asked questions, but will change his inflection appropriately for yes or no questions.       Past Medical History:        Diagnosis Date    Dementia ([facility] [address]     Diabetes mellitus ([facility] [address]     Seasonal allergies     Stroke due to stenosis of left carotid artery ([facility] [address] 2020    Vascular dementia [facility])            Procedure Laterality Date    CARDIOVASCULAR STRESS TEST  2020    Lexiscan stress test    CAROTID ENDARTERECTOMY Left 10/29/2020    LEFT CAROTID ENDARTERECTOMY -- FACILITY performed by Morteza Alonzo MD at [address] [place] [address]         Social History:  Social History     Tobacco Use   Smoking Status Former    Packs/day: 1.00    Years: 50.00    Pack years: 50.00    Types: Cigarettes    Quit date: 2020    Years since quittin.1   Smokeless Tobacco Former     Social History Substance and Sexual Activity   Alcohol Use Not Currently    Alcohol/week: 1.0 standard drink    Types: 1 Cans of beer per week    Comment: on occassion     Social History     Substance and Sexual Activity   Drug Use No         Family History:       Problem Relation Age of Onset    Emphysema Mother        Review of Systems:  All systems reviewed are negative except what is mentioned in history of present illness. Allergies:    No Known Allergies     Current Medications:   amLODIPine (NORVASC) tablet 10 mg, Daily  insulin glargine-yfgn (SEMGLEE-YFGN) injection vial 15 Units, Nightly  aspirin EC tablet 81 mg, Daily   Or  aspirin suppository 300 mg, Daily  mirtazapine (REMERON) tablet 15 mg, Nightly  rivastigmine (EXELON) 9.5 MG/24HR 1 patch, Daily  glucose chewable tablet 16 g, PRN  dextrose bolus 10% 125 mL, PRN   Or  dextrose bolus 10% 250 mL, PRN  glucagon (rDNA) injection 1 mg, PRN  dextrose 10 % infusion, Continuous PRN  insulin lispro (HUMALOG) injection vial 0-16 Units, TID WC  insulin lispro (HUMALOG) injection vial 0-4 Units, Nightly  sodium chloride flush 0.9 % injection 5-40 mL, 2 times per day  sodium chloride flush 0.9 % injection 5-40 mL, PRN  0.9 % sodium chloride infusion, PRN  acetaminophen (TYLENOL) tablet 650 mg, Q4H PRN  ondansetron (ZOFRAN-ODT) disintegrating tablet 4 mg, Q8H PRN   Or  ondansetron (ZOFRAN) injection 4 mg, Q6H PRN  perflutren lipid microspheres (DEFINITY) injection 1.65 mg, ONCE PRN  bisacodyl (DULCOLAX) EC tablet 5 mg, Daily PRN  labetalol (NORMODYNE;TRANDATE) injection 5 mg, Q10 Min PRN  hydrALAZINE (APRESOLINE) injection 5 mg, Q10 Min PRN  atorvastatin (LIPITOR) tablet 40 mg, Nightly         Physical Exam:  BP (!) 140/63   Pulse 94   Temp 98 °F (36.7 °C) (Axillary)   Resp 24   Wt 225 lb (102.1 kg)   SpO2 94%   BMI 28.12 kg/m²  I Body mass index is 28.12 kg/m².  I   Wt Readings from Last 1 Encounters:   10/10/22 225 lb (102.1 kg)          HEENT: Normocephalic, atraumatic, no lesions or abnormalities noted. Neck:  supple with full ROM; no masses, nodes or bruits; no cervical tenderness on palpation. Lungs:  clear to auscultation  bilaterally     CV: RRR without gallops or murmurs     Extremities: no c/c/e      Back: no tenderness with palpation / Tenderness per diagram ; no scoliosis; no kyphosis negative straight leg raising normal ROM in low back, pelvis, hips         Neurologic Exam     Mental Status:  Patient was alert, responsive. Does not answer questions of orientation. Comprehension appears intact, but refused to follow some commands  Cranial Nerves: Pupils were equal round and reactive to light and accommodation;    Decreased sensation to light touch on face in BL distribution of V1-V3; Extraocular movements were intact; no nystagmus; Pt would not perform facial movements   Hearing was intact;   Sternocleidomastoid  / Trapezius strength of 5/5. Patient would not protrude tongue  Motor Exam:  Right arm and leg drift against gravity; normal tone and bulk; no atrophy or fasiculations noted. Sensory Exam:  Vibratory sensation absent on BL legs and L arm, intact R arm  Cerebella Exam:  No tremors   Gait:  Gait was not tested.    Reflexes: normal and symmetric bilaterally; Babinski is negative     Labs:    CBC:   Recent Labs     10/08/22  1519 10/09/22  0501 10/10/22  0600   WBC 6.2 12.4* 10.0   HGB 15.7 15.2 14.9    175 171   MCV 90.2 87.3 87.9   MCH 29.5 28.8 28.7   MCHC 32.6 33.0 32.7   RDW 13.6 13.7 14.3     CMP:  Recent Labs     10/08/22  1519 10/09/22  0501 10/10/22  0535   * 131* 136   K 5.0 4.2 4.2   CL 94* 95* 101   CO2 25 19* 21*   BUN 14 14 20   CREATININE 0.8 0.7 0.7   GFRAA >60 >60 >60   LABGLOM >60 >60 >60   GLUCOSE 402* 340* 297*   CALCIUM 9.7 9.3 9.3     Liver:   Recent Labs     10/09/22  0501   AST 12   ALT 23   ALKPHOS 98   PROT 7.1   LABALBU 4.2   BILITOT 1.0     INR: No results for input(s): PROTIME, INR in the last 72 hours. ToxicologyNo results for input(s): PHENYTOIN, CARBTOT, PHENOBARB, VALPROATE, LAMOTRIG in the last 72 hours. Invalid input(s):  KEPPRA  No results for input(s): AMPMETHURSCR, BARBTQTU, BDZQTU, CANNABQUANT, COCMETQTU, OPIAU, PCPQUANT in the last 72 hours. Radiology:  CT HEAD WO CONTRAST    Result Date: 10/10/2022  EXAMINATION: CT OF THE HEAD WITHOUT CONTRAST  10/9/2022 10:04 pm TECHNIQUE: CT of the head was performed without the administration of intravenous contrast. Automated exposure control, iterative reconstruction, and/or weight based adjustment of the mA/kV was utilized to reduce the radiation dose to as low as reasonably achievable. Total DLP: 4601 mGy COMPARISON: 10/09/2022 HISTORY: ORDERING SYSTEM PROVIDED HISTORY: change in neuro status TECHNOLOGIST PROVIDED HISTORY: Reason for exam:->change in neuro status Has a \"code stroke\" or \"stroke alert\" been called? ->No What reading provider will be dictating this exam?->CRC FINDINGS: BRAIN/VENTRICLES: Study again reveals a zone of developing encephalomalacia in the left occipital lobe and left thalamus. There is a subacute to chronic appearing infarct in the left frontal lobe. These opacities are stable when compared the previous study. No new lesions are identified. There are no signs of acute hemorrhage no midline shift was identified. No abnormal extra-axial fluid collection. The gray-white differentiation is maintained otherwise without evidence of an acute infarct. There is no evidence of hydrocephalus. ORBITS: The visualized portion of the orbits demonstrate no acute abnormality. SINUSES: The visualized paranasal sinuses reveals periosteal mucosal thickening within the right maxillary antrum. The mastoid air cells demonstrate no acute abnormality. SOFT TISSUES/SKULL:  No acute abnormality of the visualized skull or soft tissues.      1.  No acute intracranial abnormality or significant change when compared the previous study. . 2.  Evolving infarcts in the left occipital lobe and thalamus 3. Chronic appearing left frontal lobe infarct, stable     CT HEAD WO CONTRAST    Result Date: 10/10/2022  EXAMINATION: CT OF THE HEAD WITHOUT CONTRAST  10/9/2022 5:35 pm TECHNIQUE: CT of the head was performed without the administration of intravenous contrast. Automated exposure control, iterative reconstruction, and/or weight based adjustment of the mA/kV was utilized to reduce the radiation dose to as low as reasonably achievable. COMPARISON: 10/8 HISTORY: ORDERING SYSTEM PROVIDED HISTORY: 24 hour post TNK TECHNOLOGIST PROVIDED HISTORY: To be performed 24 hours after first CT Head. Reason for exam:->24 hour post TNK Has a \"code stroke\" or \"stroke alert\" been called? ->No What reading provider will be dictating this exam?->CRC FINDINGS: BRAIN/VENTRICLES: There is no acute intracranial hemorrhage, mass effect or midline shift. No abnormal extra-axial fluid collection. There is continued evolution of the left occipital lobe acute/early subacute infarction. No hemorrhagic transformation identified within the left occipital lobe. Similar changes are identified in the left thalamus. There is encephalomalacia in the left MCA territory involving the left insula and left frontal parietal region ORBITS: The visualized portion of the orbits demonstrate no acute abnormality. SINUSES: The visualized paranasal sinuses and mastoid air cells demonstrate no acute abnormality. SOFT TISSUES/SKULL:  No acute abnormality of the visualized skull or soft tissues. Evolution of the acute/early subacute infarct in the left occipital lobe with loss of gray-white differentiation and sulcal effacement. Similar changes are identified in the left thalamus. No evidence for petechial hemorrhage or hemorrhagic transformation. No intracranial hemorrhage or extra-axial collection.  Encephalomalacia in the left MCA territory involving the left insula and left frontal parietal region. CT HEAD WO CONTRAST    Addendum Date: 10/8/2022    ADDENDUM: Results were called by Dr. Noni Barrow to Dr. Sirena Solares on 10/8/2022 at 16:25. Result Date: 10/8/2022  EXAMINATION: CTA OF THE HEAD WITH CONTRAST WITH PERFUSION; CT OF THE HEAD WITHOUT CONTRAST; CTA OF THE HEAD WITH CONTRAST; CTA OF THE NECK 10/8/2022 3:27 pm: TECHNIQUE: CTA of the head/brain was performed with the administration of intravenous contrast. Multiplanar reformatted images are provided for review. MIP images are provided for review. Automated exposure control, iterative reconstruction, and/or weight based adjustment of the mA/kV was utilized to reduce the radiation dose to as low as reasonably achievable.; CT of the head was performed without the administration of intravenous contrast. Automated exposure control, iterative reconstruction, and/or weight based adjustment of the mA/kV was utilized to reduce the radiation dose to as low as reasonably achievable.; CTA of the neck was performed with the administration of intravenous contrast. Multiplanar reformatted images are provided for review. MIP images are provided for review. Stenosis of the internal carotid arteries measured using NASCET criteria. Automated exposure control, iterative reconstruction, and/or weight based adjustment of the mA/kV was utilized to reduce the radiation dose to as low as reasonably achievable. COMPARISON: None. HISTORY: ORDERING SYSTEM PROVIDED HISTORY: stroke TECHNOLOGIST PROVIDED HISTORY: Reason for exam:->stroke Has a \"code stroke\" or \"stroke alert\" been called? ->Yes Decision Support Exception - unselect if not a suspected or confirmed emergency medical condition->Emergency Medical Condition (MA) FINDINGS: Unenhanced CT head: No acute intracranial hemorrhage or edema. No abnormal extra-axial fluid collections. Area of encephalomalacia involving left frontal lobe as well as left occipital lobe. There is no hydrocephalus.  CTA head: High-grade stenosis or occlusion involving P2 segment of left PCA. Right posterior cerebral artery is patent without evidence of stenosis. No stenosis involving anterior cerebral arteries or middle cerebral arteries. No evidence of stenosis. There is a normal vertebrobasilar junction. Basilar artery is patent without evidence of stenosis. No evidence of intracranial aneurysm or AVM. CTA neck: Common carotid arteries are patent without evidence of stenosis. Moderate calcified and noncalcified plaque associated with right carotid bulb and proximal right ICA. No evidence of stenosis involving proximal or distal cervical internal carotid arteries. Both vertebral arteries are patent without evidence of stenosis. CT brain perfusion: Increased mean transit time with corresponding decreased perfusion and decreased cerebral volume at site of chronic left occipital stroke. Additional decreased cerebral blood flow and volume along medial aspect of left occipital lobe. 1.  No acute intracranial hemorrhage or edema. 2. Areas of encephalomalacia suggesting chronic stroke involving left frontal lobe, left insula, and left occipital lobe. 3. High-grade stenosis or occlusion involving P2 segment of left PCA. 4. No stenosis involving internal carotid arteries or vertebral arteries. 5. Abnormal perfusion involving left occipital lobe concerning for infarction. MRI brain with diffusion-weighted imaging could be helpful for further evaluation. CTA NECK W CONTRAST    Addendum Date: 10/8/2022    ADDENDUM: Results were called by Dr. Chanell Coreasr to Dr. Sirena Lopez on 10/8/2022 at 16:25. Result Date: 10/8/2022  EXAMINATION: CTA OF THE HEAD WITH CONTRAST WITH PERFUSION; CT OF THE HEAD WITHOUT CONTRAST; CTA OF THE HEAD WITH CONTRAST; CTA OF THE NECK 10/8/2022 3:27 pm: TECHNIQUE: CTA of the head/brain was performed with the administration of intravenous contrast. Multiplanar reformatted images are provided for review.   MIP images are provided for review. Automated exposure control, iterative reconstruction, and/or weight based adjustment of the mA/kV was utilized to reduce the radiation dose to as low as reasonably achievable.; CT of the head was performed without the administration of intravenous contrast. Automated exposure control, iterative reconstruction, and/or weight based adjustment of the mA/kV was utilized to reduce the radiation dose to as low as reasonably achievable.; CTA of the neck was performed with the administration of intravenous contrast. Multiplanar reformatted images are provided for review. MIP images are provided for review. Stenosis of the internal carotid arteries measured using NASCET criteria. Automated exposure control, iterative reconstruction, and/or weight based adjustment of the mA/kV was utilized to reduce the radiation dose to as low as reasonably achievable. COMPARISON: None. HISTORY: ORDERING SYSTEM PROVIDED HISTORY: stroke TECHNOLOGIST PROVIDED HISTORY: Reason for exam:->stroke Has a \"code stroke\" or \"stroke alert\" been called? ->Yes Decision Support Exception - unselect if not a suspected or confirmed emergency medical condition->Emergency Medical Condition (MA) FINDINGS: Unenhanced CT head: No acute intracranial hemorrhage or edema. No abnormal extra-axial fluid collections. Area of encephalomalacia involving left frontal lobe as well as left occipital lobe. There is no hydrocephalus. CTA head: High-grade stenosis or occlusion involving P2 segment of left PCA. Right posterior cerebral artery is patent without evidence of stenosis. No stenosis involving anterior cerebral arteries or middle cerebral arteries. No evidence of stenosis. There is a normal vertebrobasilar junction. Basilar artery is patent without evidence of stenosis. No evidence of intracranial aneurysm or AVM. CTA neck: Common carotid arteries are patent without evidence of stenosis.   Moderate calcified and noncalcified plaque associated with right carotid bulb and proximal right ICA. No evidence of stenosis involving proximal or distal cervical internal carotid arteries. Both vertebral arteries are patent without evidence of stenosis. CT brain perfusion: Increased mean transit time with corresponding decreased perfusion and decreased cerebral volume at site of chronic left occipital stroke. Additional decreased cerebral blood flow and volume along medial aspect of left occipital lobe. 1.  No acute intracranial hemorrhage or edema. 2. Areas of encephalomalacia suggesting chronic stroke involving left frontal lobe, left insula, and left occipital lobe. 3. High-grade stenosis or occlusion involving P2 segment of left PCA. 4. No stenosis involving internal carotid arteries or vertebral arteries. 5. Abnormal perfusion involving left occipital lobe concerning for infarction. MRI brain with diffusion-weighted imaging could be helpful for further evaluation. CT BRAIN PERFUSION    Addendum Date: 10/8/2022    ADDENDUM: Results were called by Dr. Hugo Expose to Dr. Dulce Garvey on 10/8/2022 at 16:25. Result Date: 10/8/2022  EXAMINATION: CTA OF THE HEAD WITH CONTRAST WITH PERFUSION; CT OF THE HEAD WITHOUT CONTRAST; CTA OF THE HEAD WITH CONTRAST; CTA OF THE NECK 10/8/2022 3:27 pm: TECHNIQUE: CTA of the head/brain was performed with the administration of intravenous contrast. Multiplanar reformatted images are provided for review. MIP images are provided for review.  Automated exposure control, iterative reconstruction, and/or weight based adjustment of the mA/kV was utilized to reduce the radiation dose to as low as reasonably achievable.; CT of the head was performed without the administration of intravenous contrast. Automated exposure control, iterative reconstruction, and/or weight based adjustment of the mA/kV was utilized to reduce the radiation dose to as low as reasonably achievable.; CTA of the neck was performed with the administration of intravenous contrast. Multiplanar reformatted images are provided for review. MIP images are provided for review. Stenosis of the internal carotid arteries measured using NASCET criteria. Automated exposure control, iterative reconstruction, and/or weight based adjustment of the mA/kV was utilized to reduce the radiation dose to as low as reasonably achievable. COMPARISON: None. HISTORY: ORDERING SYSTEM PROVIDED HISTORY: stroke TECHNOLOGIST PROVIDED HISTORY: Reason for exam:->stroke Has a \"code stroke\" or \"stroke alert\" been called? ->Yes Decision Support Exception - unselect if not a suspected or confirmed emergency medical condition->Emergency Medical Condition (MA) FINDINGS: Unenhanced CT head: No acute intracranial hemorrhage or edema. No abnormal extra-axial fluid collections. Area of encephalomalacia involving left frontal lobe as well as left occipital lobe. There is no hydrocephalus. CTA head: High-grade stenosis or occlusion involving P2 segment of left PCA. Right posterior cerebral artery is patent without evidence of stenosis. No stenosis involving anterior cerebral arteries or middle cerebral arteries. No evidence of stenosis. There is a normal vertebrobasilar junction. Basilar artery is patent without evidence of stenosis. No evidence of intracranial aneurysm or AVM. CTA neck: Common carotid arteries are patent without evidence of stenosis. Moderate calcified and noncalcified plaque associated with right carotid bulb and proximal right ICA. No evidence of stenosis involving proximal or distal cervical internal carotid arteries. Both vertebral arteries are patent without evidence of stenosis. CT brain perfusion: Increased mean transit time with corresponding decreased perfusion and decreased cerebral volume at site of chronic left occipital stroke. Additional decreased cerebral blood flow and volume along medial aspect of left occipital lobe.      1.  No acute intracranial hemorrhage or edema. 2. Areas of encephalomalacia suggesting chronic stroke involving left frontal lobe, left insula, and left occipital lobe. 3. High-grade stenosis or occlusion involving P2 segment of left PCA. 4. No stenosis involving internal carotid arteries or vertebral arteries. 5. Abnormal perfusion involving left occipital lobe concerning for infarction. MRI brain with diffusion-weighted imaging could be helpful for further evaluation. CTA HEAD W CONTRAST    Addendum Date: 10/8/2022    ADDENDUM: Results were called by Dr. Chanell Coreasr to Dr. Sirena Lopez on 10/8/2022 at 16:25. Result Date: 10/8/2022  EXAMINATION: CTA OF THE HEAD WITH CONTRAST WITH PERFUSION; CT OF THE HEAD WITHOUT CONTRAST; CTA OF THE HEAD WITH CONTRAST; CTA OF THE NECK 10/8/2022 3:27 pm: TECHNIQUE: CTA of the head/brain was performed with the administration of intravenous contrast. Multiplanar reformatted images are provided for review. MIP images are provided for review. Automated exposure control, iterative reconstruction, and/or weight based adjustment of the mA/kV was utilized to reduce the radiation dose to as low as reasonably achievable.; CT of the head was performed without the administration of intravenous contrast. Automated exposure control, iterative reconstruction, and/or weight based adjustment of the mA/kV was utilized to reduce the radiation dose to as low as reasonably achievable.; CTA of the neck was performed with the administration of intravenous contrast. Multiplanar reformatted images are provided for review. MIP images are provided for review. Stenosis of the internal carotid arteries measured using NASCET criteria. Automated exposure control, iterative reconstruction, and/or weight based adjustment of the mA/kV was utilized to reduce the radiation dose to as low as reasonably achievable. COMPARISON: None.  HISTORY: ORDERING SYSTEM PROVIDED HISTORY: stroke TECHNOLOGIST PROVIDED HISTORY: Reason for exam:->stroke Has a \"code stroke\" or \"stroke alert\" been called? ->Yes Decision Support Exception - unselect if not a suspected or confirmed emergency medical condition->Emergency Medical Condition (MA) FINDINGS: Unenhanced CT head: No acute intracranial hemorrhage or edema. No abnormal extra-axial fluid collections. Area of encephalomalacia involving left frontal lobe as well as left occipital lobe. There is no hydrocephalus. CTA head: High-grade stenosis or occlusion involving P2 segment of left PCA. Right posterior cerebral artery is patent without evidence of stenosis. No stenosis involving anterior cerebral arteries or middle cerebral arteries. No evidence of stenosis. There is a normal vertebrobasilar junction. Basilar artery is patent without evidence of stenosis. No evidence of intracranial aneurysm or AVM. CTA neck: Common carotid arteries are patent without evidence of stenosis. Moderate calcified and noncalcified plaque associated with right carotid bulb and proximal right ICA. No evidence of stenosis involving proximal or distal cervical internal carotid arteries. Both vertebral arteries are patent without evidence of stenosis. CT brain perfusion: Increased mean transit time with corresponding decreased perfusion and decreased cerebral volume at site of chronic left occipital stroke. Additional decreased cerebral blood flow and volume along medial aspect of left occipital lobe. 1.  No acute intracranial hemorrhage or edema. 2. Areas of encephalomalacia suggesting chronic stroke involving left frontal lobe, left insula, and left occipital lobe. 3. High-grade stenosis or occlusion involving P2 segment of left PCA. 4. No stenosis involving internal carotid arteries or vertebral arteries. 5. Abnormal perfusion involving left occipital lobe concerning for infarction. MRI brain with diffusion-weighted imaging could be helpful for further evaluation.          The patient's records from referring provider and available information in the EHR was reviewed. Impression:  Left occipital and thalamic acute infarcts    Principal Problem:    Acute CVA (cerebrovascular accident) ([facility] [address]  Active Problems:    History of stroke    Dementia ([facility] [address]    Type 2 diabetes mellitus ([facility] [address]  Resolved Problems:    * No resolved hospital problems. *      Recommendations:                                            MRI brain pending, will follow  ASA + Plavix x 21 days followed by aspirin indefinitely; however patient not currently cooperating with swallowing pills. So in short term, we can use ASA rectally and heparin drip  PTOT  Continue thrice daily attempts for swallow eval  Case was discussed with primary service. All questions were answered. It was my pleasure to evaluate Janki Garcia Sr. today. Please call with questions.       Electronically signed by Izzy Armas MD on 10/10/2022 at 12:02 PM

## 2022-10-10 NOTE — PROGRESS NOTES
Pt has removed all linens and gown from bed and body despite being 4pt restrained. Pt refuses to have gown and lines replaced.

## 2022-10-10 NOTE — PLAN OF CARE
Problem: Discharge Planning  Goal: Discharge to home or other facility with appropriate resources  Outcome: Progressing     Problem: Respiratory - Adult  Goal: Achieves optimal ventilation and oxygenation  Outcome: Progressing     Problem: Cardiovascular - Adult  Goal: Maintains optimal cardiac output and hemodynamic stability  Outcome: Progressing  Goal: Absence of cardiac dysrhythmias or at baseline  Outcome: Progressing     Problem: Musculoskeletal - Adult  Goal: Return mobility to safest level of function  Outcome: Progressing  Goal: Maintain proper alignment of affected body part  Outcome: Progressing  Goal: Return ADL status to a safe level of function  Outcome: Progressing     Problem: Gastrointestinal - Adult  Goal: Minimal or absence of nausea and vomiting  Outcome: Progressing     Problem: Genitourinary - Adult  Goal: Urinary catheter remains patent  Outcome: Progressing     Problem: Hematologic - Adult  Goal: Maintains hematologic stability  Outcome: Progressing     Problem: Safety - Medical Restraint  Goal: Remains free of injury from restraints (Restraint for Interference with Medical Device)  Description: INTERVENTIONS:  1. Determine that other, less restrictive measures have been tried or would not be effective before applying the restraint  2. Evaluate the patient's condition at the time of restraint application  3. Inform patient/family regarding the reason for restraint  4.  Q2H: Monitor safety, psychosocial status, comfort, nutrition and hydration  Outcome: Progressing     Problem: Neurosensory - Adult  Goal: Achieves stable or improved neurological status  Outcome: Progressing  Goal: Absence of seizures  Outcome: Progressing  Goal: Remains free of injury related to seizures activity  Outcome: Progressing  Goal: Achieves maximal functionality and self care  Outcome: Progressing     Problem: Skin/Tissue Integrity - Adult  Goal: Skin integrity remains intact  Outcome: Progressing  Goal: Incisions, wounds, or drain sites healing without S/S of infection  Outcome: Progressing  Goal: Oral mucous membranes remain intact  Outcome: Progressing     Problem: Pain  Goal: Verbalizes/displays adequate comfort level or baseline comfort level  Outcome: Progressing     Problem: Chronic Conditions and Co-morbidities  Goal: Patient's chronic conditions and co-morbidity symptoms are monitored and maintained or improved  Outcome: Progressing     Problem: Skin/Tissue Integrity  Goal: Absence of new skin breakdown  Description: 1. Monitor for areas of redness and/or skin breakdown  2. Assess vascular access sites hourly  3. Every 4-6 hours minimum:  Change oxygen saturation probe site  4. Every 4-6 hours:  If on nasal continuous positive airway pressure, respiratory therapy assess nares and determine need for appliance change or resting period.   Outcome: Progressing     Problem: Safety - Adult  Goal: Free from fall injury  Outcome: Progressing     Problem: ABCDS Injury Assessment  Goal: Absence of physical injury  Outcome: Progressing

## 2022-10-11 ENCOUNTER — APPOINTMENT (OUTPATIENT)
Dept: GENERAL RADIOLOGY | Age: 67
DRG: 023 | End: 2022-10-11
Payer: COMMERCIAL

## 2022-10-11 ENCOUNTER — TELEPHONE (OUTPATIENT)
Dept: NON INVASIVE DIAGNOSTICS | Age: 67
End: 2022-10-11

## 2022-10-11 ENCOUNTER — APPOINTMENT (OUTPATIENT)
Dept: CT IMAGING | Age: 67
DRG: 023 | End: 2022-10-11
Payer: COMMERCIAL

## 2022-10-11 DIAGNOSIS — G45.9 TRANSIENT CEREBRAL ISCHEMIA, UNSPECIFIED TYPE: ICD-10-CM

## 2022-10-11 DIAGNOSIS — I63.9 CEREBROVASCULAR ACCIDENT (CVA), UNSPECIFIED MECHANISM (HCC): Primary | ICD-10-CM

## 2022-10-11 LAB
ANION GAP SERPL CALCULATED.3IONS-SCNC: 12 MMOL/L (ref 7–16)
BUN BLDV-MCNC: 28 MG/DL (ref 6–23)
CALCIUM SERPL-MCNC: 9.1 MG/DL (ref 8.6–10.2)
CHLORIDE BLD-SCNC: 104 MMOL/L (ref 98–107)
CO2: 24 MMOL/L (ref 22–29)
CREAT SERPL-MCNC: 0.8 MG/DL (ref 0.7–1.2)
GFR AFRICAN AMERICAN: >60
GFR SERPL CREATININE-BSD FRML MDRD: 96 ML/MIN/1.73
GLUCOSE BLD-MCNC: 218 MG/DL (ref 74–99)
HCT VFR BLD CALC: 44.6 % (ref 37–54)
HEMOGLOBIN: 14.4 G/DL (ref 12.5–16.5)
MAGNESIUM: 2.3 MG/DL (ref 1.6–2.6)
MCH RBC QN AUTO: 29 PG (ref 26–35)
MCHC RBC AUTO-ENTMCNC: 32.3 % (ref 32–34.5)
MCV RBC AUTO: 89.9 FL (ref 80–99.9)
METER GLUCOSE: 167 MG/DL (ref 74–99)
METER GLUCOSE: 194 MG/DL (ref 74–99)
METER GLUCOSE: 208 MG/DL (ref 74–99)
METER GLUCOSE: 223 MG/DL (ref 74–99)
PDW BLD-RTO: 14.6 FL (ref 11.5–15)
PHOSPHORUS: 3.4 MG/DL (ref 2.5–4.5)
PLATELET # BLD: 152 E9/L (ref 130–450)
PMV BLD AUTO: 11.1 FL (ref 7–12)
POTASSIUM SERPL-SCNC: 4.3 MMOL/L (ref 3.5–5)
RBC # BLD: 4.96 E12/L (ref 3.8–5.8)
SODIUM BLD-SCNC: 140 MMOL/L (ref 132–146)
WBC # BLD: 8.1 E9/L (ref 4.5–11.5)

## 2022-10-11 PROCEDURE — 2700000000 HC OXYGEN THERAPY PER DAY

## 2022-10-11 PROCEDURE — 6370000000 HC RX 637 (ALT 250 FOR IP): Performed by: NURSE PRACTITIONER

## 2022-10-11 PROCEDURE — 85027 COMPLETE CBC AUTOMATED: CPT

## 2022-10-11 PROCEDURE — 74018 RADEX ABDOMEN 1 VIEW: CPT

## 2022-10-11 PROCEDURE — 6370000000 HC RX 637 (ALT 250 FOR IP): Performed by: INTERNAL MEDICINE

## 2022-10-11 PROCEDURE — 84100 ASSAY OF PHOSPHORUS: CPT

## 2022-10-11 PROCEDURE — 83735 ASSAY OF MAGNESIUM: CPT

## 2022-10-11 PROCEDURE — 2709999900 HC NON-CHARGEABLE SUPPLY

## 2022-10-11 PROCEDURE — 70450 CT HEAD/BRAIN W/O DYE: CPT

## 2022-10-11 PROCEDURE — 99222 1ST HOSP IP/OBS MODERATE 55: CPT | Performed by: STUDENT IN AN ORGANIZED HEALTH CARE EDUCATION/TRAINING PROGRAM

## 2022-10-11 PROCEDURE — 2000000000 HC ICU R&B

## 2022-10-11 PROCEDURE — 99232 SBSQ HOSP IP/OBS MODERATE 35: CPT | Performed by: NURSE PRACTITIONER

## 2022-10-11 PROCEDURE — 6370000000 HC RX 637 (ALT 250 FOR IP): Performed by: SURGERY

## 2022-10-11 PROCEDURE — S5553 INSULIN LONG ACTING 5 U: HCPCS | Performed by: INTERNAL MEDICINE

## 2022-10-11 PROCEDURE — 36415 COLL VENOUS BLD VENIPUNCTURE: CPT

## 2022-10-11 PROCEDURE — 6360000002 HC RX W HCPCS: Performed by: INTERNAL MEDICINE

## 2022-10-11 PROCEDURE — 82962 GLUCOSE BLOOD TEST: CPT

## 2022-10-11 PROCEDURE — 2580000003 HC RX 258: Performed by: PSYCHIATRY & NEUROLOGY

## 2022-10-11 PROCEDURE — 80048 BASIC METABOLIC PNL TOTAL CA: CPT

## 2022-10-11 PROCEDURE — 2580000003 HC RX 258: Performed by: SURGERY

## 2022-10-11 PROCEDURE — 99233 SBSQ HOSP IP/OBS HIGH 50: CPT | Performed by: NURSE PRACTITIONER

## 2022-10-11 PROCEDURE — 2500000003 HC RX 250 WO HCPCS: Performed by: PSYCHIATRY & NEUROLOGY

## 2022-10-11 RX ORDER — MIRTAZAPINE 15 MG/1
15 TABLET, FILM COATED ORAL NIGHTLY
Status: DISCONTINUED | OUTPATIENT
Start: 2022-10-11 | End: 2022-10-21 | Stop reason: HOSPADM

## 2022-10-11 RX ORDER — INSULIN LISPRO 100 [IU]/ML
0-16 INJECTION, SOLUTION INTRAVENOUS; SUBCUTANEOUS EVERY 4 HOURS
Status: DISCONTINUED | OUTPATIENT
Start: 2022-10-11 | End: 2022-10-13

## 2022-10-11 RX ORDER — AMLODIPINE BESYLATE 10 MG/1
10 TABLET ORAL DAILY
Status: DISCONTINUED | OUTPATIENT
Start: 2022-10-12 | End: 2022-10-21 | Stop reason: HOSPADM

## 2022-10-11 RX ORDER — ATORVASTATIN CALCIUM 40 MG/1
40 TABLET, FILM COATED ORAL NIGHTLY
Status: DISCONTINUED | OUTPATIENT
Start: 2022-10-11 | End: 2022-10-11

## 2022-10-11 RX ORDER — INSULIN GLARGINE-YFGN 100 [IU]/ML
25 INJECTION, SOLUTION SUBCUTANEOUS NIGHTLY
Status: DISCONTINUED | OUTPATIENT
Start: 2022-10-11 | End: 2022-10-13

## 2022-10-11 RX ORDER — ATORVASTATIN CALCIUM 40 MG/1
80 TABLET, FILM COATED ORAL NIGHTLY
Status: DISCONTINUED | OUTPATIENT
Start: 2022-10-11 | End: 2022-10-21 | Stop reason: HOSPADM

## 2022-10-11 RX ADMIN — MIRTAZAPINE 15 MG: 15 TABLET, FILM COATED ORAL at 21:48

## 2022-10-11 RX ADMIN — AMLODIPINE BESYLATE 10 MG: 10 TABLET ORAL at 08:27

## 2022-10-11 RX ADMIN — LABETALOL HYDROCHLORIDE 5 MG: 5 INJECTION, SOLUTION INTRAVENOUS at 19:03

## 2022-10-11 RX ADMIN — HALOPERIDOL LACTATE 3 MG: 5 INJECTION, SOLUTION INTRAMUSCULAR at 04:25

## 2022-10-11 RX ADMIN — LABETALOL HYDROCHLORIDE 5 MG: 5 INJECTION, SOLUTION INTRAVENOUS at 13:05

## 2022-10-11 RX ADMIN — LABETALOL HYDROCHLORIDE 5 MG: 5 INJECTION, SOLUTION INTRAVENOUS at 15:10

## 2022-10-11 RX ADMIN — INSULIN LISPRO 4 UNITS: 100 INJECTION, SOLUTION INTRAVENOUS; SUBCUTANEOUS at 11:51

## 2022-10-11 RX ADMIN — INSULIN GLARGINE-YFGN 25 UNITS: 100 INJECTION, SOLUTION SUBCUTANEOUS at 21:51

## 2022-10-11 RX ADMIN — SODIUM CHLORIDE: 9 INJECTION, SOLUTION INTRAVENOUS at 07:38

## 2022-10-11 RX ADMIN — SODIUM CHLORIDE, PRESERVATIVE FREE 10 ML: 5 INJECTION INTRAVENOUS at 21:48

## 2022-10-11 RX ADMIN — ATORVASTATIN CALCIUM 80 MG: 40 TABLET, FILM COATED ORAL at 21:48

## 2022-10-11 RX ADMIN — INSULIN LISPRO 4 UNITS: 100 INJECTION, SOLUTION INTRAVENOUS; SUBCUTANEOUS at 21:50

## 2022-10-11 ASSESSMENT — PAIN SCALES - GENERAL
PAINLEVEL_OUTOF10: 0
PAINLEVEL_OUTOF10: 3
PAINLEVEL_OUTOF10: 0

## 2022-10-11 NOTE — CARE COORDINATION
Will defer consultation to Electrophysiology as the consult is for Linq.   Electronically signed by CARROLL Guan CNP on 10/11/22 at 7:51 AM EDT

## 2022-10-11 NOTE — PLAN OF CARE
Collins sent to Antonio Espinoza NP for new consult to [id number] [facility].

## 2022-10-11 NOTE — PROGRESS NOTES
Pt continues to be anxious and remove his clothes. Pt has removed 2 external catheters. Pt in adult diaper. Pt is not able to be redirected.

## 2022-10-11 NOTE — PROGRESS NOTES
Patient continues to reach for IV access and external catheter when unrestrained. Pt attempts to toss legs over side rail. Attempts to verbally reorient/redirect or educate patient on importance towards continued care goals are unsuccessful at this time. 2pt bilateral soft wrist restraints maintained for patient safety. Will continue to attempt to reorient/redirect and assess for earliest possible safe removal from restraints. Son, Bhavna Gloria, is aware of indication and continuation for pt safety.

## 2022-10-11 NOTE — CARE COORDINATION
Care Coordination   Spoke to the patients son Mr Danielle Brooks and told him that his father was denied for acute rehab. Went over all skilled units in the area that have memory care units. He is going to look them all tonight and call this cm later with his decision. Once have his choices referral will be made.

## 2022-10-11 NOTE — FLOWSHEET NOTE
Patient continues to reach for IV access and alberts when unrestrained, also attempting to toss legs over side rail. Attempts to verbally reorient/redirect or educate patient on importance towards continued care goals are unsuccessful at this time. 2pt bilateral soft wrist restraints and 2pt soft bilateral ankle restraints maintained for patient safety. Will continue to attempt to reorient/redirect and assess for earliest possible safe removal from restraints. Son Nimisha Reyes aware of indication and continuation.

## 2022-10-11 NOTE — CARE COORDINATION
Care Coordination  The patient was admitted  from the [facility] at [facility] where he was receiving  total care as he has had a prior stoke in 2020. The patient was admitted for an acute left Pca occlusion. The patient was taken to surgery for a thrombectomy with tici 3 flow. 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 There is an acute infarct involving the medial aspect the left temporal lobe and the left occipital lobe. Ep on consult for a [employer] recorder. Ivf at 100 cc hr. The patient remains npo. Lovenox sq 40 mg qd. He is on Haldol 3 mg Im three times daily had x 1. Spoke to the patients son  Octavio Barnett that a skilled facility with memory care would be a better option for the patient. Left a voicemail for Jocelyne Gordillo at [facility] to call this cm. Acute rehab beds are full still awaiting therapy to see the patient.

## 2022-10-11 NOTE — PLAN OF CARE
Problem: Discharge Planning  Goal: Discharge to home or other facility with appropriate resources  10/11/2022 0852 by Marizol Mayo RN  Outcome: Progressing  Flowsheets  Taken 10/11/2022 0800 by Marizol Mayo RN  Discharge to home or other facility with appropriate resources:   Identify barriers to discharge with patient and caregiver   Arrange for needed discharge resources and transportation as appropriate   Identify discharge learning needs (meds, wound care, etc)  Taken 10/10/2022 2100 by Jose Tierney  Discharge to home or other facility with appropriate resources:   Identify barriers to discharge with patient and caregiver   Arrange for needed discharge resources and transportation as appropriate   Identify discharge learning needs (meds, wound care, etc)  10/10/2022 1957 by Jose Tierney  Outcome: Progressing     Problem: Respiratory - Adult  Goal: Achieves optimal ventilation and oxygenation  10/11/2022 0852 by Marizol Mayo RN  Outcome: Progressing  Flowsheets  Taken 10/11/2022 0800 by Marizol Mayo RN  Achieves optimal ventilation and oxygenation: Assess for changes in respiratory status  Taken 10/10/2022 2100 by Donnell Bynum optimal ventilation and oxygenation:   Assess for changes in respiratory status   Assess for changes in mentation and behavior   Position to facilitate oxygenation and minimize respiratory effort   Oxygen supplementation based on oxygen saturation or arterial blood gases   Encourage broncho-pulmonary hygiene including cough, deep breathe, incentive spirometry   Assess the need for suctioning and aspirate as needed   Assess and instruct to report shortness of breath or any respiratory difficulty   Respiratory therapy support as indicated  10/10/2022 1957 by Jose Tierney  Outcome: Progressing     Problem: Cardiovascular - Adult  Goal: Maintains optimal cardiac output and hemodynamic stability  10/11/2022 0852 by Marizol Mayo RN  Outcome: Progressing  Flowsheets  Taken 10/11/2022 0852 by Tyler Cabrera RN  Maintains optimal cardiac output and hemodynamic stability:   Monitor blood pressure and heart rate   Monitor urine output and notify Licensed Independent Practitioner for values outside of normal range   Assess for signs of decreased cardiac output  Taken 10/11/2022 0800 by Tyler Cabrera RN  Maintains optimal cardiac output and hemodynamic stability: Monitor blood pressure and heart rate  Taken 10/10/2022 2100 by Fariha Oliver optimal cardiac output and hemodynamic stability:   Monitor blood pressure and heart rate   Monitor urine output and notify Licensed Independent Practitioner for values outside of normal range   Assess for signs of decreased cardiac output  10/10/2022 1957 by Jose Barahona  Outcome: Progressing  Goal: Absence of cardiac dysrhythmias or at baseline  10/11/2022 0852 by Tyler Cabrera RN  Outcome: Progressing  Flowsheets  Taken 10/11/2022 0852 by Tyler Cabrera RN  Absence of cardiac dysrhythmias or at baseline: Monitor cardiac rate and rhythm  Taken 10/11/2022 0800 by Tyler Cabrera RN  Absence of cardiac dysrhythmias or at baseline: Monitor cardiac rate and rhythm  Taken 10/10/2022 2100 by Jose Barahona  Absence of cardiac dysrhythmias or at baseline: Monitor cardiac rate and rhythm  10/10/2022 1957 by Jose Barahona  Outcome: Progressing     Problem: Musculoskeletal - Adult  Goal: Return mobility to safest level of function  10/11/2022 0852 by Tyler Cabrera RN  Outcome: Progressing  Flowsheets  Taken 10/11/2022 0852 by Tyler Cabrera RN  Return Mobility to Safest Level of Function: Obtain physical therapy/occupational therapy consults as needed  Taken 10/11/2022 0800 by Tyler Cabrera RN  Return Mobility to Safest Level of Function: Assess patient stability and activity tolerance for standing, transferring and ambulating with or without assistive devices  Taken 10/10/2022 2100 by Jose Barahona  Return Mobility to Safest Level of Function: Assess patient stability and activity tolerance for standing, transferring and ambulating with or without assistive devices   Assist with transfers and ambulation using safe patient handling equipment as needed   Ensure adequate protection for wounds/incisions during mobilization   Obtain physical therapy/occupational therapy consults as needed   Apply continuous passive motion per provider or physical therapy orders to increase flexion toward goal  10/10/2022 1957 by Camilla Hebert  Outcome: Progressing  Goal: Maintain proper alignment of affected body part  Outcome: Progressing  Flowsheets  Taken 10/11/2022 0800 by Ashley Campos RN  Maintain proper alignment of affected body part: Instruct and reinforce with patient and family use of appropriate assistive device and precautions (e.g. spinal or hip dislocation precautions)  Taken 10/10/2022 2100 by Camilla Hebert  Maintain proper alignment of affected body part:  Instruct and reinforce with patient and family use of appropriate assistive device and precautions (e.g. spinal or hip dislocation precautions)  Goal: Return ADL status to a safe level of function  Outcome: Progressing  Flowsheets (Taken 10/11/2022 0800)  Return ADL Status to a Safe Level of Function: Administer medication as ordered     Problem: Gastrointestinal - Adult  Goal: Minimal or absence of nausea and vomiting  Outcome: Progressing  Flowsheets  Taken 10/11/2022 0800 by Ashley Campos RN  Minimal or absence of nausea and vomiting:   Administer IV fluids as ordered to ensure adequate hydration   Maintain NPO status until nausea and vomiting are resolved  Taken 10/10/2022 2100 by Camilla Hebert  Minimal or absence of nausea and vomiting:   Administer IV fluids as ordered to ensure adequate hydration   Maintain NPO status until nausea and vomiting are resolved     Problem: Genitourinary - Adult  Goal: Urinary catheter remains patent  Outcome: Progressing  Flowsheets  Taken 10/11/2022 0800 by Lesli Alatorre Marv Sellers RN  Urinary catheter remains patent: Assess patency of urinary catheter  Taken 10/10/2022 2100 by Emogene Brocarol  Urinary catheter remains patent: Assess patency of urinary catheter     Problem: Hematologic - Adult  Goal: Maintains hematologic stability  Outcome: Progressing  Flowsheets  Taken 10/11/2022 0800 by Tata Woods RN  Maintains hematologic stability:   Assess for signs and symptoms of bleeding or hemorrhage   Monitor labs for bleeding or clotting disorders  Taken 10/10/2022 2100 by Gale Valentin hematologic stability:   Assess for signs and symptoms of bleeding or hemorrhage   Monitor labs for bleeding or clotting disorders     Problem: Safety - Medical Restraint  Goal: Remains free of injury from restraints (Restraint for Interference with Medical Device)  Description: INTERVENTIONS:  1. Determine that other, less restrictive measures have been tried or would not be effective before applying the restraint  2. Evaluate the patient's condition at the time of restraint application  3. Inform patient/family regarding the reason for restraint  4.  Q2H: Monitor safety, psychosocial status, comfort, nutrition and hydration  10/11/2022 0852 by Tata Woods RN  Outcome: Progressing  Flowsheets (Taken 10/11/2022 0800)  Remains free of injury from restraints (restraint for interference with medical device):   Determine that other, less restrictive measures have been tried or would not be effective before applying the restraint   Evaluate the patient's condition at the time of restraint application   Inform patient/family regarding the reason for restraint   Every 2 hours: Monitor safety, psychosocial status, comfort, nutrition and hydration  10/10/2022 1957 by Alyssa Anderson  Outcome: Not Progressing  Flowsheets (Taken 10/10/2022 1951)  Remains free of injury from restraints (restraint for interference with medical device):   Determine that other, less restrictive measures have been tried or would not be effective before applying the restraint   Evaluate the patient's condition at the time of restraint application   Inform patient/family regarding the reason for restraint   Every 2 hours: Monitor safety, psychosocial status, comfort, nutrition and hydration     Problem: Neurosensory - Adult  Goal: Achieves stable or improved neurological status  Outcome: Progressing  Flowsheets  Taken 10/11/2022 0800 by Marcelino Gonzalez RN  Achieves stable or improved neurological status: Assess for and report changes in neurological status  Taken 10/10/2022 2100 by Abbey Gambino stable or improved neurological status:   Assess for and report changes in neurological status   Initiate measures to prevent increased intracranial pressure   Maintain blood pressure and fluid volume within ordered parameters to optimize cerebral perfusion and minimize risk of hemorrhage   Monitor temperature, glucose, and sodium. Initiate appropriate interventions as ordered  Goal: Absence of seizures  Outcome: Progressing  Flowsheets  Taken 10/11/2022 0800 by Marcelino Gonzalez RN  Absence of seizures: Monitor for seizure activity. If seizure occurs, document type and location of movements and any associated apnea  Taken 10/10/2022 2100 by Jr Quintana  Absence of seizures: Monitor for seizure activity.   If seizure occurs, document type and location of movements and any associated apnea  Goal: Remains free of injury related to seizures activity  Outcome: Progressing  Flowsheets  Taken 10/11/2022 0800 by Marcelino Gonzalez RN  Remains free of injury related to seizure activity: Maintain airway, patient safety  and administer oxygen as ordered  Taken 10/10/2022 2100 by Felipa Ruvalcaba free of injury related to seizure activity: Maintain airway, patient safety  and administer oxygen as ordered  Goal: Achieves maximal functionality and self care  Outcome: Progressing  Flowsheets  Taken 10/11/2022 0800 by Marcelino Gonzalez RN  Achieves maximal functionality and self care: Encourage and assist patient to increase activity and self care with guidance from physical therapy/occupational therapy  Taken 10/10/2022 2100 by Alejandro Austin maximal functionality and self care: Encourage and assist patient to increase activity and self care with guidance from physical therapy/occupational therapy     Problem: Skin/Tissue Integrity - Adult  Goal: Skin integrity remains intact  10/11/2022 0852 by Marium Turner RN  Outcome: Progressing  Flowsheets  Taken 10/11/2022 0849  Skin Integrity Remains Intact:   Monitor for areas of redness and/or skin breakdown   Assess vascular access sites hourly  Taken 10/11/2022 0800  Skin Integrity Remains Intact: Monitor for areas of redness and/or skin breakdown  10/10/2022 1957 by Dustin Severino  Outcome: Progressing  Flowsheets (Taken 10/10/2022 1956)  Skin Integrity Remains Intact:   Monitor for areas of redness and/or skin breakdown   Assess vascular access sites hourly   Every 4-6 hours minimum: Change oxygen saturation probe site   Every 4-6 hours: If on nasal continuous positive airway pressure, respiratory therapy assesses nares and determine need for appliance change or resting period  Goal: Incisions, wounds, or drain sites healing without S/S of infection  10/11/2022 0852 by Marium Turner RN  Outcome: Progressing  Flowsheets  Taken 10/11/2022 0849  Incisions, Wounds, or Drain Sites Healing Without Sign and Symptoms of Infection: ADMISSION and DAILY: Assess and document risk factors for pressure ulcer development  Taken 10/11/2022 0800  Incisions, Wounds, or Drain Sites Healing Without Sign and Symptoms of Infection: ADMISSION and DAILY: Assess and document risk factors for pressure ulcer development  10/10/2022 1957 by Dustin Severino  Outcome: Progressing  Flowsheets (Taken 10/10/2022 1956)  Incisions, Wounds, or Drain Sites Healing Without Sign and Symptoms of Infection:   ADMISSION and DAILY: Assess and document risk factors for pressure ulcer development   TWICE DAILY: Assess and document skin integrity   TWICE DAILY: Assess and document dressing/incision, wound bed, drain sites and surrounding tissue   Implement wound care per orders   Initiate pressure ulcer prevention bundle as indicated  Goal: Oral mucous membranes remain intact  10/11/2022 0852 by Chele Dover RN  Outcome: Progressing  Flowsheets  Taken 10/11/2022 0849  Oral Mucous Membranes Remain Intact: Assess oral mucosa and hygiene practices  Taken 10/11/2022 0800  Oral Mucous Membranes Remain Intact: Assess oral mucosa and hygiene practices  10/10/2022 1957 by Shayna Ulrich  Outcome: Progressing  Flowsheets (Taken 10/10/2022 1956)  Oral Mucous Membranes Remain Intact:   Assess oral mucosa and hygiene practices   Implement preventative oral hygiene regimen   Implement oral medicated treatments as ordered     Problem: Pain  Goal: Verbalizes/displays adequate comfort level or baseline comfort level  10/11/2022 0852 by Chele Dover RN  Outcome: Progressing  Flowsheets (Taken 10/10/2022 2000 by Shayna Ulrich)  Verbalizes/displays adequate comfort level or baseline comfort level:   Encourage patient to monitor pain and request assistance   Assess pain using appropriate pain scale   Administer analgesics based on type and severity of pain and evaluate response   Implement non-pharmacological measures as appropriate and evaluate response   Notify Licensed Independent Practitioner if interventions unsuccessful or patient reports new pain  10/10/2022 1957 by Shayna Ulrich  Outcome: Progressing     Problem: Chronic Conditions and Co-morbidities  Goal: Patient's chronic conditions and co-morbidity symptoms are monitored and maintained or improved  Outcome: Progressing  Flowsheets  Taken 10/11/2022 0800 by Sylvie Dumont 32 - Patient's Chronic Conditions and Co-Morbidity Symptoms are Monitored and Maintained or Improved:   Monitor and assess patient's chronic conditions and comorbid symptoms for stability, deterioration, or improvement   Collaborate with multidisciplinary team to address chronic and comorbid conditions and prevent exacerbation or deterioration   Update acute care plan with appropriate goals if chronic or comorbid symptoms are exacerbated and prevent overall improvement and discharge  Taken 10/10/2022 2100 by [id number] - Patient's Chronic Conditions and Co-Morbidity Symptoms are Monitored and Maintained or Improved:   Monitor and assess patient's chronic conditions and comorbid symptoms for stability, deterioration, or improvement   Collaborate with multidisciplinary team to address chronic and comorbid conditions and prevent exacerbation or deterioration   Update acute care plan with appropriate goals if chronic or comorbid symptoms are exacerbated and prevent overall improvement and discharge     Problem: Skin/Tissue Integrity  Goal: Absence of new skin breakdown  Description: 1. Monitor for areas of redness and/or skin breakdown  2. Assess vascular access sites hourly  3. Every 4-6 hours minimum:  Change oxygen saturation probe site  4. Every 4-6 hours:  If on nasal continuous positive airway pressure, respiratory therapy assess nares and determine need for appliance change or resting period.   Outcome: Progressing     Problem: Safety - Adult  Goal: Free from fall injury  Outcome: Progressing  Flowsheets  Taken 10/11/2022 0849 by Leslye Alaniz RN  Free From Fall Injury:   Instruct family/caregiver on patient safety   Based on caregiver fall risk screen, instruct family/caregiver to ask for assistance with transferring infant if caregiver noted to have fall risk factors  Taken 10/10/2022 1956 by Jewel Pugh  Free From Fall Injury:   Instruct family/caregiver on patient safety   Based on caregiver fall risk screen, instruct family/caregiver to ask for assistance with transferring infant if caregiver noted to have fall risk factors     Problem: ABCDS Injury Assessment  Goal: Absence of physical injury  Outcome: Progressing  Flowsheets  Taken 10/11/2022 0849 by Jorge Quintana RN  Absence of Physical Injury: Implement safety measures based on patient assessment  Taken 10/10/2022 1956 by Dewayne Torres  Absence of Physical Injury: Implement safety measures based on patient assessment     Problem: Safety - Medical Restraint  Goal: Remains free of injury from restraints (Restraint for Interference with Medical Device)  Description: INTERVENTIONS:  1. Determine that other, less restrictive measures have been tried or would not be effective before applying the restraint  2. Evaluate the patient's condition at the time of restraint application  3. Inform patient/family regarding the reason for restraint  4.  Q2H: Monitor safety, psychosocial status, comfort, nutrition and hydration  10/11/2022 0852 by Jorge Quintana RN  Outcome: Progressing  Flowsheets (Taken 10/11/2022 0800)  Remains free of injury from restraints (restraint for interference with medical device):   Determine that other, less restrictive measures have been tried or would not be effective before applying the restraint   Evaluate the patient's condition at the time of restraint application   Inform patient/family regarding the reason for restraint   Every 2 hours: Monitor safety, psychosocial status, comfort, nutrition and hydration  10/10/2022 1957 by Dewayne Torres  Outcome: Not Progressing  Flowsheets (Taken 10/10/2022 1951)  Remains free of injury from restraints (restraint for interference with medical device):   Determine that other, less restrictive measures have been tried or would not be effective before applying the restraint   Evaluate the patient's condition at the time of restraint application   Inform patient/family regarding the reason for restraint   Every 2 hours: Monitor safety, psychosocial status, comfort, nutrition and hydration

## 2022-10-11 NOTE — PROGRESS NOTES
Physical Therapy  Physical Therapy Attempt    Name: Urvashi Walker Sr.  : 1955  MRN: [id number]      Date of Service: 10/11/2022  Chart reviewed. Per NP, pt is not appropriate for skilled PT at this time. Will re-attempt as able.     Robert Bustos, PT, DPT  [id number]

## 2022-10-11 NOTE — CONSULTS
[address] ELECTROPHYSIOLOGY DEPARTMENT/DIVISION OF CARDIOLOGY  Inpatient Consultation Report  PATIENT: Scott Quiroga Sr. MEDICAL RECORD NUMBER: [id number]  DATE OF SERVICE:  10/11/2022  ATTENDING ELECTROPHYSIOLOGIST:  Jeramie Garner DO   REFERRING PHYSICIAN: No ref. provider found and Lorri Goldberg DO  CHIEF COMPLAINT: cryptogenic stroke    HPI: Alireza Valenzuela is a 69 y.o. male with a history of stroke with right hemiparesis and dysarthria (9/2020), PAD sp left carotid endarterectomy (/2020), DM2, vascular dementia, COPD. He is managed by PCP with aspirin 81 mg daily and Lipitor 40 mg daily. In 9/2020, he was diagnosed with ischemic stroke due to left carotid stenosis. In 10/2020, he had left carotid endarterectomy. On 10/8/2022, he presented with chief complaint of confusion, slurred speech, left facial droop, and right-sided weakness. He was diagnosed with stroke and treated with IV-TNK, rectal aspirin, heparin infusion. Imaging reported acute left thalamic infarct with a small amount of hemorrhage and acute infarct in the medial aspect of the left temporal lobe and left occipital lobes as well as remote infarct of the left MCA territory with atrophy. He was transferred to [facility] and underwent mechanical arterial thrombectomy on 10/8/2022. He was diagnosed with acute/early subacute evolving left occipital and ophthalmic infarcts, which was treated with tPA and mechanical arterial thrombectomy on 10/8/2022. On 10/10/2022, he was evaluated by neurology, who recommended DAPT for 21 days followed by aspirin indefinitely. Imaging of the neck reported no stenosis involving the internal carotid or vertebral arteries. EP service is now consulted by admitting physician for further evaluation and management of stroke. History obtained via chart review due to patient confusion.     Prior cardiac testing:   Limited TTE (10/10/2022): Technically very difficult examination, likely preserved biventricular systolic function. ECG (10/8/2022): SR at 74 bpm, QTC = 432 ms  Pharmacologic nuclear stress test (9/3/2020): LVEF = 60%, normal perfusion, and no ischemia or infarct. TTE (2020): Technically suboptimal and limited study, low normal LVEF.     Past Medical History:   Diagnosis Date    Dementia ([facility]     Diabetes mellitus ([facility]     Seasonal allergies     Stroke due to stenosis of left carotid artery ([facility] 2020    Vascular dementia [facility])      Past Surgical History:   Procedure Laterality Date    CARDIOVASCULAR STRESS TEST  2020    Lexiscan stress test    CAROTID ENDARTERECTOMY Left 10/29/2020    LEFT CAROTID ENDARTERECTOMY -- FACILITY performed by Katheryne Prader, MD at [address]      IR MECHANICAL ART THROMBECTOMY INTRACRANIAL  10/8/2022    IR MECHANICAL ART THROMBECTOMY INTRACRANIAL 10/8/2022 Brianne Shultz MD [facility]    TONSILLECTOMY          Family History   Problem Relation Age of Onset    Emphysema Mother      There is no family history of sudden cardiac arrest    Social History     Tobacco Use    Smoking status: Former     Packs/day: 1.00     Years: 50.00     Pack years: 50.00     Types: Cigarettes     Quit date: 2020     Years since quittin.1    Smokeless tobacco: Former   Substance Use Topics    Alcohol use: Not Currently     Alcohol/week: 1.0 standard drink     Types: 1 Cans of beer per week     Comment: on occassion       Current Facility-Administered Medications   Medication Dose Route Frequency Provider Last Rate Last Admin    [START ON 10/12/2022] amLODIPine (NORVASC) tablet 10 mg  10 mg Per NG tube Daily CARROLL Wynn CNP        atorvastatin (LIPITOR) tablet 40 mg  40 mg Per NG tube Nightly CARROLL Wynn CNP        mirtazapine (REMERON) tablet 15 mg  15 mg Per NG tube Nightly CARROLL Wynn CNP        [Held by provider] aspirin EC tablet 81 mg  81 mg Oral Daily CARROLL Cerrato CNP   81 mg at 10/10/22 1031    Or    [Held by provider] aspirin suppository 300 mg  300 mg Rectal Daily CARROLL Cerrato CNP        [Held by provider] enoxaparin (LOVENOX) injection 40 mg  40 mg SubCUTAneous Daily CARROLL Cerrato - CNP   40 mg at 10/10/22 1857    haloperidol lactate (HALDOL) injection 3 mg  3 mg IntraMUSCular TID PRN Edd Antonio MD   3 mg at 10/11/22 0425    insulin glargine-yfgn (SEMGLEE-YFGN) injection vial 20 Units  20 Units SubCUTAneous Nightly CARROLL Cerrato CNP   20 Units at 10/10/22 2034    0.9 % sodium chloride infusion   IntraVENous Continuous Nicolas Franks  mL/hr at 10/11/22 0738 New Bag at 10/11/22 0738    rivastigmine (EXELON) 9.5 MG/24HR 1 patch  1 patch TransDERmal Daily CARROLL Najera CNP   1 patch at 10/11/22 8706    glucose chewable tablet 16 g  4 tablet Oral PRN CARROLL Najera CNP        dextrose bolus 10% 125 mL  125 mL IntraVENous PRN CARROLL Najera CNP        Or    dextrose bolus 10% 250 mL  250 mL IntraVENous PRN CARROLL Najera CNP        glucagon (rDNA) injection 1 mg  1 mg SubCUTAneous PRN CARROLL Najera CNP        dextrose 10 % infusion   IntraVENous Continuous PRN CARROLL Najera CNP        insulin lispro (HUMALOG) injection vial 0-16 Units  0-16 Units SubCUTAneous TID WC CARROLL Najera CNP   4 Units at 10/10/22 1857    insulin lispro (HUMALOG) injection vial 0-4 Units  0-4 Units SubCUTAneous Nightly CARROLL Najera CNP        sodium chloride flush 0.9 % injection 5-40 mL  5-40 mL IntraVENous 2 times per day Anatoly Murray MD   10 mL at 10/10/22 2110    sodium chloride flush 0.9 % injection 5-40 mL  5-40 mL IntraVENous PRN Anatoly Murray MD        0.9 % sodium chloride infusion   IntraVENous PRN Anatoly Murray MD        acetaminophen (TYLENOL) tablet 650 mg  650 mg Oral Q4H PRN Anatoly Murray MD ondansetron (ZOFRAN-ODT) disintegrating tablet 4 mg  4 mg Oral Q8H PRN Rosa Andrew MD        Or    ondansetron (ZOFRAN) injection 4 mg  4 mg IntraVENous Q6H PRN Everton Craft MD        perflutren lipid microspheres (DEFINITY) injection 1.65 mg  1.5 mL IntraVENous ONCE PRN Rosa Andrew MD        bisacodyl (DULCOLAX) EC tablet 5 mg  5 mg Oral Daily PRN Rosa Andrew MD        labetalol (NORMODYNE;TRANDATE) injection 5 mg  5 mg IntraVENous Q10 Min PRN Rosa Andrew MD   5 mg at 10/10/22 2105    hydrALAZINE (APRESOLINE) injection 5 mg  5 mg IntraVENous Q10 Min PRN Rosa Andrew MD   5 mg at 10/10/22 0334        No Known Allergies    ROS: Unable to obtain due to confusion/dementia    PHYSICAL EXAM:  Constitutional   Vitals:    10/11/22 0813 10/11/22 0900 10/11/22 1000 10/11/22 1100   BP: (!) 141/79 137/80 138/86 (!) 147/99   Pulse: 83 95 87 (!) 104   Resp: 19 16 18 20   Temp:       TempSrc:       SpO2: 95% 99%     Weight:        Well-developed, no acute distress, well groomed  Eyes: conjunctivae normal, no xanthelasma   Ears, Nose, Throat: oral mucosa moist, no cyanosis   Neck: supple, no JVD, no bruits, no thyromegaly   CV: normal rate, regular rhythm,  no murmurs, rubs, or gallops.  PMI is nondisplaced, Peripheral pulses normal including carotid auscultation, no noted aortic bruit, bilateral femoral and pedal pulses are normal in quality  Lungs: clear to auscultation bilaterally, normal respiratory effort without used of accessory muscles, no wheezes  Abdomen: soft, non-tender, bowel sounds present, no masses or hepatomegaly   Extremities: no digital clubbing, no edema   Skin: warm, no rashes   Neuro/Psych: A&O x 0, left facial droop, right-sided weakness    Data:    Recent Labs     10/09/22  0501 10/10/22  0600 10/11/22  0515   WBC 12.4* 10.0 8.1   HGB 15.2 14.9 14.4   HCT 46.0 45.6 44.6    171 152     Recent Labs     10/09/22  0502 10/10/22  0591 10/11/22  0515   * 136 140   K 4.2 4.2 4.3   CL 95* 101 104   CO2 19* 21* 24   BUN 14 20 28*   CREATININE 0.7 0.7 0.8   CALCIUM 9.3 9.3 9.1     No results for input(s): INR in the last 72 hours. No results for input(s): TSH in the last 72 hours. Lab Results   Component Value Date/Time    MG 2.3 10/11/2022 05:15 AM     Telemetry: SR at 75 - 100 bpm.     Assessment/plan:  Stroke  - Stroke in 2020 related to left carotid stenosis. Subsequently underwent left carotid endarterectomy. - In 10/2022, presented with recurrence of stroke in left carotid territory. Imaging without significant carotid or vertebral artery disease. Currently on dual antiplatelet therapy per neurology for 21 days followed by aspirin indefinitely. - Recommend 30-day external cardiac monitor. Please contact ECG technician if necessary to apply monitor. If no AF/AFL, could consider implantable cardiac monitor. - EP service will sign off. Please contact with questions concerns. - Follow-up with EP office in 2 months. My office will arrange. I have spent a total of 60 minutes with the patient and his/her family reviewing the above stated recommendations. And a total of >50% of that time involved face-to-face time providing counseling and or coordination of care with the other providers. Thank you for allowing me to participate in your patient's care. Please call me if there are any questions.       Bindu Green, DO   Cardiac Electrophysiology  [facility]  [facility]) Physicians

## 2022-10-11 NOTE — ED PROVIDER NOTES
HPI:  10/10/22, Time: 10:59 PM EDT         Сергей Mccauley is a 69 y.o. male presenting to the ED for acute CVA. Patient presented to [facility] for strokelike symptoms. He was in the [place] K window. Came on suddenly, nothing made it better or worse, no associated pain. Did receive TNKase. Was transferred to go to interventional radiology. NIH has not improved in route. He denies nausea, vomiting, chest pain, fever, chills, cough, sputum, paresthesias, lethargy, and any other symptoms. Review of Systems:   A complete review of systems was performed and pertinent positives and negatives are stated within HPI, all other systems reviewed and are negative.          --------------------------------------------- PAST HISTORY ---------------------------------------------  Past Medical History:  has a past medical history of Dementia ([facility] Seasonal allergies, Stroke due to stenosis of left carotid artery ([facility] and [facility] Past Surgical History:  has a past surgical history that includes hernia repair; Tonsillectomy; cardiovascular stress test (09/03/2020); Carotid endarterectomy (Left, 10/29/2020); and IR MECHANICAL ART THROMBECTOMY INTRACRANIAL (10/8/2022). Social History:  reports that he quit smoking about 2 years ago. His smoking use included cigarettes. He has a 50.00 pack-year smoking history. He has quit using smokeless tobacco. He reports that he does not currently use alcohol after a past usage of about 1.0 standard drink per week. He reports that he does not use drugs. Family History: family history includes Emphysema in his mother. The patients home medications have been reviewed. Allergies: Patient has no known allergies.     -------------------------------------------------- RESULTS -------------------------------------------------  All laboratory and radiology results have been personally reviewed by myself   LABS:  Results for orders placed or performed during the hospital encounter of 10/08/22   COVID-19, Rapid    Specimen: Nasopharyngeal Swab   Result Value Ref Range    SARS-CoV-2, NAAT Not Detected Not Detected   CBC   Result Value Ref Range    WBC 12.4 (H) 4.5 - 11.5 E9/L    RBC 5.27 3.80 - 5.80 E12/L    Hemoglobin 15.2 12.5 - 16.5 g/dL    Hematocrit 46.0 37.0 - 54.0 %    MCV 87.3 80.0 - 99.9 fL    MCH 28.8 26.0 - 35.0 pg    MCHC 33.0 32.0 - 34.5 %    RDW 13.7 11.5 - 15.0 fL    Platelets 092 973 - 185 E9/L    MPV 11.5 7.0 - 12.0 fL   Comprehensive metabolic panel   Result Value Ref Range    Sodium 131 (L) 132 - 146 mmol/L    Potassium 4.2 3.5 - 5.0 mmol/L    Chloride 95 (L) 98 - 107 mmol/L    CO2 19 (L) 22 - 29 mmol/L    Anion Gap 17 (H) 7 - 16 mmol/L    Glucose 340 (H) 74 - 99 mg/dL    BUN 14 6 - 23 mg/dL    Creatinine 0.7 0.7 - 1.2 mg/dL    GFR Non-African American >60 >=60 mL/min/1.73    GFR African American >60     Calcium 9.3 8.6 - 10.2 mg/dL    Total Protein 7.1 6.4 - 8.3 g/dL    Albumin 4.2 3.5 - 5.2 g/dL    Total Bilirubin 1.0 0.0 - 1.2 mg/dL    Alkaline Phosphatase 98 40 - 129 U/L    ALT 23 0 - 40 U/L    AST 12 0 - 39 U/L   Hepatic function panel   Result Value Ref Range    Bilirubin, Direct 0.2 0.0 - 0.3 mg/dL    Bilirubin, Indirect 0.8 0.0 - 1.0 mg/dL   Hemoglobin A1c   Result Value Ref Range    Hemoglobin A1C 11.3 (H) 4.0 - 5.6 %   Lipid Panel   Result Value Ref Range    Cholesterol, Total 171 0 - 199 mg/dL    Triglycerides 158 (H) 0 - 149 mg/dL    HDL 36 >40 mg/dL    LDL Calculated 103 (H) 0 - 99 mg/dL    VLDL Cholesterol Calculated 32 mg/dL   CBC with Auto Differential   Result Value Ref Range    WBC 10.0 4.5 - 11.5 E9/L    RBC 5.19 3.80 - 5.80 E12/L    Hemoglobin 14.9 12.5 - 16.5 g/dL    Hematocrit 45.6 37.0 - 54.0 %    MCV 87.9 80.0 - 99.9 fL    MCH 28.7 26.0 - 35.0 pg    MCHC 32.7 32.0 - 34.5 %    RDW 14.3 11.5 - 15.0 fL    Platelets 656 529 - 996 E9/L    MPV 11.7 7.0 - 12.0 fL    Neutrophils % 78.4 43.0 - 80.0 %    Immature Granulocytes % 0.4 0.0 - 5.0 %    Lymphocytes % 13.0 (L) 20.0 - 42.0 %    Monocytes % 7.2 2.0 - 12.0 %    Eosinophils % 0.7 0.0 - 6.0 %    Basophils % 0.3 0.0 - 2.0 %    Neutrophils Absolute 7.80 (H) 1.80 - 7.30 E9/L    Immature Granulocytes # 0.04 E9/L    Lymphocytes Absolute 1.29 (L) 1.50 - 4.00 E9/L    Monocytes Absolute 0.72 0.10 - 0.95 E9/L    Eosinophils Absolute 0.07 0.05 - 0.50 E9/L    Basophils Absolute 0.03 0.00 - 0.20 A0/W   Basic Metabolic Panel   Result Value Ref Range    Sodium 136 132 - 146 mmol/L    Potassium 4.2 3.5 - 5.0 mmol/L    Chloride 101 98 - 107 mmol/L    CO2 21 (L) 22 - 29 mmol/L    Anion Gap 14 7 - 16 mmol/L    Glucose 297 (H) 74 - 99 mg/dL    BUN 20 6 - 23 mg/dL    Creatinine 0.7 0.7 - 1.2 mg/dL    GFR Non-African American >60 >=60 mL/min/1.73    GFR African American >60     Calcium 9.3 8.6 - 10.2 mg/dL   Magnesium   Result Value Ref Range    Magnesium 2.0 1.6 - 2.6 mg/dL   Phosphorus   Result Value Ref Range    Phosphorus 2.9 2.5 - 4.5 mg/dL   POCT Glucose   Result Value Ref Range    Meter Glucose 297 (H) 74 - 99 mg/dL   POCT Glucose   Result Value Ref Range    Meter Glucose 243 (H) 74 - 99 mg/dL   POCT Glucose   Result Value Ref Range    Meter Glucose 263 (H) 74 - 99 mg/dL   POCT Glucose   Result Value Ref Range    Meter Glucose 290 (H) 74 - 99 mg/dL   POCT Glucose   Result Value Ref Range    Meter Glucose 239 (H) 74 - 99 mg/dL   POCT Glucose   Result Value Ref Range    Meter Glucose 223 (H) 74 - 99 mg/dL   POCT Glucose   Result Value Ref Range    Meter Glucose 218 (H) 74 - 99 mg/dL       RADIOLOGY:  Interpreted by Radiologist.  MRI brain without contrast   Preliminary Result   Motion artifact degrades the images. There is an acute area of infarct involving the left thalamus which contains   a small amount of hemorrhage. There is an acute infarct involving the medial   aspect the left temporal lobe and the left occipital lobe.       There is a remote left middle cerebral artery territory infarct. There is   cerebral atrophy. The results were called and discussed with the patient's nurse, Brynn Linares RN, 10/10/2022 at 7:34 p.m. and will to be relayed to the attending   physician. CT HEAD WO CONTRAST   Final Result   1. No acute intracranial abnormality or significant change when compared the   previous study. .      2.  Evolving infarcts in the left occipital lobe and thalamus      3. Chronic appearing left frontal lobe infarct, stable         CT HEAD WO CONTRAST   Final Result   Evolution of the acute/early subacute infarct in the left occipital lobe with   loss of gray-white differentiation and sulcal effacement. Similar changes   are identified in the left thalamus. No evidence for petechial hemorrhage or   hemorrhagic transformation. No intracranial hemorrhage or extra-axial collection. Encephalomalacia in the left MCA territory involving the left insula and left   frontal parietal region. IR MECHANICAL ART THROMBECTOMY INTRACRANIAL   Final Result      CT HEAD WO CONTRAST    (Results Pending)       ------------------------- NURSING NOTES AND VITALS REVIEWED ---------------------------   The nursing notes within the ED encounter and vital signs as below have been reviewed. /82   Pulse 74   Temp 98.7 °F (37.1 °C) (Axillary)   Resp 16   Wt 225 lb (102.1 kg)   SpO2 95%   BMI 28.12 kg/m²   Oxygen Saturation Interpretation: Normal      ---------------------------------------------------PHYSICAL EXAM--------------------------------------      Constitutional/General: Alert and oriented x3, well appearing, non toxic in NAD  Head: Normocephalic and atraumatic  Eyes: PERRL, EOMI  Mouth: Oropharynx clear, handling secretions, no trismus  Neck: Supple, full ROM,   Pulmonary: Lungs clear to auscultation bilaterally, no wheezes, rales, or rhonchi. Not in respiratory distress  Cardiovascular:  Regular rate and rhythm, no murmurs, gallops, or rubs. 2+ distal pulses  Abdomen: Soft, non tender, non distended,   Extremities: Moves all extremities x 4.  Warm and well perfused  Skin: warm and dry without rash  Neurologic: GCS 15  Psych: Normal Affect      ------------------------------ ED COURSE/MEDICAL DECISION MAKING----------------------  Medications   sodium chloride flush 0.9 % injection 5-40 mL (10 mLs IntraVENous Given 10/10/22 2110)   sodium chloride flush 0.9 % injection 5-40 mL (has no administration in time range)   0.9 % sodium chloride infusion (has no administration in time range)   acetaminophen (TYLENOL) tablet 650 mg (has no administration in time range)   ondansetron (ZOFRAN-ODT) disintegrating tablet 4 mg (has no administration in time range)     Or   ondansetron (ZOFRAN) injection 4 mg (has no administration in time range)   perflutren lipid microspheres (DEFINITY) injection 1.65 mg (has no administration in time range)   bisacodyl (DULCOLAX) EC tablet 5 mg (has no administration in time range)   labetalol (NORMODYNE;TRANDATE) injection 5 mg (5 mg IntraVENous Given 10/10/22 2105)   hydrALAZINE (APRESOLINE) injection 5 mg (5 mg IntraVENous Given 10/10/22 0334)   atorvastatin (LIPITOR) tablet 40 mg (40 mg Oral Not Given 10/10/22 1954)   mirtazapine (REMERON) tablet 15 mg (15 mg Oral Not Given 10/10/22 1954)   rivastigmine (EXELON) 9.5 MG/24HR 1 patch (1 patch TransDERmal Patch Applied 10/10/22 1034)   glucose chewable tablet 16 g (has no administration in time range)   dextrose bolus 10% 125 mL (has no administration in time range)     Or   dextrose bolus 10% 250 mL (has no administration in time range)   glucagon (rDNA) injection 1 mg (has no administration in time range)   dextrose 10 % infusion (has no administration in time range)   insulin lispro (HUMALOG) injection vial 0-16 Units (4 Units SubCUTAneous Given 10/10/22 1857)   insulin lispro (HUMALOG) injection vial 0-4 Units (0 Units SubCUTAneous Not Given 10/10/22 2024)   amLODIPine (NORVASC) tablet 10 mg (10 mg Oral Given 10/10/22 1032)   aspirin EC tablet 81 mg ( Oral Automatically Held 10/13/22 0900)     Or   aspirin suppository 300 mg ( Rectal Automatically Held 10/13/22 0900)   enoxaparin (LOVENOX) injection 40 mg ( SubCUTAneous Automatically Held 10/13/22 0900)   haloperidol lactate (HALDOL) injection 3 mg (has no administration in time range)   insulin glargine-yfgn (SEMGLEE-YFGN) injection vial 20 Units (20 Units SubCUTAneous Given 10/10/22 2034)   0.9 % sodium chloride infusion ( IntraVENous Rate/Dose Verify 10/10/22 2142)   heparin (porcine) 5,000 Units with nitroGLYCERIN 100 mcg in sodium chloride 0.9% 1000 mL irrigation (1,000 mLs Irrigation Given 10/8/22 1854)   heparin (porcine) 5,000 Units with nitroGLYCERIN 100 mcg in sodium chloride 0.9% 1000 mL irrigation (1,000 mLs Irrigation Given 10/8/22 1854)   heparin (porcine) 5,000 Units with nitroGLYCERIN 100 mcg in sodium chloride 0.9% 1000 mL irrigation (1,000 mLs Irrigation Given 10/8/22 1854)   heparin (porcine) 5,000 Units with nitroGLYCERIN 100 mcg in sodium chloride 0.9% 1000 mL irrigation (1,000 mLs Irrigation Given 10/8/22 1854)   heparin (porcine) 5000 Units in sodium chloride 0.9% 1000 mL irrigation (5,000 Units Irrigation Given 10/8/22 1854)   heparin (porcine) 5000 Units in sodium chloride 0.9% 1000 mL irrigation (5,000 Units Irrigation Given 10/8/22 1855)   heparin (porcine) 5000 Units in sodium chloride 0.9% 1000 mL irrigation (5,000 Units Irrigation Given 10/8/22 1855)   famotidine (PEPCID) 20 mg in sodium chloride (PF) 10 mL injection (20 mg IntraVENous Given 10/9/22 2223)   iopamidol (ISOVUE-300) 61 % injection 40 mL (40 mLs Other Given by Other 10/8/22 1910)   LORazepam (ATIVAN) injection 1 mg (1 mg IntraVENous Given 10/10/22 1545)         ED COURSE:       Medical Decision Making:    Patient to go to IR    Counseling:    The emergency provider has spoken with the patient and discussed todays results, in addition to providing specific details for the plan of care and counseling regarding the diagnosis and prognosis. Questions are answered at this time and they are agreeable with the plan.      --------------------------------- IMPRESSION AND DISPOSITION ---------------------------------    IMPRESSION  1. [facility]        DISPOSITION  Disposition: Admit to IR  Patient condition is serious      NOTE: This report was transcribed using voice recognition software.  Every effort was made to ensure accuracy; however, inadvertent computerized transcription errors may be present       Clementine Dumont MD  10/10/22 4838

## 2022-10-11 NOTE — TELEPHONE ENCOUNTER
----- Message from Cecily Lyons DO sent at 10/11/2022  1:14 PM EDT -----  Regarding: appt  Dana in 2 months.  -Cecily Lyons DO

## 2022-10-11 NOTE — PROGRESS NOTES
OCCUPATIONAL THERAPY    Date:10/11/2022  Patient Name: Av Gutierrez. MRN: [id number]  : 1955  Room: [address] [place] [address]              Chart reviewed. Per NP, pt not appropriate for therapy at this time. Will re-attempt at later time. Thank you for consult.     Tonya Dexterable, OTR/L [id number]

## 2022-10-11 NOTE — PROGRESS NOTES
[facility]   Progress note      Subjective: The patient is resting comfortably on my evaluation, no acute events or issues overnight  Responded well to Ativan last night   Remains in two-point restraints today  No family at bedside  Case discussed with nursing staff  He is able to follow some commands  Objective:    BP (!) 125/94   Pulse 88   Temp 99 °F (37.2 °C) (Axillary)   Resp 21   Wt 225 lb (102.1 kg)   SpO2 97%   BMI 28.12 kg/m²     In: 1831.8 [I.V.:1831.8]  Out: 1765   In: 1831.8   Out: 1765 [Urine:1765]    General appearance: NAD, not conversant, however does follow commands  HEENT: AT/NC, MMM  Neck: FROM, supple  Lungs: Clear to auscultation  CV: RRR, no MRGs  Vasc: Radial pulses 2+  Abdomen: Soft, non-tender; no masses or HSM  Extremities: No peripheral edema or digital cyanosis  Skin: no rash, lesions or ulcers  Psych: Alert and oriented to person, place and time       Recent Labs     10/09/22  0501 10/10/22  0600 10/11/22  0515   WBC 12.4* 10.0 8.1   HGB 15.2 14.9 14.4   HCT 46.0 45.6 44.6    171 152       Recent Labs     10/09/22  0501 10/10/22  0535 10/11/22  0515   * 136 140   K 4.2 4.2 4.3   CL 95* 101 104   CO2 19* 21* 24   BUN 14 20 28*   CREATININE 0.7 0.7 0.8   CALCIUM 9.3 9.3 9.1       Assessment:    Principal Problem:    Acute CVA (cerebrovascular accident) ([facility] [address]  Active Problems:    History of stroke    Dementia (HCC)    Type 2 diabetes mellitus ([facility] [address]  Resolved Problems:    * No resolved hospital problems.  *      Plan:  78-year-old male with a history of a CVA in 2020 presented to the ED with altered mental status and slurred speech and patient was admitted postop to ICU with     Acute ischemic CVA with left PCA occlusion    10/8/2022 successful thrombectomy of left PCA  MRI pending  CT head completed with no intracranial hemorrhage, evolution of acute or early subacute infarcts in the left occipital lobe  Postprocedure CT head pending  NIHSS score q shift  Hold antiplatelets for 24 hours post TN K  Monitor blood pressure-maintain SBP less than 150 and MAP greater than 65 adjust medications as needed.-As needed IV labetalol  Discussed risk factor modification.   ICU vitals  Echo-pending    10/10/2022  Remains completely confused, extremely agitated today with nursing  MRI pending-Will need medication  As needed Haldol every 12 for severe agitation  Echocardiogram essentially completely uninformative-discussed with echo tech at bedside  May benefit from CECI given appearance of multiple infarcts to rule out thrombus  Resume antiplatelet agents at discretion of neurology   blood pressure management-currently optimally controlled on multiple agents-amlodipine, as needed hydralazine and labetalol, add diuretic  Insulin sliding scale plus Lantus insulin on board with Noble control, uptitrate Lantus tonight if persistent hyperglycemia    10/11/2022  Remains in two-point restraints  Tolerated MRI post sedation-acute infarct left temporal and occipital lobe with thalamic hemorrhage  Postprocedure CT head with stable left posterior cerebral artery infarct with small focus of active conversion in left thalamus  Hold off on antiplatelet agents chemical DVT prophylaxis for now due to above findings  30-day event monitor at discharge  Blood pressure adequately controlled  Initiate tube feeds for nutrition  Blood glucose marginally well controlled-uptitrate Lantus to 25 units at bedtime    Code Status: Full  Consultants:  Neurointensive care team, neurology, neuro interventional radiology, electrophysiology  DVT Prophylaxis   PT/OT  Discharge Dada Price MD  6:02 PM  10/11/2022

## 2022-10-11 NOTE — PROGRESS NOTES
[facility]  Neurology Follow Up Note     Neurology is following for acute stroke s/p intervention    PMH: Prior stroke in 2020, vascular dementia, diabetes mellitus    Vital signs at present time are stable on O2 via nasal cannula; intermittently hypertensive    Patient presented to ED on 10/8 with confusion and slurred speech. Patient was unable to communicate beyond nodding and shaking his head. Patient resides at a facility and they noticed worsening of his confusion in addition to left facial droop and right-sided weakness seen in ED. Initial CT head showed evolving infarcts in the left occipital lobe and thalamus; chronic left frontal infarct noted. CTA head and neck showed high-grade stenosis or occlusion involving the P2 segment of the left PCA; no other areas of high-grade stenosis. Areas of encephalomalacia reflecting chronic infarcts also noted. Patient was given aspirin rectally and placed on heparin drip after becoming agitated and refusing to take oral medications. MRI brain shows acute infarct in the left thalamus, left temporal lobe and left occipital lobe with small amount of hemorrhage; remote left MCA infarct noted. Patient has been intermittently agitated and combative since admission. On Monday morning patient was actively fighting staff subsequently placed in four-point restraints. Today patient was agitated combative as well until his son arrived; sleeping on my arrival but wakes and is cooperative with me today.     Son is present at bedside currently    Review of Systems:  Limited due to aphasia, agitation    Allergies:    No Known Allergies     Current Medications:    [START ON 10/12/2022] amLODIPine  10 mg Per NG tube Daily    mirtazapine  15 mg Per NG tube Nightly    atorvastatin  80 mg Per NG tube Nightly    [Held by provider] aspirin  81 mg Oral Daily    Or    [Held by provider] aspirin  300 mg Rectal Daily    [Held by provider] enoxaparin  40 mg SubCUTAneous Daily    insulin glargine  20 Units SubCUTAneous Nightly    rivastigmine  1 patch TransDERmal Daily    insulin lispro  0-16 Units SubCUTAneous TID WC    insulin lispro  0-4 Units SubCUTAneous Nightly    sodium chloride flush  5-40 mL IntraVENous 2 times per day        Physical Exam:  BP (!) 147/99   Pulse (!) 104   Temp 98.6 °F (37 °C) (Axillary)   Resp 20   Wt 225 lb (102.1 kg)   SpO2 99%   BMI 28.12 kg/m²  I Body mass index is 28.12 kg/m². I   Wt Readings from Last 1 Encounters:   10/10/22 225 lb (102.1 kg)        General: Initially asleep, wakes easily to voice, no obvious distress  HEENT: Normocephalic, atraumatic, no lesions or abnormalities noted. Neck:  supple with full ROM; Lungs: Unlabored breaths on room air  CV: RRR without gallops or murmurs     Extremities: no c/c/e          Neurologic Exam     Mental Status:   Initially asleep, wakes easily to voice, answers most questions with \"no\"   Severe expressive and moderate receptive aphasia noted. Patient did  my hands bilaterally but would not follow other commands such as sticking tongue out or wiggling toes  Cranial Nerves:   Pupils were equal round and reactive to light and accommodation; Appears to appreciate LT throughout  No gaze preference noted; tracks examiner to both sides of bed   Left facial droop noted  Hearing was intact;   Sternocleidomastoid  / Trapezius strength appears grossly 5/5. Patient would not protrude tongue  Motor Exam:   Limited motor exam due to poor cooperation intermittent aphasia;      Sensory Exam:   Appears to appreciate LT throughout  Coordination exam:   Unable to test due to poor cooperation and aphasia/unable to understand   Gait:   Not tested for patient's safety  Reflexes:   normal and symmetric bilaterally; Babinski is negative     Labs:  Lab Results   Component Value Date    WBC 8.1 10/11/2022    HGB 14.4 10/11/2022    HCT 44.6 10/11/2022    MCV 89.9 10/11/2022     10/11/2022    LYMPHOPCT 13.0 (L) 10/10/2022    RBC 4.96 10/11/2022    MCH 29.0 10/11/2022    MCHC 32.3 10/11/2022    RDW 14.6 10/11/2022     Lab Results   Component Value Date     10/11/2022    K 4.3 10/11/2022     10/11/2022    CO2 24 10/11/2022    BUN 28 (H) 10/11/2022    CREATININE 0.8 10/11/2022    GLUCOSE 218 (H) 10/11/2022    CALCIUM 9.1 10/11/2022    PROT 7.1 10/09/2022    LABALBU 4.2 10/09/2022    BILITOT 1.0 10/09/2022    ALKPHOS 98 10/09/2022    AST 12 10/09/2022    ALT 23 10/09/2022    LABGLOM >60 10/11/2022    GFRAA >60 10/11/2022     Hemoglobin A1C   Date Value Ref Range Status   10/09/2022 11.3 (H) 4.0 - 5.6 % Final     Lab Results   Component Value Date    LDLCALC 103 (H) 10/10/2022        Radiology:  CT HEAD WO CONTRAST   Final Result   Stable left posterior cerebral artery territory infarct with a small focus of   hemorrhagic conversion redemonstrated in the left thalamus. See above. MRI brain without contrast   Preliminary Result   Motion artifact degrades the images. There is an acute area of infarct involving the left thalamus which contains   a small amount of hemorrhage. There is an acute infarct involving the medial   aspect the left temporal lobe and the left occipital lobe. There is a remote left middle cerebral artery territory infarct. There is   cerebral atrophy. The results were called and discussed with the patient's nurse, Meryle Houseman, RN, 10/10/2022 at 7:34 p.m. and will to be relayed to the attending   physician. CT HEAD WO CONTRAST   Final Result   1. No acute intracranial abnormality or significant change when compared the   previous study. .      2.  Evolving infarcts in the left occipital lobe and thalamus      3. Chronic appearing left frontal lobe infarct, stable         CT HEAD WO CONTRAST   Final Result   Evolution of the acute/early subacute infarct in the left occipital lobe with   loss of gray-white differentiation and sulcal effacement. Similar changes   are identified in the left thalamus. No evidence for petechial hemorrhage or   hemorrhagic transformation. No intracranial hemorrhage or extra-axial collection. Encephalomalacia in the left MCA territory involving the left insula and left   frontal parietal region. IR MECHANICAL ART THROMBECTOMY INTRACRANIAL   Final Result      XR ABDOMEN FOR NG/OG/NE TUBE PLACEMENT    (Results Pending)     Complete echo 10/10/2022:  Limited study with difficult views. Technically very difficult examination. Likely preserved biventricular systolic function. Agitated saline contrast study is non diagnostic. Consider CECI if clinically indicated. I have personally reviewed all labs and neuroimaging today. Assessment:  Acute infarcts in the left thalamus, left temporal and left occipital lobes   Infarct contain small amount of hemorrhage--hold DAPT x1 week then start as below   CTAs consistent with high-grade stenosis or occlusion in the P2 segment of the left PCA   Stroke risk factors include: Prior stroke, HTN, HDL, DM 2, carotid stenosis, former smoker   Complete echo limited; EP consulted for Linq placement    Vascular dementia   Remains on Exelon, Remeron, Aricept--- Home meds   Per son patient has had expressive aphasia since his last stroke       Plan  Continue supportive care  Electrophysiology consult pending for Linq placement  Hold ASA and Plavix for 1 week then continue DAPT for 21 days and aspirin indefinitely  A1c 11.3--- may benefit from diabetes educator and/or endocrinology  --increase to 80 mg nightly  Continue PT/OT/ST as able  Stroke risk factor modifications are imperative  SCDs  Stroke education  Fall precautions                                           Anticipate stroke clinic follow-up at discharge.         Electronically signed by CARROLL Monteiro NP on 10/11/2022 at 11:47 AM

## 2022-10-11 NOTE — PROGRESS NOTES
Intensive Care Unit  Critical Care Consult  Daily Progress Note 10/11/2022    Date of Admission: 10/8    EVENTS:   10/8 L PCA ischemic stroke with PCA occlusion s/p TNK and thrombectomy, presented with worsening right sided weakness and aphasia   10/9 confused and restless overnight requiring four-point restraints  10/10 agitated overnight requiring restraints, HTN  10/11 agitated again overnight, not following commands this AM, afebrile, small stable hemorrhagic conversion     PHYSICAL EXAM:    /86   Pulse 87   Temp 98.6 °F (37 °C) (Axillary)   Resp 18   Wt 225 lb (102.1 kg)   SpO2 99%   BMI 28.12 kg/m²     General appearance:  Comfortable. GCS:    4 - Opens eyes on own   5 - Pushes away noxious stimulus  3 - Talks, but nonsensical      Pupil size:  Left 3 mm  Right 3 mm  Pupil reaction: Yes  Wiggles fingers: Left Yes Right decreased  Hand grasp:   Left normal     Right normal  Wiggles toes: Left No    Right No  Plantar flexion: Left absent    Right absent    CONSTITUTIONAL: no acute distress, lying in hospital bed, alert and cooperative   NEUROLOGIC: PERRL, expressive aphasia, right sided weakness, purposeful   CARDIOVASCULAR: S1 S2, regular rate, regular rhythm, no murmur/gallop/rub. Monitor: sinus  PULMONARY: no rhonchi/rales/wheezes, no use of accessory muscles, RA  RENAL: alberts to gravity, clear yellow urine  ABDOMEN: soft, nontender, nondistended, nontympanic, normal bowel sounds  SKIN/EXTREMITIES: no rashes/ecchymosis, no edema/clubbing, warm/dry, good capillary refill     LINES: peripheral       Past Medical History:   Diagnosis Date    Dementia ([address]     Diabetes mellitus ([address]     Seasonal allergies     Stroke due to stenosis of left carotid artery ([address] 9/2/2020    Vascular dementia ([facility] [address]        ASSESSMENT/PLAN:       Neuro:  R PCA stroke s/p TNK/IR thrombectomy, vascular dementia. Monitor neuro status, Neurology following, hold aspirin, Exelon, Remeron  CV: HTN. Monitor hemodynamics. BP goal < 150. PRN hydralazine & labetalol. Statin. 2Decho reviewed. Increase Norvasc, Lipitor  Pulm: No acute issues  Monitor RR & SpO2. Pulmonary hygiene   GI: Dysphagia. NG, start tube feeds. Monitor bowel function. Renal: No acute issues. Monitor BUN & Cr. Monitor electrolytes & replace as needed. Monitor urine output. ID: No acute issues   Endocrine: Hyperglycemia. Monitor BS. ISS. Lantus   MSK: No acute issues. ROM. turn & reposition. PT/OT when able  Heme: No acute issues. Monitor CBC. Bowel regime: Dulcolax   Pain control/Sedation: Tylenol  DVT prophylaxis: SCDs. GI prophylaxis: Pepcid, Diet  Mouth/Eye care: as needed  Eduardo: Remove  Family update:  Will update when available    Code status:  Full  Disposition:  ICU      Total  time: 30 minutes     Electronically signed by CARROLL Sethi CNP on 10/11/2022 at 10:22 AM

## 2022-10-11 NOTE — CONSULTS
[address]                  [address] [facility]                                  CONSULTATION    PATIENT NAME: Villa Lima                     :        1955  MED REC NO:   [id number]                            ROOM:       5427  ACCOUNT NO:   [de-identified]                           ADMIT DATE: 10/08/2022  PROVIDER:     Kaylee Rodriguez MD    CONSULT DATE:  10/11/2022    REHABILITATION CONSULTATION NOTE    AGE:  69. SEX:  Male. CHIEF COMPLAINT:  CVA. HISTORY OF PRESENT ILLNESS:  The patient was admitted to [facility]  on 10/08/2022 with worsening weakness and confusion. He was found on  MRI to have an acute infarct in the left thalamus with a small amount of  hemorrhage as well as several old infarcts. He is in Neuro ICU right  now. He is very confused and agitated. He was not able to participate  with therapy. I was asked to evaluate him further in terms of eventual  plans for that. PAST MEDICAL HISTORY:  1.  Prior CVA. 2.  Type 2 diabetes mellitus. ALLERGIES:  NONE KNOWN. CURRENT MEDICATIONS:  Norvasc, Lipitor, short and long-acting insulin,  Ativan, Remeron, Exelon. SOCIAL HISTORY:  No other information available. REVIEW OF SYSTEMS:  The patient cannot give me any further information. PHYSICAL EXAMINATION:  GENERAL:  A well-nourished, well-developed, white male. He is very  confused, restless and agitated. LUNGS:  Clear to P and A. HEART:  Regular rate and rhythm. S1, S2 normal.  No murmurs or gallops. ABDOMEN:  Bowel sounds normal.  Soft, nontender. No masses. EXTREMITIES:  Without clubbing, cyanosis or edema. MENTAL STATUS:  The patient is awake. He is restless and agitated but  cannot answer any questions for me. Sensation unable to evaluate. NEUROMUSCULAR:  Right side seems to be weak, but he could not follow  commands. PROBLEM LIST:  1. Left hemisphere acute CVA.   2.  Prior left hemisphere CVA. 3.  Dementia. 4.  Hypertension. 5.  Type 2 diabetes mellitus. RECOMMENDATIONS:  At this point, the patient is too confused to  participate with rehab. If that improves, we can reconsider, but with  what I am seeing right now, I think it is more likely that we would have  to look at long-term placement unless he shows significant improvement.         Jah Suárez MD    D: 10/11/2022 10:16:35       T: 10/11/2022 10:20:23     [email or web address]  Job#: [id number]     Doc#: [id number]    CC:

## 2022-10-11 NOTE — PROGRESS NOTES
Inserted a small bowel feeding tube 85cm using Image Socket guidance system,bridled and xray ordered

## 2022-10-12 LAB
ALBUMIN SERPL-MCNC: 4 G/DL (ref 3.5–5.2)
ALP BLD-CCNC: 109 U/L (ref 40–129)
ALT SERPL-CCNC: 20 U/L (ref 0–40)
ANION GAP SERPL CALCULATED.3IONS-SCNC: 12 MMOL/L (ref 7–16)
AST SERPL-CCNC: 13 U/L (ref 0–39)
BILIRUB SERPL-MCNC: 1.3 MG/DL (ref 0–1.2)
BUN BLDV-MCNC: 29 MG/DL (ref 6–23)
CALCIUM SERPL-MCNC: 9.4 MG/DL (ref 8.6–10.2)
CHLORIDE BLD-SCNC: 104 MMOL/L (ref 98–107)
CO2: 25 MMOL/L (ref 22–29)
CREAT SERPL-MCNC: 0.6 MG/DL (ref 0.7–1.2)
GFR AFRICAN AMERICAN: >60
GFR SERPL CREATININE-BSD FRML MDRD: 134 ML/MIN/1.73
GLUCOSE BLD-MCNC: 257 MG/DL (ref 74–99)
HCT VFR BLD CALC: 48.7 % (ref 37–54)
HEMOGLOBIN: 15.5 G/DL (ref 12.5–16.5)
MAGNESIUM: 2.3 MG/DL (ref 1.6–2.6)
MCH RBC QN AUTO: 29.6 PG (ref 26–35)
MCHC RBC AUTO-ENTMCNC: 31.8 % (ref 32–34.5)
MCV RBC AUTO: 93.1 FL (ref 80–99.9)
METER GLUCOSE: 184 MG/DL (ref 74–99)
METER GLUCOSE: 214 MG/DL (ref 74–99)
METER GLUCOSE: 226 MG/DL (ref 74–99)
METER GLUCOSE: 232 MG/DL (ref 74–99)
METER GLUCOSE: 233 MG/DL (ref 74–99)
METER GLUCOSE: 240 MG/DL (ref 74–99)
PDW BLD-RTO: 14.3 FL (ref 11.5–15)
PHOSPHORUS: 3.3 MG/DL (ref 2.5–4.5)
PLATELET # BLD: 171 E9/L (ref 130–450)
PMV BLD AUTO: 11.6 FL (ref 7–12)
POTASSIUM SERPL-SCNC: 4.5 MMOL/L (ref 3.5–5)
RBC # BLD: 5.23 E12/L (ref 3.8–5.8)
SODIUM BLD-SCNC: 141 MMOL/L (ref 132–146)
TOTAL PROTEIN: 7.1 G/DL (ref 6.4–8.3)
TRIGL SERPL-MCNC: 176 MG/DL (ref 0–149)
WBC # BLD: 10.5 E9/L (ref 4.5–11.5)

## 2022-10-12 PROCEDURE — 97530 THERAPEUTIC ACTIVITIES: CPT

## 2022-10-12 PROCEDURE — 97162 PT EVAL MOD COMPLEX 30 MIN: CPT

## 2022-10-12 PROCEDURE — 84478 ASSAY OF TRIGLYCERIDES: CPT

## 2022-10-12 PROCEDURE — 6370000000 HC RX 637 (ALT 250 FOR IP): Performed by: NURSE PRACTITIONER

## 2022-10-12 PROCEDURE — 99231 SBSQ HOSP IP/OBS SF/LOW 25: CPT | Performed by: NURSE PRACTITIONER

## 2022-10-12 PROCEDURE — 85027 COMPLETE CBC AUTOMATED: CPT

## 2022-10-12 PROCEDURE — 6370000000 HC RX 637 (ALT 250 FOR IP): Performed by: INTERNAL MEDICINE

## 2022-10-12 PROCEDURE — S5553 INSULIN LONG ACTING 5 U: HCPCS | Performed by: INTERNAL MEDICINE

## 2022-10-12 PROCEDURE — 2580000003 HC RX 258: Performed by: PSYCHIATRY & NEUROLOGY

## 2022-10-12 PROCEDURE — 80053 COMPREHEN METABOLIC PANEL: CPT

## 2022-10-12 PROCEDURE — 83735 ASSAY OF MAGNESIUM: CPT

## 2022-10-12 PROCEDURE — 6370000000 HC RX 637 (ALT 250 FOR IP): Performed by: SURGERY

## 2022-10-12 PROCEDURE — 99233 SBSQ HOSP IP/OBS HIGH 50: CPT | Performed by: NURSE PRACTITIONER

## 2022-10-12 PROCEDURE — 82962 GLUCOSE BLOOD TEST: CPT

## 2022-10-12 PROCEDURE — 2060000000 HC ICU INTERMEDIATE R&B

## 2022-10-12 PROCEDURE — 97166 OT EVAL MOD COMPLEX 45 MIN: CPT

## 2022-10-12 PROCEDURE — 2500000003 HC RX 250 WO HCPCS: Performed by: PSYCHIATRY & NEUROLOGY

## 2022-10-12 PROCEDURE — 6360000002 HC RX W HCPCS: Performed by: INTERNAL MEDICINE

## 2022-10-12 PROCEDURE — 2700000000 HC OXYGEN THERAPY PER DAY

## 2022-10-12 PROCEDURE — 6370000000 HC RX 637 (ALT 250 FOR IP): Performed by: PSYCHIATRY & NEUROLOGY

## 2022-10-12 PROCEDURE — 84100 ASSAY OF PHOSPHORUS: CPT

## 2022-10-12 RX ORDER — HYDRALAZINE HYDROCHLORIDE 20 MG/ML
5 INJECTION INTRAMUSCULAR; INTRAVENOUS EVERY 4 HOURS PRN
Status: DISCONTINUED | OUTPATIENT
Start: 2022-10-12 | End: 2022-10-21 | Stop reason: HOSPADM

## 2022-10-12 RX ADMIN — LABETALOL HYDROCHLORIDE 5 MG: 5 INJECTION, SOLUTION INTRAVENOUS at 08:04

## 2022-10-12 RX ADMIN — MIRTAZAPINE 15 MG: 15 TABLET, FILM COATED ORAL at 19:55

## 2022-10-12 RX ADMIN — ATORVASTATIN CALCIUM 80 MG: 40 TABLET, FILM COATED ORAL at 19:55

## 2022-10-12 RX ADMIN — HALOPERIDOL LACTATE 3 MG: 5 INJECTION, SOLUTION INTRAMUSCULAR at 08:08

## 2022-10-12 RX ADMIN — AMLODIPINE BESYLATE 10 MG: 10 TABLET ORAL at 08:43

## 2022-10-12 RX ADMIN — INSULIN LISPRO 4 UNITS: 100 INJECTION, SOLUTION INTRAVENOUS; SUBCUTANEOUS at 23:39

## 2022-10-12 RX ADMIN — ACETAMINOPHEN 650 MG: 325 TABLET, FILM COATED ORAL at 11:53

## 2022-10-12 RX ADMIN — INSULIN LISPRO 4 UNITS: 100 INJECTION, SOLUTION INTRAVENOUS; SUBCUTANEOUS at 05:04

## 2022-10-12 RX ADMIN — LABETALOL HYDROCHLORIDE 5 MG: 5 INJECTION, SOLUTION INTRAVENOUS at 07:25

## 2022-10-12 RX ADMIN — SODIUM CHLORIDE, PRESERVATIVE FREE 10 ML: 5 INJECTION INTRAVENOUS at 19:55

## 2022-10-12 RX ADMIN — LABETALOL HYDROCHLORIDE 5 MG: 5 INJECTION, SOLUTION INTRAVENOUS at 20:06

## 2022-10-12 RX ADMIN — LABETALOL HYDROCHLORIDE 5 MG: 5 INJECTION, SOLUTION INTRAVENOUS at 07:51

## 2022-10-12 RX ADMIN — INSULIN GLARGINE-YFGN 25 UNITS: 100 INJECTION, SOLUTION SUBCUTANEOUS at 19:55

## 2022-10-12 RX ADMIN — SODIUM CHLORIDE, PRESERVATIVE FREE 10 ML: 5 INJECTION INTRAVENOUS at 08:43

## 2022-10-12 RX ADMIN — INSULIN LISPRO 4 UNITS: 100 INJECTION, SOLUTION INTRAVENOUS; SUBCUTANEOUS at 16:48

## 2022-10-12 RX ADMIN — INSULIN LISPRO 4 UNITS: 100 INJECTION, SOLUTION INTRAVENOUS; SUBCUTANEOUS at 11:47

## 2022-10-12 RX ADMIN — INSULIN LISPRO 4 UNITS: 100 INJECTION, SOLUTION INTRAVENOUS; SUBCUTANEOUS at 19:55

## 2022-10-12 ASSESSMENT — PAIN SCALES - GENERAL
PAINLEVEL_OUTOF10: 0
PAINLEVEL_OUTOF10: 4
PAINLEVEL_OUTOF10: 0
PAINLEVEL_OUTOF10: 0

## 2022-10-12 NOTE — PROGRESS NOTES
Intensive Care Unit  Critical Care Consult  Daily Progress Note 10/12/2022    Date of Admission: 10/8    EVENTS:   10/8 L PCA ischemic stroke with PCA occlusion s/p TNK and thrombectomy, presented with worsening right sided weakness and aphasia   10/9 confused and restless overnight requiring four-point restraints  10/10 agitated overnight requiring restraints, HTN  10/11 agitated again overnight, not following commands this AM, afebrile, small stable hemorrhagic conversion   10/12 no acute events overnight, afebrile, VSS    PHYSICAL EXAM:    /79   Pulse 78   Temp 98.8 °F (37.1 °C) (Axillary)   Resp 18   Wt 225 lb (102.1 kg)   SpO2 95%   BMI 28.12 kg/m²     General appearance:  Comfortable. GCS:    4 - Opens eyes on own   5 - Pushes away noxious stimulus  3 - Talks, but nonsensical      Pupil size:  Left 3 mm  Right 3 mm  Pupil reaction: Yes  Wiggles fingers: Left Yes Right decreased  Hand grasp:   Left normal     Right normal  Wiggles toes: Left No    Right No  Plantar flexion: Left absent    Right absent    CONSTITUTIONAL: no acute distress, lying in hospital bed, alert and cooperative   NEUROLOGIC: PERRL, expressive aphasia, right sided weakness, purposeful   CARDIOVASCULAR: S1 S2, regular rate, regular rhythm, no murmur/gallop/rub. Monitor: sinus  PULMONARY: no rhonchi/rales/wheezes, no use of accessory muscles, RA  RENAL: voiding   ABDOMEN: soft, nontender, nondistended, nontympanic, normal bowel sounds  SKIN/EXTREMITIES: no rashes/ecchymosis, no edema/clubbing, warm/dry, good capillary refill     LINES: peripheral       Past Medical History:   Diagnosis Date    Dementia ([facility] [address]     Diabetes mellitus ([facility] [address]     Seasonal allergies     Stroke due to stenosis of left carotid artery ([facility] [address] 9/2/2020    Vascular dementia (HCC)        ASSESSMENT/PLAN:       Neuro:  R PCA stroke s/p TNK/IR thrombectomy, vascular dementia. Monitor neuro status, Neurology signed, hold aspirin, Exelon, Remeron  CV: HTN. Monitor hemodynamics. BP goal < 150. PRN hydralazine & labetalol. Statin. 2Decho reviewed. Norvasc, Lipitor  Pulm: No acute issues  Monitor RR & SpO2. Pulmonary hygiene   GI: Dysphagia. NG, tube feeds. Monitor bowel function. Renal: No acute issues. Monitor BUN & Cr. Monitor electrolytes & replace as needed. Monitor urine output. ID: No acute issues   Endocrine: Hyperglycemia. Monitor BS. ISS. Lantus   MSK: No acute issues. ROM. turn & reposition. PT/OT when able  Heme: No acute issues. Monitor CBC. Bowel regime: Dulcolax   Pain control/Sedation: Tylenol  DVT prophylaxis: SCDs. GI prophylaxis: Pepcid, Diet  Mouth/Eye care: as needed  Eduardo: Remove  Family update:  Will update when available    Code status:  Full  Disposition:  transfer       Total  time: 30 minutes     Electronically signed by CARROLL Woodard CNP on 10/12/2022 at 12:58 PM

## 2022-10-12 NOTE — PLAN OF CARE
Problem: Discharge Planning  Goal: Discharge to home or other facility with appropriate resources  Outcome: Progressing     Problem: Respiratory - Adult  Goal: Achieves optimal ventilation and oxygenation  Outcome: Progressing     Problem: Cardiovascular - Adult  Goal: Maintains optimal cardiac output and hemodynamic stability  Outcome: Progressing  Goal: Absence of cardiac dysrhythmias or at baseline  Outcome: Progressing     Problem: Safety - Medical Restraint  Goal: Remains free of injury from restraints (Restraint for Interference with Medical Device)  Description: INTERVENTIONS:  1. Determine that other, less restrictive measures have been tried or would not be effective before applying the restraint  2. Evaluate the patient's condition at the time of restraint application  3. Inform patient/family regarding the reason for restraint  4.  Q2H: Monitor safety, psychosocial status, comfort, nutrition and hydration  Outcome: Progressing  Flowsheets (Taken 10/11/2022 2036)  Remains free of injury from restraints (restraint for interference with medical device): Determine that other, less restrictive measures have been tried or would not be effective before applying the restraint 20:29

## 2022-10-12 NOTE — PROGRESS NOTES
[facility]  Neurology Follow Up Note     Neurology is following for acute stroke s/p intervention    PMH: Prior stroke in 2020, vascular dementia, diabetes mellitus    Patient remains hypertensive otherwise vitals are stable on O2 via nasal cannula    Patient presented to ED on 10/8 with confusion and slurred speech. Patient was unable to communicate beyond nodding and shaking his head. Patient resides at a facility and they noticed worsening of his confusion in addition to left facial droop and right-sided weakness seen in ED. Initial CT head showed evolving infarcts in the left occipital lobe and thalamus; chronic left frontal infarct noted. CTA head and neck showed high-grade stenosis or occlusion involving the P2 segment of the left PCA; no other areas of high-grade stenosis. Areas of encephalomalacia reflecting chronic infarcts also noted. Patient was given aspirin rectally and placed on heparin drip after becoming agitated and refusing to take oral medications. MRI brain shows acute infarct in the left thalamus, left temporal lobe and left occipital lobe with small amount of hemorrhage; remote left MCA infarct noted. Patient has been intermittently agitated and combative since admission. On Monday morning patient was actively fighting staff subsequently placed in four-point restraints.     On Tuesday, patient was agitated combative as well until his son arrived; sleeping on my arrival but wakes and is cooperative with me  Today, patient was agitated earlier this AM but was calm on my visit---Haldol given     Son is present at bedside currently    Review of Systems:  Limited due to aphasia, agitation    Allergies:    No Known Allergies     Current Medications:    amLODIPine  10 mg Per NG tube Daily    mirtazapine  15 mg Per NG tube Nightly    atorvastatin  80 mg Per NG tube Nightly    insulin lispro  0-16 Units SubCUTAneous Q4H    insulin glargine  25 Units SubCUTAneous Nightly [Held by provider] aspirin  81 mg Oral Daily    Or    [Held by provider] aspirin  300 mg Rectal Daily    [Held by provider] enoxaparin  40 mg SubCUTAneous Daily    rivastigmine  1 patch TransDERmal Daily    sodium chloride flush  5-40 mL IntraVENous 2 times per day        Physical Exam:  /89   Pulse 75   Temp 98.9 °F (37.2 °C) (Axillary)   Resp 20   Wt 225 lb (102.1 kg)   SpO2 96%   BMI 28.12 kg/m²  I Body mass index is 28.12 kg/m². I   Wt Readings from Last 1 Encounters:   10/10/22 225 lb (102.1 kg)        General: Awake, wakes easily to voice, no obvious distress  HEENT: Normocephalic, atraumatic, no lesions or abnormalities noted. Neck:  supple with full ROM; Lungs: Unlabored breaths on room air  CV: RRR without gallops or murmurs     Extremities: no c/c/e          Neurologic Exam     Mental Status:   Awake, wakes easily to voice, answers most questions with \"no\"   Severe expressive and moderate receptive aphasia noted. Patient did  my hands bilaterally but would not follow other commands such as sticking tongue out or wiggling toes  Cranial Nerves:   Pupils were equal round and reactive to light and accommodation; Appears to appreciate LT throughout  No gaze preference noted; tracks examiner to both sides of bed   Left facial droop noted  Hearing was intact;   Sternocleidomastoid  / Trapezius strength appears grossly 5/5.      Normal tongue strength  Motor Exam:   Limited motor exam due to poor cooperation and aphasia; b/l wrist restraints   Sensory Exam:   Appears to appreciate LT throughout  Coordination exam:   Unable to test due to poor cooperation and aphasia/unable to understand with restraints   Gait:   Not tested for patient's safety  Reflexes:   normal and symmetric bilaterally; Babinski is negative     Labs:  Lab Results   Component Value Date    WBC 10.5 10/12/2022    HGB 15.5 10/12/2022    HCT 48.7 10/12/2022    MCV 93.1 10/12/2022     10/12/2022    LYMPHOPCT 13.0 (L) 10/10/2022    RBC 5.23 10/12/2022    MCH 29.6 10/12/2022    MCHC 31.8 (L) 10/12/2022    RDW 14.3 10/12/2022     Lab Results   Component Value Date     10/12/2022    K 4.5 10/12/2022     10/12/2022    CO2 25 10/12/2022    BUN 29 (H) 10/12/2022    CREATININE 0.6 (L) 10/12/2022    GLUCOSE 257 (H) 10/12/2022    CALCIUM 9.4 10/12/2022    PROT 7.1 10/12/2022    LABALBU 4.0 10/12/2022    BILITOT 1.3 (H) 10/12/2022    ALKPHOS 109 10/12/2022    AST 13 10/12/2022    ALT 20 10/12/2022    LABGLOM >60 10/12/2022    GFRAA >60 10/12/2022     Hemoglobin A1C   Date Value Ref Range Status   10/09/2022 11.3 (H) 4.0 - 5.6 % Final     Lab Results   Component Value Date    LDLCALC 103 (H) 10/10/2022        Radiology:  XR ABDOMEN FOR NG/OG/NE TUBE PLACEMENT   Final Result   NG tube tip at the gastric antrum or pylorus. CT HEAD WO CONTRAST   Final Result   Stable left posterior cerebral artery territory infarct with a small focus of   hemorrhagic conversion redemonstrated in the left thalamus. See above. MRI brain without contrast   Preliminary Result   Motion artifact degrades the images. There is an acute area of infarct involving the left thalamus which contains   a small amount of hemorrhage. There is an acute infarct involving the medial   aspect the left temporal lobe and the left occipital lobe. There is a remote left middle cerebral artery territory infarct. There is   cerebral atrophy. The results were called and discussed with the patient's nurse, Izabel Desir RN, 10/10/2022 at 7:34 p.m. and will to be relayed to the attending   physician. CT HEAD WO CONTRAST   Final Result   1. No acute intracranial abnormality or significant change when compared the   previous study. .      2.  Evolving infarcts in the left occipital lobe and thalamus      3.   Chronic appearing left frontal lobe infarct, stable         CT HEAD WO CONTRAST   Final Result   Evolution of the acute/early subacute infarct in the left occipital lobe with   loss of gray-white differentiation and sulcal effacement. Similar changes   are identified in the left thalamus. No evidence for petechial hemorrhage or   hemorrhagic transformation. No intracranial hemorrhage or extra-axial collection. Encephalomalacia in the left MCA territory involving the left insula and left   frontal parietal region. IR MECHANICAL ART THROMBECTOMY INTRACRANIAL   Final Result        Complete echo 10/10/2022:  Limited study with difficult views. Technically very difficult examination. Likely preserved biventricular systolic function. Agitated saline contrast study is non diagnostic. Consider CECI if clinically indicated. I have personally reviewed all labs and neuroimaging today. Assessment:  Acute infarcts in the left thalamus, left temporal and left occipital lobes   Infarct contain small amount of hemorrhage--hold DAPT x1 week then start as below   CTAs consistent with high-grade stenosis or occlusion in the P2 segment of the left PCA   Stroke risk factors include: Prior stroke, HTN, HDL, DM 2, carotid stenosis, former smoker   Complete echo limited; EP consulted for Linq placement    Vascular dementia   Remains on Exelon, Remeron, Aricept--- Home meds   Per son patient has had expressive aphasia since his last stroke    Agitation   Patient continues to become combative with staff on a daily basis. Given Haldol this morning for this--- calm now    All questions and concerns addressed today patient's son at bedside. Plan for long-term cardiac monitoring discussed with him.      Plan  Continue supportive care  Electrophysiology recommends 30 day monitor at discharge  Hold ASA and Plavix for 1 week then continue DAPT for 21 days and aspirin indefinitely  A1c 11.3--- may benefit from diabetes educator and/or endocrinology  --increase to 80 mg nightly  Continue PT/OT/ST as able  Stroke risk

## 2022-10-12 NOTE — PROGRESS NOTES
Comprehensive Nutrition Assessment    Type and Reason for Visit:  Initial, Consult (TF O&M)    Nutrition Recommendations/Plan:   Continue NPO. Will Modify Current EN to meet estimated needs and monitor. TF Recommendations:  Diabetic (Glucerna 1.5) @ 60ml/hr (Goal) Continuous x 24hr/d= 1440ml TV, 2160kcal, 119gm Pro, 1093ml freewater. Flush per critical care mgmt. Malnutrition Assessment:  Malnutrition Status: At risk for malnutrition (Comment) (10/12/22 1243)    Context:  Acute Illness     Findings of the 6 clinical characteristics of malnutrition:  Energy Intake:  50% or less of estimated energy requirements for 5 or more days  Weight Loss:  Unable to assess (2/2 poor EMR wt hx pta)     Body Fat Loss:  Unable to assess     Muscle Mass Loss:  Unable to assess    Fluid Accumulation:  No significant fluid accumulation     Strength:  Not Performed    Nutrition Assessment:    Pt adm from SNF w/ stroke-like symptoms x ~1d pta. PMHx previous CVA (21'), Dementia, DM, COPD;  Adm w/ Acute CVA, s/p Lt PCA Thrombectomy 10/8, failed BSE 10/10, now s/p Corpak 10/11, tolerating EN thus far. Pt at nutritional risk d/t poor intake w/ ongoing NPO status/EN support 2/2 CVA/Dementia/Dysphagia. Will Modify Current EN to meet estimated needs and monitor. Nutrition Related Findings:     AMSx3, intermittent agitation, poor dentition, Abd/BS WDL, NE to TF, trace edema, -I/O's, +elevated BGL/LFT's (, Bili 1.3) Wound Type: None       Current Nutrition Intake & Therapies:    Average Meal Intake: NPO  Average Supplements Intake: NPO  Diet NPO  ADULT TUBE FEEDING; Nasogastric; Diabetic; Continuous; 20; Yes; 20; Q 4 hours; 55; 30; Q 4 hours  Current Tube Feeding (TF) Orders:  Feeding Route: Nasoenteric  Formula: Diabetic  Schedule: Continuous  Feeding Regimen: 55ml/hr  Additives/Modulars: None  Water Flushes: 30ml q 4h= 180ml total flush/day  Current TF & Flush Orders Provides: Same at Goal  Goal TF & Flush Orders Provides: 1320ml TV, 1980kcal, 109gm Pro, 1002ml freewater, 1182ml total water w/ flush. Additional Calorie Sources:  none    Anthropometric Measures:  Height:    Ideal Body Weight (IBW):   ( )    Admission Body Weight: 225 lb (102.1 kg) (bed 10/10)  Current Body Weight: 225 lb (102.1 kg) (bed 10/10),   IBW. Weight Source: Bed Scale  Current BMI (kg/m2): 28.1  Usual Body Weight:  (UTO UBW 2/2 poor EMR wt hx pta)     Weight Adjustment For: No Adjustment                 BMI Categories: Overweight (BMI 25.0-29. 9)    Estimated Daily Nutrient Needs:  Energy Requirements Based On: Formula  Weight Used for Energy Requirements: Current  Energy (kcal/day):   Weight Used for Protein Requirements: Ideal  Protein (g/day): 1.3-1.5gm/kg IBW= 115-135  Method Used for Fluid Requirements: 1 ml/kcal  Fluid (ml/day):  or per critical care/neuro mgmt    Nutrition Diagnosis:   Inadequate oral intake related to cognitive or neurological impairment (2/2 CVA/Dementia) as evidenced by NPO or clear liquid status due to medical condition, nutrition support - enteral nutrition, swallow study results    Nutrition Interventions:   Food and/or Nutrient Delivery: Continue NPO, Modify Tube Feeding (Continue NPO. Will Modify Current EN to meet estimated needs and monitor. TF Recommendations:  Diabetic (Glucerna 1.5) @ 60ml/hr (Goal) Continuous x 24hr/d= 1440ml TV, 2160kcal, 119gm Pro, 1093ml freewater. Flush per critical care mgmt.)  Nutrition Education/Counseling: No recommendation at this time  Coordination of Nutrition Care: Continue to monitor while inpatient       Goals:     Goals:  Tolerate nutrition support at goal rate, within 2 days       Nutrition Monitoring and Evaluation:   Behavioral-Environmental Outcomes: None Identified  Food/Nutrient Intake Outcomes: Enteral Nutrition Intake/Tolerance  Physical Signs/Symptoms Outcomes: Biochemical Data, GI Status, Fluid Status or Edema, Hemodynamic Status, Nutrition Focused Physical Findings, Skin, Weight    Discharge Planning:     Too soon to determine     Enma Braswell RD, LD  Contact: ext [phone]

## 2022-10-12 NOTE — PROGRESS NOTES
Physical Therapy  Physical Therapy Initial Assessment     Name: [facility] [place].  : 1955  MRN: [id number]      Date of Service: 10/12/2022    Evaluating PT:  Sadaf Nelson, PT, DPT [id number]    Room #:  3437/4769-Z  Diagnosis:  Acute CVA (cerebrovascular accident) [facility]) [I63.9]  PMHx/PSHx:    Past Medical History:   Diagnosis Date    Dementia ([facility]     Diabetes mellitus ([facility]     Seasonal allergies     Stroke due to stenosis of left carotid artery ([facility] 2020    Vascular dementia ([facility]      Procedure/Surgery:  10/8 L PCA thrombectomy with TNK  Precautions:  Falls, restraints, NG tube, O2, dysarthria, aphasia, bed alarm, R hemiparesis  Equipment Needs:  TBD    SUBJECTIVE:    Pt was admitted from [facility]. Pt ambulated without device PTA - questionable historian. OBJECTIVE:   Initial Evaluation  Date: 10/12/22 Treatment Short Term/ Long Term   Goals   AM-PAC 6 Clicks      Was pt agreeable to Eval/treatment? Yes     Does pt have pain?  No c/o pain     Bed Mobility  Rolling: NT  Supine to sit: MaxA with HOB elevated  Sit to supine: Matt  Scooting: Matt  SBA   Transfers Sit to stand: ModA x 2  Stand to sit: ModA x 2  Stand pivot: NT  SBA with AAD   Ambulation   NT  >150 feet with SBA with AAD   Stair negotiation: ascended and descended NT  >4 steps with 1 rail SBA   ROM BUE:  Defer to OT note  BLE:  WFL     Strength BUE:  Defer to OT note  LLE:  4/5  RLE:  3 to 3+/5  Increase by 1/3 MMT grade   Balance Sitting EOB:  Matt  Dynamic Standing:  ModA no device  Sitting EOB:  Independent  Dynamic Standing:  SBA with AAD     Pt is A & O x 1 self  CAM-ICU: NT  RASS: -1  Sensation:  Unable to formally assess due to cognition  Edema:  None    Vitals:  Heart Rate at rest 83 bpm Heart Rate post session 80 bpm   SpO2 at rest -% SpO2 post session 96%   Blood Pressure at rest 153/89 mmHg Blood Pressure post session 146/83 mmHg       Functional Status Score-Intensive Care Unit (FSS-ICU)   Rolling -/7   Supine to sit transfer 2/7   Unsupported sitting  4/7   Sit to stand transfers 3/7   Ambulation -/7   Total  9/35       Therapeutic Exercises:  NA    Patient education  Pt educated on safety    Patient response to education:   Pt verbalized understanding Pt demonstrated skill Pt requires further education in this area   partial partial yes     ASSESSMENT:    Conditions Requiring Skilled Therapeutic Intervention:    [x]Decreased strength     []Decreased ROM  [x]Decreased functional mobility  [x]Decreased balance   [x]Decreased endurance   [x]Decreased posture  []Decreased sensation  [x]Decreased coordination   []Decreased vision  [x]Decreased safety awareness   []Increased pain       Comments:  NP reported pt was medically stable. Pt was in bed upon arrival, agreeable to initial evaluation. Pt was mildly lethargic due to receiving Haldol per RN but able to stay alert and participate. Reoriented pt to place, time and situation. Increased assistance provided for bed mobility. Pt demonstrated a slight posterior lean with activities at EOB. Mild RLE weakness noted with MMT. Pt was soiled and in need of linen change. Assisted pt to standing as RN changed linens. Posterior lean with flexed knees noted but pt was able to maintain static standing. Fatigue limited activity. Pt was left in bed with all needs met and call light in reach. All lines remained intact and restraints reapplied. Bed alarm on. Treatment:  Patient practiced and was instructed in the following treatment:    Bed mobility training - pt given verbal and tactile cues to facilitate proper sequencing and safety during supine>sit as well as provided with physical assistance.   Sitting EOB for >5 minutes for upright tolerance, postural awareness and BLE ROM  Transfer training - pt was given verbal and tactile cues to facilitate proper hand placement, technique and safety during sit to stand, stand to sit transfers as well as provided with physical assistance. Skilled positioning - Pt placed in the chair position with pillows utilized to facilitate upright posture, joint and skin integrity, and interaction with environment. Non-pharmacological treatment and prevention of ICU delirium - Pt oriented to date, time, time of day, place, and situation as well as provided with visual and auditory stimuli in order to improve cognition and combat effects of ICU delirium. Pt's/ family goals   1. Unable to obtain    Prognosis is good for reaching above PT goals. Patient and or family understand(s) diagnosis, prognosis, and plan of care. -Doubtful due to cognition    PHYSICAL THERAPY PLAN OF CARE:    PT POC is established based on physician order and patient diagnosis     Referring provider/PT Order:  Mal Schneider MD /10/08/22 1930 PT eval and treat  Diagnosis:  Acute CVA (cerebrovascular accident) ([facility] [I63.9]  Specific instructions for next treatment:  Progress activity    Current Treatment Recommendations:     [x] Strengthening to improve independence with functional mobility   [] ROM to improve independence with functional mobility   [x] Balance Training to improve static/dynamic balance and to reduce fall risk  [x] Endurance Training to improve activity tolerance during functional mobility   [x] Transfer Training to improve safety and independence with all functional transfers   [x] Gait Training to improve gait mechanics, endurance and asses need for appropriate assistive device  [x] Stair Training in preparation for safe discharge home and/or into the community   [] Positioning to prevent skin breakdown and contractures  [x] Safety and Education Training   [x] Patient/Caregiver Education   [] HEP  [] Other     PT long term treatment goals are located in above grid    Frequency of treatments: 2-5x/week x 1-2 weeks.     Time in  1015  Time out  1040    Total Treatment Time  10 minutes     Evaluation Time includes thorough review of current medical information, gathering information on past medical history/social history and prior level of function, completion of standardized testing/informal observation of tasks, assessment of data and education on plan of care and goals.     CPT codes:  [] Low Complexity PT evaluation 35829  [x] Moderate Complexity PT evaluation 63095  [] High Complexity PT evaluation 35118  [] PT Re-evaluation 64670  [] Gait training 88609 - minutes  [] Manual therapy 02417 - minutes  [x] Therapeutic activities 13481 10 minutes  [] Therapeutic exercises 58236 - minutes  [] Neuromuscular reeducation 17454 - minutes     Nohelia Bernal, PT, DPT  [id number]

## 2022-10-12 NOTE — CARE COORDINATION
Care Coordination  Spoke to the patients son this and he wants his father to go to [address] at [facility] as they have a memory care unit. Referral made to [facility] at [facility] as they have a memory care/ wonder guard system as he has dementia. Envue feeding tube inserted. The patient is post  L pca ischemic stroke with pca occulusion post Tnk. Agitated overnight and has restraints. Therapies pending till out of restraints.

## 2022-10-12 NOTE — PROGRESS NOTES
[address] [place] [address]       JJUI:                                                               Patient Name: Alireza Casiano. MRN: [id number]  : 1955  Room: [address] [place] [address]    Evaluating OT: Marine Jordan OTR/L 2648    Referring Provider: Jamshid Jain MD   Specific Provider Orders/Date: OT eval and treat (10/8/22)       Diagnosis: Acute CVA      Reason for admission: presented to ED on 10/8 with confusion and slurred speech; R sided weakness    Surgery/Procedures:  10/8 L PCA thrombectomy with TNK     Pertinent Medical History: Dementia, DM, Stroke due to stenosis of L carotid artery ()    *Precautions:  Falls, B UE restraints, bed alarm, L hemiparesis, NG tube, O2, dysarthria, aphasia, bed alarm, Dementia, agitation, SBP<150     Assessment of current deficits   [x] Functional mobility  [x]ADLs  [x] Strength               [x]Cognition   [x] Functional transfers   [x] IADLs         [x] Safety Awareness   [x]Endurance   [x] Fine Coordination        [x] ROM     [x] Vision/perception   [x]Sensation    [x]Gross Motor Coordination [x] Balance   [x] Delirium                  [x]Motor Control     [x] Communication    OT PLAN OF CARE   OT POC based on physician orders, patient diagnosis and results of clinical assessment.        Frequency/Duration: 1-3 days/wk for 1-2 weeks PRN    Specific OT Treatment to include:   ADL retraining/adapted techniques and AE recommendations to increase functional independence within precautions                    Energy conservation techniques to improve tolerance for selfcare routine   Functional transfer/mobility training/DME recommendations for increased independence, safety and fall prevention         Patient/family education to increase safety and functional independence within precautions              Environmental modifications for safe mobility and completion of ADLs                           Cognitive retraining ex's to improve problem solving skills & safe participation in ADLs/IADLs  Sensory re-education techniques to improve extremity awareness, maintain skin integrity and improve hand function                             Visual/Perceptual retraining  to improve body awareness and safety during transfers and ADLs  Splinting/positioning needs to maintain joint/skin integrity and prevent contractures  Therapeutic activity to improve functional performance during ADLs/IADLs                                         Therapeutic exercise to improve tolerance and functional strength for ADLs   Balance retraining exercises/tasks for facilitation of postural control with dynamic challenges during ADLs . Positioning to improve functional independence  Neuromuscular re-education: facilitation of righting/equilibrium reactions, normalization muscle tone/facilitation active functional movement                      Delirium prevention/treatment    Modified Marta Scale   Score     Description  0             No symptoms  1             No significant disability despite symptoms  2             Slight disability; able to look after own affairs  3             Moderate disability; able to ambulate without assist/ requires assist with ADLs  4             Moderate/Severe disability;requires assist to ambulate/assist with ADLs  5             Severe disability;bedridden/incontinent   6               Score:   5    Recommended Adaptive Equipment: TBA     Home Living: Pt resides in an [facility] per chart. Prior Level of Function:  Per chart, pt was receiving total care. Pt cannot report PTA status. Pain Level: pt c/o 0/10  pain  this session    Cognition: A&O: 1/4  (self only; not oriented to place or situation)  Follows occasional gross 1 step commands 50% of the time.     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   Comprehension fair- (simple commands)   Problem solving: poor+   Judgement/safety: poor               Communication skills: impaired word finding; aphasia           Vision: grossly WFL; continue to assess during session               Glasses:\"yes\" (not present)                                                   Hearing: WFL     RASS: +1 to 0  CAM-ICU: (NT due to impaired cognition) Delirium    UE Assessment:  Hand Dominance: Right [x]  Left []     ROM Strength STM goal: PRN   RUE  PROM WFL;  A/AROM grossly 80 shoulder flexion with jerky movement; gross elbow flexion; gross grasp 3-/5 proximal  3-/5 distal  WFL for ADLs; 4-/5     LUE A/AROM shoulder flexion; WFL distal  4-/5 4/5       Sensation: No c/o numbness/tingling in extremities. Tone: WNL   Edema: [facility]     Functional Assessment:  AM-PAC Daily Activity Raw Score: 8/24   Initial Eval Status  Date: 10/12/22 Treatment Status  Date: STGs = LTGs  Time frame: 7-14 days   Feeding NGT  Re-assess when cleared for diet. Grooming Max A                        Min A   while seated in chair     UB dressing/bathing Max A  (gown)                        Min A       LB dressing/bathing Dep                        Mod A   using AE as needed for safe reach/ energy conservation       Toileting Dep  *incontinent of bladder bed level                        Mod A     Bed Mobility  Supine to sit: Max A    Sit to supine:   Min A with R LE                        Min A  in prep of ADL tasks & transfers   Functional Transfers Sit to stand: Mod A+2    Stand to sit:   Mod A                        Min A  sit<>stand/functional bathroom transfers using AD/DME as needed for balance and safety   Functional Mobility NT                       Min A   functional/bathroom mobility using AD as needed & demonstrating G safety     Balance Sitting:     Static:  Min A    Dynamic:Min A  Standing:    Mod A+2  SBA dynamic sitting balance; Min A dynamic standing balance  during ADL tasks & transfers   Endurance/  Activity Tolerance   F tolerance with light activity. G   tolerance with moderate activity/self care routine   Visual/  Perceptual Impaired: environmental/R body awareness; decreased insight into deficits                        Vitals:  Heart Rate at rest 83 bpm Heart Rate post session 80 bpm   SpO2 at rest -% SpO2 post session 96%   Blood Pressure at rest 153/89 mmHg Blood Pressure post session 146/83 mmHg        Treatment: OT treatment provided this date includes:  Bed mobility: Instruction on precautions prior to bed mobility to facilitate safe transfers and ADLS. HOB elevated to assist.     Balance retraining: Performed sitting/standing balance ex's with instruction to facilitate righting reactions with postural changes during ADLS.      ROM/exercise: R UE A/AROM ex's to increase functional reach and hand function    Delirium Prevention: Environmental and sensory modifications assessed and implemented to decrease ICU acquired delirium and to improve overall orientation, mentation and pt interaction with family/staff. Line management and environmental modifications made prior to and end of session to ensure patient safety and to increase efficiency of session. Skilled monitoring of HR, O2 saturation, blood pressure and patient's response to activity performed throughout session. Comments: OK from RN to see patient. Upon arrival, patient supine in bed, restless but agreeable to session. Pt demo fair tolerance with decreased understanding of education/techniques. At end of session, patient returned to bed and bed placed in chair position to increase activity tolerance. Pillows placed under B UE for comfort & edema control. B UE restraints intact. Call light within reach, all lines and tubes intact. Pt instructed on use of call light for assistance and fall prevention.  .    Patient presents with decreased ROM/strength,activity tolerance, dynamic balance, functional mobility and cognitive deficits limiting completion of ADLs and safety. Pt can benefit from continued skilled OT to increase safety, functional independence and quality of life. Rehab Potential: Good for established goals    Patient / Family Goal: to return to PLOF    Patient and/or family were instructed/educated on diagnosis, prognosis/goals and plan of care. Pt demonstrated decreased understanding. Evaluation Complexity: Moderate     History: Expanded chart review of consults, imaging, and psychosocial history related to current functional performance. Exam: 5+ performance deficits identified limiting functional independence and safe return home   Assistance/Modification: Min/mod assistance or modifications required to perform tasks. May have comorbidities that affect occupational performance. [] Malnutrition indicators have been identified and nursing has been notified to ensure a dietitian consult is ordered. Time In:1025             Time Out: 1050         Total Treatment time: 10   Min Units   OT Eval Low 39199     OT Eval Medium 01505 X    OT Eval High 88820     OT Re-Eval [id number]     Therapeutic Ex 40988     Therapeutic Activities 85823 10 1   ADL/Self Care 25548     Orthotic Management 89368     Neuro Re-Ed 60764     Non-Billable Time        Evaluation time includes thorough review of current medical information, gathering information on past medical history/social history and prior level of function, completion of standardized testing/informal observation of tasks, assessment of data and development of POC/Goals.      Chris Edmonds, OTR/L 2548

## 2022-10-12 NOTE — PROGRESS NOTES
Patient reaches towards corpack and removes medical equipment when not restrained. He is not redirectable at this time. B/L soft wrist restrains to remain in place for patient safety. Will continue to monitor for earliest possible safe removal from restraint.

## 2022-10-12 NOTE — PROGRESS NOTES
[facility]   Progress note      Subjective: The patient is resting comfortably on my evaluation, no acute events or issues overnight  Responded well to Haldol this morning, resting comfortably  Remains in two-point restraints today due to trying to pull out NG tube  No family at bedside  Case discussed with nursing staff  He is able to follow some commands      Objective:    BP (!) 147/123   Pulse 95   Temp 98.8 °F (37.1 °C) (Axillary)   Resp 21   Wt 225 lb (102.1 kg)   SpO2 97%   BMI 28.12 kg/m²     In: 2093.1 [I.V.:962.1; NG/GT:1131]  Out: 170   In: 2093.1   Out: 170 [Urine:170]    General appearance: NAD, not conversant, however does follow commands  HEENT: AT/NC, MMM  Neck: FROM, supple  Lungs: Clear to auscultation  CV: RRR, no MRGs  Vasc: Radial pulses 2+  Abdomen: Soft, non-tender; no masses or HSM  Extremities: No peripheral edema or digital cyanosis  Skin: no rash, lesions or ulcers  Psych: Alert and oriented to person, place and time       Recent Labs     10/10/22  0600 10/11/22  0515 10/12/22  0456   WBC 10.0 8.1 10.5   HGB 14.9 14.4 15.5   HCT 45.6 44.6 48.7    152 171       Recent Labs     10/10/22  0535 10/11/22  0515 10/12/22  0456    140 141   K 4.2 4.3 4.5    104 104   CO2 21* 24 25   BUN 20 28* 29*   CREATININE 0.7 0.8 0.6*   CALCIUM 9.3 9.1 9.4       Assessment:    Principal Problem:    Acute CVA (cerebrovascular accident) ([facility] [address]  Active Problems:    History of stroke    Dementia (HCC)    Type 2 diabetes mellitus ([address]  Resolved Problems:    * No resolved hospital problems.  *      Plan:  56-year-old male with a history of a CVA in 2020 presented to the ED with altered mental status and slurred speech and patient was admitted postop to ICU with     Acute ischemic CVA with left PCA occlusion    10/8/2022 successful thrombectomy of left PCA  MRI pending  CT head completed with no intracranial hemorrhage, evolution of acute or early subacute infarcts in the left occipital lobe  Postprocedure CT head pending  NIHSS score q shift  Hold antiplatelets for 24 hours post TN K  Monitor blood pressure-maintain SBP less than 150 and MAP greater than 65 adjust medications as needed.-As needed IV labetalol  Discussed risk factor modification.   ICU vitals  Echo-pending    10/10/2022  Remains completely confused, extremely agitated today with nursing  MRI pending-Will need medication  As needed Haldol every 12 for severe agitation  Echocardiogram essentially completely uninformative-discussed with echo tech at bedside  May benefit from CECI given appearance of multiple infarcts to rule out thrombus  Resume antiplatelet agents at discretion of neurology   blood pressure management-currently optimally controlled on multiple agents-amlodipine, as needed hydralazine and labetalol, add diuretic  Insulin sliding scale plus Lantus insulin on board with Noble control, uptitrate Lantus tonight if persistent hyperglycemia    10/11/2022  Remains in two-point restraints  Tolerated MRI post sedation-acute infarct left temporal and occipital lobe with thalamic hemorrhage  Postprocedure CT head with stable left posterior cerebral artery infarct with small focus of active conversion in left thalamus  Hold off on antiplatelet agents chemical DVT prophylaxis for now due to above findings  30-day event monitor at discharge  Blood pressure adequately controlled  Initiate tube feeds for nutrition  Blood glucose marginally well controlled-uptitrate Lantus to 25 units at bedtime    10/12/2022  Will need subacute rehab for ongoing PT/OT/speech therapy  Per neurology, start dual antiplatelet therapy in 1 week x 21 days followed by indefinite aspirin therapy  Okay for discharge from medicine standpoint once place finalized  CECI at discretion of EP  Patient is not alert enough to benefit from diabetic educator, blood sugars now reasonably well controlled with initiation of Lantus-currently on 25 units at bedtime with insulin sliding scale  Will make arrangements for outpatient endocrine referral at discharge      Code Status: Full  Consultants:  Neurointensive care team, neurology, neuro interventional radiology, electrophysiology  DVT Prophylaxis   PT/OT  Discharge planning         Beverley Shine MD  4:22 PM  10/12/2022

## 2022-10-13 ENCOUNTER — APPOINTMENT (OUTPATIENT)
Dept: GENERAL RADIOLOGY | Age: 67
DRG: 023 | End: 2022-10-13
Payer: COMMERCIAL

## 2022-10-13 LAB
ANION GAP SERPL CALCULATED.3IONS-SCNC: 16 MMOL/L (ref 7–16)
BUN BLDV-MCNC: 37 MG/DL (ref 6–23)
CALCIUM SERPL-MCNC: 10.5 MG/DL (ref 8.6–10.2)
CHLORIDE BLD-SCNC: 104 MMOL/L (ref 98–107)
CO2: 24 MMOL/L (ref 22–29)
CREAT SERPL-MCNC: 0.7 MG/DL (ref 0.7–1.2)
GFR AFRICAN AMERICAN: >60
GFR SERPL CREATININE-BSD FRML MDRD: 112 ML/MIN/1.73
GLUCOSE BLD-MCNC: 257 MG/DL (ref 74–99)
HCT VFR BLD CALC: 49.1 % (ref 37–54)
HEMOGLOBIN: 15.9 G/DL (ref 12.5–16.5)
MCH RBC QN AUTO: 29.6 PG (ref 26–35)
MCHC RBC AUTO-ENTMCNC: 32.4 % (ref 32–34.5)
MCV RBC AUTO: 91.3 FL (ref 80–99.9)
METER GLUCOSE: 241 MG/DL (ref 74–99)
METER GLUCOSE: 282 MG/DL (ref 74–99)
METER GLUCOSE: 365 MG/DL (ref 74–99)
PDW BLD-RTO: 14.4 FL (ref 11.5–15)
PLATELET # BLD: 178 E9/L (ref 130–450)
PMV BLD AUTO: 11.9 FL (ref 7–12)
POTASSIUM SERPL-SCNC: 4.1 MMOL/L (ref 3.5–5)
RBC # BLD: 5.38 E12/L (ref 3.8–5.8)
SODIUM BLD-SCNC: 144 MMOL/L (ref 132–146)
WBC # BLD: 9.8 E9/L (ref 4.5–11.5)

## 2022-10-13 PROCEDURE — 6370000000 HC RX 637 (ALT 250 FOR IP): Performed by: NURSE PRACTITIONER

## 2022-10-13 PROCEDURE — 6360000002 HC RX W HCPCS: Performed by: NURSE PRACTITIONER

## 2022-10-13 PROCEDURE — 82962 GLUCOSE BLOOD TEST: CPT

## 2022-10-13 PROCEDURE — S5553 INSULIN LONG ACTING 5 U: HCPCS | Performed by: INTERNAL MEDICINE

## 2022-10-13 PROCEDURE — 2500000003 HC RX 250 WO HCPCS: Performed by: NURSE PRACTITIONER

## 2022-10-13 PROCEDURE — 2580000003 HC RX 258: Performed by: NURSE PRACTITIONER

## 2022-10-13 PROCEDURE — 2060000000 HC ICU INTERMEDIATE R&B

## 2022-10-13 PROCEDURE — 74230 X-RAY XM SWLNG FUNCJ C+: CPT

## 2022-10-13 PROCEDURE — 36415 COLL VENOUS BLD VENIPUNCTURE: CPT

## 2022-10-13 PROCEDURE — 85027 COMPLETE CBC AUTOMATED: CPT

## 2022-10-13 PROCEDURE — 92611 MOTION FLUOROSCOPY/SWALLOW: CPT | Performed by: SPEECH-LANGUAGE PATHOLOGIST

## 2022-10-13 PROCEDURE — 92526 ORAL FUNCTION THERAPY: CPT | Performed by: SPEECH-LANGUAGE PATHOLOGIST

## 2022-10-13 PROCEDURE — 6370000000 HC RX 637 (ALT 250 FOR IP): Performed by: INTERNAL MEDICINE

## 2022-10-13 PROCEDURE — 80048 BASIC METABOLIC PNL TOTAL CA: CPT

## 2022-10-13 RX ORDER — INSULIN LISPRO 100 [IU]/ML
0-4 INJECTION, SOLUTION INTRAVENOUS; SUBCUTANEOUS NIGHTLY
Status: DISCONTINUED | OUTPATIENT
Start: 2022-10-13 | End: 2022-10-16

## 2022-10-13 RX ORDER — INSULIN LISPRO 100 [IU]/ML
0-4 INJECTION, SOLUTION INTRAVENOUS; SUBCUTANEOUS
Status: DISCONTINUED | OUTPATIENT
Start: 2022-10-13 | End: 2022-10-16

## 2022-10-13 RX ORDER — INSULIN GLARGINE-YFGN 100 [IU]/ML
30 INJECTION, SOLUTION SUBCUTANEOUS NIGHTLY
Status: DISCONTINUED | OUTPATIENT
Start: 2022-10-13 | End: 2022-10-18

## 2022-10-13 RX ADMIN — HYDRALAZINE HYDROCHLORIDE 5 MG: 20 INJECTION INTRAMUSCULAR; INTRAVENOUS at 18:03

## 2022-10-13 RX ADMIN — LABETALOL HYDROCHLORIDE 5 MG: 5 INJECTION, SOLUTION INTRAVENOUS at 06:31

## 2022-10-13 RX ADMIN — SODIUM CHLORIDE, PRESERVATIVE FREE 10 ML: 5 INJECTION INTRAVENOUS at 21:02

## 2022-10-13 RX ADMIN — AMLODIPINE BESYLATE 10 MG: 10 TABLET ORAL at 11:15

## 2022-10-13 RX ADMIN — ATORVASTATIN CALCIUM 80 MG: 40 TABLET, FILM COATED ORAL at 21:01

## 2022-10-13 RX ADMIN — INSULIN LISPRO 1 UNITS: 100 INJECTION, SOLUTION INTRAVENOUS; SUBCUTANEOUS at 18:04

## 2022-10-13 RX ADMIN — INSULIN LISPRO 4 UNITS: 100 INJECTION, SOLUTION INTRAVENOUS; SUBCUTANEOUS at 23:22

## 2022-10-13 RX ADMIN — MIRTAZAPINE 15 MG: 15 TABLET, FILM COATED ORAL at 21:01

## 2022-10-13 RX ADMIN — INSULIN GLARGINE-YFGN 30 UNITS: 100 INJECTION, SOLUTION SUBCUTANEOUS at 20:59

## 2022-10-13 RX ADMIN — HYDRALAZINE HYDROCHLORIDE 5 MG: 20 INJECTION INTRAMUSCULAR; INTRAVENOUS at 23:29

## 2022-10-13 RX ADMIN — SODIUM CHLORIDE, PRESERVATIVE FREE 10 ML: 5 INJECTION INTRAVENOUS at 11:26

## 2022-10-13 RX ADMIN — INSULIN LISPRO 8 UNITS: 100 INJECTION, SOLUTION INTRAVENOUS; SUBCUTANEOUS at 11:17

## 2022-10-13 RX ADMIN — INSULIN LISPRO 8 UNITS: 100 INJECTION, SOLUTION INTRAVENOUS; SUBCUTANEOUS at 04:12

## 2022-10-13 NOTE — PROGRESS NOTES
Speech Language Pathology      NAME:  Janki Garcia Sr.  :  1955  DATE: 10/13/2022  ROOM:  [address] [place], [place] [address]    Pt seen for ongoing dysphagia therapy. Pt much more appropriate and cooperative today. Allowed SLP to provide oral care; dried oral secretions noted. Pt did comm he felt better after oral care was provided. SLP directed PO trials of pureed solids. Impaired oral acceptance, delayed bolus manipulation and AP transit with suspected prespill of bolus to pharynx. Pt with delayed pharyngeal swallow suspected, and questionable (weak) throat clear. MBSS ordered prior to this note/ encounter. Will attempt to complete later or tomorrow, pending radiology schedule.      Acute CVA (cerebrovascular accident) [facility]) [I63.9]    13796  dysphagia tx    Mary Whitley M.S., [address] Pathologist  [id number]  10/13/2022

## 2022-10-13 NOTE — PROGRESS NOTES
Restraints released for patient care and repositioning ,patient continues to pull at corpak,2 point soft wrist restraints continued will monitor patient

## 2022-10-13 NOTE — PROGRESS NOTES
[facility]   Progress note      Subjective: The patient is resting comfortably on my evaluation, no acute events or issues overnight  Still continues with aphasia, no acute issues otherwise, much calmer  On assessment still in 2 point wrist restraints  For video swallow today      Objective:    BP (!) 158/86   Pulse 89   Temp 97.9 °F (36.6 °C) (Temporal)   Resp 20   Wt 225 lb (102.1 kg)   SpO2 91%   BMI 28.12 kg/m²     In: 1909 [I.V.:10; NG/GT:1899]  Out: 400   In: 1909   Out: 400 [Urine:400]    General appearance: NAD, not conversant, however does follow commands  HEENT: AT/NC, MMM, NGT L nare   Neck: FROM, supple  Lungs: Clear to auscultation  CV: RRR, no MRGs  Vasc: Radial pulses 2+  Abdomen: Soft, non-tender; no masses or HSM  Extremities: No peripheral edema or digital cyanosis  Skin: no rash, lesions or ulcers  Psych: Alert and oriented to person only        Recent Labs     10/11/22  0515 10/12/22  0456 10/13/22  0520   WBC 8.1 10.5 9.8   HGB 14.4 15.5 15.9   HCT 44.6 48.7 49.1    171 178         Recent Labs     10/11/22  0515 10/12/22  0456 10/13/22  0520    141 144   K 4.3 4.5 4.1    104 104   CO2 24 25 24   BUN 28* 29* 37*   CREATININE 0.8 0.6* 0.7   CALCIUM 9.1 9.4 10.5*         Assessment:    Principal Problem:    Acute CVA (cerebrovascular accident) ([facility] [address]  Active Problems:    History of stroke    Dementia (HCC)    Type 2 diabetes mellitus ([facility] [address]  Resolved Problems:    * No resolved hospital problems.  *      Plan:  57-year-old male with a history of a CVA in 2020 presented to the ED with altered mental status and slurred speech and patient was admitted postop to ICU with     Acute ischemic CVA with left PCA occlusion    10/8/2022   Successful thrombectomy of left PCA  MRI pending  CT head completed with no intracranial hemorrhage, evolution of acute or early subacute infarcts in the left occipital lobe  Postprocedure CT head pending  NIHSS score q shift  Hold antiplatelets for 24 hours post TN K  Monitor blood pressure-maintain SBP less than 150 and MAP greater than 65 adjust medications as needed.-As needed IV labetalol  Discussed risk factor modification.   ICU vitals  Echo-pending    10/10/2022  Remains completely confused, extremely agitated today with nursing  MRI pending-Will need medication  As needed Haldol every 12 for severe agitation  Echocardiogram essentially completely uninformative-discussed with echo tech at bedside  May benefit from CECI given appearance of multiple infarcts to rule out thrombus  Resume antiplatelet agents at discretion of neurology   blood pressure management-currently optimally controlled on multiple agents-amlodipine, as needed hydralazine and labetalol, add diuretic  Insulin sliding scale plus Lantus insulin on board with Noble control, uptitrate Lantus tonight if persistent hyperglycemia    10/11/2022  Remains in two-point restraints  Tolerated MRI post sedation-acute infarct left temporal and occipital lobe with thalamic hemorrhage  Postprocedure CT head with stable left posterior cerebral artery infarct with small focus of active conversion in left thalamus  Hold off on antiplatelet agents chemical DVT prophylaxis for now due to above findings  30-day event monitor at discharge  Blood pressure adequately controlled  Initiate tube feeds for nutrition  Blood glucose marginally well controlled-uptitrate Lantus to 25 units at bedtime    10/12/2022  Will need subacute rehab for ongoing PT/OT/speech therapy  Per neurology, start dual antiplatelet therapy in 1 week (10/19/2022) x 21 days followed by indefinite aspirin therapy**  Okay for discharge from medicine standpoint once place finalized  CECI at discretion of EP  Patient is not alert enough to benefit from diabetic educator, blood sugars now reasonably well controlled with initiation of Lantus-currently on 25 units at bedtime with insulin sliding scale  Will make arrangements for outpatient endocrine referral at discharge    10/13  -Repeat video swallow today, passed with pureed and honey thick liquids.  -Nursing communication to remove NGT and restraints. Hopeful to start precert this afternoon to acceptance to [facility]. -Blood glucose levels remain in the mid 200's, continue to monitor now with switch in diet and off tube feeds.   -Increase insulin to 30 units at bedtime and decrease insulin sliding scale to low-dose  -CBC in a.m. to assess for elevation in white count    Code Status: Full  Consultants:  Neurointensive care team (S/o), neurology, neuro interventional radiology (S/O), electrophysiology (S/O)  DVT Prophylaxis   PT/OT  Discharge planning [facility] on precert is obtained         CARROLL Garcia CNP  2:32 PM  10/13/2022     Above note edited to reflect my thoughts     I personally saw, examined and provided care for the patient. Radiographs, labs and medication list were reviewed by me independently. The case was discussed in detail and plans for care were established. Review of CARROLL Garcia-CNP   , documentation was conducted and revisions were made as appropriate directly by me. I agree with the above documented exam, problem list, and plan of care.      Mykel Stone MD  3:46 PM  10/13/2022

## 2022-10-13 NOTE — PROGRESS NOTES
SPEECH/LANGUAGE PATHOLOGY  VIDEOFLUOROSCOPIC STUDY OF SWALLOWING (MBS)   and PLAN OF CARE    PATIENT NAME:  Gage Walker Sr.  (male)     MRN:  [id number]    :  1955  (69 y.o.)  STATUS:  Inpatient: Room 8464/1207-Z    TODAY'S DATE:  10/13/2022  REFERRING PROVIDER:   Suhas Jeff MD   SPECIFIC PROVIDER ORDER: FL modified barium swallow with video  Date of order:  10/13/22   REASON FOR REFERRAL: assess oropharyngeal swallow function    EVALUATING THERAPIST: ALEX Veronica      RESULTS:      DYSPHAGIA DIAGNOSIS:  moderate-severe oropharyngeal phase dysphagia     DIET RECOMMENDATIONS:  Pureed consistency solids (IDDSI level 4) with  honey consistency (moderately thick - IDDSI level 3)  liquids      Transient penetration was observed for nectar thick liquid, however swallow onset delay was present for NTL and NOT present for honey thick liquid, which SLP suspects is related to increased viscosity and sensation to oral cavity.      MEDICATION ADMINISTRATION Administer medication crushed, as able, with pudding/applesauce    FEEDING RECOMMENDATIONS:    Assistance level:  Stand by assistance is needed during all oral intake, Set-up is required for all oral intake, Supervision is needed during all oral intake, Encourage self-feeding     Compensatory strategies recommended: Double swallow, Small bites/sips, Alternate solids and liquids, and No straw     Discussed recommendations with nursing and/or faxed report to referring provider: Yes    Laryngeal Penetration and Aspiration:  Penetration WITHOUT aspiration was observed in today's study with  mildly thick liquid (nectar)    SPEECH THERAPY  PLAN OF CARE   The dysphagia POC is established based on physician order and dysphagia diagnosis    Skilled SLP intervention for dysphagia management up to 5x per week until goals met, pt plateaus in function and/or discharged from hospital      Conditions Requiring Skilled Therapeutic Intervention for dysphagia:    Patient ([facility] [I63.9]     VISIT DIAGNOSIS:         PATIENT REPORT/COMPLAINT: patient currently NPO pending results of this evaluation    PRIOR LEVEL OF SWALLOW FUNCTION:    Past History of Dysphagia?:  none reported    Home diet: Regular consistency solids (IDDSI level 7) with  thin liquids (IDDSI level 0)  Current Diet Order:  ADULT TUBE FEEDING; Nasoenteric; Diabetic; Continuous; 20; Yes; 20; Q 4 hours; 60; 30; Q 4 hours    PROCEDURE:  Consistencies Administered During the Evaluation   Liquids: nectar thick liquid and honey thick liquid   Solids:  pureed foods      Method of Intake:   cup, spoon  Fed by clinician      Position:   Seated, upright    INSTRUMENTAL ASSESSMENT:    ORAL PREP/ ORAL PHASE:   Inadequate labial seal resulting anterior labial spillage on the right side  Impaired oral initiation  Delayed A-P transit due to: cognitive function   Lingual pumping present  Decreased bolus formation resulting in observed premature pharyngeal spillage     PHARYNGEAL PHASE:     ONSET TIME       Delayed initiation of the pharyngeal swallow was noted with swallow reflex triggered at the level of the pyriform sinus        PHARYNGEAL RESIDUALS        Vallecula/Pharyngeal Wall           Reduced tongue base retraction and impaired epiglottic inversion resulting in residuals in vallecula and/or posterior pharyngeal wall for honey consistency liquid and pureed foods which cleared/mostly cleared with cued double swallow and spontaneous double swallow       Pyriform Sinuses      No significant residuals were noted in the pyriform sinuses     LARYNGEAL PENETRATION   Laryngeal penetration occurred in the absence of aspiration DURING the swallow for nectar consistency liquid due to  delayed laryngeal closure and inadequate laryngeal closure which remained in the laryngeal vestibule.  . Laryngeal penetration was mild and occurred inconsistently   an absent cough/throat clear was noted    ASPIRATION  Aspiration was not present during this evaluation    PENETRATION-ASPIRATION SCALE (PAS):  THIN item not administered  MILDLY THICK 3 = Material enters the airway, remains above the vocal folds, and is not ejected from the airway  MODERATELY THICK 1 = Material does not enter the airway  PUREE 1 = Material does not enter the airway  HARD SOLID item not administered       COMPENSATORY STRATEGIES    Compensatory strategies that were beneficial included Double swallow, Small bites/sips, and Alternate solids and liquids      STRUCTURAL/FUNCTIONAL ANOMALIES   No structural/functional anomalies were noted    CERVICAL ESOPHAGEAL STAGE :     The cervical esophagus appeared adequate          ___________    Cognition:   Within functional limits for this exam    Oral Peripheral Examination   Generalized oral weakness    Current Respiratory Status   room air     Parameters of Speech Production  Respiration:  Adequate for speech production  Quality:   Strained  Intensity: Quiet    Pain: No pain reported. EDUCATION:   The Speech Language Pathologist (SLP) completed education regarding results of evaluation and that intervention is warranted at this time. Learner: Patient  Education: Reviewed results and recommendations of this evaluation, Reviewed diet and strategies, Reviewed signs, symptoms and risks of aspiration, and Reviewed recommendations for follow-up  Evaluation of Education:  No evidence of learning    This plan may be re-evaluated and revised as warranted. Evaluation Time includes thorough review of current medical information, gathering information on past medical history/social history and prior level of function, completion of standardized testing/informal observation of tasks, assessment of data and education on plan of care and goals. [x]The admitting diagnosis and active problem list, have been reviewed prior to initiation of this evaluation.     CPT Code: 37620  dysphagia study    ACTIVE PROBLEM LIST:   Patient Active Problem List Diagnosis    Acute ischemic left MCA stroke (HCC)    Middle cerebral artery stenosis, left    Dysarthria    Uncontrolled type 2 diabetes mellitus with hyperglycemia (HCC)    Tobacco dependence    COPD (chronic obstructive pulmonary disease) (HCC)    Stroke due to stenosis of left carotid artery (HCC)    Acute CVA (cerebrovascular accident) ([address]    Symptomatic stenosis of left carotid artery    Asymptomatic stenosis of right carotid artery    History of left-sided carotid endarterectomy    History of stroke    Dementia (HCC)    Type 2 diabetes mellitus ([address]    Cerebrovascular accident (CVA) due to occlusion of left posterior cerebral artery (HCC)    Nihss score 8    Acute right hemiparesis ([address]       INTERVENTION  CPT Code: 32198  dysphagia tx    Speech Pathologist (SLP) completed education with the patient/family regarding procedure of Modified Barium Swallow Study prior to exam and then type of swallowing impairment following completion of MBSS. Reviewed current solid/liquid consistency diet recommendations --   Dysphagia 1, Pureed solids with  honey consistency (moderately thick - IDDSI level 3)  liquids and discussed compensatory strategies (small sips,d ouble swallow, no straws) to ensure safe PO intake. Images from MBSS reviewed with patient/ family and education provided. Reviewed aspiration precautions. Encouraged patient and/or family to engage SLP in unstructured Q&A session relative to identified deficit areas; indicated understanding of all information provided via satisfactory verbal response.

## 2022-10-13 NOTE — DISCHARGE INSTR - COC
Continuity of Care Form    Patient Name: Yesy aSldivar   :  1955  MRN:  [id number]    Admit date:  10/8/2022  Discharge date:  10/21/22    Code Status Order: Full Code   Advance Directives:     Admitting Physician:  Verónica Mendoza MD  PCP: Franklin Quijano DO    Discharging Nurse: as  [address] Unit/Room#: 9378/5616-K  Discharging Unit Phone Number: [phone]    Emergency Contact:   Extended Emergency Contact Information  Primary Emergency Contact: [address] Phone: [phone]  Mobile Phone: [phone]  Relation: Child  Secondary Emergency Contact: Tacho Quiroga   [address] Phone: [phone]  Relation: Brother/Sister    Past Surgical History:  Past Surgical History:   Procedure Laterality Date    CARDIOVASCULAR STRESS TEST  2020    Lexiscan stress test    CAROTID ENDARTERECTOMY Left 10/29/2020    LEFT CAROTID ENDARTERECTOMY -- FACILITY performed by Jacqueline Hinds MD at [address]      IR MECHANICAL ART THROMBECTOMY INTRACRANIAL  10/8/2022    IR MECHANICAL ART THROMBECTOMY INTRACRANIAL 10/8/2022 Kimberly Ackerman MD [facility]    TONSILLECTOMY         Immunization History: There is no immunization history for the selected administration types on file for this patient.     Active Problems:  Patient Active Problem List   Diagnosis Code    Acute ischemic left MCA stroke [facility]) I63.512    Middle cerebral artery stenosis, left I66.02    Dysarthria R47.1    Uncontrolled type 2 diabetes mellitus with hyperglycemia ([facility]) E11.65    Tobacco dependence F17.200    COPD (chronic obstructive pulmonary disease) ([facility]) J44.9    Stroke due to stenosis of left carotid artery ([facility]) E17.135    Acute CVA (cerebrovascular accident) ([facility] I63.9    Symptomatic stenosis of left carotid artery I65.22    Asymptomatic stenosis of right carotid artery I65.21    History of left-sided carotid endarterectomy Z98.890    History of stroke Z86.73 Dementia ([facility] F03.90    Type 2 diabetes mellitus (HCC) E11.9    Cerebrovascular accident (CVA) due to occlusion of left posterior cerebral artery (HCC) I63.532    Nihss score 8 R29.708    Acute right hemiparesis ([facility]) G81.91       Isolation/Infection:   Isolation            No Isolation          Patient Infection Status       Infection Onset Added Last Indicated Last Indicated By Review Planned Expiration Resolved Resolved By    None active    Resolved    COVID-19 (Rule Out) 10/08/22 10/08/22 10/08/22 COVID-19, Rapid (Ordered)   10/08/22 Rule-Out Test Resulted    COVID-19 (Rule Out) 09/11/20 09/11/20 09/11/20 COVID-19 (Ordered)   09/11/20 Rule-Out Test Resulted            Nurse Assessment:  Last Vital Signs: BP (!) 158/86   Pulse 89   Temp 97.9 °F (36.6 °C) (Temporal)   Resp 20   Wt 225 lb (102.1 kg)   SpO2 91%   BMI 28.12 kg/m²     Last documented pain score (0-10 scale): Pain Level: 0  Last Weight:   Wt Readings from Last 1 Encounters:   10/10/22 225 lb (102.1 kg)     Mental Status:  disoriented, alert, and coherent    IV Access:  - None    Nursing Mobility/ADLs:  Walking   Dependent  Transfer  Dependent  Bathing  Dependent  Dressing  Dependent  Toileting  Dependent  Feeding  Dependent  Med Admin  Dependent  Med Delivery   crushed and prefers mixed with applesauce    Wound Care Documentation and Therapy:        Elimination:  Continence: Bowel: No  Bladder: No  Urinary Catheter: Indication for Use of Catheter: Acute urinary retention/obstruction   Colostomy/Ileostomy/Ileal Conduit: No       Date of Last BM: 10/21    Intake/Output Summary (Last 24 hours) at 10/13/2022 1043  Last data filed at 10/13/2022 0847  Gross per 24 hour   Intake 1864 ml   Output 400 ml   Net 1464 ml     I/O last 3 completed shifts:   In: 2108 [I.V.:10; NG/GT:2098]  Out: 400 [Urine:400]    Safety Concerns:     History of Falls (last 30 days) and Aspiration Risk    Impairments/Disabilities:      Speech and Vision    Nutrition Therapy:  Current Nutrition Therapy:   - Oral Diet:  Dysphagia 1 pureed    Routes of Feeding: Oral  Liquids: Honey Thick Liquids  Daily Fluid Restriction: no  Last Modified Barium Swallow with Video (Video Swallowing Test): not done    Treatments at the Time of Hospital Discharge:   Respiratory Treatments:     Oxygen Therapy:  is not on home oxygen therapy. Ventilator:    - No ventilator support    Rehab Therapies: Physical Therapy, Occupational Therapy, and Speech/Language Therapy  Weight Bearing Status/Restrictions: No weight bearing restrictions  Other Medical Equipment (for information only, NOT a DME order):  walker and bedside commode  Other Treatments:     Patient's personal belongings (please select all that are sent with patient):  None    RN SIGNATURE:  Electronically signed by Griffin Leos RN on 10/21/22 at 3:58 PM EDT    CASE MANAGEMENT/SOCIAL WORK SECTION    Inpatient Status Date:       Readmission Risk Assessment Score:  Readmission Risk              Risk of Unplanned Readmission:  22           Discharging to Facility/ Agency   Name:   [facility]  Address:  Phone:  Fax:    Dialysis Facility (if applicable)   Name:  Address:  Dialysis Schedule:  Phone:  Fax:    / signature: Electronically signed by BRETT Grier on 10/21/2022 at 10:07 AM      PHYSICIAN SECTION    Prognosis: {Prognosis:2423995743}    Condition at Discharge: [facility] Patient Condition:[id number]}    Rehab Potential (if transferring to Rehab): {Prognosis:7888626420}    Recommended Labs or Other Treatments After Discharge: ***    Physician Certification: I certify the above information and transfer of Dinah Toscano  is necessary for the continuing treatment of the diagnosis listed and that he requires [facility] for less than 30 days.      Update Admission H&P: {[id number]}    PHYSICIAN SIGNATURE:  {Esignature:[id number]}

## 2022-10-13 NOTE — CARE COORDINATION
Care Coordination  Reviewed the chart for readiness for precert. To go to [facility]. The patient remains in restraints.  Will follow

## 2022-10-13 NOTE — CARE COORDINATION
Chart reviewed and case reviewed in IDR. Patient admitted from [facility], + stroke, s/p Left PCA Thrombectomy with TNK 10/8. Patient with a history of vascular dementia, hypertension. EP recommending a 30 day cardiac monitor at discharge. Both neurology and cardiology have signed off. Patient with [facility] for TF and meds. Currently in 2 pt soft restraints and does continue to attempt to remove [facility] when they are removed. Video swallow ordered for today as patient has now worked with PT and OT and is he is following commands per nursing documentation. Spoke with speech therapy and they will work with him again today. Spoke with Darren Raphael, liaison with [facility]. Patient is from their [facility] and [facility] at [employer] is following the patient. He will need to be restraint free for 24 hours and will need to have authorization and a COVID test the day of discharge for transition of care. [place] completed. Envelope and transfer paperwork in the soft chart and paperwork will need signed and dated on day of discharge. PASAR completed. Will continue to follow.      Tae Peterson RN.  Dena Zendejas  [phone]

## 2022-10-14 ENCOUNTER — APPOINTMENT (OUTPATIENT)
Dept: GENERAL RADIOLOGY | Age: 67
DRG: 023 | End: 2022-10-14
Payer: COMMERCIAL

## 2022-10-14 LAB
BASOPHILS ABSOLUTE: 0.02 E9/L (ref 0–0.2)
BASOPHILS RELATIVE PERCENT: 0.1 % (ref 0–2)
EOSINOPHILS ABSOLUTE: 0.08 E9/L (ref 0.05–0.5)
EOSINOPHILS RELATIVE PERCENT: 0.6 % (ref 0–6)
HCT VFR BLD CALC: 53.6 % (ref 37–54)
HEMOGLOBIN: 17.5 G/DL (ref 12.5–16.5)
IMMATURE GRANULOCYTES #: 0.04 E9/L
IMMATURE GRANULOCYTES %: 0.3 % (ref 0–5)
LYMPHOCYTES ABSOLUTE: 0.79 E9/L (ref 1.5–4)
LYMPHOCYTES RELATIVE PERCENT: 5.6 % (ref 20–42)
MCH RBC QN AUTO: 30 PG (ref 26–35)
MCHC RBC AUTO-ENTMCNC: 32.6 % (ref 32–34.5)
MCV RBC AUTO: 91.9 FL (ref 80–99.9)
METER GLUCOSE: 241 MG/DL (ref 74–99)
METER GLUCOSE: 314 MG/DL (ref 74–99)
METER GLUCOSE: 327 MG/DL (ref 74–99)
METER GLUCOSE: 363 MG/DL (ref 74–99)
MONOCYTES ABSOLUTE: 0.84 E9/L (ref 0.1–0.95)
MONOCYTES RELATIVE PERCENT: 6 % (ref 2–12)
NEUTROPHILS ABSOLUTE: 12.24 E9/L (ref 1.8–7.3)
NEUTROPHILS RELATIVE PERCENT: 87.4 % (ref 43–80)
PDW BLD-RTO: 14.6 FL (ref 11.5–15)
PLATELET # BLD: 212 E9/L (ref 130–450)
PMV BLD AUTO: 12.1 FL (ref 7–12)
RBC # BLD: 5.83 E12/L (ref 3.8–5.8)
WBC # BLD: 14 E9/L (ref 4.5–11.5)

## 2022-10-14 PROCEDURE — 82962 GLUCOSE BLOOD TEST: CPT

## 2022-10-14 PROCEDURE — 6370000000 HC RX 637 (ALT 250 FOR IP): Performed by: INTERNAL MEDICINE

## 2022-10-14 PROCEDURE — 6370000000 HC RX 637 (ALT 250 FOR IP): Performed by: NURSE PRACTITIONER

## 2022-10-14 PROCEDURE — S5553 INSULIN LONG ACTING 5 U: HCPCS | Performed by: INTERNAL MEDICINE

## 2022-10-14 PROCEDURE — 36415 COLL VENOUS BLD VENIPUNCTURE: CPT

## 2022-10-14 PROCEDURE — 2060000000 HC ICU INTERMEDIATE R&B

## 2022-10-14 PROCEDURE — 85025 COMPLETE CBC W/AUTO DIFF WBC: CPT

## 2022-10-14 PROCEDURE — 2580000003 HC RX 258: Performed by: NURSE PRACTITIONER

## 2022-10-14 PROCEDURE — 71045 X-RAY EXAM CHEST 1 VIEW: CPT

## 2022-10-14 RX ADMIN — AMLODIPINE BESYLATE 10 MG: 10 TABLET ORAL at 08:22

## 2022-10-14 RX ADMIN — INSULIN LISPRO 3 UNITS: 100 INJECTION, SOLUTION INTRAVENOUS; SUBCUTANEOUS at 08:23

## 2022-10-14 RX ADMIN — SODIUM CHLORIDE, PRESERVATIVE FREE 10 ML: 5 INJECTION INTRAVENOUS at 08:22

## 2022-10-14 RX ADMIN — INSULIN LISPRO 1 UNITS: 100 INJECTION, SOLUTION INTRAVENOUS; SUBCUTANEOUS at 16:16

## 2022-10-14 RX ADMIN — INSULIN LISPRO 4 UNITS: 100 INJECTION, SOLUTION INTRAVENOUS; SUBCUTANEOUS at 21:20

## 2022-10-14 RX ADMIN — INSULIN GLARGINE-YFGN 30 UNITS: 100 INJECTION, SOLUTION SUBCUTANEOUS at 21:20

## 2022-10-14 RX ADMIN — ATORVASTATIN CALCIUM 80 MG: 40 TABLET, FILM COATED ORAL at 21:22

## 2022-10-14 RX ADMIN — INSULIN LISPRO 4 UNITS: 100 INJECTION, SOLUTION INTRAVENOUS; SUBCUTANEOUS at 11:40

## 2022-10-14 NOTE — PROGRESS NOTES
[facility]   Progress note      Subjective: The patient is resting comfortably on my evaluation, no acute events or issues overnight  Out of restraints since yesterday   CXR today    Objective:    /74   Pulse (!) 104   Temp 97.8 °F (36.6 °C) (Temporal)   Resp 20   Wt 225 lb (102.1 kg)   SpO2 (!) 87%   BMI 28.12 kg/m²     No intake/output data recorded. No intake/output data recorded. General appearance: NAD, not conversant, however does follow commands  HEENT: AT/NC, MMM  Neck: FROM, supple  Lungs: diminished   CV: RRR, no MRGs  Vasc: Radial pulses 2+  Abdomen: Soft, non-tender; no masses or HSM  Extremities: No peripheral edema or digital cyanosis  Skin: no rash, lesions or ulcers  Psych: Alert and oriented to person only        Recent Labs     10/12/22  0456 10/13/22  0520 10/14/22  0530   WBC 10.5 9.8 14.0*   HGB 15.5 15.9 17.5*   HCT 48.7 49.1 53.6    178 212         Recent Labs     10/12/22  0456 10/13/22  0520    144   K 4.5 4.1    104   CO2 25 24   BUN 29* 37*   CREATININE 0.6* 0.7   CALCIUM 9.4 10.5*         Assessment:    Principal Problem:    Acute CVA (cerebrovascular accident) ([facility]  Active Problems:    History of stroke    Dementia (HCC)    Type 2 diabetes mellitus ([facility]  Resolved Problems:    * No resolved hospital problems.  *      Plan:  78-year-old male with a history of a CVA in 2020 presented to the ED with altered mental status and slurred speech and patient was admitted postop to ICU with     Acute ischemic CVA with left PCA occlusion    10/8/2022   Successful thrombectomy of left PCA  MRI pending  CT head completed with no intracranial hemorrhage, evolution of acute or early subacute infarcts in the left occipital lobe  Postprocedure CT head pending  NIHSS score q shift  Hold antiplatelets for 24 hours post TN K  Monitor blood pressure-maintain SBP less than 150 and MAP greater than 65 adjust medications as needed.-As needed IV labetalol  Discussed risk factor modification.   ICU vitals  Echo-pending    10/10/2022  Remains completely confused, extremely agitated today with nursing  MRI pending-Will need medication  As needed Haldol every 12 for severe agitation  Echocardiogram essentially completely uninformative-discussed with echo tech at bedside  May benefit from CECI given appearance of multiple infarcts to rule out thrombus  Resume antiplatelet agents at discretion of neurology   blood pressure management-currently optimally controlled on multiple agents-amlodipine, as needed hydralazine and labetalol, add diuretic  Insulin sliding scale plus Lantus insulin on board with Noble control, uptitrate Lantus tonight if persistent hyperglycemia    10/11/2022  Remains in two-point restraints  Tolerated MRI post sedation-acute infarct left temporal and occipital lobe with thalamic hemorrhage  Postprocedure CT head with stable left posterior cerebral artery infarct with small focus of active conversion in left thalamus  Hold off on antiplatelet agents chemical DVT prophylaxis for now due to above findings  30-day event monitor at discharge  Blood pressure adequately controlled  Initiate tube feeds for nutrition  Blood glucose marginally well controlled-uptitrate Lantus to 25 units at bedtime    10/12/2022  Will need subacute rehab for ongoing PT/OT/speech therapy  Per neurology, start dual antiplatelet therapy in 1 week (10/19/2022) x 21 days followed by indefinite aspirin therapy**  Okay for discharge from medicine standpoint once place finalized  CECI at discretion of EP  Patient is not alert enough to benefit from diabetic educator, blood sugars now reasonably well controlled with initiation of Lantus-currently on 25 units at bedtime with insulin sliding scale  Will make arrangements for outpatient endocrine referral at discharge    10/13  -Repeat video swallow today, passed with pureed and honey thick liquids.  -Nursing communication to remove NGT and restraints. Hopeful to start precert this afternoon to acceptance to [facility]. -Blood glucose levels remain in the mid 200's, continue to monitor now with switch in diet and off tube feeds.   -Increase insulin to 30 units at bedtime and decrease insulin sliding scale to low-dose  -CBC in a.m. to assess for elevation in white count    10/14:  -CXR for hypoxia (87% room air) and new leukocytosis 15.6  -Awaiting Precert to [facility]   -Can restart Plavix and asa 10/19/22    Code Status: Full  Consultants:  Neurointensive care team (S/o), neurology, neuro interventional radiology (S/O), electrophysiology (S/O)  DVT Prophylaxis   PT/OT  Discharge planning [facility] on precert is obtained         CARROLL Velazquez CNP  12:06 PM  10/14/2022     Above note edited to reflect my thoughts     I personally saw, examined and provided care for the patient. Radiographs, labs and medication list were reviewed by me independently. The case was discussed in detail and plans for care were established. Review of CHEYANNE Velazquez   , documentation was conducted and revisions were made as appropriate directly by me. I agree with the above documented exam, problem list, and plan of care.      Melisa Brice MD  7:41 PM  10/14/2022

## 2022-10-14 NOTE — CARE COORDINATION
Here from [facility] @ [address] -confused and having increased speech difficulty and slurring. [place] transfer; pt to go to IR-successful thrombectomy of left PCA. Neurology cardiology consults. Repeat video swallow 10/13  passed with pureed and honey thick liquids. Has been restraint free since 230 pm yesterday. EP recommending a 30 day cardiac monitor at discharge. Message to Airam Omalley to start precert. Per prior cm note-He will need to be restraint free for 24 hours and will need to have authorization and a COVID test the day of discharge for transition of care. [place] completed. Envelope and transfer paperwork in the soft chart and paperwork will need signed and dated on day of discharge. PASAR completed. Will continue to follow.  Placed on weekend pt/ot list.Electronically signed by Sindhu Murray RN on 10/14/2022 at 1:24 PM

## 2022-10-15 LAB
METER GLUCOSE: 253 MG/DL (ref 74–99)
METER GLUCOSE: 290 MG/DL (ref 74–99)
METER GLUCOSE: 291 MG/DL (ref 74–99)
METER GLUCOSE: 383 MG/DL (ref 74–99)

## 2022-10-15 PROCEDURE — S5553 INSULIN LONG ACTING 5 U: HCPCS | Performed by: INTERNAL MEDICINE

## 2022-10-15 PROCEDURE — 6370000000 HC RX 637 (ALT 250 FOR IP): Performed by: NURSE PRACTITIONER

## 2022-10-15 PROCEDURE — 2580000003 HC RX 258: Performed by: NURSE PRACTITIONER

## 2022-10-15 PROCEDURE — 82962 GLUCOSE BLOOD TEST: CPT

## 2022-10-15 PROCEDURE — 6370000000 HC RX 637 (ALT 250 FOR IP): Performed by: INTERNAL MEDICINE

## 2022-10-15 PROCEDURE — 2060000000 HC ICU INTERMEDIATE R&B

## 2022-10-15 RX ADMIN — SODIUM CHLORIDE, PRESERVATIVE FREE 10 ML: 5 INJECTION INTRAVENOUS at 20:37

## 2022-10-15 RX ADMIN — INSULIN LISPRO 2 UNITS: 100 INJECTION, SOLUTION INTRAVENOUS; SUBCUTANEOUS at 18:39

## 2022-10-15 RX ADMIN — AMLODIPINE BESYLATE 10 MG: 10 TABLET ORAL at 08:46

## 2022-10-15 RX ADMIN — INSULIN LISPRO 4 UNITS: 100 INJECTION, SOLUTION INTRAVENOUS; SUBCUTANEOUS at 14:47

## 2022-10-15 RX ADMIN — INSULIN LISPRO 2 UNITS: 100 INJECTION, SOLUTION INTRAVENOUS; SUBCUTANEOUS at 08:48

## 2022-10-15 RX ADMIN — MIRTAZAPINE 15 MG: 15 TABLET, FILM COATED ORAL at 20:37

## 2022-10-15 RX ADMIN — ATORVASTATIN CALCIUM 80 MG: 40 TABLET, FILM COATED ORAL at 20:37

## 2022-10-15 RX ADMIN — INSULIN GLARGINE-YFGN 30 UNITS: 100 INJECTION, SOLUTION SUBCUTANEOUS at 20:37

## 2022-10-15 NOTE — PROGRESS NOTES
[facility]   Progress note      Subjective: The patient is resting comfortably on my evaluation, no acute events or issues overnight  Out of restraints  Does not respond to any questions for me  Objective:    BP (!) 148/98   Pulse (!) 102   Temp 98.2 °F (36.8 °C) (Temporal)   Resp 20   Wt 225 lb (102.1 kg)   SpO2 93%   BMI 28.12 kg/m²     In: 610 [P.O.:610]  Out: -   In: 610   Out: -     General appearance: NAD, not conversant, however does follow commands  HEENT: AT/NC, MMM  Neck: FROM, supple  Lungs: diminished   CV: RRR, no MRGs  Vasc: Radial pulses 2+  Abdomen: Soft, non-tender; no masses or HSM  Extremities: No peripheral edema or digital cyanosis  Skin: no rash, lesions or ulcers  Psych: Alert and oriented to person only        Recent Labs     10/13/22  0520 10/14/22  0530   WBC 9.8 14.0*   HGB 15.9 17.5*   HCT 49.1 53.6    212       Recent Labs     10/13/22  0520      K 4.1      CO2 24   BUN 37*   CREATININE 0.7   CALCIUM 10.5*       Assessment:    Principal Problem:    Acute CVA (cerebrovascular accident) ([facility]  Active Problems:    History of stroke    Dementia (HCC)    Type 2 diabetes mellitus ([facility]  Resolved Problems:    * No resolved hospital problems. *      Plan:  58-year-old male with a history of a CVA in 2020 presented to the ED with altered mental status and slurred speech and patient was admitted postop to ICU with     Acute ischemic CVA with left PCA occlusion    10/8/2022   Successful thrombectomy of left PCA  MRI pending  CT head completed with no intracranial hemorrhage, evolution of acute or early subacute infarcts in the left occipital lobe  Postprocedure CT head pending  NIHSS score q shift  Hold antiplatelets for 24 hours post TN K  Monitor blood pressure-maintain SBP less than 150 and MAP greater than 65 adjust medications as needed.-As needed IV labetalol  Discussed risk factor modification.   ICU vitals  Echo-pending    10/10/2022  Remains completely confused, extremely agitated today with nursing  MRI pending-Will need medication  As needed Haldol every 12 for severe agitation  Echocardiogram essentially completely uninformative-discussed with echo tech at bedside  May benefit from CECI given appearance of multiple infarcts to rule out thrombus  Resume antiplatelet agents at discretion of neurology   blood pressure management-currently optimally controlled on multiple agents-amlodipine, as needed hydralazine and labetalol, add diuretic  Insulin sliding scale plus Lantus insulin on board with Noble control, uptitrate Lantus tonight if persistent hyperglycemia    10/11/2022  Remains in two-point restraints  Tolerated MRI post sedation-acute infarct left temporal and occipital lobe with thalamic hemorrhage  Postprocedure CT head with stable left posterior cerebral artery infarct with small focus of active conversion in left thalamus  Hold off on antiplatelet agents chemical DVT prophylaxis for now due to above findings  30-day event monitor at discharge  Blood pressure adequately controlled  Initiate tube feeds for nutrition  Blood glucose marginally well controlled-uptitrate Lantus to 25 units at bedtime    10/12/2022  Will need subacute rehab for ongoing PT/OT/speech therapy  Per neurology, start dual antiplatelet therapy in 1 week (10/19/2022) x 21 days followed by indefinite aspirin therapy**  Okay for discharge from medicine standpoint once place finalized  CECI at discretion of EP  Patient is not alert enough to benefit from diabetic educator, blood sugars now reasonably well controlled with initiation of Lantus-currently on 25 units at bedtime with insulin sliding scale  Will make arrangements for outpatient endocrine referral at discharge    10/13  -Repeat video swallow today, passed with pureed and honey thick liquids.  -Nursing communication to remove NGT and restraints. Hopeful to start precert this afternoon to acceptance to [facility].   -Blood glucose levels remain in the mid 200's, continue to monitor now with switch in diet and off tube feeds.   -Increase insulin to 30 units at bedtime and decrease insulin sliding scale to low-dose  -CBC in a.m. to assess for elevation in white count    10/14:  -CXR for hypoxia (87% room air) and new leukocytosis 64.7  -Awaiting Precert to [facility]   -Can restart Plavix and asa 10/19/22    10/15:  Still resting comfortably no acute distress  Still remains confused and with expressive aphasia  Blood sugars elevated in spite of increasing Lantus to 30 units yesterday  Check procalcitonin level CBC and BMP in a.m. to assess for possible infectious etiology including aspiration    Code Status: Full  Consultants:  Neurointensive care team (S/o), neurology, neuro interventional radiology (S/O), electrophysiology (S/O)  DVT Prophylaxis   PT/OT  Discharge planning EDUIN on precert is obtained         Caitlyn Clayton MD  3:06 PM  10/15/2022

## 2022-10-16 ENCOUNTER — APPOINTMENT (OUTPATIENT)
Dept: CT IMAGING | Age: 67
DRG: 023 | End: 2022-10-16
Payer: COMMERCIAL

## 2022-10-16 LAB
ANION GAP SERPL CALCULATED.3IONS-SCNC: 19 MMOL/L (ref 7–16)
BASOPHILS ABSOLUTE: 0.04 E9/L (ref 0–0.2)
BASOPHILS RELATIVE PERCENT: 0.3 % (ref 0–2)
BUN BLDV-MCNC: 53 MG/DL (ref 6–23)
CALCIUM SERPL-MCNC: 10.5 MG/DL (ref 8.6–10.2)
CHLORIDE BLD-SCNC: 111 MMOL/L (ref 98–107)
CO2: 21 MMOL/L (ref 22–29)
CREAT SERPL-MCNC: 0.9 MG/DL (ref 0.7–1.2)
EOSINOPHILS ABSOLUTE: 0.08 E9/L (ref 0.05–0.5)
EOSINOPHILS RELATIVE PERCENT: 0.7 % (ref 0–6)
GFR AFRICAN AMERICAN: >60
GFR SERPL CREATININE-BSD FRML MDRD: 84 ML/MIN/1.73
GLUCOSE BLD-MCNC: 343 MG/DL (ref 74–99)
HCT VFR BLD CALC: 56.7 % (ref 37–54)
HEMOGLOBIN: 18.5 G/DL (ref 12.5–16.5)
IMMATURE GRANULOCYTES #: 0.04 E9/L
IMMATURE GRANULOCYTES %: 0.3 % (ref 0–5)
LYMPHOCYTES ABSOLUTE: 1.97 E9/L (ref 1.5–4)
LYMPHOCYTES RELATIVE PERCENT: 16.6 % (ref 20–42)
MCH RBC QN AUTO: 29.9 PG (ref 26–35)
MCHC RBC AUTO-ENTMCNC: 32.6 % (ref 32–34.5)
MCV RBC AUTO: 91.7 FL (ref 80–99.9)
METER GLUCOSE: 255 MG/DL (ref 74–99)
METER GLUCOSE: 294 MG/DL (ref 74–99)
METER GLUCOSE: 301 MG/DL (ref 74–99)
METER GLUCOSE: 338 MG/DL (ref 74–99)
MONOCYTES ABSOLUTE: 0.99 E9/L (ref 0.1–0.95)
MONOCYTES RELATIVE PERCENT: 8.3 % (ref 2–12)
NEUTROPHILS ABSOLUTE: 8.74 E9/L (ref 1.8–7.3)
NEUTROPHILS RELATIVE PERCENT: 73.8 % (ref 43–80)
PDW BLD-RTO: 14.5 FL (ref 11.5–15)
PLATELET # BLD: 231 E9/L (ref 130–450)
PMV BLD AUTO: 12.2 FL (ref 7–12)
POTASSIUM SERPL-SCNC: 4.4 MMOL/L (ref 3.5–5)
PROCALCITONIN: 0.08 NG/ML (ref 0–0.08)
RBC # BLD: 6.18 E12/L (ref 3.8–5.8)
SODIUM BLD-SCNC: 151 MMOL/L (ref 132–146)
WBC # BLD: 11.9 E9/L (ref 4.5–11.5)

## 2022-10-16 PROCEDURE — 2580000003 HC RX 258: Performed by: INTERNAL MEDICINE

## 2022-10-16 PROCEDURE — 70450 CT HEAD/BRAIN W/O DYE: CPT

## 2022-10-16 PROCEDURE — 2060000000 HC ICU INTERMEDIATE R&B

## 2022-10-16 PROCEDURE — 72125 CT NECK SPINE W/O DYE: CPT

## 2022-10-16 PROCEDURE — 6370000000 HC RX 637 (ALT 250 FOR IP): Performed by: NURSE PRACTITIONER

## 2022-10-16 PROCEDURE — 82962 GLUCOSE BLOOD TEST: CPT

## 2022-10-16 PROCEDURE — 84145 PROCALCITONIN (PCT): CPT

## 2022-10-16 PROCEDURE — 85025 COMPLETE CBC W/AUTO DIFF WBC: CPT

## 2022-10-16 PROCEDURE — 6370000000 HC RX 637 (ALT 250 FOR IP): Performed by: INTERNAL MEDICINE

## 2022-10-16 PROCEDURE — 36415 COLL VENOUS BLD VENIPUNCTURE: CPT

## 2022-10-16 PROCEDURE — 97530 THERAPEUTIC ACTIVITIES: CPT

## 2022-10-16 PROCEDURE — 80048 BASIC METABOLIC PNL TOTAL CA: CPT

## 2022-10-16 RX ORDER — DEXTROSE MONOHYDRATE 50 MG/ML
INJECTION, SOLUTION INTRAVENOUS CONTINUOUS
Status: DISCONTINUED | OUTPATIENT
Start: 2022-10-16 | End: 2022-10-17 | Stop reason: ALTCHOICE

## 2022-10-16 RX ORDER — GLIPIZIDE 5 MG/1
5 TABLET ORAL
Status: DISCONTINUED | OUTPATIENT
Start: 2022-10-17 | End: 2022-10-21 | Stop reason: HOSPADM

## 2022-10-16 RX ORDER — INSULIN LISPRO 100 [IU]/ML
0-4 INJECTION, SOLUTION INTRAVENOUS; SUBCUTANEOUS NIGHTLY
Status: DISCONTINUED | OUTPATIENT
Start: 2022-10-16 | End: 2022-10-21 | Stop reason: HOSPADM

## 2022-10-16 RX ORDER — INSULIN LISPRO 100 [IU]/ML
0-16 INJECTION, SOLUTION INTRAVENOUS; SUBCUTANEOUS
Status: DISCONTINUED | OUTPATIENT
Start: 2022-10-16 | End: 2022-10-21 | Stop reason: HOSPADM

## 2022-10-16 RX ADMIN — METFORMIN HYDROCHLORIDE 500 MG: 500 TABLET ORAL at 17:29

## 2022-10-16 RX ADMIN — AMLODIPINE BESYLATE 10 MG: 10 TABLET ORAL at 09:35

## 2022-10-16 RX ADMIN — DEXTROSE MONOHYDRATE: 50 INJECTION, SOLUTION INTRAVENOUS at 15:03

## 2022-10-16 RX ADMIN — INSULIN LISPRO 3 UNITS: 100 INJECTION, SOLUTION INTRAVENOUS; SUBCUTANEOUS at 09:36

## 2022-10-16 RX ADMIN — INSULIN LISPRO 8 UNITS: 100 INJECTION, SOLUTION INTRAVENOUS; SUBCUTANEOUS at 17:29

## 2022-10-16 RX ADMIN — INSULIN LISPRO 3 UNITS: 100 INJECTION, SOLUTION INTRAVENOUS; SUBCUTANEOUS at 12:43

## 2022-10-16 NOTE — PLAN OF CARE
Problem: Discharge Planning  Goal: Discharge to home or other facility with appropriate resources  Outcome: Progressing     Problem: Respiratory - Adult  Goal: Achieves optimal ventilation and oxygenation  Outcome: Progressing     Problem: Cardiovascular - Adult  Goal: Maintains optimal cardiac output and hemodynamic stability  Outcome: Progressing  Goal: Absence of cardiac dysrhythmias or at baseline  Outcome: Progressing     Problem: Musculoskeletal - Adult  Goal: Return mobility to safest level of function  Outcome: Progressing  Goal: Maintain proper alignment of affected body part  Outcome: Progressing  Goal: Return ADL status to a safe level of function  Outcome: Progressing     Problem: Gastrointestinal - Adult  Goal: Minimal or absence of nausea and vomiting  Outcome: Progressing     Problem: Genitourinary - Adult  Goal: Urinary catheter remains patent  Outcome: Progressing     Problem: Hematologic - Adult  Goal: Maintains hematologic stability  Outcome: Progressing     Problem: Safety - Medical Restraint  Goal: Remains free of injury from restraints (Restraint for Interference with Medical Device)  Description: INTERVENTIONS:  1. Determine that other, less restrictive measures have been tried or would not be effective before applying the restraint  2. Evaluate the patient's condition at the time of restraint application  3. Inform patient/family regarding the reason for restraint  4.  Q2H: Monitor safety, psychosocial status, comfort, nutrition and hydration  Outcome: Progressing     Problem: Neurosensory - Adult  Goal: Achieves stable or improved neurological status  Outcome: Progressing  Goal: Absence of seizures  Outcome: Progressing  Goal: Remains free of injury related to seizures activity  Outcome: Progressing  Goal: Achieves maximal functionality and self care  Outcome: Progressing     Problem: Skin/Tissue Integrity - Adult  Goal: Skin integrity remains intact  Outcome: Progressing  Goal: Incisions, wounds, or drain sites healing without S/S of infection  Outcome: Progressing  Goal: Oral mucous membranes remain intact  Outcome: Progressing     Problem: Pain  Goal: Verbalizes/displays adequate comfort level or baseline comfort level  Outcome: Progressing     Problem: Chronic Conditions and Co-morbidities  Goal: Patient's chronic conditions and co-morbidity symptoms are monitored and maintained or improved  Outcome: Progressing     Problem: Skin/Tissue Integrity  Goal: Absence of new skin breakdown  Description: 1. Monitor for areas of redness and/or skin breakdown  2. Assess vascular access sites hourly  3. Every 4-6 hours minimum:  Change oxygen saturation probe site  4. Every 4-6 hours:  If on nasal continuous positive airway pressure, respiratory therapy assess nares and determine need for appliance change or resting period.   Outcome: Progressing     Problem: Safety - Adult  Goal: Free from fall injury  Outcome: Progressing     Problem: ABCDS Injury Assessment  Goal: Absence of physical injury  Outcome: Progressing     Problem: Nutrition Deficit:  Goal: Optimize nutritional status  Outcome: Progressing

## 2022-10-16 NOTE — PROGRESS NOTES
[facility]   Progress note      Subjective: The patient is resting comfortably on my evaluation, no acute events or issues overnight  Out of restraints  Does not respond to any questions for me  Objective:    /76   Pulse (!) 101   Temp 97.8 °F (36.6 °C) (Temporal)   Resp 18   Wt 225 lb (102.1 kg)   SpO2 94%   BMI 28.12 kg/m²     No intake/output data recorded. No intake/output data recorded. General appearance: NAD, not conversant, however does follow commands  HEENT: AT/NC, MMM  Neck: FROM, supple  Lungs: diminished   CV: RRR, no MRGs  Vasc: Radial pulses 2+  Abdomen: Soft, non-tender; no masses or HSM  Extremities: No peripheral edema or digital cyanosis  Skin: no rash, lesions or ulcers  Psych: Alert and oriented to person only        Recent Labs     10/14/22  0530 10/16/22  0500   WBC 14.0* 11.9*   HGB 17.5* 18.5*   HCT 53.6 56.7*    231       Recent Labs     10/16/22  0500   *   K 4.4   *   CO2 21*   BUN 53*   CREATININE 0.9   CALCIUM 10.5*       Assessment:    Principal Problem:    Acute CVA (cerebrovascular accident) ([facility])  Active Problems:    History of stroke    Dementia (HCC)    Type 2 diabetes mellitus ([facility])  Resolved Problems:    * No resolved hospital problems. *      Plan:  56-year-old male with a history of a CVA in 2020 presented to the ED with altered mental status and slurred speech and patient was admitted postop to ICU with     Acute ischemic CVA with left PCA occlusion    10/8/2022   Successful thrombectomy of left PCA  MRI pending  CT head completed with no intracranial hemorrhage, evolution of acute or early subacute infarcts in the left occipital lobe  Postprocedure CT head pending  NIHSS score q shift  Hold antiplatelets for 24 hours post TN K  Monitor blood pressure-maintain SBP less than 150 and MAP greater than 65 adjust medications as needed.-As needed IV labetalol  Discussed risk factor modification.   ICU vitals  Echo-pending    10/10/2022  Remains completely confused, extremely agitated today with nursing  MRI pending-Will need medication  As needed Haldol every 12 for severe agitation  Echocardiogram essentially completely uninformative-discussed with echo tech at bedside  May benefit from CECI given appearance of multiple infarcts to rule out thrombus  Resume antiplatelet agents at discretion of neurology   blood pressure management-currently optimally controlled on multiple agents-amlodipine, as needed hydralazine and labetalol, add diuretic  Insulin sliding scale plus Lantus insulin on board with Noble control, uptitrate Lantus tonight if persistent hyperglycemia    10/11/2022  Remains in two-point restraints  Tolerated MRI post sedation-acute infarct left temporal and occipital lobe with thalamic hemorrhage  Postprocedure CT head with stable left posterior cerebral artery infarct with small focus of active conversion in left thalamus  Hold off on antiplatelet agents chemical DVT prophylaxis for now due to above findings  30-day event monitor at discharge  Blood pressure adequately controlled  Initiate tube feeds for nutrition  Blood glucose marginally well controlled-uptitrate Lantus to 25 units at bedtime    10/12/2022  Will need subacute rehab for ongoing PT/OT/speech therapy  Per neurology, start dual antiplatelet therapy in 1 week (10/19/2022) x 21 days followed by indefinite aspirin therapy**  Okay for discharge from medicine standpoint once place finalized  CECI at discretion of EP  Patient is not alert enough to benefit from diabetic educator, blood sugars now reasonably well controlled with initiation of Lantus-currently on 25 units at bedtime with insulin sliding scale  Will make arrangements for outpatient endocrine referral at discharge    10/13  -Repeat video swallow today, passed with pureed and honey thick liquids.  -Nursing communication to remove NGT and restraints.  Hopeful to start precert this afternoon to acceptance to [facility].   -Blood glucose levels remain in the mid 200's, continue to monitor now with switch in diet and off tube feeds.   -Increase insulin to 30 units at bedtime and decrease insulin sliding scale to low-dose  -CBC in a.m. to assess for elevation in white count    10/14:  -CXR for hypoxia (87% room air) and new leukocytosis 39.4  -Awaiting Precert to [facility]   -Can restart Plavix and asa 10/19/22    10/15:  Still resting comfortably no acute distress  Still remains confused and with expressive aphasia  Blood sugars elevated in spite of increasing Lantus to 30 units yesterday  Check procalcitonin level CBC and BMP in a.m. to assess for possible infectious etiology including aspiration    10/16:  Resting comfortably, no acute complaints-still altered mentation from stroke  Encourage water by mouth  Start D5 water for hypernatremia today, likely from poor oral intake  Increase sliding scale insulin to high-dose,Resume glipizide and metformin-as he takes at home  A.m. labs  White count mildly up at 12,000 but Pro-Rei is normal    Code Status: Full  Consultants:  Neurointensive care team (S/o), neurology, neuro interventional radiology (S/O), electrophysiology (S/O)  DVT Prophylaxis   PT/OT  Discharge planning [facility] once precert is obtained         Britney Banerjee MD  1:41 PM  10/16/2022

## 2022-10-16 NOTE — PROGRESS NOTES
Patient found laying on floor by the bed. The patients bed alarm had been turned on by this RN, but did not go off. Patient denies hitting his head, and no injuries noted. [address] NP called and notified as well as the patients son Bob Houser. A telesitter was also placed in the room at this time for extra monitoring.

## 2022-10-16 NOTE — PROGRESS NOTES
Physical Therapy  Physical Therapy Treatment    Name: Nicci Herman Sr.  : 1955  MRN: [id number]      Date of Service: 10/16/2022    Evaluating PT:  Brian Armijo, PT, DPT [id number]    Room #:  2832/0845-I  Diagnosis:  Acute CVA (cerebrovascular accident) [facility]) [I63.9]  PMHx/PSHx:    Past Medical History:   Diagnosis Date    Dementia ([facility]     Diabetes mellitus ([facility]     Seasonal allergies     Stroke due to stenosis of left carotid artery ([facility] 2020    Vascular dementia ([facility]      Procedure/Surgery:  10/8 L PCA thrombectomy with TNK  Precautions:  Falls, , dysarthria, aphasia, bed alarm, R hemiparesis  Equipment Needs:  TBD    SUBJECTIVE:    Pt was admitted from [facility]. Pt ambulated without device PTA - questionable historian. OBJECTIVE:   Initial Evaluation  Date: 10/12/22 Treatment  10/16/22 Short Term/ Long Term   Goals   AM-PAC 6 Clicks 8/29 3/69    Was pt agreeable to Eval/treatment? Yes yes    Does pt have pain?  No c/o pain No c/o pain    Bed Mobility  Rolling: NT  Supine to sit: MaxA with HOB elevated  Sit to supine: Matt  Scooting: Matt Rolling: NT  Supine to sit: ModA  Sit to supine: ModA  Scooting: Matt SBA   Transfers Sit to stand: ModA x 2  Stand to sit: ModA x 2  Stand pivot: NT Sit to stand: attempt, unable  Stand to sit: NT  Stand pivot: NT SBA with AAD   Ambulation   NT NT >150 feet with SBA with AAD   Stair negotiation: ascended and descended NT NT >4 steps with 1 rail SBA   ROM BUE:  Defer to OT note  BLE:  WFL     Strength BUE:  Defer to OT note  LLE:  4/5  RLE:  3 to 3+/5  Increase by 1/3 MMT grade   Balance Sitting EOB:  Matt  Dynamic Standing:  ModA no device Sitting EOB:  Matt<>ModA  Dynamic Standing:  NT Sitting EOB:  Independent  Dynamic Standing:  SBA with AAD     Pt is A & O x 1 to self, answers to name, but unable to accurately assess d/t aphasia  Sensation:  NT  Edema:  none noted    Patient education  Pt educated on role of PT, safety during mobility, sequencing for tasks    Patient response to education:   Pt verbalized understanding Pt demonstrated skill Pt requires further education in this area   partial partial yes     ASSESSMENT:    Comments:  patient semi-supine in bed upon entry and agreeable to PT treatment. Pt with difficulty word finding throughout session, but able to follow approximately 75% of commands. Significant assist to BLEs and trunk for bed mobility. At EOB, pt with significant R trunk lean with inability to correct requiring assist to prevent LOB. Assist to place R hand on EOB for improved balance with inability to maintain without assist. Attempt x2 at STS with one person assist unsuccessful with limited participation noted from pt this date. Pt responded \"yes\" to feeling dizzy several times. Assisted pt back to bed at end of session with all needs met. Treatment:  Patient practiced and was instructed in the following treatment:    Bed Mobility: VCs provided for sequencing and safety during mobility. Manual assist provided for completion of task. Therapeutic Activities: pt sat EOB for approximately 8 minutes during session with significant assist for static and dynamic sitting balance. PLAN:    Patient is making good progress towards established goals. Will continue with current POC.       Time in  0834  Time out  0847    Total Treatment Time  13 minutes     CPT codes:  [] Gait training 96934 - minutes  [] Manual therapy 35494 - minutes  [x] Therapeutic activities 99494 13 minutes  [] Therapeutic exercises 95526 - minutes  [] Neuromuscular reeducation 90904 - minutes    Stephenie Mcgovern PT, DPT  [id number]

## 2022-10-17 LAB
ANION GAP SERPL CALCULATED.3IONS-SCNC: 14 MMOL/L (ref 7–16)
ANION GAP SERPL CALCULATED.3IONS-SCNC: 20 MMOL/L (ref 7–16)
BASOPHILS ABSOLUTE: 0.04 E9/L (ref 0–0.2)
BASOPHILS RELATIVE PERCENT: 0.3 % (ref 0–2)
BETA-HYDROXYBUTYRATE: 1.68 MMOL/L (ref 0.02–0.27)
BUN BLDV-MCNC: 59 MG/DL (ref 6–23)
BUN BLDV-MCNC: 62 MG/DL (ref 6–23)
CALCIUM SERPL-MCNC: 10 MG/DL (ref 8.6–10.2)
CALCIUM SERPL-MCNC: 10.3 MG/DL (ref 8.6–10.2)
CHLORIDE BLD-SCNC: 115 MMOL/L (ref 98–107)
CHLORIDE BLD-SCNC: 118 MMOL/L (ref 98–107)
CO2: 20 MMOL/L (ref 22–29)
CO2: 23 MMOL/L (ref 22–29)
CREAT SERPL-MCNC: 1.1 MG/DL (ref 0.7–1.2)
CREAT SERPL-MCNC: 1.2 MG/DL (ref 0.7–1.2)
EOSINOPHILS ABSOLUTE: 0.08 E9/L (ref 0.05–0.5)
EOSINOPHILS RELATIVE PERCENT: 0.6 % (ref 0–6)
FERRITIN: 724 NG/ML
GFR AFRICAN AMERICAN: >60
GFR NON-AFRICAN AMERICAN: >60 ML/MIN/1.73
GFR SERPL CREATININE-BSD FRML MDRD: >60 ML/MIN/1.73
GLUCOSE BLD-MCNC: 194 MG/DL (ref 74–99)
GLUCOSE BLD-MCNC: 363 MG/DL (ref 74–99)
HCT VFR BLD CALC: 63.7 % (ref 37–54)
HEMOGLOBIN: 19.5 G/DL (ref 12.5–16.5)
IMMATURE GRANULOCYTES #: 0.05 E9/L
IMMATURE GRANULOCYTES %: 0.4 % (ref 0–5)
LYMPHOCYTES ABSOLUTE: 2.04 E9/L (ref 1.5–4)
LYMPHOCYTES RELATIVE PERCENT: 16.5 % (ref 20–42)
MCH RBC QN AUTO: 29.3 PG (ref 26–35)
MCHC RBC AUTO-ENTMCNC: 30.6 % (ref 32–34.5)
MCV RBC AUTO: 95.8 FL (ref 80–99.9)
METER GLUCOSE: 189 MG/DL (ref 74–99)
METER GLUCOSE: 261 MG/DL (ref 74–99)
METER GLUCOSE: 342 MG/DL (ref 74–99)
METER GLUCOSE: 347 MG/DL (ref 74–99)
MONOCYTES ABSOLUTE: 0.97 E9/L (ref 0.1–0.95)
MONOCYTES RELATIVE PERCENT: 7.9 % (ref 2–12)
NEUTROPHILS ABSOLUTE: 9.15 E9/L (ref 1.8–7.3)
NEUTROPHILS RELATIVE PERCENT: 74.3 % (ref 43–80)
PDW BLD-RTO: 14.6 FL (ref 11.5–15)
PLATELET # BLD: 223 E9/L (ref 130–450)
PMV BLD AUTO: 12.6 FL (ref 7–12)
POTASSIUM SERPL-SCNC: 3.9 MMOL/L (ref 3.5–5)
POTASSIUM SERPL-SCNC: 4.3 MMOL/L (ref 3.5–5)
RBC # BLD: 6.65 E12/L (ref 3.8–5.8)
SODIUM BLD-SCNC: 155 MMOL/L (ref 132–146)
SODIUM BLD-SCNC: 155 MMOL/L (ref 132–146)
TRANSFERRIN: 215 MG/DL (ref 200–360)
WBC # BLD: 12.3 E9/L (ref 4.5–11.5)

## 2022-10-17 PROCEDURE — 82728 ASSAY OF FERRITIN: CPT

## 2022-10-17 PROCEDURE — 6370000000 HC RX 637 (ALT 250 FOR IP): Performed by: NURSE PRACTITIONER

## 2022-10-17 PROCEDURE — 2580000003 HC RX 258: Performed by: INTERNAL MEDICINE

## 2022-10-17 PROCEDURE — 6370000000 HC RX 637 (ALT 250 FOR IP): Performed by: INTERNAL MEDICINE

## 2022-10-17 PROCEDURE — 2580000003 HC RX 258: Performed by: NURSE PRACTITIONER

## 2022-10-17 PROCEDURE — 2060000000 HC ICU INTERMEDIATE R&B

## 2022-10-17 PROCEDURE — 84466 ASSAY OF TRANSFERRIN: CPT

## 2022-10-17 PROCEDURE — 97530 THERAPEUTIC ACTIVITIES: CPT

## 2022-10-17 PROCEDURE — 51701 INSERT BLADDER CATHETER: CPT

## 2022-10-17 PROCEDURE — 36415 COLL VENOUS BLD VENIPUNCTURE: CPT

## 2022-10-17 PROCEDURE — 85025 COMPLETE CBC W/AUTO DIFF WBC: CPT

## 2022-10-17 PROCEDURE — 82962 GLUCOSE BLOOD TEST: CPT

## 2022-10-17 PROCEDURE — 2700000000 HC OXYGEN THERAPY PER DAY

## 2022-10-17 PROCEDURE — 51798 US URINE CAPACITY MEASURE: CPT

## 2022-10-17 PROCEDURE — S5553 INSULIN LONG ACTING 5 U: HCPCS | Performed by: INTERNAL MEDICINE

## 2022-10-17 PROCEDURE — 82010 KETONE BODYS QUAN: CPT

## 2022-10-17 PROCEDURE — 80048 BASIC METABOLIC PNL TOTAL CA: CPT

## 2022-10-17 RX ORDER — SODIUM CHLORIDE 450 MG/100ML
INJECTION, SOLUTION INTRAVENOUS CONTINUOUS
Status: DISCONTINUED | OUTPATIENT
Start: 2022-10-17 | End: 2022-10-19

## 2022-10-17 RX ORDER — DEXTROSE MONOHYDRATE 50 MG/ML
INJECTION, SOLUTION INTRAVENOUS CONTINUOUS
Status: DISCONTINUED | OUTPATIENT
Start: 2022-10-17 | End: 2022-10-17

## 2022-10-17 RX ADMIN — ATORVASTATIN CALCIUM 80 MG: 40 TABLET, FILM COATED ORAL at 22:07

## 2022-10-17 RX ADMIN — INSULIN GLARGINE-YFGN 30 UNITS: 100 INJECTION, SOLUTION SUBCUTANEOUS at 22:08

## 2022-10-17 RX ADMIN — INSULIN GLARGINE-YFGN 30 UNITS: 100 INJECTION, SOLUTION SUBCUTANEOUS at 00:26

## 2022-10-17 RX ADMIN — INSULIN LISPRO 12 UNITS: 100 INJECTION, SOLUTION INTRAVENOUS; SUBCUTANEOUS at 13:03

## 2022-10-17 RX ADMIN — SODIUM CHLORIDE, PRESERVATIVE FREE 10 ML: 5 INJECTION INTRAVENOUS at 08:34

## 2022-10-17 RX ADMIN — DEXTROSE MONOHYDRATE: 50 INJECTION, SOLUTION INTRAVENOUS at 10:25

## 2022-10-17 RX ADMIN — MIRTAZAPINE 15 MG: 15 TABLET, FILM COATED ORAL at 00:26

## 2022-10-17 RX ADMIN — AMLODIPINE BESYLATE 10 MG: 10 TABLET ORAL at 08:36

## 2022-10-17 RX ADMIN — METFORMIN HYDROCHLORIDE 500 MG: 500 TABLET ORAL at 08:34

## 2022-10-17 RX ADMIN — INSULIN LISPRO 12 UNITS: 100 INJECTION, SOLUTION INTRAVENOUS; SUBCUTANEOUS at 08:34

## 2022-10-17 RX ADMIN — METFORMIN HYDROCHLORIDE 500 MG: 500 TABLET ORAL at 17:57

## 2022-10-17 RX ADMIN — ATORVASTATIN CALCIUM 80 MG: 40 TABLET, FILM COATED ORAL at 00:26

## 2022-10-17 RX ADMIN — GLIPIZIDE 5 MG: 5 TABLET ORAL at 08:34

## 2022-10-17 RX ADMIN — MIRTAZAPINE 15 MG: 15 TABLET, FILM COATED ORAL at 22:07

## 2022-10-17 RX ADMIN — SODIUM CHLORIDE: 4.5 INJECTION, SOLUTION INTRAVENOUS at 14:04

## 2022-10-17 NOTE — CARE COORDINATION
Chart reviewed and case reviewed in IDR rounds. Therapy updates received and [place] notified that updates were available and she will submit them to his case. Call placed to the patient's son Baldo Pugh to discuss transition of care planning. Pre-cert pending from Friday and not received authorization yet. Reviewed updated therapy sent today. Reviewed labs and need for Zio Patch at discharge. Baldo Pugh asked to speak with someone from the facility re: what his father will need for rehab at transition of care. Message out to ann-marie re: above and she will reach out to Baldo Pugh. PASAR completed. Envelope and transfer paperwork in the soft chart. Will need signed and dated on the day of discharge. Will continue to follow.      Sofi Justin RN.  [facility]  [phone]

## 2022-10-17 NOTE — PROGRESS NOTES
Pt unable to void since 0700, RN did bladder scan for 480mL. RN notified NP Deborah Precise. New orders rcvd. Pt straight cath'd for 300mL.

## 2022-10-17 NOTE — PROGRESS NOTES
Occupational Therapy  OT BEDSIDE TREATMENT NOTE     [facility] [address] [address], [address] [place] [address] [address]      WYXW:  Patient Name: Shelbie Bryant. MRN: [id number]  : 1955  Room: [address] [place] [address]     Per OT Eval:   Evaluating OT: Cristobal Roth OTR/L [id number]     Referring Provider: Khadijah Clark MD   Specific Provider Orders/Date: OT eval and treat (10/8/22)        Diagnosis: Acute CVA       Reason for admission: presented to ED on 10/8 with confusion and slurred speech; R sided weakness     Surgery/Procedures:  10/8 L PCA thrombectomy with TNK      Pertinent Medical History: Dementia, DM, Stroke due to stenosis of L carotid artery ()     *Precautions:  Falls, B UE restraints, bed alarm, L hemiparesis, NG tube, O2, dysarthria, aphasia, bed alarm, Dementia, agitation, SBP<150      Assessment of current deficits   [x] Functional mobility          [x]ADLs           [x] Strength                  [x]Cognition   [x] Functional transfers        [x] IADLs         [x] Safety Awareness   [x]Endurance   [x] Fine Coordination           [x] ROM           [x] Vision/perception    [x]Sensation     [x]Gross Motor Coordination [x] Balance    [x] Delirium                  [x]Motor Control     [x] Communication     OT PLAN OF CARE   OT POC based on physician orders, patient diagnosis and results of clinical assessment.         Frequency/Duration: 1-3 days/wk for 1-2 weeks PRN    Specific OT Treatment to include:   ADL retraining/adapted techniques and AE recommendations to increase functional independence within precautions                    Energy conservation techniques to improve tolerance for selfcare routine   Functional transfer/mobility training/DME recommendations for increased independence, safety and fall prevention         Patient/family education to increase safety and functional independence within precautions              Environmental modifications for safe mobility and completion of ADLs                           Cognitive retraining ex's to improve problem solving skills & safe participation in ADLs/IADLs  Sensory re-education techniques to improve extremity awareness, maintain skin integrity and improve hand function                             Visual/Perceptual retraining  to improve body awareness and safety during transfers and ADLs  Splinting/positioning needs to maintain joint/skin integrity and prevent contractures  Therapeutic activity to improve functional performance during ADLs/IADLs                                         Therapeutic exercise to improve tolerance and functional strength for ADLs   Balance retraining exercises/tasks for facilitation of postural control with dynamic challenges during ADLs . Positioning to improve functional independence  Neuromuscular re-education: facilitation of righting/equilibrium reactions, normalization muscle tone/facilitation active functional movement                      Delirium prevention/treatment     Modified [place] Scale   Score     Description  0             No symptoms  1             No significant disability despite symptoms  2             Slight disability; able to look after own affairs  3             Moderate disability; able to ambulate without assist/ requires assist with ADLs  4             Moderate/Severe disability;requires assist to ambulate/assist with ADLs  5             Severe disability;bedridden/incontinent   6               Score:   5     Recommended Adaptive Equipment: TBA      Home Living: Pt resides in an [facility] per chart. Prior Level of Function:  Per chart, pt was receiving total care. Pt cannot report PTA status. Pain Level: pt c/o 0/10  pain  this session     Cognition: A&O: 1/4  (self only; not oriented to place or situation)  Follows occasional gross 1 step commands 10% of the time.               Memory: poor              Comprehension poor Problem solving: poor             Judgement/safety: poor                Communication skills: impaired word finding; aphasia             Vision/perception: appear impaired, pt was able to achieve eye contact but had difficulty tracking and demonstrated R inattention. continue to assess during session                    Glasses:\"yes\" (not present)                                                       Hearing: [facility]                  UE Assessment:  Hand Dominance: Right [x]  Left []       ROM Strength STM goal: PRN   RUE  PROM WFL;  A/AROM grossly 80 shoulder flexion with jerky movement; gross elbow flexion; gross grasp 3-/5 proximal  3-/5 distal  WFL for ADLs; 4-/5      LUE A/AROM shoulder flexion; WFL distal  3-/5 4/5         Sensation: No c/o numbness/tingling in extremities. Proprioception appears impaired RUE  Tone: impaired , hypotonic R side  Edema: [facility]     Functional Assessment:  AM-PAC Daily Activity Raw Score: 8/24    Initial Eval Status  Date: 10/12/22 Treatment Status  Date: STGs = LTGs  Time frame: 7-14 days   Feeding NGT Dependent   pureed diet with honey thick liquids,  Re-assess when cleared for diet. Grooming Max A  max A                          Min A   while seated in chair      UB dressing/bathing Max A  (gown)  max A/dep  Automatically attempting to remove Gown,                        Min A         LB dressing/bathing Dep  dep  To tiff brief and socks/simulate LE bathing                       Mod A   using AE as needed for safe reach/ energy conservation        Toileting Dep  *incontinent of bladder bed level  dep  Per last note                       Mod A      Bed Mobility  Supine to sit: Max A     Sit to supine:   Min A with R LE Supine to sit: max A x 2  Sit to supine: max A x2                        Min A  in prep of ADL tasks & transfers   Functional Transfers Sit to stand: Mod A+2     Stand to sit:   Mod A  NT  Poor sitting balance EOB. Pt unable to follow basic directions . Unsafe to attempt at this time                       Min A  sit<>stand/functional bathroom transfers using AD/DME as needed for balance and safety   Functional Mobility NT                        Min A   functional/bathroom mobility using AD as needed & demonstrating G safety      Balance Sitting:     Static:  Min A    Dynamic:Min A  Standing: Mod A+2   SBA dynamic sitting balance; Min A dynamic standing balance  during ADL tasks & transfers   Endurance/  Activity Tolerance    F tolerance with light activity. G   tolerance with moderate activity/self care routine   Visual/  Perceptual Impaired: environmental/R body awareness; decreased insight into deficits                                  Treatment: OT treatment provided this date includes:   ADL training-  Instruction/training on safety and adapted techniques for completion of ADLs: dressing tasks,   Mobility training-  Instruction/training on safety and improved independence with bed mobility,   Sitting EOB x 15 minutes to improve dynamic sitting balance and activity tolerance during ADLs. Neuromuscular Reeducation to facilitate balance/righting reactions for increased function with ADLs tasks:    Neuromuscular Facilitation of R UE functional movement/ROM. /fine motor dexterity . Poor carry over with basic AAROM activities. Cognition retraining -  Cues for safety during all mobility . , max  cues for basic 1 step directions, sequencing, problem solving during functional activity. Visual/Perception-Facilitation of Visual Perceptual Skills for increased safety and independence with ADLs. Skilled positioning/alignment-  Proper Positioning/Alignment with use of TAPS   Skilled monitoring of O2 sats-   , SpO2 during activity 96%  HR 100bpm    Comments: Upon arrival pt in bed. With gown partially off exposing his privates. Therapist assisted pt with rolling for donning of adult diaper. . Pt educated  through out treatment regarding proper technique & safety with  bed mobility, and sitting EOB. Pt was able to follow basic directions 10% of time. OT will continue with POC with focus on increasing independence on ADLs and functional mobility. At end of session, pt in bed, RN present, with all lines and tubes intact, call light within reach. Pt has made limited progress towards set goals.    Continue with current plan of care      Treatment Time In:10:49            Treatment Time Out: 11:19               Treatment Charges: Mins Units   Ther Ex  [id number]     Manual Therapy 85259     Thera Activities 80128 25 2   ADL/Home Mgt [id number] 5    Neuro Re-ed 60298     Group Therapy      Orthotic manage/training  94169     Non-Billable Time     Total Timed Treatment [address] [id number]

## 2022-10-17 NOTE — PROGRESS NOTES
Physical Therapy  Physical Therapy Treatment    Name: Jelena Friedman Sr.  : 1955  MRN: [id number]      Date of Service: 10/17/2022    Evaluating PT:  Junior Sotomayor, PT, DPT [id number]    Room #:  3285/1944-O  Diagnosis:  Acute CVA (cerebrovascular accident) [facility]) [I63.9]  PMHx/PSHx:    Past Medical History:   Diagnosis Date    Dementia ([facility]     Diabetes mellitus ([facility]     Seasonal allergies     Stroke due to stenosis of left carotid artery ([facility] 2020    Vascular dementia ([facility].)      Procedure/Surgery:  10/8 L PCA thrombectomy with TNK  Precautions:  Falls, , dysarthria, aphasia, bed alarm, R hemiparesis  Equipment Needs:  TBD    SUBJECTIVE:    Pt was admitted from [facility]. Pt ambulated without device PTA - questionable historian. OBJECTIVE:   Initial Evaluation  Date: 10/12/22 Treatment  10/17/22 Short Term/ Long Term   Goals   AM-PAC 6 Clicks     Was pt agreeable to Eval/treatment? Yes yes    Does pt have pain? No c/o pain C/o throat pain, unable to rate    Bed Mobility  Rolling: NT  Supine to sit: MaxA with HOB elevated  Sit to supine: Matt  Scooting: Matt Rolling: MaxA  Supine to sit: MaxAx2 with HOB elevated  Sit to supine: MaxAx2  Scooting: Matt SBA   Transfers Sit to stand: ModA x 2  Stand to sit: ModA x 2  Stand pivot: NT Sit to stand: NT  Stand to sit: NT  Stand pivot: NT SBA with AAD   Ambulation   NT NT >150 feet with SBA with AAD   Stair negotiation: ascended and descended NT NT >4 steps with 1 rail SBA   ROM BUE:  Defer to OT note  BLE:  WFL     Strength BUE:  Defer to OT note  LLE:  4/5  RLE:  3 to 3+/5  Increase by 1/3 MMT grade   Balance Sitting EOB:  Matt  Dynamic Standing:  ModA no device Sitting EOB:  MaxA  Dynamic Standing:  NT Sitting EOB:  Independent  Dynamic Standing:  SBA with AAD   Pt is A & O x 1 to person; unable to recall place, month, year.   Sensation:  Pt denies numbness and tingling to extremities  Edema:  unremarkable    Patient education  Pt educated on role of PT, proper technique for bed mobility, posture, and safety. Patient response to education:   Pt verbalized understanding Pt demonstrated skill Pt requires further education in this area   no Partially with cues x     ASSESSMENT:    Comments:  Pt was supine in bed upon PT entry. Pt was agreeable to participate. Pt was very confused throughout session. Pt was able to intermittently follow commands (15-20%) this session. Pt performed bed mobility to don brief prior to sitting EOB. Pt required tactile, visual, and verbal cues for sequencing bed mobility. Noted greater difficulty rolling L>R. Pt required heavy assistance with trunk and BLEs for sup<>sit transfer. When sitting EOB pt demonstrated heavy R lateral and forward lean, pt was unable to correct without PT assistance. Pt intermittently placed RUE into shoulder/elbow extension and wrist flexion while sitting EOB, which required assistance to correct to allow for RUE WB to assist with seated balance. Vitals remained stable throughout session. Pt was supine in bed with all needs met at conclusion of session. Nursing staff present. Treatment:  Patient practiced and was instructed in the following treatment:    Bed mobility training - pt given verbal and tactile cues to facilitate proper sequencing and safety during rolling and supine>sit as well as provided with physical assistance to complete task   Sitting EOB for >15 minutes for upright tolerance, postural awareness and BLE ROM  Closely monitored vitals throughout session. PLAN:    Patient is making slow progress towards established goals. Will continue with current POC.       Time in  1049  Time out  1119    Total Treatment Time  30 minutes     CPT codes:  [] Gait training 87459 0 minutes  [] Manual therapy 61519 0 minutes  [x] Therapeutic activities 02671 30 minutes  [] Therapeutic exercises 06628 0 minutes  [] Neuromuscular reeducation [id number] 0 minutes    Dina Bhandari PT, DPT  [id number]

## 2022-10-17 NOTE — PROGRESS NOTES
modification. ICU vitals  Echo-pending    10/10/2022  Remains completely confused, extremely agitated today with nursing  MRI pending-Will need medication  As needed Haldol every 12 for severe agitation  Echocardiogram essentially completely uninformative-discussed with echo tech at bedside  May benefit from CECI given appearance of multiple infarcts to rule out thrombus  Resume antiplatelet agents at discretion of neurology   blood pressure management-currently optimally controlled on multiple agents-amlodipine, as needed hydralazine and labetalol, add diuretic  Insulin sliding scale plus Lantus insulin on board with Noble control, uptitrate Lantus tonight if persistent hyperglycemia    10/11/2022  Remains in two-point restraints  Tolerated MRI post sedation-acute infarct left temporal and occipital lobe with thalamic hemorrhage  Postprocedure CT head with stable left posterior cerebral artery infarct with small focus of active conversion in left thalamus  Hold off on antiplatelet agents chemical DVT prophylaxis for now due to above findings  30-day event monitor at discharge  Blood pressure adequately controlled  Initiate tube feeds for nutrition  Blood glucose marginally well controlled-uptitrate Lantus to 25 units at bedtime    10/12/2022  Will need subacute rehab for ongoing PT/OT/speech therapy  Per neurology, start dual antiplatelet therapy in 1 week (10/19/2022) x 21 days followed by indefinite aspirin therapy**  Okay for discharge from medicine standpoint once place finalized  CECI at discretion of EP  Patient is not alert enough to benefit from diabetic educator, blood sugars now reasonably well controlled with initiation of Lantus-currently on 25 units at bedtime with insulin sliding scale  Will make arrangements for outpatient endocrine referral at discharge    10/13  -Repeat video swallow today, passed with pureed and honey thick liquids.  -Nursing communication to remove NGT and restraints.  Hopeful to start precert this afternoon to acceptance to [facility].   -Blood glucose levels remain in the mid 200's, continue to monitor now with switch in diet and off tube feeds.   -Increase insulin to 30 units at bedtime and decrease insulin sliding scale to low-dose  -CBC in a.m. to assess for elevation in white count    10/14:  -CXR for hypoxia (87% room air) and new leukocytosis 46.6  -Awaiting Precert to [facility]   -Can restart Plavix and asa 10/19/22    10/15:  Still resting comfortably no acute distress  Still remains confused and with expressive aphasia  Blood sugars elevated in spite of increasing Lantus to 30 units yesterday  Check procalcitonin level CBC and BMP in a.m. to assess for possible infectious etiology including aspiration    10/16:  Resting comfortably, no acute complaints-still altered mentation from stroke  Encourage water by mouth  Start D5 water for hypernatremia today, likely from poor oral intake  Increase sliding scale insulin to high-dose,Resume glipizide and metformin-as he takes at home  A.m. labs  White count mildly up at 12,000 but Pro-Rei is normal    10/17:  -Worsening hypernatremia but bedside nurse states his fluids were not running overnight due to loss of IV access, d/c D5 at 100 and start 0.45% @ 100-okay to continue 0.5 normal saline for now given hyperglycemia-  if no improvement in sodium, Change back to D5 water in a.m.  -BMP every 6 hours  -Continues to have elevated blood glucose levels, home metformin and glipizide were restarted yesterday.   -Check beta hydroxy d/t hyperglycemia and worsening anion gap > 1.68   -Upward trend of h/h 19.5/63.7 and RBC 6.65, check ferritin and transferrin     Code Status: Full  Consultants:  Neurointensive care team (S/o), neurology, neuro interventional radiology (S/O), electrophysiology (S/O)  DVT Prophylaxis   PT/OT  Discharge planning [facility] once precert is obtained       CARROLL Cifuentes - CNP  12:13 PM  10/17/2022     Above note edited to reflect my thoughts     I personally saw, examined and provided care for the patient. Radiographs, labs and medication list were reviewed by me independently. The case was discussed in detail and plans for care were established. Review of CHEYANNE Jasso   , documentation was conducted and revisions were made as appropriate directly by me. I agree with the above documented exam, problem list, and plan of care.      Raven Randhawa MD  7:27 PM  10/17/2022

## 2022-10-18 ENCOUNTER — APPOINTMENT (OUTPATIENT)
Dept: GENERAL RADIOLOGY | Age: 67
DRG: 023 | End: 2022-10-18
Payer: COMMERCIAL

## 2022-10-18 LAB
ANION GAP SERPL CALCULATED.3IONS-SCNC: 13 MMOL/L (ref 7–16)
ANION GAP SERPL CALCULATED.3IONS-SCNC: 13 MMOL/L (ref 7–16)
ANION GAP SERPL CALCULATED.3IONS-SCNC: 14 MMOL/L (ref 7–16)
ANION GAP SERPL CALCULATED.3IONS-SCNC: 16 MMOL/L (ref 7–16)
BASOPHILS ABSOLUTE: 0.06 E9/L (ref 0–0.2)
BASOPHILS RELATIVE PERCENT: 0.3 % (ref 0–2)
BUN BLDV-MCNC: 46 MG/DL (ref 6–23)
BUN BLDV-MCNC: 51 MG/DL (ref 6–23)
BUN BLDV-MCNC: 56 MG/DL (ref 6–23)
BUN BLDV-MCNC: 58 MG/DL (ref 6–23)
CALCIUM SERPL-MCNC: 10 MG/DL (ref 8.6–10.2)
CALCIUM SERPL-MCNC: 9.5 MG/DL (ref 8.6–10.2)
CALCIUM SERPL-MCNC: 9.6 MG/DL (ref 8.6–10.2)
CALCIUM SERPL-MCNC: 9.7 MG/DL (ref 8.6–10.2)
CHLORIDE BLD-SCNC: 115 MMOL/L (ref 98–107)
CHLORIDE BLD-SCNC: 115 MMOL/L (ref 98–107)
CHLORIDE BLD-SCNC: 116 MMOL/L (ref 98–107)
CHLORIDE BLD-SCNC: 116 MMOL/L (ref 98–107)
CO2: 21 MMOL/L (ref 22–29)
CO2: 24 MMOL/L (ref 22–29)
CO2: 24 MMOL/L (ref 22–29)
CO2: 26 MMOL/L (ref 22–29)
CREAT SERPL-MCNC: 1.2 MG/DL (ref 0.7–1.2)
CREAT SERPL-MCNC: 1.4 MG/DL (ref 0.7–1.2)
EOSINOPHILS ABSOLUTE: 0.11 E9/L (ref 0.05–0.5)
EOSINOPHILS RELATIVE PERCENT: 0.6 % (ref 0–6)
GFR SERPL CREATININE-BSD FRML MDRD: 54 ML/MIN/1.73
GFR SERPL CREATININE-BSD FRML MDRD: >60 ML/MIN/1.73
GLUCOSE BLD-MCNC: 282 MG/DL (ref 74–99)
GLUCOSE BLD-MCNC: 289 MG/DL (ref 74–99)
GLUCOSE BLD-MCNC: 305 MG/DL (ref 74–99)
GLUCOSE BLD-MCNC: 312 MG/DL (ref 74–99)
HCT VFR BLD CALC: 59 % (ref 37–54)
HEMOGLOBIN: 18.9 G/DL (ref 12.5–16.5)
IMMATURE GRANULOCYTES #: 0.07 E9/L
IMMATURE GRANULOCYTES %: 0.4 % (ref 0–5)
LYMPHOCYTES ABSOLUTE: 2.11 E9/L (ref 1.5–4)
LYMPHOCYTES RELATIVE PERCENT: 10.7 % (ref 20–42)
MCH RBC QN AUTO: 29.7 PG (ref 26–35)
MCHC RBC AUTO-ENTMCNC: 32 % (ref 32–34.5)
MCV RBC AUTO: 92.6 FL (ref 80–99.9)
METER GLUCOSE: 240 MG/DL (ref 74–99)
METER GLUCOSE: 259 MG/DL (ref 74–99)
METER GLUCOSE: 277 MG/DL (ref 74–99)
METER GLUCOSE: 301 MG/DL (ref 74–99)
MONOCYTES ABSOLUTE: 1.31 E9/L (ref 0.1–0.95)
MONOCYTES RELATIVE PERCENT: 6.6 % (ref 2–12)
NEUTROPHILS ABSOLUTE: 16.05 E9/L (ref 1.8–7.3)
NEUTROPHILS RELATIVE PERCENT: 81.4 % (ref 43–80)
PDW BLD-RTO: 14.5 FL (ref 11.5–15)
PLATELET # BLD: 222 E9/L (ref 130–450)
PMV BLD AUTO: 12.6 FL (ref 7–12)
POTASSIUM SERPL-SCNC: 3.9 MMOL/L (ref 3.5–5)
POTASSIUM SERPL-SCNC: 3.9 MMOL/L (ref 3.5–5)
POTASSIUM SERPL-SCNC: 4 MMOL/L (ref 3.5–5)
POTASSIUM SERPL-SCNC: 4.1 MMOL/L (ref 3.5–5)
PROCALCITONIN: 0.41 NG/ML (ref 0–0.08)
RBC # BLD: 6.37 E12/L (ref 3.8–5.8)
SODIUM BLD-SCNC: 153 MMOL/L (ref 132–146)
SODIUM BLD-SCNC: 154 MMOL/L (ref 132–146)
WBC # BLD: 19.7 E9/L (ref 4.5–11.5)

## 2022-10-18 PROCEDURE — 2700000000 HC OXYGEN THERAPY PER DAY

## 2022-10-18 PROCEDURE — 84145 PROCALCITONIN (PCT): CPT

## 2022-10-18 PROCEDURE — 6370000000 HC RX 637 (ALT 250 FOR IP): Performed by: NURSE PRACTITIONER

## 2022-10-18 PROCEDURE — 2060000000 HC ICU INTERMEDIATE R&B

## 2022-10-18 PROCEDURE — 36415 COLL VENOUS BLD VENIPUNCTURE: CPT

## 2022-10-18 PROCEDURE — 80048 BASIC METABOLIC PNL TOTAL CA: CPT

## 2022-10-18 PROCEDURE — 85025 COMPLETE CBC W/AUTO DIFF WBC: CPT

## 2022-10-18 PROCEDURE — S5553 INSULIN LONG ACTING 5 U: HCPCS | Performed by: INTERNAL MEDICINE

## 2022-10-18 PROCEDURE — 6360000002 HC RX W HCPCS: Performed by: INTERNAL MEDICINE

## 2022-10-18 PROCEDURE — 71045 X-RAY EXAM CHEST 1 VIEW: CPT

## 2022-10-18 PROCEDURE — 6370000000 HC RX 637 (ALT 250 FOR IP): Performed by: INTERNAL MEDICINE

## 2022-10-18 PROCEDURE — 82962 GLUCOSE BLOOD TEST: CPT

## 2022-10-18 PROCEDURE — 2580000003 HC RX 258: Performed by: INTERNAL MEDICINE

## 2022-10-18 RX ORDER — INSULIN GLARGINE-YFGN 100 [IU]/ML
35 INJECTION, SOLUTION SUBCUTANEOUS NIGHTLY
Status: DISCONTINUED | OUTPATIENT
Start: 2022-10-18 | End: 2022-10-19

## 2022-10-18 RX ADMIN — INSULIN LISPRO 8 UNITS: 100 INJECTION, SOLUTION INTRAVENOUS; SUBCUTANEOUS at 09:01

## 2022-10-18 RX ADMIN — AMLODIPINE BESYLATE 10 MG: 10 TABLET ORAL at 09:00

## 2022-10-18 RX ADMIN — GLIPIZIDE 5 MG: 5 TABLET ORAL at 09:00

## 2022-10-18 RX ADMIN — INSULIN LISPRO 4 UNITS: 100 INJECTION, SOLUTION INTRAVENOUS; SUBCUTANEOUS at 17:36

## 2022-10-18 RX ADMIN — METFORMIN HYDROCHLORIDE 500 MG: 500 TABLET ORAL at 09:00

## 2022-10-18 RX ADMIN — AMPICILLIN SODIUM AND SULBACTAM SODIUM 3000 MG: 2; 1 INJECTION, POWDER, FOR SOLUTION INTRAMUSCULAR; INTRAVENOUS at 21:52

## 2022-10-18 RX ADMIN — INSULIN LISPRO 12 UNITS: 100 INJECTION, SOLUTION INTRAVENOUS; SUBCUTANEOUS at 12:06

## 2022-10-18 RX ADMIN — INSULIN GLARGINE-YFGN 35 UNITS: 100 INJECTION, SOLUTION SUBCUTANEOUS at 21:44

## 2022-10-18 RX ADMIN — METFORMIN HYDROCHLORIDE 500 MG: 500 TABLET ORAL at 17:37

## 2022-10-18 NOTE — PROGRESS NOTES
Comprehensive Nutrition Assessment    Type and Reason for Visit:  Reassess    Nutrition Recommendations/Plan:   Continue current nutrition regimen to optimize intake/meet estimated protein and calorie needs. Will monitor for nutrition progression. Malnutrition Assessment:  Malnutrition Status: At risk for malnutrition (Comment) (10/12/22 1243)    Context:  Acute Illness     Findings of the 6 clinical characteristics of malnutrition:  Energy Intake:  50% or less of estimated energy requirements for 5 or more days  Weight Loss:  Unable to assess (2/2 poor EMR wt hx pta)     Body Fat Loss:  Unable to assess     Muscle Mass Loss:  Unable to assess    Fluid Accumulation:  No significant fluid accumulation     Strength:  Not Performed    Nutrition Assessment:    Pt remains at nutritional risk d/t poor intake w/ ongoing EN support 2/2 CVA/Dementia/Dysphagia. Pt adm from SNF w/ stroke-like symptoms x ~1d pta. PMHx previous CVA (21'), Dementia, DM, COPD; Adm w/ Acute CVA, s/p Lt PCA [id number]. S/p Corpak 10/11 w/ ongoing swallow eval- Pt. passed repeat video swallow 10/13 and now with pureed diet + honey thick liquids and supplemental TF. Pt. tolerating TF w/ varied/suboptimal PO intakes. Will monitor for nutrition progression. Nutrition Related Findings: AMSx3, intermittent agitation, hyperglycemia, hypernatremia, +BS, poor dentition, NE to TF, no edema, -I/O's, +elevated /LFT's on 10/13 draw -(, Bili 1.3) Wound Type: None       Current Nutrition Intake & Therapies:    Average Meal Intake: %, 1-25%, 0% (intake varied ~25-50% average)  Average Supplements Intake: None Ordered  ADULT TUBE FEEDING; Nasoenteric; Diabetic; Continuous; 20; Yes; 20; Q 4 hours; 60; 30; Q 4 hours  ADULT DIET; Dysphagia - Pureed;  Moderately Thick (Honey)  Current Tube Feeding (TF) Orders:  Feeding Route: Nasoenteric  Formula: Diabetic  Schedule: Continuous  Feeding Regimen: @60ml/hr  Additives/Modulars: None  Water Flushes: 30ml q 4h= 180ml total flush/day  Current TF & Flush Orders Provides: Same at Goal  Goal TF & Flush Orders Provides: Continuous x 24hr/d= 1440ml TV, 2160kcal, 119gm Pro, 1093ml freewater. Anthropometric Measures:  Height: 6' 3\" (190.5 cm)  Ideal Body Weight (IBW): 196 lbs (89 kg)    Admission Body Weight: 225 lb (102.1 kg) (bed 10/10)  Current Body Weight: 225 lb (102.1 kg) (bed 10/10 - no new CBW), 114.8 % IBW. Weight Source: Bed Scale  Current BMI (kg/m2): 28.1  Usual Body Weight:  (UTO UBW 2/2 poor EMR wt hx pta)     Weight Adjustment For: No Adjustment                 BMI Categories: Overweight (BMI 25.0-29. 9)    Estimated Daily Nutrient Needs:  Energy Requirements Based On: Formula  Weight Used for Energy Requirements: Current  Energy (kcal/day):   Weight Used for Protein Requirements: Ideal  Protein (g/day): 1.3-1.5gm/kg IBW= 115-135  Method Used for Fluid Requirements: 1 ml/kcal  Fluid (ml/day):  or per critical care/neuro mgmt    Nutrition Diagnosis:   Inadequate oral intake related to cognitive or neurological impairment (2/2 CVA/Dementia) as evidenced by intake 26-50%, nutrition support - enteral nutrition    Nutrition Interventions:   Food and/or Nutrient Delivery: Continue Current Diet, Continue Current Tube Feeding (Continue NPO. Continue EN regimen Diabetic (Glucerna 1.5) @ 60ml/hr (Goal) Continuous x 24hr/d=)  Nutrition Education/Counseling: No recommendation at this time  Coordination of Nutrition Care: Continue to monitor while inpatient       Goals:  Previous Goal Met: Goal(s) Achieved (new goal/continue previous)  Goals:  Tolerate nutrition support at goal rate, PO intake 50% or greater, by next RD assessment       Nutrition Monitoring and Evaluation:   Behavioral-Environmental Outcomes: None Identified  Food/Nutrient Intake Outcomes: Food and Nutrient Intake, Enteral Nutrition Intake/Tolerance  Physical Signs/Symptoms Outcomes: Biochemical Data, GI Status, Fluid Status or Edema, Hemodynamic Status, Nutrition Focused Physical Findings, Skin, Weight    Discharge Planning:     Too soon to determine     Esther Canales RD  Contact: ext [phone]

## 2022-10-18 NOTE — PROGRESS NOTES
Patient has not voided this shift, bladder scan for 566ml. Rocio Other, NP notified via perfect serve.

## 2022-10-18 NOTE — PROGRESS NOTES
[facility]   Progress note      Subjective: The patient is resting comfortably on my evaluation, no acute events or issues overnight  Out of restraints  Working with therapy on my assessment    Objective:    /83   Pulse (!) 102   Temp 99.6 °F (37.6 °C) (Temporal)   Resp 17   Ht 6' 3\" (1.905 m)   Wt 225 lb (102.1 kg)   SpO2 93%   BMI 28.12 kg/m²     In: 580 [P.O.:580]  Out: 300   In: 580   Out: 300 [Urine:300]    General appearance: NAD, not conversant, however does follow some commands  HEENT: AT/NC, MMM  Neck: FROM, supple  Lungs: diminished   CV: RRR, no MRGs  Vasc: Radial pulses 2+  Abdomen: Soft, non-tender; no masses or HSM  Extremities: No peripheral edema or digital cyanosis  Skin: no rash, lesions or ulcers  Psych: Alert and oriented to person only        Recent Labs     10/16/22  0500 10/17/22  0617 10/18/22  0132   WBC 11.9* 12.3* 19.7*   HGB 18.5* 19.5* 18.9*   HCT 56.7* 63.7* 59.0*    223 222         Recent Labs     10/17/22  1719 10/18/22  0128 10/18/22  0606   * 154* 153*   K 3.9 4.1 3.9   * 115* 116*   CO2 23 26 24   BUN 59* 58* 56*   CREATININE 1.2 1.2 1.2   CALCIUM 10.0 10.0 9.6         Assessment:    Principal Problem:    Acute CVA (cerebrovascular accident) ([facility]  Active Problems:    History of stroke    Dementia (HCC)    Type 2 diabetes mellitus ([facility]  Resolved Problems:    * No resolved hospital problems.  *      Plan:  58-year-old male with a history of a CVA in 2020 presented to the ED with altered mental status and slurred speech and patient was admitted postop to ICU with     Acute ischemic CVA with left PCA occlusion    10/8/2022   Successful thrombectomy of left PCA  MRI pending  CT head completed with no intracranial hemorrhage, evolution of acute or early subacute infarcts in the left occipital lobe  Postprocedure CT head pending  NIHSS score q shift  Hold antiplatelets for 24 hours post TN K  Monitor blood pressure-maintain SBP less passed with pureed and honey thick liquids.  -Nursing communication to remove NGT and restraints. Hopeful to start precert this afternoon to acceptance to [facility].   -Blood glucose levels remain in the mid 200's, continue to monitor now with switch in diet and off tube feeds.   -Increase insulin to 30 units at bedtime and decrease insulin sliding scale to low-dose  -CBC in a.m. to assess for elevation in white count    10/14:  -CXR for hypoxia (87% room air) and new leukocytosis 12.2  -Awaiting Precert to [facility]   -Can restart Plavix and asa 10/19/22    10/15:  Still resting comfortably no acute distress  Still remains confused and with expressive aphasia  Blood sugars elevated in spite of increasing Lantus to 30 units yesterday  Check procalcitonin level CBC and BMP in a.m. to assess for possible infectious etiology including aspiration    10/16:  Resting comfortably, no acute complaints-still altered mentation from stroke  Encourage water by mouth  Start D5 water for hypernatremia today, likely from poor oral intake  Increase sliding scale insulin to high-dose,Resume glipizide and metformin-as he takes at home  A.m. labs  White count mildly up at 12,000 but Pro-Rei is normal    10/17:  -Worsening hypernatremia but bedside nurse states his fluids were not running overnight due to loss of IV access, d/c D5 at 100 and start 0.45% @ 100-okay to continue 0.5 normal saline for now given hyperglycemia-  if no improvement in sodium, Change back to D5 water in a.m.  -BMP every 6 hours  -Continues to have elevated blood glucose levels, home metformin and glipizide were restarted yesterday.   -Check beta hydroxy d/t hyperglycemia and worsening anion gap > 1.68   -Upward trend of h/h 19.5/63.7 and RBC 6.65, check ferritin and transferrin     10/18:  -Worsening hypoxia, now on supplemental oxygen with worsening leukocytosis, check CXR and Procal   -Slow improvement in hypernatremia Na+ 153 today, continue IVF 0.45%  -Anion gap resolved with above IV fluids  -Blood sugars improved today with cessation of D5W-and resumption of home diabetic regimen  -Increase sliding scale to next level, initiate/uptitrate Lantus if needed  -Initiate empiric IV antibiotic therapy-Unasyn for concern of aspiration, if procalcitonin elevated in a.m. Code Status: Full  Consultants:  Neurointensive care team (S/o), neurology, neuro interventional radiology (S/O), electrophysiology (S/O)  DVT Prophylaxis   PT/OT  Discharge planning EDUIN once precert is obtained       CARROLL Calloway CNP  3:00 PM  10/18/2022     Above note edited to reflect my thoughts     I personally saw, examined and provided care for the patient. Radiographs, labs and medication list were reviewed by me independently. The case was discussed in detail and plans for care were established. Review of CHEYANNE Calloway   , documentation was conducted and revisions were made as appropriate directly by me. I agree with the above documented exam, problem list, and plan of care.      Verónica Mendoza MD  7:06 PM  10/18/2022

## 2022-10-19 ENCOUNTER — APPOINTMENT (OUTPATIENT)
Dept: GENERAL RADIOLOGY | Age: 67
DRG: 023 | End: 2022-10-19
Payer: COMMERCIAL

## 2022-10-19 LAB
ALBUMIN SERPL-MCNC: 3 G/DL (ref 3.5–5.2)
ALP BLD-CCNC: 109 U/L (ref 40–129)
ALT SERPL-CCNC: 15 U/L (ref 0–40)
ANION GAP SERPL CALCULATED.3IONS-SCNC: 12 MMOL/L (ref 7–16)
ANION GAP SERPL CALCULATED.3IONS-SCNC: 13 MMOL/L (ref 7–16)
ANION GAP SERPL CALCULATED.3IONS-SCNC: 13 MMOL/L (ref 7–16)
ANION GAP SERPL CALCULATED.3IONS-SCNC: 15 MMOL/L (ref 7–16)
AST SERPL-CCNC: 12 U/L (ref 0–39)
BACTERIA: ABNORMAL /HPF
BASOPHILS ABSOLUTE: 0.04 E9/L (ref 0–0.2)
BASOPHILS RELATIVE PERCENT: 0.2 % (ref 0–2)
BILIRUB SERPL-MCNC: 1.2 MG/DL (ref 0–1.2)
BILIRUBIN URINE: NEGATIVE
BLOOD, URINE: NEGATIVE
BUN BLDV-MCNC: 31 MG/DL (ref 6–23)
BUN BLDV-MCNC: 34 MG/DL (ref 6–23)
BUN BLDV-MCNC: 40 MG/DL (ref 6–23)
BUN BLDV-MCNC: 43 MG/DL (ref 6–23)
CALCIUM SERPL-MCNC: 9 MG/DL (ref 8.6–10.2)
CALCIUM SERPL-MCNC: 9.2 MG/DL (ref 8.6–10.2)
CALCIUM SERPL-MCNC: 9.2 MG/DL (ref 8.6–10.2)
CALCIUM SERPL-MCNC: 9.5 MG/DL (ref 8.6–10.2)
CHLORIDE BLD-SCNC: 116 MMOL/L (ref 98–107)
CHLORIDE BLD-SCNC: 116 MMOL/L (ref 98–107)
CHLORIDE BLD-SCNC: 117 MMOL/L (ref 98–107)
CHLORIDE BLD-SCNC: 118 MMOL/L (ref 98–107)
CLARITY: CLEAR
CO2: 19 MMOL/L (ref 22–29)
CO2: 23 MMOL/L (ref 22–29)
CO2: 24 MMOL/L (ref 22–29)
CO2: 26 MMOL/L (ref 22–29)
COLOR: YELLOW
CREAT SERPL-MCNC: 1.1 MG/DL (ref 0.7–1.2)
CREAT SERPL-MCNC: 1.1 MG/DL (ref 0.7–1.2)
CREAT SERPL-MCNC: 1.3 MG/DL (ref 0.7–1.2)
CREAT SERPL-MCNC: 1.3 MG/DL (ref 0.7–1.2)
CRYSTALS, UA: ABNORMAL /HPF
EOSINOPHILS ABSOLUTE: 0.01 E9/L (ref 0.05–0.5)
EOSINOPHILS RELATIVE PERCENT: 0 % (ref 0–6)
GFR SERPL CREATININE-BSD FRML MDRD: 59 ML/MIN/1.73
GFR SERPL CREATININE-BSD FRML MDRD: 59 ML/MIN/1.73
GFR SERPL CREATININE-BSD FRML MDRD: >60 ML/MIN/1.73
GFR SERPL CREATININE-BSD FRML MDRD: >60 ML/MIN/1.73
GLUCOSE BLD-MCNC: 249 MG/DL (ref 74–99)
GLUCOSE BLD-MCNC: 278 MG/DL (ref 74–99)
GLUCOSE BLD-MCNC: 297 MG/DL (ref 74–99)
GLUCOSE BLD-MCNC: 322 MG/DL (ref 74–99)
GLUCOSE URINE: 250 MG/DL
HCT VFR BLD CALC: 51.9 % (ref 37–54)
HEMOGLOBIN: 16.4 G/DL (ref 12.5–16.5)
IMMATURE GRANULOCYTES #: 0.15 E9/L
IMMATURE GRANULOCYTES %: 0.7 % (ref 0–5)
KETONES, URINE: ABNORMAL MG/DL
LEUKOCYTE ESTERASE, URINE: NEGATIVE
LYMPHOCYTES ABSOLUTE: 1.62 E9/L (ref 1.5–4)
LYMPHOCYTES RELATIVE PERCENT: 7.7 % (ref 20–42)
MCH RBC QN AUTO: 29.7 PG (ref 26–35)
MCHC RBC AUTO-ENTMCNC: 31.6 % (ref 32–34.5)
MCV RBC AUTO: 94 FL (ref 80–99.9)
METER GLUCOSE: 206 MG/DL (ref 74–99)
METER GLUCOSE: 263 MG/DL (ref 74–99)
METER GLUCOSE: 267 MG/DL (ref 74–99)
METER GLUCOSE: 371 MG/DL (ref 74–99)
MONOCYTES ABSOLUTE: 1.33 E9/L (ref 0.1–0.95)
MONOCYTES RELATIVE PERCENT: 6.3 % (ref 2–12)
NEUTROPHILS ABSOLUTE: 17.86 E9/L (ref 1.8–7.3)
NEUTROPHILS RELATIVE PERCENT: 85.1 % (ref 43–80)
NITRITE, URINE: NEGATIVE
PDW BLD-RTO: 14.6 FL (ref 11.5–15)
PH UA: 5.5 (ref 5–9)
PLATELET # BLD: 147 E9/L (ref 130–450)
PMV BLD AUTO: 12.9 FL (ref 7–12)
POTASSIUM SERPL-SCNC: 3.4 MMOL/L (ref 3.5–5)
POTASSIUM SERPL-SCNC: 3.8 MMOL/L (ref 3.5–5)
POTASSIUM SERPL-SCNC: 4.2 MMOL/L (ref 3.5–5)
POTASSIUM SERPL-SCNC: 4.4 MMOL/L (ref 3.5–5)
PROTEIN UA: ABNORMAL MG/DL
RBC # BLD: 5.52 E12/L (ref 3.8–5.8)
RBC UA: ABNORMAL /HPF (ref 0–2)
SODIUM BLD-SCNC: 151 MMOL/L (ref 132–146)
SODIUM BLD-SCNC: 152 MMOL/L (ref 132–146)
SODIUM BLD-SCNC: 154 MMOL/L (ref 132–146)
SODIUM BLD-SCNC: 155 MMOL/L (ref 132–146)
SPECIFIC GRAVITY UA: 1.02 (ref 1–1.03)
TOTAL PROTEIN: 6.8 G/DL (ref 6.4–8.3)
UROBILINOGEN, URINE: 0.2 E.U./DL
WBC # BLD: 21 E9/L (ref 4.5–11.5)
WBC UA: ABNORMAL /HPF (ref 0–5)

## 2022-10-19 PROCEDURE — 6370000000 HC RX 637 (ALT 250 FOR IP): Performed by: NURSE PRACTITIONER

## 2022-10-19 PROCEDURE — 80053 COMPREHEN METABOLIC PANEL: CPT

## 2022-10-19 PROCEDURE — 6370000000 HC RX 637 (ALT 250 FOR IP): Performed by: INTERNAL MEDICINE

## 2022-10-19 PROCEDURE — 80048 BASIC METABOLIC PNL TOTAL CA: CPT

## 2022-10-19 PROCEDURE — 82962 GLUCOSE BLOOD TEST: CPT

## 2022-10-19 PROCEDURE — 85025 COMPLETE CBC W/AUTO DIFF WBC: CPT

## 2022-10-19 PROCEDURE — 6360000002 HC RX W HCPCS: Performed by: INTERNAL MEDICINE

## 2022-10-19 PROCEDURE — 2580000003 HC RX 258: Performed by: INTERNAL MEDICINE

## 2022-10-19 PROCEDURE — 36415 COLL VENOUS BLD VENIPUNCTURE: CPT

## 2022-10-19 PROCEDURE — 81001 URINALYSIS AUTO W/SCOPE: CPT

## 2022-10-19 PROCEDURE — 97530 THERAPEUTIC ACTIVITIES: CPT

## 2022-10-19 PROCEDURE — 2060000000 HC ICU INTERMEDIATE R&B

## 2022-10-19 PROCEDURE — 2580000003 HC RX 258: Performed by: NURSE PRACTITIONER

## 2022-10-19 PROCEDURE — S5553 INSULIN LONG ACTING 5 U: HCPCS | Performed by: NURSE PRACTITIONER

## 2022-10-19 PROCEDURE — 71045 X-RAY EXAM CHEST 1 VIEW: CPT

## 2022-10-19 PROCEDURE — 87088 URINE BACTERIA CULTURE: CPT

## 2022-10-19 RX ORDER — DEXTROSE MONOHYDRATE 50 MG/ML
INJECTION, SOLUTION INTRAVENOUS CONTINUOUS
Status: DISCONTINUED | OUTPATIENT
Start: 2022-10-19 | End: 2022-10-21 | Stop reason: HOSPADM

## 2022-10-19 RX ORDER — INSULIN GLARGINE-YFGN 100 [IU]/ML
45 INJECTION, SOLUTION SUBCUTANEOUS NIGHTLY
Status: DISCONTINUED | OUTPATIENT
Start: 2022-10-19 | End: 2022-10-21 | Stop reason: HOSPADM

## 2022-10-19 RX ORDER — CLOPIDOGREL BISULFATE 75 MG/1
75 TABLET ORAL DAILY
Status: DISCONTINUED | OUTPATIENT
Start: 2022-10-19 | End: 2022-10-21 | Stop reason: HOSPADM

## 2022-10-19 RX ADMIN — AMPICILLIN SODIUM AND SULBACTAM SODIUM 3000 MG: 2; 1 INJECTION, POWDER, FOR SOLUTION INTRAMUSCULAR; INTRAVENOUS at 07:57

## 2022-10-19 RX ADMIN — DEXTROSE MONOHYDRATE: 50 INJECTION, SOLUTION INTRAVENOUS at 11:26

## 2022-10-19 RX ADMIN — INSULIN GLARGINE-YFGN 45 UNITS: 100 INJECTION, SOLUTION SUBCUTANEOUS at 21:39

## 2022-10-19 RX ADMIN — CLOPIDOGREL BISULFATE 75 MG: 75 TABLET ORAL at 11:20

## 2022-10-19 RX ADMIN — AMPICILLIN SODIUM AND SULBACTAM SODIUM 3000 MG: 2; 1 INJECTION, POWDER, FOR SOLUTION INTRAMUSCULAR; INTRAVENOUS at 15:03

## 2022-10-19 RX ADMIN — GLIPIZIDE 5 MG: 5 TABLET ORAL at 09:29

## 2022-10-19 RX ADMIN — SODIUM CHLORIDE, PRESERVATIVE FREE 10 ML: 5 INJECTION INTRAVENOUS at 21:40

## 2022-10-19 RX ADMIN — AMPICILLIN SODIUM AND SULBACTAM SODIUM 3000 MG: 2; 1 INJECTION, POWDER, FOR SOLUTION INTRAMUSCULAR; INTRAVENOUS at 02:09

## 2022-10-19 RX ADMIN — METFORMIN HYDROCHLORIDE 500 MG: 500 TABLET ORAL at 18:26

## 2022-10-19 RX ADMIN — INSULIN LISPRO 4 UNITS: 100 INJECTION, SOLUTION INTRAVENOUS; SUBCUTANEOUS at 09:29

## 2022-10-19 RX ADMIN — AMPICILLIN SODIUM AND SULBACTAM SODIUM 3000 MG: 2; 1 INJECTION, POWDER, FOR SOLUTION INTRAMUSCULAR; INTRAVENOUS at 21:27

## 2022-10-19 RX ADMIN — INSULIN LISPRO 4 UNITS: 100 INJECTION, SOLUTION INTRAVENOUS; SUBCUTANEOUS at 21:39

## 2022-10-19 RX ADMIN — AMLODIPINE BESYLATE 10 MG: 10 TABLET ORAL at 09:29

## 2022-10-19 RX ADMIN — INSULIN LISPRO 8 UNITS: 100 INJECTION, SOLUTION INTRAVENOUS; SUBCUTANEOUS at 18:26

## 2022-10-19 RX ADMIN — MIRTAZAPINE 15 MG: 15 TABLET, FILM COATED ORAL at 21:24

## 2022-10-19 RX ADMIN — METFORMIN HYDROCHLORIDE 500 MG: 500 TABLET ORAL at 09:29

## 2022-10-19 RX ADMIN — ATORVASTATIN CALCIUM 80 MG: 40 TABLET, FILM COATED ORAL at 21:24

## 2022-10-19 RX ADMIN — DEXTROSE MONOHYDRATE: 50 INJECTION, SOLUTION INTRAVENOUS at 21:44

## 2022-10-19 RX ADMIN — INSULIN LISPRO 8 UNITS: 100 INJECTION, SOLUTION INTRAVENOUS; SUBCUTANEOUS at 13:26

## 2022-10-19 ASSESSMENT — PAIN SCALES - GENERAL: PAINLEVEL_OUTOF10: 0

## 2022-10-19 ASSESSMENT — PAIN SCALES - WONG BAKER: WONGBAKER_NUMERICALRESPONSE: 0

## 2022-10-19 NOTE — PROGRESS NOTES
Physical Therapy  Physical Therapy Treatment    Name: Eliud Romero Sr.  : 1955  MRN: [id number]      Date of Service: 10/19/2022    Evaluating PT:  Keke Cardozo, PT, DPT [id number]    Room #:  8431/9050-X  Diagnosis:  Acute CVA (cerebrovascular accident) [facility]) [I63.9]  PMHx/PSHx:    Past Medical History:   Diagnosis Date    Dementia ([facility]     Diabetes mellitus ([facility]     Seasonal allergies     Stroke due to stenosis of left carotid artery ([facility] 2020    Vascular dementia ([facility]      Procedure/Surgery:  10/8 L PCA thrombectomy with TNK  Precautions:  Falls, , dysarthria, aphasia, bed alarm, R hemiparesis  Equipment Needs:  TBD    SUBJECTIVE:    Pt was admitted from long term. Pt ambulated without device PTA - questionable historian. OBJECTIVE:   Initial Evaluation  Date: 10/12/22 Treatment  10/19/22 Short Term/ Long Term   Goals   AM-PAC 6 Clicks     Was pt agreeable to Eval/treatment? Yes yes    Does pt have pain? No c/o pain C/o pain when rolling to L, unable to rate    Bed Mobility  Rolling: NT  Supine to sit: MaxA with HOB elevated  Sit to supine: Matt  Scooting: Matt Rolling: MaxA  Supine to sit: MaxAx2 with HOB elevated  Sit to supine: MaxAx2  Scooting: MaxA SBA   Transfers Sit to stand: ModA x 2  Stand to sit: ModA x 2  Stand pivot: NT Sit to stand: MaxAx2 for partial stand  Stand to sit: MaxAx2  Stand pivot: NT SBA with AAD   Ambulation   NT NT >150 feet with SBA with AAD   Stair negotiation: ascended and descended NT NT >4 steps with 1 rail SBA   ROM BUE:  Defer to OT note  BLE:  WFL     Strength BUE:  Defer to OT note  LLE:  4/5  RLE:  3 to 3+/5  Increase by 1/3 MMT grade   Balance Sitting EOB:  Matt  Dynamic Standing:  ModA no device Sitting EOB:  Matt-MaxA  Dynamic Standing:  NT Sitting EOB:  Independent  Dynamic Standing:  SBA with AAD   Pt is A & O x 1 to person; unable to follow commands this session.   Sensation:  Pt denies numbness and tingling to extremities  Edema: unremarkable    Patient education  Pt educated on role of PT, proper technique for bed mobility and safety. Patient response to education:   Pt verbalized understanding Pt demonstrated skill Pt requires further education in this area   no Partially with cues x     ASSESSMENT:    Comments:  Pt was asleep supine in bed upon PT entry. Pt was agreeable to participate. Pt was very confused throughout session. Pt was unable to follow commands this session. Noted that pt was very fatigued this session. Pt demonstrated a posterior and forward lean while sitting EOB, required min-max assistance to correct. Pt was returned to supine and completed bed mobility with assistance and cuing for sequencing for cyrus pad change and daniel care. Pt's SPO2 was 86% on RA, pt was placed on 2L/min O2 SPO2 returned to 92%. Pt was positioned in the chair position for breakfast. Son and nursing staff were present at conclusion of session. All questions and concerns were addressed and all needs were met. RN notified. Treatment:  Patient practiced and was instructed in the following treatment:    Bed mobility training - pt given verbal and tactile cues to facilitate proper sequencing and safety during rolling and supine>sit as well as provided with physical assistance to complete task   Sitting EOB for >5 minutes for upright tolerance, postural awareness and BLE ROM  Transfer training - pt was given verbal and tactile cues to facilitate proper hand placement, technique and safety during sit to stand and stand to sit as well as provided with physical assistance. Closely monitored vitals throughout session. PLAN:    Patient is making slow progress towards established goals. Will continue with current POC.       Time in  0841  Time out  0917    Total Treatment Time  36 minutes     CPT codes:  [] Gait training 40629 0 minutes  [] Manual therapy 44521 0 minutes  [x] Therapeutic activities 23425 36 minutes  [] Therapeutic exercises 81054 0 minutes  [] Neuromuscular reeducation 49225 0 minutes    Marilia Lipoma PT, DPT  [id number]

## 2022-10-19 NOTE — PROGRESS NOTES
Occupational Therapy  OT BEDSIDE TREATMENT NOTE     [address] [facility] [address] [place] [address]      EUXS:  Patient Name: Deny Pearson. MRN: [id number]  : 1955  Room: [address] [place] [address]     Per OT Eval:   Evaluating OT: Connor Dias OTR/L [id number]     Referring Provider: Orlando Hodge MD   Specific Provider Orders/Date: OT eval and treat (10/8/22)        Diagnosis: Acute CVA       Reason for admission: presented to ED on 10/8 with confusion and slurred speech; R sided weakness     Surgery/Procedures:  10/8 L PCA thrombectomy with TNK      Pertinent Medical History: Dementia, DM, Stroke due to stenosis of L carotid artery ()     *Precautions:  Falls, B UE restraints, bed alarm, L hemiparesis, NG tube, O2, dysarthria, aphasia, bed alarm, Dementia, agitation, SBP<150 , TSM     Assessment of current deficits   [x] Functional mobility          [x]ADLs           [x] Strength                  [x]Cognition   [x] Functional transfers        [x] IADLs         [x] Safety Awareness   [x]Endurance   [x] Fine Coordination           [x] ROM           [x] Vision/perception    [x]Sensation     [x]Gross Motor Coordination [x] Balance    [x] Delirium                  [x]Motor Control     [x] Communication     OT PLAN OF CARE   OT POC based on physician orders, patient diagnosis and results of clinical assessment.         Frequency/Duration: 1-3 days/wk for 1-2 weeks PRN    Specific OT Treatment to include:   ADL retraining/adapted techniques and AE recommendations to increase functional independence within precautions                    Energy conservation techniques to improve tolerance for selfcare routine   Functional transfer/mobility training/DME recommendations for increased independence, safety and fall prevention         Patient/family education to increase safety and functional independence within precautions              Environmental modifications for safe mobility and completion of ADLs                           Cognitive retraining ex's to improve problem solving skills & safe participation in ADLs/IADLs  Sensory re-education techniques to improve extremity awareness, maintain skin integrity and improve hand function                             Visual/Perceptual retraining  to improve body awareness and safety during transfers and ADLs  Splinting/positioning needs to maintain joint/skin integrity and prevent contractures  Therapeutic activity to improve functional performance during ADLs/IADLs                                         Therapeutic exercise to improve tolerance and functional strength for ADLs   Balance retraining exercises/tasks for facilitation of postural control with dynamic challenges during ADLs . Positioning to improve functional independence  Neuromuscular re-education: facilitation of righting/equilibrium reactions, normalization muscle tone/facilitation active functional movement                      Delirium prevention/treatment     Modified [place] Scale   Score     Description  0             No symptoms  1             No significant disability despite symptoms  2             Slight disability; able to look after own affairs  3             Moderate disability; able to ambulate without assist/ requires assist with ADLs  4             Moderate/Severe disability;requires assist to ambulate/assist with ADLs  5             Severe disability;bedridden/incontinent   6               Score:   5     Recommended Adaptive Equipment: TBA      Home Living: Pt resides in an retirement per chart. Prior Level of Function:  Per chart, pt was receiving total care. Pt cannot report PTA status.       Pain Level: pt c/o 0/10  pain  this session     Cognition: A&O: 1/4  (self only; not oriented to place or situation)  Following intermittent one step simple commands <10% if time             Memory: poor              Comprehension poor Problem solving: poor             Judgement/safety: poor                Communication skills: impaired word finding; aphasia             Vision/perception: continues to appear impaired, difficulty achieving eye contact. Limited command following to assess visual field. Continue to assess during subsequent sessions. Glasses:\"yes\" (not present)                                                       Hearing: WFL                  UE Assessment:  Hand Dominance: Right [x]  Left []       ROM Strength STM goal: PRN   RUE  PROM WFL;  A/AROM grossly 80 shoulder flexion with jerky movement; gross elbow flexion; gross grasp 3-/5 proximal  3-/5 distal  WFL for ADLs; 4-/5      LUE A/AROM shoulder flexion; WFL distal  3-/5 4/5         Sensation: No c/o numbness/tingling in extremities. Proprioception appears impaired RUE  Tone: impaired , hypotonic R side  Edema: [facility]     Functional Assessment:  AM-PAC Daily Activity Raw Score: 7/24    Initial Eval Status  Date: 10/12/22 Treatment Status  Date: 10/19/22 STGs = LTGs  Time frame: 7-14 days   Feeding NGT Max A  HCA assisting with feeding at end of session Re-assess when cleared for diet. Grooming Max A  DEP                         Min A   while seated in chair      UB dressing/bathing Max A  (gown)  DEP  To tiff gown                       Min A         LB dressing/bathing Dep  DEP  To tiff brief and socks/                       Mod A   using AE as needed for safe reach/ energy conservation        Toileting Dep  *incontinent of bladder bed level  DEP  Incontinent of bowel upon arrival; assisted with perineal hygiene                       Mod A      Bed Mobility  Supine to sit: Max A     Sit to supine:   Min A with R LE Supine to sit: max A x 2  Sit to supine: max A x2                        Min A  in prep of ADL tasks & transfers   Functional Transfers Sit to stand: Mod A+2     Stand to sit:   Mod A  Sit <> stand:  Max A x2                       Min A  sit<>stand/functional bathroom transfers using AD/DME as needed for balance and safety   Functional Mobility NT  NT; unable to achieve full stand, poor command following                      Min A   functional/bathroom mobility using AD as needed & demonstrating G safety      Balance Sitting:     Static:  Min A    Dynamic:Min A  Standing: Mod A+2 Sitting:     Static:  Min A    Dynamic:Max A  Standing: Max A x2 unable to achieve full stand  SBA dynamic sitting balance; Min A dynamic standing balance  during ADL tasks & transfers   Endurance/  Activity Tolerance    F tolerance with light activity. P; fatigued throughout session G   tolerance with moderate activity/self care routine   Visual/  Perceptual Impaired: environmental/R body awareness; decreased insight into deficits         impaired, see above                         Treatment:   Bed mobility: Facilitated bed mobility with cues for proper body mechanics and sequencing to prepare for ADL completion. Functional transfers: Facilitated transfers from various surfaces with cues for body alignment, safety and hand placement. Postural Balance: Sitting balance retraining to improve righting reactions with postural changes during ADLS. Cognitive/Perceptual training: retraining exercises to improve attention, mentation, and spatial awareness for ADLs & transfers. Skilled positioning: Proper positioning to improve interaction with environment, overall functioning and decrease/prevent edema and contractures. Delirium Prevention/Management: Implementation of non-pharmacologic interventions for delirium prevention incorporated throughout session to patient and family. Including environmental and sensory modifications to decrease patient's internal distraction caused by delirium, and improve overall mentation. Comments: Upon arrival pt in bed. With gown completely off exposing his privates.  Patient seen conjunction with PT to increase patient safety and participation. Max strategies employed to increase patient participation and alertness- positional change, environmental modifications, and interpersonal engagement with little success, intermittent eye contact and alertness. Minimal to no command following. SpO2 monitored throughout- 86% on RA, supplemental O2 applied at 2L/min O2 SPO2 returned to 92%. Attempts to educate patient throughout treatment regarding proper technique & safety with  bed mobility, and sitting EOB, transfers. Pt was able to follow basic directions <10% of time. OT will continue with POC with focus on increasing independence on ADLs and functional mobility. At end of session, pt in bed, HCA present to assist with feeding, with all lines and tubes intact, call light within reach. Son present at end of session. Pt has made poor progress towards set goals.    Continue with current plan of care      Treatment Time In: 7872  Treatment Time Out: Bernabe Lazaro 45  Treatment Charges: Mins Units   Ther Ex  [id number]     Manual Therapy [phone]     Thera Activities 61794 30 2   ADL/Home Mgt [id number]     Neuro Re-ed [id number]     Group Therapy      Orthotic manage/training  36682     Non-Billable Time     Total Timed Treatment 30 100 , OTR/L  [id number]

## 2022-10-19 NOTE — PROGRESS NOTES
[facility]   Progress note      Subjective: The patient is resting comfortably on my evaluation, no acute events or issues overnight  Awaiting precert for rehab    Objective:    /79   Pulse 98   Temp 97.6 °F (36.4 °C) (Temporal)   Resp 16   Ht 6' 3\" (1.905 m)   Wt 225 lb (102.1 kg)   SpO2 91%   BMI 28.12 kg/m²     In: 1380 [P.O.:480; I.V.:900]  Out: -   In: 1380   Out: -     General appearance: NAD, not conversant, however does follow some commands  HEENT: AT/NC, MMM  Neck: FROM, supple  Lungs: diminished   CV: RRR, no MRGs  Vasc: Radial pulses 2+  Abdomen: Soft, non-tender; no masses or HSM  Extremities: No peripheral edema or digital cyanosis  Skin: no rash, lesions or ulcers  Psych: Alert and oriented to person only        Recent Labs     10/17/22  0617 10/18/22  0132 10/19/22  0547   WBC 12.3* 19.7* 21.0*   HGB 19.5* 18.9* 16.4   HCT 63.7* 59.0* 51.9    222 147         Recent Labs     10/18/22  2000 10/19/22  0126 10/19/22  0547   * 154* 155*   K 4.0 4.4 3.8   * 117* 116*   CO2 24 24 26   BUN 46* 43* 40*   CREATININE 1.4* 1.3* 1.3*   CALCIUM 9.5 9.2 9.2         Assessment:    Principal Problem:    Acute CVA (cerebrovascular accident) ([facility] [address]  Active Problems:    History of stroke    Dementia (HCC)    Type 2 diabetes mellitus ([facility] [address]  Resolved Problems:    * No resolved hospital problems.  *      Plan:  68-year-old male with a history of a CVA in 2020 presented to the ED with altered mental status and slurred speech and patient was admitted postop to ICU with     Acute ischemic CVA with left PCA occlusion    10/8/2022   Successful thrombectomy of left PCA  MRI pending  CT head completed with no intracranial hemorrhage, evolution of acute or early subacute infarcts in the left occipital lobe  Postprocedure CT head pending  NIHSS score q shift  Hold antiplatelets for 24 hours post TN K  Monitor blood pressure-maintain SBP less than 150 and MAP greater than 65 adjust medications as needed.-As needed IV labetalol  Discussed risk factor modification.   ICU vitals  Echo-pending    10/10/2022  Remains completely confused, extremely agitated today with nursing  MRI pending-Will need medication  As needed Haldol every 12 for severe agitation  Echocardiogram essentially completely uninformative-discussed with echo tech at bedside  May benefit from CECI given appearance of multiple infarcts to rule out thrombus  Resume antiplatelet agents at discretion of neurology   blood pressure management-currently optimally controlled on multiple agents-amlodipine, as needed hydralazine and labetalol, add diuretic  Insulin sliding scale plus Lantus insulin on board with Noble control, uptitrate Lantus tonight if persistent hyperglycemia    10/11/2022  Remains in two-point restraints  Tolerated MRI post sedation-acute infarct left temporal and occipital lobe with thalamic hemorrhage  Postprocedure CT head with stable left posterior cerebral artery infarct with small focus of active conversion in left thalamus  Hold off on antiplatelet agents chemical DVT prophylaxis for now due to above findings  30-day event monitor at discharge  Blood pressure adequately controlled  Initiate tube feeds for nutrition  Blood glucose marginally well controlled-uptitrate Lantus to 25 units at bedtime    10/12/2022  Will need subacute rehab for ongoing PT/OT/speech therapy  Per neurology, start dual antiplatelet therapy in 1 week (10/19/2022) x 21 days followed by indefinite aspirin therapy**  Okay for discharge from medicine standpoint once place finalized  CECI at discretion of EP  Patient is not alert enough to benefit from diabetic educator, blood sugars now reasonably well controlled with initiation of Lantus-currently on 25 units at bedtime with insulin sliding scale  Will make arrangements for outpatient endocrine referral at discharge    10/13  -Repeat video swallow today, passed with pureed and honey thick liquids.  -Nursing communication to remove NGT and restraints. Hopeful to start precert this afternoon to acceptance to [facility].   -Blood glucose levels remain in the mid 200's, continue to monitor now with switch in diet and off tube feeds.   -Increase insulin to 30 units at bedtime and decrease insulin sliding scale to low-dose  -CBC in a.m. to assess for elevation in white count    10/14:  -CXR for hypoxia (87% room air) and new leukocytosis 22.8  -Awaiting Precert to [facility]   -Can restart Plavix and asa 10/19/22    10/15:  Still resting comfortably no acute distress  Still remains confused and with expressive aphasia  Blood sugars elevated in spite of increasing Lantus to 30 units yesterday  Check procalcitonin level CBC and BMP in a.m. to assess for possible infectious etiology including aspiration    10/16:  Resting comfortably, no acute complaints-still altered mentation from stroke  Encourage water by mouth  Start D5 water for hypernatremia today, likely from poor oral intake  Increase sliding scale insulin to high-dose,Resume glipizide and metformin-as he takes at home  A.m. labs  White count mildly up at 12,000 but Pro-Rei is normal    10/17:  -Worsening hypernatremia but bedside nurse states his fluids were not running overnight due to loss of IV access, d/c D5 at 100 and start 0.45% @ 100-okay to continue 0.5 normal saline for now given hyperglycemia-  if no improvement in sodium, Change back to D5 water in a.m.  -BMP every 6 hours  -Continues to have elevated blood glucose levels, home metformin and glipizide were restarted yesterday.   -Check beta hydroxy d/t hyperglycemia and worsening anion gap > 1.68   -Upward trend of h/h 19.5/63.7 and RBC 6.65, check ferritin and transferrin     10/18:  -Worsening hypoxia, now on supplemental oxygen with worsening leukocytosis, check CXR and Procal   -Slow improvement in hypernatremia Na+ 153 today, continue IVF 0.45%  -Anion gap resolved with above IV fluids  -Blood sugars improved today with cessation of D5W-and resumption of home diabetic regimen  -Increase sliding scale to next level, initiate/uptitrate Lantus if needed  -Initiate empiric IV antibiotic therapy-Unasyn for concern of aspiration, if procalcitonin elevated in a.m.    10/19:  -Worsening leukocytosis at 21.0, continue IV Unasyn, chest x-ray clear yesterday  -Worsening hypernatremia, switch fluids to D5 at 100, increase Lantus to 45 units nightly, monitor blood glucose levels for further adjustments, continue to follow BMP q6h  Urine culture pending, sodium recently seen and blood sugar reasonably well controlled on current regimen  May need to switch this to sodium bicarb tomorrow if CO2 continues to decline      Code Status: Full  Consultants:  Neurointensive care team (S/o), neurology, neuro interventional radiology (S/O), electrophysiology (S/O)  DVT Prophylaxis   PT/OT  Discharge planning EDUIN once precert is obtained       CARROLL Barragan CNP  1:58 PM  10/19/2022     Above note edited to reflect my thoughts     I personally saw, examined and provided care for the patient. Radiographs, labs and medication list were reviewed by me independently. The case was discussed in detail and plans for care were established. Review of CHEYANNE Barragan   , documentation was conducted and revisions were made as appropriate directly by me. I agree with the above documented exam, problem list, and plan of care.      Phill Riddle MD  8:19 PM  10/19/2022

## 2022-10-20 LAB
ANION GAP SERPL CALCULATED.3IONS-SCNC: 10 MMOL/L (ref 7–16)
ANION GAP SERPL CALCULATED.3IONS-SCNC: 12 MMOL/L (ref 7–16)
ANION GAP SERPL CALCULATED.3IONS-SCNC: 12 MMOL/L (ref 7–16)
ANION GAP SERPL CALCULATED.3IONS-SCNC: 15 MMOL/L (ref 7–16)
BUN BLDV-MCNC: 24 MG/DL (ref 6–23)
BUN BLDV-MCNC: 25 MG/DL (ref 6–23)
BUN BLDV-MCNC: 26 MG/DL (ref 6–23)
BUN BLDV-MCNC: 28 MG/DL (ref 6–23)
CALCIUM SERPL-MCNC: 8.9 MG/DL (ref 8.6–10.2)
CALCIUM SERPL-MCNC: 8.9 MG/DL (ref 8.6–10.2)
CALCIUM SERPL-MCNC: 9 MG/DL (ref 8.6–10.2)
CALCIUM SERPL-MCNC: 9.3 MG/DL (ref 8.6–10.2)
CHLORIDE BLD-SCNC: 113 MMOL/L (ref 98–107)
CHLORIDE BLD-SCNC: 114 MMOL/L (ref 98–107)
CHLORIDE BLD-SCNC: 115 MMOL/L (ref 98–107)
CHLORIDE BLD-SCNC: 116 MMOL/L (ref 98–107)
CO2: 18 MMOL/L (ref 22–29)
CO2: 22 MMOL/L (ref 22–29)
CO2: 22 MMOL/L (ref 22–29)
CO2: 24 MMOL/L (ref 22–29)
CREAT SERPL-MCNC: 1 MG/DL (ref 0.7–1.2)
CREAT SERPL-MCNC: 1 MG/DL (ref 0.7–1.2)
CREAT SERPL-MCNC: 1.1 MG/DL (ref 0.7–1.2)
CREAT SERPL-MCNC: 1.1 MG/DL (ref 0.7–1.2)
GFR SERPL CREATININE-BSD FRML MDRD: >60 ML/MIN/1.73
GLUCOSE BLD-MCNC: 239 MG/DL (ref 74–99)
GLUCOSE BLD-MCNC: 243 MG/DL (ref 74–99)
GLUCOSE BLD-MCNC: 266 MG/DL (ref 74–99)
GLUCOSE BLD-MCNC: 307 MG/DL (ref 74–99)
HCT VFR BLD CALC: 52 % (ref 37–54)
HEMOGLOBIN: 16 G/DL (ref 12.5–16.5)
MCH RBC QN AUTO: 29.5 PG (ref 26–35)
MCHC RBC AUTO-ENTMCNC: 30.8 % (ref 32–34.5)
MCV RBC AUTO: 95.8 FL (ref 80–99.9)
METER GLUCOSE: 185 MG/DL (ref 74–99)
METER GLUCOSE: 216 MG/DL (ref 74–99)
METER GLUCOSE: 271 MG/DL (ref 74–99)
METER GLUCOSE: 282 MG/DL (ref 74–99)
PDW BLD-RTO: 14.6 FL (ref 11.5–15)
PLATELET # BLD: 121 E9/L (ref 130–450)
PMV BLD AUTO: 13 FL (ref 7–12)
POTASSIUM SERPL-SCNC: 3.6 MMOL/L (ref 3.5–5)
POTASSIUM SERPL-SCNC: 3.7 MMOL/L (ref 3.5–5)
POTASSIUM SERPL-SCNC: 4.2 MMOL/L (ref 3.5–5)
POTASSIUM SERPL-SCNC: 4.3 MMOL/L (ref 3.5–5)
PROCALCITONIN: 0.52 NG/ML (ref 0–0.08)
RBC # BLD: 5.43 E12/L (ref 3.8–5.8)
SODIUM BLD-SCNC: 147 MMOL/L (ref 132–146)
SODIUM BLD-SCNC: 147 MMOL/L (ref 132–146)
SODIUM BLD-SCNC: 149 MMOL/L (ref 132–146)
SODIUM BLD-SCNC: 150 MMOL/L (ref 132–146)
WBC # BLD: 16.9 E9/L (ref 4.5–11.5)

## 2022-10-20 PROCEDURE — 6370000000 HC RX 637 (ALT 250 FOR IP): Performed by: INTERNAL MEDICINE

## 2022-10-20 PROCEDURE — 84145 PROCALCITONIN (PCT): CPT

## 2022-10-20 PROCEDURE — 2580000003 HC RX 258: Performed by: NURSE PRACTITIONER

## 2022-10-20 PROCEDURE — 6360000002 HC RX W HCPCS: Performed by: NURSE PRACTITIONER

## 2022-10-20 PROCEDURE — 2060000000 HC ICU INTERMEDIATE R&B

## 2022-10-20 PROCEDURE — S5553 INSULIN LONG ACTING 5 U: HCPCS | Performed by: NURSE PRACTITIONER

## 2022-10-20 PROCEDURE — 6370000000 HC RX 637 (ALT 250 FOR IP): Performed by: NURSE PRACTITIONER

## 2022-10-20 PROCEDURE — 82962 GLUCOSE BLOOD TEST: CPT

## 2022-10-20 PROCEDURE — 80048 BASIC METABOLIC PNL TOTAL CA: CPT

## 2022-10-20 PROCEDURE — 6360000002 HC RX W HCPCS: Performed by: INTERNAL MEDICINE

## 2022-10-20 PROCEDURE — 85027 COMPLETE CBC AUTOMATED: CPT

## 2022-10-20 PROCEDURE — 36415 COLL VENOUS BLD VENIPUNCTURE: CPT

## 2022-10-20 PROCEDURE — 51702 INSERT TEMP BLADDER CATH: CPT

## 2022-10-20 PROCEDURE — 2580000003 HC RX 258: Performed by: INTERNAL MEDICINE

## 2022-10-20 RX ADMIN — DEXTROSE MONOHYDRATE: 50 INJECTION, SOLUTION INTRAVENOUS at 07:51

## 2022-10-20 RX ADMIN — DEXTROSE MONOHYDRATE: 50 INJECTION, SOLUTION INTRAVENOUS at 21:39

## 2022-10-20 RX ADMIN — MIRTAZAPINE 15 MG: 15 TABLET, FILM COATED ORAL at 21:31

## 2022-10-20 RX ADMIN — SODIUM CHLORIDE, PRESERVATIVE FREE 10 ML: 5 INJECTION INTRAVENOUS at 09:49

## 2022-10-20 RX ADMIN — ATORVASTATIN CALCIUM 80 MG: 40 TABLET, FILM COATED ORAL at 21:31

## 2022-10-20 RX ADMIN — METFORMIN HYDROCHLORIDE 500 MG: 500 TABLET ORAL at 17:59

## 2022-10-20 RX ADMIN — METFORMIN HYDROCHLORIDE 500 MG: 500 TABLET ORAL at 08:33

## 2022-10-20 RX ADMIN — INSULIN LISPRO 8 UNITS: 100 INJECTION, SOLUTION INTRAVENOUS; SUBCUTANEOUS at 08:37

## 2022-10-20 RX ADMIN — AMPICILLIN SODIUM AND SULBACTAM SODIUM 3000 MG: 2; 1 INJECTION, POWDER, FOR SOLUTION INTRAMUSCULAR; INTRAVENOUS at 21:34

## 2022-10-20 RX ADMIN — ENOXAPARIN SODIUM 40 MG: 100 INJECTION SUBCUTANEOUS at 09:49

## 2022-10-20 RX ADMIN — AMPICILLIN SODIUM AND SULBACTAM SODIUM 3000 MG: 2; 1 INJECTION, POWDER, FOR SOLUTION INTRAMUSCULAR; INTRAVENOUS at 14:16

## 2022-10-20 RX ADMIN — SODIUM CHLORIDE, PRESERVATIVE FREE 10 ML: 5 INJECTION INTRAVENOUS at 21:31

## 2022-10-20 RX ADMIN — ASPIRIN 81 MG: 81 TABLET, COATED ORAL at 09:50

## 2022-10-20 RX ADMIN — AMPICILLIN SODIUM AND SULBACTAM SODIUM 3000 MG: 2; 1 INJECTION, POWDER, FOR SOLUTION INTRAMUSCULAR; INTRAVENOUS at 02:33

## 2022-10-20 RX ADMIN — AMPICILLIN SODIUM AND SULBACTAM SODIUM 3000 MG: 2; 1 INJECTION, POWDER, FOR SOLUTION INTRAMUSCULAR; INTRAVENOUS at 08:34

## 2022-10-20 RX ADMIN — INSULIN LISPRO 8 UNITS: 100 INJECTION, SOLUTION INTRAVENOUS; SUBCUTANEOUS at 14:18

## 2022-10-20 RX ADMIN — CLOPIDOGREL BISULFATE 75 MG: 75 TABLET ORAL at 09:50

## 2022-10-20 RX ADMIN — AMLODIPINE BESYLATE 10 MG: 10 TABLET ORAL at 09:50

## 2022-10-20 RX ADMIN — INSULIN GLARGINE-YFGN 45 UNITS: 100 INJECTION, SOLUTION SUBCUTANEOUS at 21:35

## 2022-10-20 RX ADMIN — GLIPIZIDE 5 MG: 5 TABLET ORAL at 06:35

## 2022-10-20 ASSESSMENT — PAIN SCALES - WONG BAKER: WONGBAKER_NUMERICALRESPONSE: 0

## 2022-10-20 ASSESSMENT — PAIN SCALES - GENERAL: PAINLEVEL_OUTOF10: 0

## 2022-10-20 NOTE — PROGRESS NOTES
[facility]   Progress note      Subjective:    No acute issues or events overnight  Remains at his baseline  Objective:    /81   Pulse 85   Temp 98.3 °F (36.8 °C) (Oral)   Resp 17   Ht 6' 3\" (1.905 m)   Wt 225 lb (102.1 kg)   SpO2 91%   BMI 28.12 kg/m²     In: 1680 [P.O.:180; I.V.:1500]  Out: -   In: 1680   Out: -     General appearance: NAD, not conversant, however does follow some commands  HEENT: AT/NC, MMM  Neck: FROM, supple  Lungs: diminished   CV: RRR, no MRGs  Vasc: Radial pulses 2+  Abdomen: Soft, non-tender; no masses or HSM  Extremities: No peripheral edema or digital cyanosis  Skin: no rash, lesions or ulcers  Psych: Alert and oriented to person only        Recent Labs     10/18/22  0132 10/19/22  0547 10/20/22  0145   WBC 19.7* 21.0* 16.9*   HGB 18.9* 16.4 16.0   HCT 59.0* 51.9 52.0    147 121*       Recent Labs     10/19/22  1943 10/20/22  0144 10/20/22  0814   * 150* 147*   K 3.4* 3.7 4.3   * 116* 113*   CO2 23 24 22   BUN 31* 28* 26*   CREATININE 1.1 1.1 1.1   CALCIUM 9.0 9.3 9.0       Assessment:    Principal Problem:    Acute CVA (cerebrovascular accident) ([facility] [address]  Active Problems:    History of stroke    Dementia (HCC)    Type 2 diabetes mellitus ([facility] [address]  Resolved Problems:    * No resolved hospital problems.  *      Plan:  58-year-old male with a history of a CVA in 2020 presented to the ED with altered mental status and slurred speech and patient was admitted postop to ICU with     Acute ischemic CVA with left PCA occlusion    10/8/2022   Successful thrombectomy of left PCA  MRI pending  CT head completed with no intracranial hemorrhage, evolution of acute or early subacute infarcts in the left occipital lobe  Postprocedure CT head pending  NIHSS score q shift  Hold antiplatelets for 24 hours post TN K  Monitor blood pressure-maintain SBP less than 150 and MAP greater than 65 adjust medications as needed.-As needed IV labetalol  Discussed risk factor modification. ICU vitals  Echo-pending    10/10/2022  Remains completely confused, extremely agitated today with nursing  MRI pending-Will need medication  As needed Haldol every 12 for severe agitation  Echocardiogram essentially completely uninformative-discussed with echo tech at bedside  May benefit from CECI given appearance of multiple infarcts to rule out thrombus  Resume antiplatelet agents at discretion of neurology   blood pressure management-currently optimally controlled on multiple agents-amlodipine, as needed hydralazine and labetalol, add diuretic  Insulin sliding scale plus Lantus insulin on board with Noble control, uptitrate Lantus tonight if persistent hyperglycemia    10/11/2022  Remains in two-point restraints  Tolerated MRI post sedation-acute infarct left temporal and occipital lobe with thalamic hemorrhage  Postprocedure CT head with stable left posterior cerebral artery infarct with small focus of active conversion in left thalamus  Hold off on antiplatelet agents chemical DVT prophylaxis for now due to above findings  30-day event monitor at discharge  Blood pressure adequately controlled  Initiate tube feeds for nutrition  Blood glucose marginally well controlled-uptitrate Lantus to 25 units at bedtime    10/12/2022  Will need subacute rehab for ongoing PT/OT/speech therapy  Per neurology, start dual antiplatelet therapy in 1 week (10/19/2022) x 21 days followed by indefinite aspirin therapy**  Okay for discharge from medicine standpoint once place finalized  CECI at discretion of EP  Patient is not alert enough to benefit from diabetic educator, blood sugars now reasonably well controlled with initiation of Lantus-currently on 25 units at bedtime with insulin sliding scale  Will make arrangements for outpatient endocrine referral at discharge    10/13  -Repeat video swallow today, passed with pureed and honey thick liquids.  -Nursing communication to remove NGT and restraints. Hopeful to start precert this afternoon to acceptance to [facility].   -Blood glucose levels remain in the mid 200's, continue to monitor now with switch in diet and off tube feeds.   -Increase insulin to 30 units at bedtime and decrease insulin sliding scale to low-dose  -CBC in a.m. to assess for elevation in white count    10/14:  -CXR for hypoxia (87% room air) and new leukocytosis 87.2  -Awaiting Precert to [facility]   -Can restart Plavix and asa 10/19/22    10/15:  Still resting comfortably no acute distress  Still remains confused and with expressive aphasia  Blood sugars elevated in spite of increasing Lantus to 30 units yesterday  Check procalcitonin level CBC and BMP in a.m. to assess for possible infectious etiology including aspiration    10/16:  Resting comfortably, no acute complaints-still altered mentation from stroke  Encourage water by mouth  Start D5 water for hypernatremia today, likely from poor oral intake  Increase sliding scale insulin to high-dose,Resume glipizide and metformin-as he takes at home  A.m. labs  White count mildly up at 12,000 but Pro-Rei is normal    10/17:  -Worsening hypernatremia but bedside nurse states his fluids were not running overnight due to loss of IV access, d/c D5 at 100 and start 0.45% @ 100-okay to continue 0.5 normal saline for now given hyperglycemia-  if no improvement in sodium, Change back to D5 water in a.m.  -BMP every 6 hours  -Continues to have elevated blood glucose levels, home metformin and glipizide were restarted yesterday.   -Check beta hydroxy d/t hyperglycemia and worsening anion gap > 1.68   -Upward trend of h/h 19.5/63.7 and RBC 6.65, check ferritin and transferrin     10/18:  -Worsening hypoxia, now on supplemental oxygen with worsening leukocytosis, check CXR and Procal   -Slow improvement in hypernatremia Na+ 153 today, continue IVF 0.45%  -Anion gap resolved with above IV fluids  -Blood sugars improved today with cessation of D5W-and resumption of home diabetic regimen  -Increase sliding scale to next level, initiate/uptitrate Lantus if needed  -Initiate empiric IV antibiotic therapy-Unasyn for concern of aspiration, if procalcitonin elevated in a.m.    10/19:  -Worsening leukocytosis at 21.0, continue IV Unasyn, chest x-ray clear yesterday  -Worsening hypernatremia, switch fluids to D5 at 100, increase Lantus to 45 units nightly, monitor blood glucose levels for further adjustments, continue to follow BMP q6h  Urine culture pending, sodium recently seen and blood sugar reasonably well controlled on current regimen  May need to switch this to sodium bicarb tomorrow if CO2 continues to decline    10/20  White count improved with continuation of Unasyn  Sodium level improved to 147 with D5 water  Blood glucose better with increase in Lantus  Continue same until pre-CERT obtained at which time he can be discharged  Give strong consideration to PEG tube actually prior to discharge if family agrees as he is not going to be able to eat and drink with his current mentation    Code Status: Full  Consultants:  Neurointensive care team (S/o), neurology, neuro interventional radiology (S/O), electrophysiology (S/O)  DVT Prophylaxis   PT/OT  Discharge planning EDUIN once precert is obtained       Mykel Stone MD  3:40 PM  10/20/2022

## 2022-10-20 NOTE — PROGRESS NOTES
Notified Waldo Cobos NP, pt was bladder scanned. Bladder scan showed 387 ml. Pt had no urine output in 8 hours.

## 2022-10-21 VITALS
DIASTOLIC BLOOD PRESSURE: 77 MMHG | HEIGHT: 75 IN | OXYGEN SATURATION: 92 % | TEMPERATURE: 98 F | BODY MASS INDEX: 27.98 KG/M2 | HEART RATE: 69 BPM | SYSTOLIC BLOOD PRESSURE: 149 MMHG | RESPIRATION RATE: 18 BRPM | WEIGHT: 225 LBS

## 2022-10-21 LAB
ANION GAP SERPL CALCULATED.3IONS-SCNC: 11 MMOL/L (ref 7–16)
ANION GAP SERPL CALCULATED.3IONS-SCNC: 11 MMOL/L (ref 7–16)
ANION GAP SERPL CALCULATED.3IONS-SCNC: 6 MMOL/L (ref 7–16)
BUN BLDV-MCNC: 18 MG/DL (ref 6–23)
BUN BLDV-MCNC: 21 MG/DL (ref 6–23)
BUN BLDV-MCNC: 24 MG/DL (ref 6–23)
CALCIUM SERPL-MCNC: 8.6 MG/DL (ref 8.6–10.2)
CALCIUM SERPL-MCNC: 8.6 MG/DL (ref 8.6–10.2)
CALCIUM SERPL-MCNC: 8.9 MG/DL (ref 8.6–10.2)
CHLORIDE BLD-SCNC: 111 MMOL/L (ref 98–107)
CHLORIDE BLD-SCNC: 112 MMOL/L (ref 98–107)
CHLORIDE BLD-SCNC: 114 MMOL/L (ref 98–107)
CO2: 25 MMOL/L (ref 22–29)
CO2: 26 MMOL/L (ref 22–29)
CO2: 28 MMOL/L (ref 22–29)
CREAT SERPL-MCNC: 0.9 MG/DL (ref 0.7–1.2)
GFR SERPL CREATININE-BSD FRML MDRD: >60 ML/MIN/1.73
GLUCOSE BLD-MCNC: 250 MG/DL (ref 74–99)
GLUCOSE BLD-MCNC: 254 MG/DL (ref 74–99)
GLUCOSE BLD-MCNC: 265 MG/DL (ref 74–99)
HCT VFR BLD CALC: 46.3 % (ref 37–54)
HEMOGLOBIN: 14.5 G/DL (ref 12.5–16.5)
MCH RBC QN AUTO: 29.4 PG (ref 26–35)
MCHC RBC AUTO-ENTMCNC: 31.3 % (ref 32–34.5)
MCV RBC AUTO: 93.9 FL (ref 80–99.9)
METER GLUCOSE: 188 MG/DL (ref 74–99)
METER GLUCOSE: 233 MG/DL (ref 74–99)
METER GLUCOSE: 267 MG/DL (ref 74–99)
PDW BLD-RTO: 14.5 FL (ref 11.5–15)
PLATELET # BLD: 98 E9/L (ref 130–450)
PLATELET CONFIRMATION: NORMAL
PMV BLD AUTO: 13.2 FL (ref 7–12)
POTASSIUM SERPL-SCNC: 3.4 MMOL/L (ref 3.5–5)
POTASSIUM SERPL-SCNC: 3.5 MMOL/L (ref 3.5–5)
POTASSIUM SERPL-SCNC: 3.6 MMOL/L (ref 3.5–5)
RBC # BLD: 4.93 E12/L (ref 3.8–5.8)
SARS-COV-2, NAAT: NOT DETECTED
SODIUM BLD-SCNC: 145 MMOL/L (ref 132–146)
SODIUM BLD-SCNC: 148 MMOL/L (ref 132–146)
SODIUM BLD-SCNC: 151 MMOL/L (ref 132–146)
URINE CULTURE, ROUTINE: NORMAL
WBC # BLD: 10.5 E9/L (ref 4.5–11.5)

## 2022-10-21 PROCEDURE — 87635 SARS-COV-2 COVID-19 AMP PRB: CPT

## 2022-10-21 PROCEDURE — 2580000003 HC RX 258: Performed by: NURSE PRACTITIONER

## 2022-10-21 PROCEDURE — 6360000002 HC RX W HCPCS: Performed by: INTERNAL MEDICINE

## 2022-10-21 PROCEDURE — 85027 COMPLETE CBC AUTOMATED: CPT

## 2022-10-21 PROCEDURE — 6370000000 HC RX 637 (ALT 250 FOR IP): Performed by: NURSE PRACTITIONER

## 2022-10-21 PROCEDURE — 2580000003 HC RX 258: Performed by: INTERNAL MEDICINE

## 2022-10-21 PROCEDURE — 36415 COLL VENOUS BLD VENIPUNCTURE: CPT

## 2022-10-21 PROCEDURE — 93246 EXT ECG>7D<15D RECORDING: CPT

## 2022-10-21 PROCEDURE — 6360000002 HC RX W HCPCS: Performed by: NURSE PRACTITIONER

## 2022-10-21 PROCEDURE — 80048 BASIC METABOLIC PNL TOTAL CA: CPT

## 2022-10-21 PROCEDURE — 6370000000 HC RX 637 (ALT 250 FOR IP): Performed by: INTERNAL MEDICINE

## 2022-10-21 PROCEDURE — 82962 GLUCOSE BLOOD TEST: CPT

## 2022-10-21 RX ORDER — AMLODIPINE BESYLATE 10 MG/1
10 TABLET ORAL DAILY
Qty: 30 TABLET | Refills: 3 | DISCHARGE
Start: 2022-10-22

## 2022-10-21 RX ORDER — CLOPIDOGREL BISULFATE 75 MG/1
75 TABLET ORAL DAILY
Qty: 18 TABLET | Refills: 0 | DISCHARGE
Start: 2022-10-22 | End: 2022-11-09

## 2022-10-21 RX ORDER — AMOXICILLIN AND CLAVULANATE POTASSIUM 562.5; 437.5; 62.5 MG/1; MG/1; MG/1
1 TABLET, FILM COATED, EXTENDED RELEASE ORAL 2 TIMES DAILY
Qty: 14 TABLET | Refills: 0 | DISCHARGE
Start: 2022-10-21 | End: 2022-10-28

## 2022-10-21 RX ORDER — INSULIN GLARGINE-YFGN 100 [IU]/ML
30 INJECTION, SOLUTION SUBCUTANEOUS NIGHTLY
DISCHARGE
Start: 2022-10-21

## 2022-10-21 RX ORDER — ATORVASTATIN CALCIUM 80 MG/1
80 TABLET, FILM COATED ORAL NIGHTLY
Qty: 30 TABLET | Refills: 3 | DISCHARGE
Start: 2022-10-21

## 2022-10-21 RX ORDER — GLIPIZIDE 5 MG/1
10 TABLET ORAL
DISCHARGE
Start: 2022-10-21

## 2022-10-21 RX ADMIN — AMPICILLIN SODIUM AND SULBACTAM SODIUM 3000 MG: 2; 1 INJECTION, POWDER, FOR SOLUTION INTRAMUSCULAR; INTRAVENOUS at 08:24

## 2022-10-21 RX ADMIN — ASPIRIN 81 MG: 81 TABLET, COATED ORAL at 08:21

## 2022-10-21 RX ADMIN — AMLODIPINE BESYLATE 10 MG: 10 TABLET ORAL at 08:21

## 2022-10-21 RX ADMIN — AMPICILLIN SODIUM AND SULBACTAM SODIUM 3000 MG: 2; 1 INJECTION, POWDER, FOR SOLUTION INTRAMUSCULAR; INTRAVENOUS at 03:36

## 2022-10-21 RX ADMIN — SODIUM CHLORIDE, PRESERVATIVE FREE 10 ML: 5 INJECTION INTRAVENOUS at 08:28

## 2022-10-21 RX ADMIN — METFORMIN HYDROCHLORIDE 500 MG: 500 TABLET ORAL at 17:58

## 2022-10-21 RX ADMIN — CLOPIDOGREL BISULFATE 75 MG: 75 TABLET ORAL at 08:21

## 2022-10-21 RX ADMIN — INSULIN LISPRO 8 UNITS: 100 INJECTION, SOLUTION INTRAVENOUS; SUBCUTANEOUS at 14:16

## 2022-10-21 RX ADMIN — ENOXAPARIN SODIUM 40 MG: 100 INJECTION SUBCUTANEOUS at 08:21

## 2022-10-21 RX ADMIN — GLIPIZIDE 5 MG: 5 TABLET ORAL at 06:48

## 2022-10-21 RX ADMIN — INSULIN LISPRO 4 UNITS: 100 INJECTION, SOLUTION INTRAVENOUS; SUBCUTANEOUS at 08:25

## 2022-10-21 RX ADMIN — METFORMIN HYDROCHLORIDE 500 MG: 500 TABLET ORAL at 08:21

## 2022-10-21 NOTE — DISCHARGE SUMMARY
[facility]   Discharge summary   Patient ID:  Ritesh Mcdonald.  [id number]  69 y.o.  1955    Admit date: 10/8/2022    Discharge date and time: 10/21/2022    Admission Diagnoses:   Patient Active Problem List   Diagnosis    Acute ischemic left MCA stroke ([facility] [address]    Middle cerebral artery stenosis, left    Dysarthria    Uncontrolled type 2 diabetes mellitus with hyperglycemia ([facility])    Tobacco dependence    COPD (chronic obstructive pulmonary disease) ([facility])    Stroke due to stenosis of left carotid artery ([facility])    Acute CVA (cerebrovascular accident) ([facility] [address]    Symptomatic stenosis of left carotid artery    Asymptomatic stenosis of right carotid artery    History of left-sided carotid endarterectomy    History of stroke    Dementia ([facility])    Type 2 diabetes mellitus ([facility] [address]    Cerebrovascular accident (CVA) due to occlusion of left posterior cerebral artery ([facility])    Nihss score 8    Acute right hemiparesis [facility])       Discharge Diagnoses: Acute CVA    Consults: pulmonary/intensive care, neurology, and EP    Procedures: none    Hospital Course:     68-year-old male with a history of a CVA in 2020 presented to the ED with altered mental status and slurred speech and patient was admitted postop to ICU with     Acute ischemic CVA with left PCA occlusion     10/8/2022   Successful thrombectomy of left PCA  MRI pending  CT head completed with no intracranial hemorrhage, evolution of acute or early subacute infarcts in the left occipital lobe  Postprocedure CT head pending  NIHSS score q shift  Hold antiplatelets for 24 hours post TN K  Monitor blood pressure-maintain SBP less than 150 and MAP greater than 65 adjust medications as needed.-As needed IV labetalol  Discussed risk factor modification.   ICU vitals  Echo-pending     10/10/2022  Remains completely confused, extremely agitated today with nursing  MRI pending-Will need medication  As needed Haldol every 12 for severe agitation  Echocardiogram 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  Postprocedure CT head with stable left posterior cerebral artery infarct with small focus of active conversion in left thalamus  Hold off on antiplatelet agents chemical DVT prophylaxis for now due to above findings  30-day event monitor at discharge  Blood pressure adequately controlled  Initiate tube feeds for nutrition  Blood glucose marginally well controlled-uptitrate Lantus to 25 units at bedtime     10/12/2022  Will need subacute rehab for ongoing PT/OT/speech therapy  Per neurology, start dual antiplatelet therapy in 1 week (10/19/2022) x 21 days followed by indefinite aspirin therapy**  Okay for discharge from medicine standpoint once place finalized  CECI at discretion of EP  Patient is not alert enough to benefit from diabetic educator, blood sugars now reasonably well controlled with initiation of Lantus-currently on 25 units at bedtime with insulin sliding scale  Will make arrangements for outpatient endocrine referral at discharge     10/13  -Repeat video swallow today, passed with pureed and honey thick liquids.  -Nursing communication to remove NGT and restraints. Hopeful to start precert this afternoon to acceptance to [facility].   -Blood glucose levels remain in the mid 200's, continue to monitor now with switch in diet and off tube feeds.   -Increase insulin to 30 units at bedtime and decrease insulin sliding scale to low-dose  -CBC in a.m. to assess for elevation in white count     10/14:  -CXR for hypoxia (87% room air) and new leukocytosis 92.0  -Awaiting Precert to EDUIN   -Can restart Plavix and asa 10/19/22     10/15:  Still resting comfortably no acute distress  Still remains confused and with expressive aphasia  Blood sugars elevated in spite of increasing Lantus to 30 units yesterday  Check procalcitonin level CBC and BMP in a.m. to assess for possible infectious etiology including aspiration     10/16:  Resting comfortably, no acute complaints-still altered mentation from stroke  Encourage water by mouth  Start D5 water for hypernatremia today, likely from poor oral intake  Increase sliding scale insulin to high-dose,Resume glipizide and metformin-as he takes at home  A.m. labs  White count mildly up at 12,000 but Pro-Rei is normal     10/17:  -Worsening hypernatremia but bedside nurse states his fluids were not running overnight due to loss of IV access, d/c D5 at 100 and start 0.45% @ 100-okay to continue 0.5 normal saline for now given hyperglycemia-  if no improvement in sodium, Change back to D5 water in a.m.  -BMP every 6 hours  -Continues to have elevated blood glucose levels, home metformin and glipizide were restarted yesterday.   -Check beta hydroxy d/t hyperglycemia and worsening anion gap > 1.68   -Upward trend of h/h 19.5/63.7 and RBC 6.65, check ferritin and transferrin      10/18:  -Worsening hypoxia, now on supplemental oxygen with worsening leukocytosis, check CXR and Procal   -Slow improvement in hypernatremia Na+ 153 today, continue IVF 0.45%  -Anion gap resolved with above IV fluids  -Blood sugars improved today with cessation of D5W-and resumption of home diabetic regimen  -Increase sliding scale to next level, initiate/uptitrate Lantus if needed  -Initiate empiric IV antibiotic therapy-Unasyn for concern of aspiration, if procalcitonin elevated in a.m.     10/19:  -Worsening leukocytosis at 21.0, continue IV Unasyn, chest x-ray clear yesterday  -Worsening hypernatremia, switch fluids to D5 at 100, increase Lantus to 45 units nightly, monitor blood glucose levels for further adjustments, continue to follow BMP q6h  Urine culture pending, sodium recently seen and blood sugar reasonably well controlled on current regimen  May need to switch this to sodium bicarb tomorrow if CO2 continues to decline     10/20  -White count improved with continuation of Unasyn  -Sodium level improved to 147 with D5 water  -Blood glucose better with increase in Lantus  -Continue same until pre-CERT obtained at which time he can be discharged  -Give strong consideration to PEG tube actually prior to discharge if family agrees as he is not going to be able to eat and drink with his current mentation     10/21/22:  -Na+ 151 today, repeat BMP at 3 > 148 on discharge   -Blood sugars do remain elevated likely due to D5 running.  -Continue IV Unasyn, will switch to oral Augmentin on D/C     Lengthy conversation with patient's son regarding possible PEG tube placement for free water flushes to prevent readmission once patient is discharged to facility as patient has to be prompted to eat and drink and will likely run into hypernatremia and readmission. Patient's son states his father voiced never wanting to have a PEG tube placed although explained to the son that this does not have to be permanent but just while patient is receiving rehab to hopefully regain the ability to have a normal diet again. Patient son continued to decline PEG tube and realizes the risks and the possibilities of readmissions to the hospital due to inadequate oral intake.      Code Status: Full  Consultants:  Neurointensive care team (S/o), neurology, neuro interventional radiology (S/O), electrophysiology (S/O)    Recent Labs     10/19/22  0547 10/20/22  0145 10/21/22  0555   WBC 21.0* 16.9* 10.5   HGB 16.4 16.0 14.5   HCT 51.9 52.0 46.3  121* 98*       Recent Labs     10/21/22  0117 10/21/22  0642 10/21/22  1405    151* 148*   K 3.6 3.4* 3.5   * 114* 112*   CO2 28 26 25   BUN 24* 18 21   CREATININE 0.9 0.9 0.9   CALCIUM 8.6 8.6 8.9       CT HEAD WO CONTRAST    Result Date: 10/10/2022  EXAMINATION: CT OF THE HEAD WITHOUT CONTRAST  10/9/2022 10:04 pm TECHNIQUE: CT of the head was performed without the administration of intravenous contrast. Automated exposure control, iterative reconstruction, and/or weight based adjustment of the mA/kV was utilized to reduce the radiation dose to as low as reasonably achievable. Total DLP: 5532 mGy COMPARISON: 10/09/2022 HISTORY: ORDERING SYSTEM PROVIDED HISTORY: change in neuro status TECHNOLOGIST PROVIDED HISTORY: Reason for exam:->change in neuro status Has a \"code stroke\" or \"stroke alert\" been called? ->No What reading provider will be dictating this exam?->CRC FINDINGS: BRAIN/VENTRICLES: Study again reveals a zone of developing encephalomalacia in the left occipital lobe and left thalamus. There is a subacute to chronic appearing infarct in the left frontal lobe. These opacities are stable when compared the previous study. No new lesions are identified. There are no signs of acute hemorrhage no midline shift was identified. No abnormal extra-axial fluid collection. The gray-white differentiation is maintained otherwise without evidence of an acute infarct. There is no evidence of hydrocephalus. ORBITS: The visualized portion of the orbits demonstrate no acute abnormality. SINUSES: The visualized paranasal sinuses reveals periosteal mucosal thickening within the right maxillary antrum. The mastoid air cells demonstrate no acute abnormality. SOFT TISSUES/SKULL:  No acute abnormality of the visualized skull or soft tissues. 1.  No acute intracranial abnormality or significant change when compared the previous study. . 2.  Evolving infarcts in the left occipital lobe and thalamus 3. Chronic appearing left frontal lobe infarct, stable     CT HEAD WO CONTRAST    Result Date: 10/10/2022  EXAMINATION: CT OF THE HEAD WITHOUT CONTRAST  10/9/2022 5:35 pm TECHNIQUE: CT of the head was performed without the administration of intravenous contrast. Automated exposure control, iterative reconstruction, and/or weight based adjustment of the mA/kV was utilized to reduce the radiation dose to as low as reasonably achievable. COMPARISON: 10/8 HISTORY: ORDERING SYSTEM PROVIDED HISTORY: 24 hour post TNK TECHNOLOGIST PROVIDED HISTORY: To be performed 24 hours after first CT Head. Reason for exam:->24 hour post TNK Has a \"code stroke\" or \"stroke alert\" been called? ->No What reading provider will be dictating this exam?->CRC FINDINGS: BRAIN/VENTRICLES: There is no acute intracranial hemorrhage, mass effect or midline shift. No abnormal extra-axial fluid collection. There is continued evolution of the left occipital lobe acute/early subacute infarction. No hemorrhagic transformation identified within the left occipital lobe. Similar changes are identified in the left thalamus. There is encephalomalacia in the left MCA territory involving the left insula and left frontal parietal region ORBITS: The visualized portion of the orbits demonstrate no acute abnormality. SINUSES: The visualized paranasal sinuses and mastoid air cells demonstrate no acute abnormality. SOFT TISSUES/SKULL:  No acute abnormality of the visualized skull or soft tissues. Evolution of the acute/early subacute infarct in the left occipital lobe with loss of gray-white differentiation and sulcal effacement. Similar changes are identified in the left thalamus. No evidence for petechial hemorrhage or hemorrhagic transformation. No intracranial hemorrhage or extra-axial collection. Encephalomalacia in the left MCA territory involving the left insula and left frontal parietal region.      CT HEAD WO CONTRAST    Addendum Date: 10/8/2022 ADDENDUM: Results were called by Dr. Diamante Vaca to Dr. Yinka Fletcher on 10/8/2022 at 16:25. Result Date: 10/8/2022  EXAMINATION: CTA OF THE HEAD WITH CONTRAST WITH PERFUSION; CT OF THE HEAD WITHOUT CONTRAST; CTA OF THE HEAD WITH CONTRAST; CTA OF THE NECK 10/8/2022 3:27 pm: TECHNIQUE: CTA of the head/brain was performed with the administration of intravenous contrast. Multiplanar reformatted images are provided for review. MIP images are provided for review. Automated exposure control, iterative reconstruction, and/or weight based adjustment of the mA/kV was utilized to reduce the radiation dose to as low as reasonably achievable.; CT of the head was performed without the administration of intravenous contrast. Automated exposure control, iterative reconstruction, and/or weight based adjustment of the mA/kV was utilized to reduce the radiation dose to as low as reasonably achievable.; CTA of the neck was performed with the administration of intravenous contrast. Multiplanar reformatted images are provided for review. MIP images are provided for review. Stenosis of the internal carotid arteries measured using NASCET criteria. Automated exposure control, iterative reconstruction, and/or weight based adjustment of the mA/kV was utilized to reduce the radiation dose to as low as reasonably achievable. COMPARISON: None. HISTORY: ORDERING SYSTEM PROVIDED HISTORY: stroke TECHNOLOGIST PROVIDED HISTORY: Reason for exam:->stroke Has a \"code stroke\" or \"stroke alert\" been called? ->Yes Decision Support Exception - unselect if not a suspected or confirmed emergency medical condition->Emergency Medical Condition (MA) FINDINGS: Unenhanced CT head: No acute intracranial hemorrhage or edema. No abnormal extra-axial fluid collections. Area of encephalomalacia involving left frontal lobe as well as left occipital lobe. There is no hydrocephalus. CTA head: High-grade stenosis or occlusion involving P2 segment of left PCA.   Right posterior cerebral artery is patent without evidence of stenosis. No stenosis involving anterior cerebral arteries or middle cerebral arteries. No evidence of stenosis. There is a normal vertebrobasilar junction. Basilar artery is patent without evidence of stenosis. No evidence of intracranial aneurysm or AVM. CTA neck: Common carotid arteries are patent without evidence of stenosis. Moderate calcified and noncalcified plaque associated with right carotid bulb and proximal right ICA. No evidence of stenosis involving proximal or distal cervical internal carotid arteries. Both vertebral arteries are patent without evidence of stenosis. CT brain perfusion: Increased mean transit time with corresponding decreased perfusion and decreased cerebral volume at site of chronic left occipital stroke. Additional decreased cerebral blood flow and volume along medial aspect of left occipital lobe. 1.  No acute intracranial hemorrhage or edema. 2. Areas of encephalomalacia suggesting chronic stroke involving left frontal lobe, left insula, and left occipital lobe. 3. High-grade stenosis or occlusion involving P2 segment of left PCA. 4. No stenosis involving internal carotid arteries or vertebral arteries. 5. Abnormal perfusion involving left occipital lobe concerning for infarction. MRI brain with diffusion-weighted imaging could be helpful for further evaluation. CTA NECK W CONTRAST    Addendum Date: 10/8/2022    ADDENDUM: Results were called by Dr. Kennedi Weldon to Dr. Lucila Avalos on 10/8/2022 at 16:25. Result Date: 10/8/2022  EXAMINATION: CTA OF THE HEAD WITH CONTRAST WITH PERFUSION; CT OF THE HEAD WITHOUT CONTRAST; CTA OF THE HEAD WITH CONTRAST; CTA OF THE NECK 10/8/2022 3:27 pm: TECHNIQUE: CTA of the head/brain was performed with the administration of intravenous contrast. Multiplanar reformatted images are provided for review. MIP images are provided for review.  Automated exposure control, iterative reconstruction, and/or weight based adjustment of the mA/kV was utilized to reduce the radiation dose to as low as reasonably achievable.; CT of the head was performed without the administration of intravenous contrast. Automated exposure control, iterative reconstruction, and/or weight based adjustment of the mA/kV was utilized to reduce the radiation dose to as low as reasonably achievable.; CTA of the neck was performed with the administration of intravenous contrast. Multiplanar reformatted images are provided for review. MIP images are provided for review. Stenosis of the internal carotid arteries measured using NASCET criteria. Automated exposure control, iterative reconstruction, and/or weight based adjustment of the mA/kV was utilized to reduce the radiation dose to as low as reasonably achievable. COMPARISON: None. HISTORY: ORDERING SYSTEM PROVIDED HISTORY: stroke TECHNOLOGIST PROVIDED HISTORY: Reason for exam:->stroke Has a \"code stroke\" or \"stroke alert\" been called? ->Yes Decision Support Exception - unselect if not a suspected or confirmed emergency medical condition->Emergency Medical Condition (MA) FINDINGS: Unenhanced CT head: No acute intracranial hemorrhage or edema. No abnormal extra-axial fluid collections. Area of encephalomalacia involving left frontal lobe as well as left occipital lobe. There is no hydrocephalus. CTA head: High-grade stenosis or occlusion involving P2 segment of left PCA. Right posterior cerebral artery is patent without evidence of stenosis. No stenosis involving anterior cerebral arteries or middle cerebral arteries. No evidence of stenosis. There is a normal vertebrobasilar junction. Basilar artery is patent without evidence of stenosis. No evidence of intracranial aneurysm or AVM. CTA neck: Common carotid arteries are patent without evidence of stenosis. Moderate calcified and noncalcified plaque associated with right carotid bulb and proximal right ICA.   No evidence of stenosis involving proximal or distal cervical internal carotid arteries. Both vertebral arteries are patent without evidence of stenosis. CT brain perfusion: Increased mean transit time with corresponding decreased perfusion and decreased cerebral volume at site of chronic left occipital stroke. Additional decreased cerebral blood flow and volume along medial aspect of left occipital lobe. 1.  No acute intracranial hemorrhage or edema. 2. Areas of encephalomalacia suggesting chronic stroke involving left frontal lobe, left insula, and left occipital lobe. 3. High-grade stenosis or occlusion involving P2 segment of left PCA. 4. No stenosis involving internal carotid arteries or vertebral arteries. 5. Abnormal perfusion involving left occipital lobe concerning for infarction. MRI brain with diffusion-weighted imaging could be helpful for further evaluation. CT BRAIN PERFUSION    Addendum Date: 10/8/2022    ADDENDUM: Results were called by Dr. Margy Gibbs to Dr. Mirta Broussard on 10/8/2022 at 16:25. Result Date: 10/8/2022  EXAMINATION: CTA OF THE HEAD WITH CONTRAST WITH PERFUSION; CT OF THE HEAD WITHOUT CONTRAST; CTA OF THE HEAD WITH CONTRAST; CTA OF THE NECK 10/8/2022 3:27 pm: TECHNIQUE: CTA of the head/brain was performed with the administration of intravenous contrast. Multiplanar reformatted images are provided for review. MIP images are provided for review.  Automated exposure control, iterative reconstruction, and/or weight based adjustment of the mA/kV was utilized to reduce the radiation dose to as low as reasonably achievable.; CT of the head was performed without the administration of intravenous contrast. Automated exposure control, iterative reconstruction, and/or weight based adjustment of the mA/kV was utilized to reduce the radiation dose to as low as reasonably achievable.; CTA of the neck was performed with the administration of intravenous contrast. Multiplanar reformatted images are provided for review. MIP images are provided for review. Stenosis of the internal carotid arteries measured using NASCET criteria. Automated exposure control, iterative reconstruction, and/or weight based adjustment of the mA/kV was utilized to reduce the radiation dose to as low as reasonably achievable. COMPARISON: None. HISTORY: ORDERING SYSTEM PROVIDED HISTORY: stroke TECHNOLOGIST PROVIDED HISTORY: Reason for exam:->stroke Has a \"code stroke\" or \"stroke alert\" been called? ->Yes Decision Support Exception - unselect if not a suspected or confirmed emergency medical condition->Emergency Medical Condition (MA) FINDINGS: Unenhanced CT head: No acute intracranial hemorrhage or edema. No abnormal extra-axial fluid collections. Area of encephalomalacia involving left frontal lobe as well as left occipital lobe. There is no hydrocephalus. CTA head: High-grade stenosis or occlusion involving P2 segment of left PCA. Right posterior cerebral artery is patent without evidence of stenosis. No stenosis involving anterior cerebral arteries or middle cerebral arteries. No evidence of stenosis. There is a normal vertebrobasilar junction. Basilar artery is patent without evidence of stenosis. No evidence of intracranial aneurysm or AVM. CTA neck: Common carotid arteries are patent without evidence of stenosis. Moderate calcified and noncalcified plaque associated with right carotid bulb and proximal right ICA. No evidence of stenosis involving proximal or distal cervical internal carotid arteries. Both vertebral arteries are patent without evidence of stenosis. CT brain perfusion: Increased mean transit time with corresponding decreased perfusion and decreased cerebral volume at site of chronic left occipital stroke. Additional decreased cerebral blood flow and volume along medial aspect of left occipital lobe. 1.  No acute intracranial hemorrhage or edema.  2. Areas of encephalomalacia suggesting chronic stroke involving left frontal lobe, left insula, and left occipital lobe. 3. High-grade stenosis or occlusion involving P2 segment of left PCA. 4. No stenosis involving internal carotid arteries or vertebral arteries. 5. Abnormal perfusion involving left occipital lobe concerning for infarction. MRI brain with diffusion-weighted imaging could be helpful for further evaluation. CTA HEAD W CONTRAST    Addendum Date: 10/8/2022    ADDENDUM: Results were called by Dr. Clarisa Salcedo to Dr. Ilda Stone on 10/8/2022 at 16:25. Result Date: 10/8/2022  EXAMINATION: CTA OF THE HEAD WITH CONTRAST WITH PERFUSION; CT OF THE HEAD WITHOUT CONTRAST; CTA OF THE HEAD WITH CONTRAST; CTA OF THE NECK 10/8/2022 3:27 pm: TECHNIQUE: CTA of the head/brain was performed with the administration of intravenous contrast. Multiplanar reformatted images are provided for review. MIP images are provided for review. Automated exposure control, iterative reconstruction, and/or weight based adjustment of the mA/kV was utilized to reduce the radiation dose to as low as reasonably achievable.; CT of the head was performed without the administration of intravenous contrast. Automated exposure control, iterative reconstruction, and/or weight based adjustment of the mA/kV was utilized to reduce the radiation dose to as low as reasonably achievable.; CTA of the neck was performed with the administration of intravenous contrast. Multiplanar reformatted images are provided for review. MIP images are provided for review. Stenosis of the internal carotid arteries measured using NASCET criteria. Automated exposure control, iterative reconstruction, and/or weight based adjustment of the mA/kV was utilized to reduce the radiation dose to as low as reasonably achievable. COMPARISON: None. HISTORY: ORDERING SYSTEM PROVIDED HISTORY: stroke TECHNOLOGIST PROVIDED HISTORY: Reason for exam:->stroke Has a \"code stroke\" or \"stroke alert\" been called? ->Yes Decision Support Exception - unselect if not a suspected or confirmed emergency medical condition->Emergency Medical Condition (MA) FINDINGS: Unenhanced CT head: No acute intracranial hemorrhage or edema. No abnormal extra-axial fluid collections. Area of encephalomalacia involving left frontal lobe as well as left occipital lobe. There is no hydrocephalus. CTA head: High-grade stenosis or occlusion involving P2 segment of left PCA. Right posterior cerebral artery is patent without evidence of stenosis. No stenosis involving anterior cerebral arteries or middle cerebral arteries. No evidence of stenosis. There is a normal vertebrobasilar junction. Basilar artery is patent without evidence of stenosis. No evidence of intracranial aneurysm or AVM. CTA neck: Common carotid arteries are patent without evidence of stenosis. Moderate calcified and noncalcified plaque associated with right carotid bulb and proximal right ICA. No evidence of stenosis involving proximal or distal cervical internal carotid arteries. Both vertebral arteries are patent without evidence of stenosis. CT brain perfusion: Increased mean transit time with corresponding decreased perfusion and decreased cerebral volume at site of chronic left occipital stroke. Additional decreased cerebral blood flow and volume along medial aspect of left occipital lobe. 1.  No acute intracranial hemorrhage or edema. 2. Areas of encephalomalacia suggesting chronic stroke involving left frontal lobe, left insula, and left occipital lobe. 3. High-grade stenosis or occlusion involving P2 segment of left PCA. 4. No stenosis involving internal carotid arteries or vertebral arteries. 5. Abnormal perfusion involving left occipital lobe concerning for infarction. MRI brain with diffusion-weighted imaging could be helpful for further evaluation.      IR MECHANICAL ART THROMBECTOMY INTRACRANIAL    Result Date: 10/10/2022  IR MECHANICAL ART THROMBECTOMY INTRACRANIAL PROCEDURE PERFORMED: Cerebral angiogram with mechanical thrombectomy of Left Posterior cerebral artery thrombus Intraoperative Lety CT Limited CT with interpretation COMPLETED DATE:  10/8/2022 6:42 PM CLINICAL HISTORY/PRE-PROCEDURE DIAGNOSIS: Acute ischemic stroke. Patient with a NIH stroke scale representing a left MCA syndrome along with hemineglect and visual field deficits. Patient did receive 10 K POST-PROCEDURE DIAGNOSIS: Please see below COMPARISON: CT angiographic documentation done on the same day ANESTHESIA: Conscious sedation and local anesthesia VESSELS CATHETERIZED: Left vertebral artery, basilar artery, left posterior cerebral artery. RADIATION EXPOSURE DATA:  529.0 MG Y TOTAL FLUOROSCOPY TIME: 7 minutes and 1 second CONTRAST USED: 40 mL of Isovue-300 PROCEDURE: Following informed consent, the patient was brought to the angiography suite, both groins are prepped and draped in usual sterile manner. Vascular access was obtained in the right femoral artery. Following this a short sheath was placed and connected to continuous heparinized saline. A 8 [place] cerebase access catheter along with the diagnostic catheter was taken up into the left vertebral artery. The cerebrabase then catheterized and telescoped into the left vertebral artery which was the dominant vertebral artery noticed from the CT angiographic documentation. Following this a Natasha and rebar was taken up into the basilar artery and the Rebar was taken into the PCA. The Rebar could not be taken past the P2 segment hence the repeat Rebar was parked and the Benton was taken up into the P2 segment and aspiration performed for approximately 2 minutes. Large amount of thrombus was identified in the POD system. Following this repeat angiographic documentation revealed filling of the basilar tip and filling of the left posterior cerebral artery and its branches. There is still some residual stenosis noticed.   Postretrieval digital subtraction images were obtained and showed revascularization. Intraoperative Lety CT was obtained which was negative for intracranial hemorrhage. Following this groin was closed with an 8 Jose Wexford Angio-Seal. Anesthesia was reversed and patient was transferred to the ICU in a stable condition. STROKE ACUTE TIME STAMPS: Preprocedure NIH stroke scale: 15 Vascular access time:1844 Time of 1st angiographic assessment: 1850 Time First Pass:1858 TICI 3, Natasha Aspiration Time of final recanalization:1858 Time of vascular closure:1907 Postprocedure NIH stroke scale:not assessed due to anesthesia INTERPRETATION OF IMAGES: 1. Left  vertebral artery injections: Left vertebral artery is dominant as seen from the CTA. There is slow flow seen to the tip of the basilar this may be also pseudo-occlusion as the only distal vessel would be the left PCA as the patient has a true fetal-type right PCA with a hypoplastic right P1 segment. Delayed flow seen into the P1 however no flow seen past this segment of the left PCA 2. Postprocedure vertebral artery injections: Complete recanalization there is residual stenosis of the PCA however this is not seeming to be significant on the lateral projections. The right PCA is barely seen because the right PCA is a true fetal PCA arising from the anterior circulation. Recanalization is TICI 3 3. Intraoperative Lety CT Limited CT with interpretation. Intraoperative Lety CT was obtained and transferred to an independent workstation where the axial coronal and sagittal images were reconstructed and reviewed. There is no evidence of intracranial hemorrhage. IMPRESSION/RECOMMENDATIONS: 1. Left PCA occlusion causing slow flow to the basilar tip/pseudo occlusion of the basilar tip 2. Successful thrombectomy of the left posterior cerebral artery with TICI 3 recanalization. Postprocedure neurological and hemodynamic monitoring was recommended. Patients:  If you have questions regarding some of the verbiage in your report, please visit [email or web address] for a definition. If you have any other questions, please contact your physician. Discharge Exam:    HEENT: NCAT,  PERRLA, No JVD  Heart:  RRR, no murmurs, gallops, or rubs. Lungs:  CTA bilaterally, no wheeze, rales or rhonchi  Abd: bowel sounds present, nontender, nondistended, no masses  Extrem:  No clubbing, cyanosis, or edema    Disposition: [facility]     Patient Condition at Discharge: Stable     Patient Instructions:      Medication List        START taking these medications      amLODIPine 10 MG tablet  Commonly known as: NORVASC  1 tablet by Per NG tube route daily  Start taking on: October 22, 2022     amoxicillin-clavulanate 1000-62.5 MG per extended release tablet  Commonly known as:  Augmentin XR  Take 1 tablet by mouth 2 times daily for 7 days     clopidogrel 75 MG tablet  Commonly known as: PLAVIX  Take 1 tablet by mouth daily for 18 days  Start taking on: October 22, 2022     insulin glargine-yfgn 100 UNIT/ML injection vial  Commonly known as: SEMGLEE-YFGN  Inject 30 Units into the skin nightly            CHANGE how you take these medications      atorvastatin 80 MG tablet  Commonly known as: LIPITOR  1 tablet by Per NG tube route nightly  What changed:   medication strength  how much to take  how to take this     metFORMIN 500 MG tablet  Commonly known as: GLUCOPHAGE  Take 1 tablet by mouth 2 times daily (with meals)  What changed: how much to take            CONTINUE taking these medications      acetaminophen 325 MG tablet  Commonly known as: TYLENOL     albuterol sulfate  (90 Base) MCG/ACT inhaler  Commonly known as: Proventil HFA  Inhale 2 puffs into the lungs every 4 hours as needed for Wheezing     ALLEGRA-D 12 HOUR PO     aluminum & magnesium hydroxide-simethicone 200-200-20 MG/5ML Susp suspension  Commonly known as: MAALOX     aspirin 81 MG EC tablet  Take 1 tablet by mouth daily     Claritin 10 MG capsule  Generic drug: loratadine Dextromethorphan-guaiFENesin  MG/5ML Syrp     docusate sodium 100 MG capsule  Commonly known as: COLACE     glipiZIDE 5 MG tablet  Commonly known as: GLUCOTROL  Take 2 tablets by mouth every morning (before breakfast)     hydrOXYzine HCl 25 MG tablet  Commonly known as: ATARAX     loperamide 2 MG capsule  Commonly known as: IMODIUM     magnesium hydroxide 400 MG/5ML suspension  Commonly known as: MILK OF MAGNESIA     mirtazapine 15 MG tablet  Commonly known as: REMERON     rivastigmine 9.5 MG/24HR  Commonly known as: EXELON     vitamin B-12 1000 MCG tablet  Commonly known as: CYANOCOBALAMIN  Take 1 tablet by mouth daily            STOP taking these medications      donepezil 5 MG tablet  Commonly known as: Aricept     HYDROcodone-acetaminophen 5-325 MG per tablet  Commonly known as: NORCO     vitamin C 250 MG tablet               Where to Get Your Medications        Information about where to get these medications is not yet available    Ask your nurse or doctor about these medications  amLODIPine 10 MG tablet  amoxicillin-clavulanate 1000-62.5 MG per extended release tablet  atorvastatin 80 MG tablet  clopidogrel 75 MG tablet  glipiZIDE 5 MG tablet  insulin glargine-yfgn 100 UNIT/ML injection vial       Activity: activity as tolerated  Diet: Diabetic diet, pureed and honey thick     Pt has been advised to: Follow-up with Alek Reveles DO in 1 week. Follow-up with consultants as recommended by them    Note that over 30 minutes was spent in preparing discharge papers, discussing discharge with patient, medication review, etc.    Signed:  CARROLL Harry CNP  10/21/2022  3:45 PM     Above note edited to reflect my thoughts     I personally saw, examined and provided care for the patient. Radiographs, labs and medication list were reviewed by me independently. The case was discussed in detail and plans for care were established.  Review of CHEYANNE Harry   , documentation was conducted and revisions were made as appropriate directly by me. I agree with the above documented exam, problem list, and plan of care.      Wilver Boyce MD  7:58 PM  10/21/2022

## 2022-10-21 NOTE — PROGRESS NOTES
[facility]   Progress note      Subjective:    No acute issues or events overnight  Remains at his baseline    Objective:    /71   Pulse 76   Temp 97.2 °F (36.2 °C) (Temporal)   Resp 17   Ht 6' 3\" (1.905 m)   Wt 225 lb (102.1 kg)   SpO2 92%   BMI 28.12 kg/m²     In: 120 [P.O.:120]  Out: 800   In: 120   Out: 800 [Urine:800]    General appearance: NAD, not conversant, however does follow some commands  HEENT: AT/NC, MMM  Neck: FROM, supple  Lungs: diminished   CV: RRR, no MRGs  Vasc: Radial pulses 2+  Abdomen: Soft, non-tender; no masses or HSM  Extremities: No peripheral edema or digital cyanosis  Skin: no rash, lesions or ulcers  Psych: Alert and oriented to person only        Recent Labs     10/19/22  0547 10/20/22  0145 10/21/22  0555   WBC 21.0* 16.9* 10.5   HGB 16.4 16.0 14.5   HCT 51.9 52.0 46.3    121* 98*         Recent Labs     10/20/22  2016 10/21/22  0117 10/21/22  0642   * 145 151*   K 3.6 3.6 3.4*   * 111* 114*   CO2 22 28 26   BUN 24* 24* 18   CREATININE 1.0 0.9 0.9   CALCIUM 8.9 8.6 8.6         Assessment:    Principal Problem:    Acute CVA (cerebrovascular accident) ([facility] [address]  Active Problems:    History of stroke    Dementia (HCC)    Type 2 diabetes mellitus ([facility] [address]  Resolved Problems:    * No resolved hospital problems.  *      Plan:  64-year-old male with a history of a CVA in 2020 presented to the ED with altered mental status and slurred speech and patient was admitted postop to ICU with     Acute ischemic CVA with left PCA occlusion    10/8/2022   Successful thrombectomy of left PCA  MRI pending  CT head completed with no intracranial hemorrhage, evolution of acute or early subacute infarcts in the left occipital lobe  Postprocedure CT head pending  NIHSS score q shift  Hold antiplatelets for 24 hours post TN K  Monitor blood pressure-maintain SBP less than 150 and MAP greater than 65 adjust medications as needed.-As needed IV labetalol  Discussed risk factor modification. ICU vitals  Echo-pending    10/10/2022  Remains completely confused, extremely agitated today with nursing  MRI pending-Will need medication  As needed Haldol every 12 for severe agitation  Echocardiogram essentially completely uninformative-discussed with echo tech at bedside  May benefit from CECI given appearance of multiple infarcts to rule out thrombus  Resume antiplatelet agents at discretion of neurology   blood pressure management-currently optimally controlled on multiple agents-amlodipine, as needed hydralazine and labetalol, add diuretic  Insulin sliding scale plus Lantus insulin on board with Noble control, uptitrate Lantus tonight if persistent hyperglycemia    10/11/2022  Remains in two-point restraints  Tolerated MRI post sedation-acute infarct left temporal and occipital lobe with thalamic hemorrhage  Postprocedure CT head with stable left posterior cerebral artery infarct with small focus of active conversion in left thalamus  Hold off on antiplatelet agents chemical DVT prophylaxis for now due to above findings  30-day event monitor at discharge  Blood pressure adequately controlled  Initiate tube feeds for nutrition  Blood glucose marginally well controlled-uptitrate Lantus to 25 units at bedtime    10/12/2022  Will need subacute rehab for ongoing PT/OT/speech therapy  Per neurology, start dual antiplatelet therapy in 1 week (10/19/2022) x 21 days followed by indefinite aspirin therapy**  Okay for discharge from medicine standpoint once place finalized  CECI at discretion of EP  Patient is not alert enough to benefit from diabetic educator, blood sugars now reasonably well controlled with initiation of Lantus-currently on 25 units at bedtime with insulin sliding scale  Will make arrangements for outpatient endocrine referral at discharge    10/13  -Repeat video swallow today, passed with pureed and honey thick liquids.  -Nursing communication to remove NGT and restraints. Hopeful to start precert this afternoon to acceptance to [facility].   -Blood glucose levels remain in the mid 200's, continue to monitor now with switch in diet and off tube feeds.   -Increase insulin to 30 units at bedtime and decrease insulin sliding scale to low-dose  -CBC in a.m. to assess for elevation in white count    10/14:  -CXR for hypoxia (87% room air) and new leukocytosis 31.5  -Awaiting Precert to [facility]   -Can restart Plavix and asa 10/19/22    10/15:  Still resting comfortably no acute distress  Still remains confused and with expressive aphasia  Blood sugars elevated in spite of increasing Lantus to 30 units yesterday  Check procalcitonin level CBC and BMP in a.m. to assess for possible infectious etiology including aspiration    10/16:  Resting comfortably, no acute complaints-still altered mentation from stroke  Encourage water by mouth  Start D5 water for hypernatremia today, likely from poor oral intake  Increase sliding scale insulin to high-dose,Resume glipizide and metformin-as he takes at home  A.m. labs  White count mildly up at 12,000 but Pro-Rei is normal    10/17:  -Worsening hypernatremia but bedside nurse states his fluids were not running overnight due to loss of IV access, d/c D5 at 100 and start 0.45% @ 100-okay to continue 0.5 normal saline for now given hyperglycemia-  if no improvement in sodium, Change back to D5 water in a.m.  -BMP every 6 hours  -Continues to have elevated blood glucose levels, home metformin and glipizide were restarted yesterday.   -Check beta hydroxy d/t hyperglycemia and worsening anion gap > 1.68   -Upward trend of h/h 19.5/63.7 and RBC 6.65, check ferritin and transferrin     10/18:  -Worsening hypoxia, now on supplemental oxygen with worsening leukocytosis, check CXR and Procal   -Slow improvement in hypernatremia Na+ 153 today, continue IVF 0.45%  -Anion gap resolved with above IV fluids  -Blood sugars improved today with cessation of D5W-and resumption of home diabetic regimen  -Increase sliding scale to next level, initiate/uptitrate Lantus if needed  -Initiate empiric IV antibiotic therapy-Unasyn for concern of aspiration, if procalcitonin elevated in a.m.    10/19:  -Worsening leukocytosis at 21.0, continue IV Unasyn, chest x-ray clear yesterday  -Worsening hypernatremia, switch fluids to D5 at 100, increase Lantus to 45 units nightly, monitor blood glucose levels for further adjustments, continue to follow BMP q6h  Urine culture pending, sodium recently seen and blood sugar reasonably well controlled on current regimen  May need to switch this to sodium bicarb tomorrow if CO2 continues to decline    10/20  -White count improved with continuation of Unasyn  -Sodium level improved to 147 with D5 water  -Blood glucose better with increase in Lantus  -Continue same until pre-CERT obtained at which time he can be discharged  -Give strong consideration to PEG tube actually prior to discharge if family agrees as he is not going to be able to eat and drink with his current mentation    10/21/22:  -Na+ 151 today, repeat BMP at 3  -Blood sugars do remain elevated likely due to D5 running.  -Continue IV Unasyn, will switch to oral ABX on discharge    Lengthy conversation with patient's son regarding possible PEG tube placement for free water flushes to prevent readmission once patient is discharged to facility as patient has to be prompted to eat and drink and will likely run into hypernatremia and readmission. Patient's son states his father voiced never wanting to have a PEG tube placed although explained to the son that this does not have to be permanent but just while patient is receiving rehab to hopefully regain the ability to have a normal diet again. Patient son continued to decline PEG tube and realizes the risks and the possibilities of readmissions to the hospital due to inadequate oral intake.     Code Status: Full  Consultants:  Neurointensive care team (S/o), neurology, neuro interventional radiology (S/O), electrophysiology (S/O)  DVT Prophylaxis   PT/OT  Discharge planning EDUIN once precert is obtained       CARROLL Calloway CNP  3:10 PM  10/21/2022     Above note edited to reflect my thoughts     I personally saw, examined and provided care for the patient. Radiographs, labs and medication list were reviewed by me independently. The case was discussed in detail and plans for care were established. Review of CHEYANNE Calloway   , documentation was conducted and revisions were made as appropriate directly by me. I agree with the above documented exam, problem list, and plan of care.      Verónica Mendoza MD  7:57 PM  10/21/2022

## 2022-10-21 NOTE — PROGRESS NOTES
Spoke to patient's son, Isatu Johns., all questions answered in length and detail regarding the normal lab values in comparison to his fathers. Essence Amanda states that he is unclear why his father has to stay until after 3 pm to have his labs drawn. The patients son questions on exactly what we are looking for in order to discharge and why we are not able to treat this outpatient.

## 2022-10-21 NOTE — DISCHARGE SUMMARY
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  Postprocedure CT head with stable left posterior cerebral artery infarct with small focus of active conversion in left thalamus  Hold off on antiplatelet agents chemical DVT prophylaxis for now due to above findings  30-day event monitor at discharge  Blood pressure adequately controlled  Initiate tube feeds for nutrition  Blood glucose marginally well controlled-uptitrate Lantus to 25 units at bedtime     10/12/2022  Will need subacute rehab for ongoing PT/OT/speech therapy  Per neurology, start dual antiplatelet therapy in 1 week (10/19/2022) x 21 days followed by indefinite aspirin therapy**  Okay for discharge from medicine standpoint once place finalized  CECI at discretion of EP  Patient is not alert enough to benefit from diabetic educator, blood sugars now reasonably well controlled with initiation of Lantus-currently on 25 units at bedtime with insulin sliding scale  Will make arrangements for outpatient endocrine referral at discharge     10/13  -Repeat video swallow today, passed with pureed and honey thick liquids.  -Nursing communication to remove NGT and restraints. Hopeful to start precert this afternoon to acceptance to [facility].   -Blood glucose levels remain in the mid 200's, continue to monitor now with switch in diet and off tube feeds.   -Increase insulin to 30 units at bedtime and decrease insulin sliding scale to low-dose  -CBC in a.m. to assess for elevation in white count     10/14:  -CXR for hypoxia (87% room air) and new leukocytosis 71.0  -Awaiting Precert to EDUIN   -Can restart Plavix and asa 10/19/22     10/15:  Still resting comfortably no acute distress  Still remains confused and with expressive aphasia  Blood sugars elevated in spite of increasing Lantus to 30 units yesterday  Check procalcitonin level CBC and BMP in a.m. to assess for possible infectious etiology including aspiration     10/16:  Resting comfortably, no acute complaints-still altered mentation from stroke  Encourage water by mouth  Start D5 water for hypernatremia today, likely from poor oral intake  Increase sliding scale insulin to high-dose,Resume glipizide and metformin-as he takes at home  A.m. labs  White count mildly up at 12,000 but Pro-Rei is normal     10/17:  -Worsening hypernatremia but bedside nurse states his fluids were not running overnight due to loss of IV access, d/c D5 at 100 and start 0.45% @ 100-okay to continue 0.5 normal saline for now given hyperglycemia-  if no improvement in sodium, Change back to D5 water in a.m.  -BMP every 6 hours  -Continues to have elevated blood glucose levels, home metformin and glipizide were restarted yesterday.   -Check beta hydroxy d/t hyperglycemia and worsening anion gap > 1.68   -Upward trend of h/h 19.5/63.7 and RBC 6.65, check ferritin and transferrin      10/18:  -Worsening hypoxia, now on supplemental oxygen with worsening leukocytosis, check CXR and Procal   -Slow improvement in hypernatremia Na+ 153 today, continue IVF 0.45%  -Anion gap resolved with above IV fluids  -Blood sugars improved today with cessation of D5W-and resumption of home diabetic regimen  -Increase sliding scale to next level, initiate/uptitrate Lantus if needed  -Initiate empiric IV antibiotic therapy-Unasyn for concern of aspiration, if procalcitonin elevated in a.m.     10/19:  -Worsening leukocytosis at 21.0, continue IV Unasyn, chest x-ray clear yesterday  -Worsening hypernatremia, switch fluids to D5 at 100, increase Lantus to 45 units nightly, monitor blood glucose levels for further adjustments, continue to follow BMP q6h  Urine culture pending, sodium recently seen and blood sugar reasonably well controlled on current regimen  May need to switch this to sodium bicarb tomorrow if CO2 continues to decline     10/20  -White count improved with continuation of Unasyn  -Sodium level improved to 147 with D5 water  -Blood glucose better with increase in Lantus  -Continue same until pre-CERT obtained at which time he can be discharged  -Give strong consideration to PEG tube actually prior to discharge if family agrees as he is not going to be able to eat and drink with his current mentation     10/21/22:  -Na+ 151 today, repeat BMP at 3 > 148 on discharge   -Blood sugars do remain elevated likely due to D5 running.  -Continue IV Unasyn, will switch to oral Augmentin on D/C to complete a course     Lengthy conversation with patient's son regarding possible PEG tube placement for free water flushes to prevent readmission once patient is discharged to facility as patient has to be prompted to eat and drink and will likely run into hypernatremia and readmission. Patient's son states his father voiced never wanting to have a PEG tube placed although explained to the son that this does not have to be permanent but just while patient is receiving rehab to hopefully regain the ability to have a normal diet again. Patient son continued to decline PEG tube and realizes the risks and the possibilities of readmissions to the hospital due to inadequate oral intake.     Unfortunately risk of readmission remains high     Code Status: Full  Consultants:  Neurointensive care team (S/o), neurology, neuro interventional radiology (S/O), electrophysiology (S/O)    Recent Labs     10/19/22  0547 10/20/22  0148 10/21/22  0555   WBC 21.0* 16.9* 10.5   HGB 16.4 16.0 14.5   HCT 51.9 52.0 46.3    121* 98*         Recent Labs     10/21/22  0117 10/21/22  0642 10/21/22  1405    151* 148*   K 3.6 3.4* 3.5   * 114* 112*   CO2 28 26 25   BUN 24* 18 21   CREATININE 0.9 0.9 0.9   CALCIUM 8.6 8.6 8.9         CT HEAD WO CONTRAST    Result Date: 10/10/2022  EXAMINATION: CT OF THE HEAD WITHOUT CONTRAST  10/9/2022 10:04 pm TECHNIQUE: CT of the head was performed without the administration of intravenous contrast. Automated exposure control, iterative reconstruction, and/or weight based adjustment of the mA/kV was utilized to reduce the radiation dose to as low as reasonably achievable. Total DLP: 5575 mGy COMPARISON: 10/09/2022 HISTORY: ORDERING SYSTEM PROVIDED HISTORY: change in neuro status TECHNOLOGIST PROVIDED HISTORY: Reason for exam:->change in neuro status Has a \"code stroke\" or \"stroke alert\" been called? ->No What reading provider will be dictating this exam?->CRC FINDINGS: BRAIN/VENTRICLES: Study again reveals a zone of developing encephalomalacia in the left occipital lobe and left thalamus. There is a subacute to chronic appearing infarct in the left frontal lobe. These opacities are stable when compared the previous study. No new lesions are identified. There are no signs of acute hemorrhage no midline shift was identified. No abnormal extra-axial fluid collection. The gray-white differentiation is maintained otherwise without evidence of an acute infarct. There is no evidence of hydrocephalus. ORBITS: The visualized portion of the orbits demonstrate no acute abnormality. SINUSES: The visualized paranasal sinuses reveals periosteal mucosal thickening within the right maxillary antrum. The mastoid air cells demonstrate no acute abnormality. SOFT TISSUES/SKULL:  No acute abnormality of the visualized skull or soft tissues.      1.  No acute intracranial abnormality or significant change when compared the previous study. . 2.  Evolving infarcts in the left occipital lobe and thalamus 3. Chronic appearing left frontal lobe infarct, stable     CT HEAD WO CONTRAST    Result Date: 10/10/2022  EXAMINATION: CT OF THE HEAD WITHOUT CONTRAST  10/9/2022 5:35 pm TECHNIQUE: CT of the head was performed without the administration of intravenous contrast. Automated exposure control, iterative reconstruction, and/or weight based adjustment of the mA/kV was utilized to reduce the radiation dose to as low as reasonably achievable. COMPARISON: 10/8 HISTORY: ORDERING SYSTEM PROVIDED HISTORY: 24 hour post TNK TECHNOLOGIST PROVIDED HISTORY: To be performed 24 hours after first CT Head. Reason for exam:->24 hour post TNK Has a \"code stroke\" or \"stroke alert\" been called? ->No What reading provider will be dictating this exam?->CRC FINDINGS: BRAIN/VENTRICLES: There is no acute intracranial hemorrhage, mass effect or midline shift. No abnormal extra-axial fluid collection. There is continued evolution of the left occipital lobe acute/early subacute infarction. No hemorrhagic transformation identified within the left occipital lobe. Similar changes are identified in the left thalamus. There is encephalomalacia in the left MCA territory involving the left insula and left frontal parietal region ORBITS: The visualized portion of the orbits demonstrate no acute abnormality. SINUSES: The visualized paranasal sinuses and mastoid air cells demonstrate no acute abnormality. SOFT TISSUES/SKULL:  No acute abnormality of the visualized skull or soft tissues. Evolution of the acute/early subacute infarct in the left occipital lobe with loss of gray-white differentiation and sulcal effacement. Similar changes are identified in the left thalamus. No evidence for petechial hemorrhage or hemorrhagic transformation. No intracranial hemorrhage or extra-axial collection.  Encephalomalacia in the left MCA territory involving the left insula and left frontal parietal region. CT HEAD WO CONTRAST    Addendum Date: 10/8/2022    ADDENDUM: Results were called by Dr. Mars Manual to Dr. Mariel Adams on 10/8/2022 at 16:25. Result Date: 10/8/2022  EXAMINATION: CTA OF THE HEAD WITH CONTRAST WITH PERFUSION; CT OF THE HEAD WITHOUT CONTRAST; CTA OF THE HEAD WITH CONTRAST; CTA OF THE NECK 10/8/2022 3:27 pm: TECHNIQUE: CTA of the head/brain was performed with the administration of intravenous contrast. Multiplanar reformatted images are provided for review. MIP images are provided for review. Automated exposure control, iterative reconstruction, and/or weight based adjustment of the mA/kV was utilized to reduce the radiation dose to as low as reasonably achievable.; CT of the head was performed without the administration of intravenous contrast. Automated exposure control, iterative reconstruction, and/or weight based adjustment of the mA/kV was utilized to reduce the radiation dose to as low as reasonably achievable.; CTA of the neck was performed with the administration of intravenous contrast. Multiplanar reformatted images are provided for review. MIP images are provided for review. Stenosis of the internal carotid arteries measured using NASCET criteria. Automated exposure control, iterative reconstruction, and/or weight based adjustment of the mA/kV was utilized to reduce the radiation dose to as low as reasonably achievable. COMPARISON: None. HISTORY: ORDERING SYSTEM PROVIDED HISTORY: stroke TECHNOLOGIST PROVIDED HISTORY: Reason for exam:->stroke Has a \"code stroke\" or \"stroke alert\" been called? ->Yes Decision Support Exception - unselect if not a suspected or confirmed emergency medical condition->Emergency Medical Condition (MA) FINDINGS: Unenhanced CT head: No acute intracranial hemorrhage or edema. No abnormal extra-axial fluid collections. Area of encephalomalacia involving left frontal lobe as well as left occipital lobe. There is no hydrocephalus.  CTA head: High-grade stenosis or occlusion involving P2 segment of left PCA. Right posterior cerebral artery is patent without evidence of stenosis. No stenosis involving anterior cerebral arteries or middle cerebral arteries. No evidence of stenosis. There is a normal vertebrobasilar junction. Basilar artery is patent without evidence of stenosis. No evidence of intracranial aneurysm or AVM. CTA neck: Common carotid arteries are patent without evidence of stenosis. Moderate calcified and noncalcified plaque associated with right carotid bulb and proximal right ICA. No evidence of stenosis involving proximal or distal cervical internal carotid arteries. Both vertebral arteries are patent without evidence of stenosis. CT brain perfusion: Increased mean transit time with corresponding decreased perfusion and decreased cerebral volume at site of chronic left occipital stroke. Additional decreased cerebral blood flow and volume along medial aspect of left occipital lobe. 1.  No acute intracranial hemorrhage or edema. 2. Areas of encephalomalacia suggesting chronic stroke involving left frontal lobe, left insula, and left occipital lobe. 3. High-grade stenosis or occlusion involving P2 segment of left PCA. 4. No stenosis involving internal carotid arteries or vertebral arteries. 5. Abnormal perfusion involving left occipital lobe concerning for infarction. MRI brain with diffusion-weighted imaging could be helpful for further evaluation. CTA NECK W CONTRAST    Addendum Date: 10/8/2022    ADDENDUM: Results were called by Dr. Adelita Kumar to Dr. Bimal Campbell on 10/8/2022 at 16:25. Result Date: 10/8/2022  EXAMINATION: CTA OF THE HEAD WITH CONTRAST WITH PERFUSION; CT OF THE HEAD WITHOUT CONTRAST; CTA OF THE HEAD WITH CONTRAST; CTA OF THE NECK 10/8/2022 3:27 pm: TECHNIQUE: CTA of the head/brain was performed with the administration of intravenous contrast. Multiplanar reformatted images are provided for review.   MIP images are provided for review. Automated exposure control, iterative reconstruction, and/or weight based adjustment of the mA/kV was utilized to reduce the radiation dose to as low as reasonably achievable.; CT of the head was performed without the administration of intravenous contrast. Automated exposure control, iterative reconstruction, and/or weight based adjustment of the mA/kV was utilized to reduce the radiation dose to as low as reasonably achievable.; CTA of the neck was performed with the administration of intravenous contrast. Multiplanar reformatted images are provided for review. MIP images are provided for review. Stenosis of the internal carotid arteries measured using NASCET criteria. Automated exposure control, iterative reconstruction, and/or weight based adjustment of the mA/kV was utilized to reduce the radiation dose to as low as reasonably achievable. COMPARISON: None. HISTORY: ORDERING SYSTEM PROVIDED HISTORY: stroke TECHNOLOGIST PROVIDED HISTORY: Reason for exam:->stroke Has a \"code stroke\" or \"stroke alert\" been called? ->Yes Decision Support Exception - unselect if not a suspected or confirmed emergency medical condition->Emergency Medical Condition (MA) FINDINGS: Unenhanced CT head: No acute intracranial hemorrhage or edema. No abnormal extra-axial fluid collections. Area of encephalomalacia involving left frontal lobe as well as left occipital lobe. There is no hydrocephalus. CTA head: High-grade stenosis or occlusion involving P2 segment of left PCA. Right posterior cerebral artery is patent without evidence of stenosis. No stenosis involving anterior cerebral arteries or middle cerebral arteries. No evidence of stenosis. There is a normal vertebrobasilar junction. Basilar artery is patent without evidence of stenosis. No evidence of intracranial aneurysm or AVM. CTA neck: Common carotid arteries are patent without evidence of stenosis.   Moderate calcified and noncalcified plaque associated with right carotid bulb and proximal right ICA. No evidence of stenosis involving proximal or distal cervical internal carotid arteries. Both vertebral arteries are patent without evidence of stenosis. CT brain perfusion: Increased mean transit time with corresponding decreased perfusion and decreased cerebral volume at site of chronic left occipital stroke. Additional decreased cerebral blood flow and volume along medial aspect of left occipital lobe. 1.  No acute intracranial hemorrhage or edema. 2. Areas of encephalomalacia suggesting chronic stroke involving left frontal lobe, left insula, and left occipital lobe. 3. High-grade stenosis or occlusion involving P2 segment of left PCA. 4. No stenosis involving internal carotid arteries or vertebral arteries. 5. Abnormal perfusion involving left occipital lobe concerning for infarction. MRI brain with diffusion-weighted imaging could be helpful for further evaluation. CT BRAIN PERFUSION    Addendum Date: 10/8/2022    ADDENDUM: Results were called by Dr. Margy Gibbs to Dr. Mirta Broussard on 10/8/2022 at 16:25. Result Date: 10/8/2022  EXAMINATION: CTA OF THE HEAD WITH CONTRAST WITH PERFUSION; CT OF THE HEAD WITHOUT CONTRAST; CTA OF THE HEAD WITH CONTRAST; CTA OF THE NECK 10/8/2022 3:27 pm: TECHNIQUE: CTA of the head/brain was performed with the administration of intravenous contrast. Multiplanar reformatted images are provided for review. MIP images are provided for review.  Automated exposure control, iterative reconstruction, and/or weight based adjustment of the mA/kV was utilized to reduce the radiation dose to as low as reasonably achievable.; CT of the head was performed without the administration of intravenous contrast. Automated exposure control, iterative reconstruction, and/or weight based adjustment of the mA/kV was utilized to reduce the radiation dose to as low as reasonably achievable.; CTA of the neck was performed with the administration of intravenous contrast. Multiplanar reformatted images are provided for review. MIP images are provided for review. Stenosis of the internal carotid arteries measured using NASCET criteria. Automated exposure control, iterative reconstruction, and/or weight based adjustment of the mA/kV was utilized to reduce the radiation dose to as low as reasonably achievable. COMPARISON: None. HISTORY: ORDERING SYSTEM PROVIDED HISTORY: stroke TECHNOLOGIST PROVIDED HISTORY: Reason for exam:->stroke Has a \"code stroke\" or \"stroke alert\" been called? ->Yes Decision Support Exception - unselect if not a suspected or confirmed emergency medical condition->Emergency Medical Condition (MA) FINDINGS: Unenhanced CT head: No acute intracranial hemorrhage or edema. No abnormal extra-axial fluid collections. Area of encephalomalacia involving left frontal lobe as well as left occipital lobe. There is no hydrocephalus. CTA head: High-grade stenosis or occlusion involving P2 segment of left PCA. Right posterior cerebral artery is patent without evidence of stenosis. No stenosis involving anterior cerebral arteries or middle cerebral arteries. No evidence of stenosis. There is a normal vertebrobasilar junction. Basilar artery is patent without evidence of stenosis. No evidence of intracranial aneurysm or AVM. CTA neck: Common carotid arteries are patent without evidence of stenosis. Moderate calcified and noncalcified plaque associated with right carotid bulb and proximal right ICA. No evidence of stenosis involving proximal or distal cervical internal carotid arteries. Both vertebral arteries are patent without evidence of stenosis. CT brain perfusion: Increased mean transit time with corresponding decreased perfusion and decreased cerebral volume at site of chronic left occipital stroke. Additional decreased cerebral blood flow and volume along medial aspect of left occipital lobe.      1.  No acute intracranial hemorrhage or edema. 2. Areas of encephalomalacia suggesting chronic stroke involving left frontal lobe, left insula, and left occipital lobe. 3. High-grade stenosis or occlusion involving P2 segment of left PCA. 4. No stenosis involving internal carotid arteries or vertebral arteries. 5. Abnormal perfusion involving left occipital lobe concerning for infarction. MRI brain with diffusion-weighted imaging could be helpful for further evaluation. CTA HEAD W CONTRAST    Addendum Date: 10/8/2022    ADDENDUM: Results were called by Dr. Alfredo Williamson to Dr. Anjana Vanegas on 10/8/2022 at 16:25. Result Date: 10/8/2022  EXAMINATION: CTA OF THE HEAD WITH CONTRAST WITH PERFUSION; CT OF THE HEAD WITHOUT CONTRAST; CTA OF THE HEAD WITH CONTRAST; CTA OF THE NECK 10/8/2022 3:27 pm: TECHNIQUE: CTA of the head/brain was performed with the administration of intravenous contrast. Multiplanar reformatted images are provided for review. MIP images are provided for review. Automated exposure control, iterative reconstruction, and/or weight based adjustment of the mA/kV was utilized to reduce the radiation dose to as low as reasonably achievable.; CT of the head was performed without the administration of intravenous contrast. Automated exposure control, iterative reconstruction, and/or weight based adjustment of the mA/kV was utilized to reduce the radiation dose to as low as reasonably achievable.; CTA of the neck was performed with the administration of intravenous contrast. Multiplanar reformatted images are provided for review. MIP images are provided for review. Stenosis of the internal carotid arteries measured using NASCET criteria. Automated exposure control, iterative reconstruction, and/or weight based adjustment of the mA/kV was utilized to reduce the radiation dose to as low as reasonably achievable. COMPARISON: None.  HISTORY: ORDERING SYSTEM PROVIDED HISTORY: stroke TECHNOLOGIST PROVIDED HISTORY: Reason for exam:->stroke Has a \"code stroke\" or \"stroke alert\" been called? ->Yes Decision Support Exception - unselect if not a suspected or confirmed emergency medical condition->Emergency Medical Condition (MA) FINDINGS: Unenhanced CT head: No acute intracranial hemorrhage or edema. No abnormal extra-axial fluid collections. Area of encephalomalacia involving left frontal lobe as well as left occipital lobe. There is no hydrocephalus. CTA head: High-grade stenosis or occlusion involving P2 segment of left PCA. Right posterior cerebral artery is patent without evidence of stenosis. No stenosis involving anterior cerebral arteries or middle cerebral arteries. No evidence of stenosis. There is a normal vertebrobasilar junction. Basilar artery is patent without evidence of stenosis. No evidence of intracranial aneurysm or AVM. CTA neck: Common carotid arteries are patent without evidence of stenosis. Moderate calcified and noncalcified plaque associated with right carotid bulb and proximal right ICA. No evidence of stenosis involving proximal or distal cervical internal carotid arteries. Both vertebral arteries are patent without evidence of stenosis. CT brain perfusion: Increased mean transit time with corresponding decreased perfusion and decreased cerebral volume at site of chronic left occipital stroke. Additional decreased cerebral blood flow and volume along medial aspect of left occipital lobe. 1.  No acute intracranial hemorrhage or edema. 2. Areas of encephalomalacia suggesting chronic stroke involving left frontal lobe, left insula, and left occipital lobe. 3. High-grade stenosis or occlusion involving P2 segment of left PCA. 4. No stenosis involving internal carotid arteries or vertebral arteries. 5. Abnormal perfusion involving left occipital lobe concerning for infarction. MRI brain with diffusion-weighted imaging could be helpful for further evaluation.      IR MECHANICAL ART THROMBECTOMY INTRACRANIAL    Result Date: 10/10/2022  IR MECHANICAL ART THROMBECTOMY INTRACRANIAL PROCEDURE PERFORMED: Cerebral angiogram with mechanical thrombectomy of Left Posterior cerebral artery thrombus Intraoperative Lety CT Limited CT with interpretation COMPLETED DATE:  10/8/2022 6:42 PM CLINICAL HISTORY/PRE-PROCEDURE DIAGNOSIS: Acute ischemic stroke. Patient with a NIH stroke scale representing a left MCA syndrome along with hemineglect and visual field deficits. Patient did receive 10 K POST-PROCEDURE DIAGNOSIS: Please see below COMPARISON: CT angiographic documentation done on the same day ANESTHESIA: Conscious sedation and local anesthesia VESSELS CATHETERIZED: Left vertebral artery, basilar artery, left posterior cerebral artery. RADIATION EXPOSURE DATA:  529.0 MG Y TOTAL FLUOROSCOPY TIME: 7 minutes and 1 second CONTRAST USED: 40 mL of Isovue-300 PROCEDURE: Following informed consent, the patient was brought to the angiography suite, both groins are prepped and draped in usual sterile manner. Vascular access was obtained in the right femoral artery. Following this a short sheath was placed and connected to continuous heparinized saline. A 8 [place] cerebase access catheter along with the diagnostic catheter was taken up into the left vertebral artery. The cerebrabase then catheterized and telescoped into the left vertebral artery which was the dominant vertebral artery noticed from the CT angiographic documentation. Following this a [place] and rebar was taken up into the basilar artery and the Rebar was taken into the PCA. The Rebar could not be taken past the P2 segment hence the repeat Rebar was parked and the [place] was taken up into the P2 segment and aspiration performed for approximately 2 minutes. Large amount of thrombus was identified in the POD system. Following this repeat angiographic documentation revealed filling of the basilar tip and filling of the left posterior cerebral artery and its branches.  There is still some residual stenosis noticed. Postretrieval digital subtraction images were obtained and showed revascularization. Intraoperative Lety CT was obtained which was negative for intracranial hemorrhage. Following this groin was closed with an 8 [place] Angio-Seal. Anesthesia was reversed and patient was transferred to the ICU in a stable condition. STROKE ACUTE TIME STAMPS: Preprocedure NIH stroke scale: 15 Vascular access time:1844 Time of 1st angiographic assessment: 1850 Time First Pass:1858 TICI 3, Natasha Aspiration Time of final recanalization:1858 Time of vascular closure:1907 Postprocedure NIH stroke scale:not assessed due to anesthesia INTERPRETATION OF IMAGES: 1. Left  vertebral artery injections: Left vertebral artery is dominant as seen from the CTA. There is slow flow seen to the tip of the basilar this may be also pseudo-occlusion as the only distal vessel would be the left PCA as the patient has a true fetal-type right PCA with a hypoplastic right P1 segment. Delayed flow seen into the P1 however no flow seen past this segment of the left PCA 2. Postprocedure vertebral artery injections: Complete recanalization there is residual stenosis of the PCA however this is not seeming to be significant on the lateral projections. The right PCA is barely seen because the right PCA is a true fetal PCA arising from the anterior circulation. Recanalization is TICI 3 3. Intraoperative Lety CT Limited CT with interpretation. Intraoperative Lety CT was obtained and transferred to an independent workstation where the axial coronal and sagittal images were reconstructed and reviewed. There is no evidence of intracranial hemorrhage. IMPRESSION/RECOMMENDATIONS: 1. Left PCA occlusion causing slow flow to the basilar tip/pseudo occlusion of the basilar tip 2. Successful thrombectomy of the left posterior cerebral artery with TICI 3 recanalization. Postprocedure neurological and hemodynamic monitoring was recommended. Patients:  If you have questions regarding some of the verbiage in your report, please visit [email or web address] for a definition. If you have any other questions, please contact your physician. Discharge Exam:    HEENT: NCAT,  PERRLA, No JVD  Heart:  RRR, no murmurs, gallops, or rubs. Lungs:  CTA bilaterally, no wheeze, rales or rhonchi  Abd: bowel sounds present, nontender, nondistended, no masses  Extrem:  No clubbing, cyanosis, or edema    Disposition: [facility]     Patient Condition at Discharge: Stable     Patient Instructions:      Medication List        START taking these medications      amLODIPine 10 MG tablet  Commonly known as: NORVASC  1 tablet by Per NG tube route daily  Start taking on: October 22, 2022     amoxicillin-clavulanate 1000-62.5 MG per extended release tablet  Commonly known as:  Augmentin XR  Take 1 tablet by mouth 2 times daily for 7 days     clopidogrel 75 MG tablet  Commonly known as: PLAVIX  Take 1 tablet by mouth daily for 18 days  Start taking on: October 22, 2022     insulin glargine-yfgn 100 UNIT/ML injection vial  Commonly known as: SEMGLEE-YFGN  Inject 30 Units into the skin nightly            CHANGE how you take these medications      atorvastatin 80 MG tablet  Commonly known as: LIPITOR  1 tablet by Per NG tube route nightly  What changed:   medication strength  how much to take  how to take this     metFORMIN 500 MG tablet  Commonly known as: GLUCOPHAGE  Take 1 tablet by mouth 2 times daily (with meals)  What changed: how much to take            CONTINUE taking these medications      acetaminophen 325 MG tablet  Commonly known as: TYLENOL     albuterol sulfate  (90 Base) MCG/ACT inhaler  Commonly known as: Proventil HFA  Inhale 2 puffs into the lungs every 4 hours as needed for Wheezing     ALLEGRA-D 12 HOUR PO     aluminum & magnesium hydroxide-simethicone 200-200-20 MG/5ML Susp suspension  Commonly known as: MAALOX     aspirin 81 MG EC tablet  Take 1 tablet by mouth daily

## 2022-10-21 NOTE — CARE COORDINATION
SOCIAL WORK/CASEMANAGEMENT TRANSITION OF CARE PLANNINGMyrtle Hernando Rosales, [address]):  discharge to [address] [facility] since precert was obtained today. [facility]  for 1:30 p.m. pasrr done. Son is in the room and in agreement. Snf;loc. BRETT Kaye  10/21/2022  Per the np pt needs labs drawn at 3p.m.  sodium was 151 today/ I called [facility] to have  set for 6 p.m. if needs held then rn can call to cancel. The precert is good thru Saturday, 48 hours. Called son , gautam, to inform him of the delay.  BRETT Kaye  10/21/2022

## 2022-10-24 NOTE — TELEPHONE ENCOUNTER
Janelle Bryant, [phone], called to schedule a 2 week hospital follow up for stroke. They asked fro Dr Tor Mcclelland. Patient used to see Dr Sanam Mccabe. Please call and advise. Thank you!

## 2022-10-27 NOTE — PROGRESS NOTES
[address] [facility]. Analia Norris M.D., F.A.C.P. Pia Brown, ELIZABETH, APRN, CNS  Jazmin Hooker. Rylee Kaplan, MSN, APRN-FNP-C  Yovanny Harris MSN, APRN, FNP-C  Christine QUINTANA, SHABANA  [address] MSN, APRN, FNP-C  [address], [facility]  [place], [address]  Phone: [phone]  Fax: [phone]       Ann Mack is a 69 y.o. right handed male     Patient presents to neurology clinic today for stroke follow-up. Patient presents with his son who provides his history. Patient is a poor historian. Past Medical History:     Past Medical History:   Diagnosis Date    Acute ischemic left MCA stroke (HCC)     COPD (chronic obstructive pulmonary disease) (HCC)     Dementia (HCC)     Diabetes mellitus (HCC)     Seasonal allergies     Stroke due to stenosis of left carotid artery ([facility] [address] 09/02/2020    Vascular dementia [facility])         Past Surgical History:       Past Surgical History:   Procedure Laterality Date    CARDIOVASCULAR STRESS TEST  09/03/2020    Lexiscan stress test    CAROTID ENDARTERECTOMY Left 10/29/2020    LEFT CAROTID ENDARTERECTOMY -- FACILITY performed by Jacqueline Hinds MD at [address] [place] [address] INTRACRANIAL  10/8/2022    IR MECHANICAL ART THROMBECTOMY INTRACRANIAL 10/8/2022 Kimberly Ackerman MD [facility]    TONSILLECTOMY         Allergies:       Patient has no known allergies. Medications:     Prior to Admission medications    Medication Sig Start Date End Date Taking?  Authorizing Provider   glipiZIDE (GLUCOTROL) 5 MG tablet Take 2 tablets by mouth every morning (before breakfast) 10/21/22  Yes CARROLL Calloway CNP   insulin glargine-yfgn (SEMGLEE-YFGN) 100 UNIT/ML injection vial Inject 30 Units into the skin nightly 10/21/22  Yes CARROLL Calloway CNP   atorvastatin (LIPITOR) 80 MG tablet 1 tablet by Per NG tube route nightly 10/21/22  Yes CARROLL Calloway CNP amLODIPine (NORVASC) 10 MG tablet 1 tablet by Per NG tube route daily 10/22/22  Yes CARROLL Feliciano CNP   clopidogrel (PLAVIX) 75 MG tablet Take 1 tablet by mouth daily for 18 days 10/22/22 11/9/22 Yes CARROLL Feliciano CNP   amoxicillin-clavulanate (AUGMENTIN XR) 1000-62.5 MG per extended release tablet Take 1 tablet by mouth 2 times daily for 7 days 10/21/22 10/28/22 Yes CARROLL Feliciano CNP   rivastigmine (EXELON) 9.5 MG/24HR  4/15/22  Yes Historical Provider, MD   mirtazapine (REMERON) 15 MG tablet Take 15 mg by mouth daily 5/6/22  Yes Historical Provider, MD   vitamin B-12 (CYANOCOBALAMIN) 1000 MCG tablet Take 1 tablet by mouth daily 9/1/21  Yes Nanci Crews MD   Fexofenadine-Pseudoephedrine (ALLEGRA-D 12 HOUR PO) Take by mouth   Yes Historical Provider, MD   loratadine (CLARITIN) 10 MG capsule Take 10 mg by mouth daily as needed   Yes Historical Provider, MD   hydrOXYzine (ATARAX) 25 MG tablet Take 25 mg by mouth 2 times daily as needed for Itching    Yes Historical Provider, MD   aluminum & magnesium hydroxide-simethicone (MAALOX) 200-200-20 MG/5ML SUSP suspension Take 5 mLs by mouth every 6 hours as needed for Indigestion   Yes Historical Provider, MD   Dextromethorphan-guaiFENesin  MG/5ML SYRP Take 5 mLs by mouth every 4 hours as needed for Cough   Yes Historical Provider, MD   magnesium hydroxide (MILK OF MAGNESIA) 400 MG/5ML suspension Take by mouth daily as needed for Constipation   Yes Historical Provider, MD   loperamide (IMODIUM) 2 MG capsule Take 2 mg by mouth 4 times daily as needed for Diarrhea   Yes Historical Provider, MD   docusate sodium (COLACE) 100 MG capsule Take 100 mg by mouth 2 times daily   Yes Historical Provider, MD   acetaminophen (TYLENOL) 325 MG tablet Take 650 mg by mouth every 6 hours as needed for Pain   Yes Historical Provider, MD   aspirin 81 MG EC tablet Take 1 tablet by mouth daily 9/15/20  Yes Lisa Hay MD   metFORMIN (GLUCOPHAGE) 500 MG tablet Take 1 tablet by mouth 2 times daily (with meals) 9/14/20  Yes Frank Quintana MD   albuterol sulfate HFA (PROVENTIL HFA) 108 (90 BASE) MCG/ACT inhaler Inhale 2 puffs into the lungs every 4 hours as needed for Wheezing 3/11/17 10/27/22 Yes Sergio Mckeon DO       Social History:        reports that he quit smoking about 2 years ago. His smoking use included cigarettes. He has a 50.00 pack-year smoking history. He has quit using smokeless tobacco. He reports that he does not currently use alcohol after a past usage of about 1.0 standard drink per week. He reports that he does not use drugs. Review of Systems:     ROS is limited due to aphasia    Family History:     Family History   Problem Relation Age of Onset    Emphysema Mother         History of Present Illness:     Patient presents to neurology clinic today for stroke follow-up after recent admission. Patient was just discharged on Friday, 10/21/2022. Of note patient has history of left MCA stroke in August 2020 secondary to carotid artery stenosis. At baseline patient had expressive aphasia. Patient was seeing Dr. Georgia Rueda for memory loss and stroke management; last visit in March 2021. At that time memory had been declining for at least 2 years and started prior to his stroke. This time patient presented to ED on 10/8 with confusion and slurred speech. Patient became unable to speak beyond nodding and shaking his head and saying \"nah\". NIHSS 15 when evaluated by Dr. Flori Sofia. Patient was taken for thrombectomy--- TICI 3 after found to have a left PCA occlusion versus slow flow versus pseudo occlusion of the basilar tip. Initial CT head showed evolving infarcts in the left occipital lobe and thalamus; chronic left frontal infarct noted. CTA head and neck showed high-grade stenosis or occlusion involving the P2 segment of the left PCA; no other areas of high-grade stenosis.   Areas of encephalomalacia reflecting chronic infarcts also noted. Patient was given aspirin rectally and placed on heparin drip after becoming agitated and refusing to take oral medications. MRI brain showed acute infarcts in the left thalamus, left temporal lobe and left occipital lobe with small amount of hemorrhage; remote left MCA from prior also noted. During admission patient remained agitated and combative; fighting with staff subsequently requiring four-point restraints at times. Patient was subsequently discharged on 10/21 to [facility]. Patient's appetite continued to decline during admission however patient's son is adamant about no PEG insertion as that is his father's wishes when he was able to voice them and there is significant fear that patient will pull out his PEG. Patient pulled out his catheter multiple times thus far    Today patient's son states that he continues to have agitation and combative behavior at his facility. Patient's son visits every day for about an hour--- some days the report is he has been good other days not so much. Patient waxes and wanes with his ability to participate in PT/OT/ST. Patient's son asked about quality of life today. Patient was aphasic prior to this stroke but recommended we wait until the first year to see what his real baseline will be after the stroke. Also expressed his ability to participate in therapies will gauge how much improvement will occur. Patient's son against today voices that he does not want the PEG placed as he has pulled out his catheter and his father never wanted a PEG tube placed. Patient's son provides the history today as patient remains aphasic with very poor concentration and attention during this visit. Patient's son reports poor appetite at the facility however there is question of thrush which may be agitating his mouth during meals. Patient remains on Augmentin for this. ROS is limited today due to aphasia.       Objective:       /73 (Site: Left Upper Arm, Position: Sitting)   Pulse 62   Temp 97.7 °F (36.5 °C) (Temporal)   Ht 6' 3\" (1.905 m)   Wt 220 lb (99.8 kg)   SpO2 96%   BMI 27.50 kg/m²     General appearance: alert, appears stated age and in no distress  Head: normocephalic, without obvious abnormality, atraumatic  Eyes: conjunctivae/corneas clear; no drainage  Neck: supple, symmetrical, trachea midline   Back: symmetric, no curvature. ROM normal.    Lungs: clear-diminished to auscultation bilaterally; no cough  Heart: regular rate and rhythm  Abdomen: soft, non-tender;  Extremities: normal, atraumatic, no cyanosis or edema  Pulses: 2+ and symmetric  Skin:  color, texture, turgor normal--no rashes or lesions      Mental Status: alert and oriented to self, attends when his name is called; unable to state place, time or situation. Most simple commands well.   Aphasic    Poor attention/concentration    Speech/Language: Limited speech due to aphasia; 1 word answers like yeah or no; not at times to questions  Unable to identify objects or repeat    Cranial Nerves:  I: smell    II: visual acuity     II: visual fields Full to confrontation to the left; decreased blink to threat on the right   II: pupils GRADY   III,VII: ptosis None   III,IV,VI: extraocular muscles  Tracks examiner and his son   V: mastication    V: facial light touch sensation  Appears normal   V,VII: corneal reflex     VII: facial muscle function - upper  Normal   VII: facial muscle function - lower Right lower facial droop   VIII: hearing Appears normal   IX: soft palate elevation     IX,X: gag reflex    XI: trapezius strength     XI: sternocleidomastoid strength    XI: neck extension strength  Turns neck to attend    XII: tongue strength       Motor:  Weak  bilaterally  Left side grossly +4/5, right side +3/5   Normal bulk and tone  right side drifts  No abnormal movements--no pill-rolling, cogwheeling or tremor appreciated    Sensory:  Appears to appreciate LT and vibration throughout    Coordination:   FN and MARVIN slow bilaterally more impaired to the right  FFM impaired   HS unable to complete     Gait:  wheelchair-bound; gait not tested today    DTR:   Right Brachioradialis reflex 0  Left Brachioradialis reflex 0  Right Biceps reflex 0  Left Biceps reflex 0  Right Triceps reflex 0  Left Triceps reflex 0  Right Quadriceps reflex 0  Left Quadriceps reflex 0  Right Achilles reflex 0  Left Achilles reflex 0    No Baldwin's    No other pathological reflexes    Laboratory/Radiology:     CBC with Differential:    Lab Results   Component Value Date/Time    WBC 10.5 10/21/2022 05:55 AM    RBC 4.93 10/21/2022 05:55 AM    HGB 14.5 10/21/2022 05:55 AM    HCT 46.3 10/21/2022 05:55 AM    PLT 98 10/21/2022 05:55 AM    MCV 93.9 10/21/2022 05:55 AM    MCH 29.4 10/21/2022 05:55 AM    MCHC 31.3 10/21/2022 05:55 AM    RDW 14.5 10/21/2022 05:55 AM    NRBC 1.3 03/11/2017 04:40 PM    LYMPHOPCT 7.7 10/19/2022 05:47 AM    MONOPCT 6.3 10/19/2022 05:47 AM    BASOPCT 0.2 10/19/2022 05:47 AM    MONOSABS 1.33 10/19/2022 05:47 AM    LYMPHSABS 1.62 10/19/2022 05:47 AM    EOSABS 0.01 10/19/2022 05:47 AM    BASOSABS 0.04 10/19/2022 05:47 AM     CMP:    Lab Results   Component Value Date/Time     10/21/2022 02:05 PM    K 3.5 10/21/2022 02:05 PM    K 4.5 10/29/2020 06:00 AM     10/21/2022 02:05 PM    CO2 25 10/21/2022 02:05 PM    BUN 21 10/21/2022 02:05 PM    CREATININE 0.9 10/21/2022 02:05 PM    GFRAA >60 10/17/2022 06:17 AM    LABGLOM >60 10/21/2022 02:05 PM    GLUCOSE 254 10/21/2022 02:05 PM    PROT 6.8 10/19/2022 05:47 AM    LABALBU 3.0 10/19/2022 05:47 AM    CALCIUM 8.9 10/21/2022 02:05 PM    BILITOT 1.2 10/19/2022 05:47 AM    ALKPHOS 109 10/19/2022 05:47 AM    AST 12 10/19/2022 05:47 AM    ALT 15 10/19/2022 05:47 AM     Neuropsychological testing:   Demonstrated slightly below expected levels in the area of attention but has impairments in the areas of semantic verbal fluency, visual organization constructional skills, visual memory, verbal learning, verbal memory and executive functioning. Test results are consistent with vascular dementia moderate to severe level. CT head 10/8/2022 4:12 PM  1. No acute intracranial hemorrhage or edema. 2. Areas of encephalomalacia suggesting chronic stroke involving left frontal   lobe, left insula, and left occipital lobe. 3. High-grade stenosis or occlusion involving P2 segment of left PCA. 4. No stenosis involving internal carotid arteries or vertebral arteries. 5. Abnormal perfusion involving left occipital lobe concerning for   infarction. MRI brain with diffusion-weighted imaging could be helpful for   further evaluation. CTA head and neck with contrast 10/8/2022  1. No acute intracranial hemorrhage or edema. 2. Areas of encephalomalacia suggesting chronic stroke involving left frontal   lobe, left insula, and left occipital lobe. 3. High-grade stenosis or occlusion involving P2 segment of left PCA. 4. No stenosis involving internal carotid arteries or vertebral arteries. 5. Abnormal perfusion involving left occipital lobe concerning for   infarction. MRI brain with diffusion-weighted imaging could be helpful for   further evaluation. CT head 10/9/2022 5:35 PM  Evolution of the acute/early subacute infarct in the left occipital lobe with   loss of gray-white differentiation and sulcal effacement. Similar changes   are identified in the left thalamus. No evidence for petechial hemorrhage or   hemorrhagic transformation. No intracranial hemorrhage or extra-axial collection. Encephalomalacia in the left MCA territory involving the left insula and left   frontal parietal region. CT head 10/9/2022 2204PM  1. No acute intracranial abnormality or significant change when compared the   previous study. .       2.  Evolving infarcts in the left occipital lobe and thalamus       3.   Chronic appearing left frontal lobe infarct, stable     CT head 10/11/2022 455AM  Stable left posterior cerebral artery territory infarct with a small focus of   hemorrhagic conversion redemonstrated in the left thalamus. See above. CT head without contrast 10/16/2022:  1. Increasing, newly developed small hemorrhagic transformation through the   parasagittal left occipital lobe infarct component. There is no extra-axial   hemorrhage or mass effect. 2. Unchanged 5 mm focus of hemorrhage within the central left thalamic   infarct component. Complete echo 10/10/2022:  Limited study with difficult views. Technically very difficult examination. Likely preserved biventricular systolic function. Agitated saline contrast study is non diagnostic. Consider CECI if clinically indicated.     All pertinent labs and images were personally reviewed at the time of this visit    Assessment:     Left MCA stroke   secondary to left ICA stenosis status post CEA in 2020   Memory loss and aphasia noted after that initial stroke    Dementia without behavioral disturbance; vascular dementia most likely   Diagnosed in 3021-1420; neuropsych evaluation show below expected levels and attention and multiple areas of impairments consistent with vascular dementia of moderate to severe level   Patient was already on Aricept, Namenda and Remeron at time of admission; on Exelon   Patient's son endorsed expressive aphasia since his stroke in 2020    Acute infarcts in the left thalamus, temporal and occipital lobes with small amount of hemorrhage   CTAs consistent with high-grade stenosis or occlusion in the P2 segment of the left PCA   Stroke risk factors include: Prior stroke, HTN, HDL, DM 2, carotid stenosis, former smoker              Complete echo limited; EP consulted for Linq placement during admission--recommended 30-day external cardiac monitor and follow-up in the clinic in 2 months--appointment on December 13    Agitation    Patient had continued fluctuations in blood sugars, hyponatremia and leukocytosis during admission   Patient now on Augmentin for infection--- suspected thrush versus aspiration   Continue correction of above   May trial low-dose Depakote if agitation continues--- will monitor     Plan:     Routine labs requested from facility  Recommend vitamin B12, folate and vitamin D levels be drawn  Repeat CT head ordered  Continue aspirin and Plavix--recently started on the 19th  Continue high intensity statin  Better blood glucose control  Correction of other metabolic issues  Follow-up with 30-day cardiac monitoring versus Linq at next visit  Call with any issues  Return to office in 3 months    CARROLL Reddy  9:30 AM  10/27/2022    I spent 35 minutes with this patient obtaining the HPI and discussing the exam with greater than 50% of the time providing counseling and education on medications and other treatment plans. All questions were answered prior to leaving my office.

## 2023-04-19 NOTE — PROGRESS NOTES
OCCUPATIONAL THERAPY INITIAL EVALUATION      Date:2020  Patient Name: Roberto Aguiar  MRN: [id number]  : 1955  Room: [address] [place], [place] [address]      [address], OTR/L #[id number]    AM-PAC Daily Activity Raw Score:   Recommended Adaptive Equipment: TBD     Diagnosis: Acute cerebrovascular accident (CVA) ([facility] [address] [I63.9]  Acute cerebrovascular accident (CVA) ([facility] [address] [I63.9]  Acute cerebrovascular accident (CVA) ([facility] [address] [I63.9]  Acute CVA (cerebrovascular accident) ([facility] [address] [I63.9]     Modified Marta Scale (MRS)  Score     Description  0             No symptoms  1             No significant disability despite symptoms  2             Slight disability; able to look after own affairs  3             Moderate disability; able to ambulate without assist/ requires assist with ADLs  4             Moderate/Severe disability;requires assist to ambulate/assist with ADLs  5             Severe disability;bedridden/incontinent   6               Score:   4  Referring physician: Avtar Higgins DO      Pertinent Medical History: COPD, DM    Precautions:  Falls, aphasic (see cognition comments below)     Home Living: Pt lives alone in 1 floor home. 1 BARRY, 0 handrail   Equipment owned: n/a    Prior Level of Function: independent with ADLs , independent with IADLs; ambulated independently w/o AD  Driving: yes  Occupation:  (full time)  Son provided PLOF/home setup    Pain Level: Pt c/o no pain this session     Cognition: A&O: unable to formally assess d/t aphasia (Pt unable to state first/last name); Follows 1 step directions however as session progressed pt demonstrated increased difficulty following 2-3 step commands. Increased cues required. Pt replies to majority of questions w/ 'yes or no'. At end of session, pt did verbalize 3-4 word basic phrase w/ increased time.    Memory: unable to formally assess   Sequencing:  fair    Problem solving:  fair    Judgement/safety:  fair      Functional Assessment: Initial Eval Status  Date: 9/1/20 Treatment Status  Date: Short Term Goals/LTG  Treatment frequency: 1-4x/wk   Feeding Supervision  Modified [place]   Grooming Stand by Assist   Modified [place]    UB Dressing Stand by Assist   Gown while seated EOB  Modified [place]    LB Dressing Minimal Assist   Supervision    Bathing Minimal Assist  Supervision    Toileting Minimal Assist   Supervision    Bed Mobility  Rolling: Stand by Assist   Supine to sit: Stand by Assist   Sit to supine: Stand by Assist   Rolling: Independent   Supine to sit: Modified [place]   Sit to supine: Modified [place]    Functional Transfers Min A  For safety and balance  Supervision   Functional Mobility Min A w/o AD  Household distances  Supervision   Balance Sitting: SBA  Standing: Min A w/o AD     Activity Tolerance Fair  Noted physical and mental fatigue as session progressed  Good   Visual/  Perceptual Glasses: no  R inattention noted during functional ambulation and formal assessment  Unable to further assess d/t cognition                Hand dominance: R   Strength ROM Additional Info:    RUE  4/5  WFL   good  and wfl FMC/dexterity noted during ADL tasks       LUE 4/5  WFL   good  and wfl FMC/dexterity noted during ADL tasks    Attempted to assess B finger opposition. However pt unable to follow commands when increased verbal, visual and/or tactile cues provided     Hearing: Appears WFL  Sensation:  Unable to assess  Tone: WFL   Edema: none noted                   Treatment: Upon arrival, patient lying in bed (son present). Pt/son agreeable to OT session this date in collaboration w/ PT.   Facilitation of bed mobility, unsupported sitting balance, functional transfers (sit><stand 3x EOB w/ education/min cues for safety and sequencing), standing tolerance tasks (addressing posture, balance and activity tolerance while incorporating light functional reaching) and functional ambulation task without AD (education/skilled cuing on posture, body mechanics, energy conservation techniques and safety. Intermittent cues required to attend to objects on R. Noted fatigue as progressed)  Therapist facilitated self-care retraining: UB/LB self-care tasks (gown, socks) and simulated toileting task while educating pt on modified techniques, posture, safety and energy conservation techniques. Skilled monitoring of HR, O2 sats and pts response to treatment. At end of session, patient lying in bed (son present) with call light and phone within reach, all lines and tubes intact. Comments: Pt demonstrated decreased independence w/ self-care tasks and functional mobility secondary to decreased activity tolerance, cognition, standing balance deficits and mild safety awareness deficits. Pt would benefit from continued skilled OT to increase functional independence and quality of life. mod  Profile and History- med (extensive chart review)  Assessment of Occupational Performance and Identification of Deficits- med  Clinical Decision Making- med    Evaluation time includes thorough review of current medical information, gathering information on past medical history/social history and prior level of function, completion of standardized testing/informal observation of tasks, assessment of data, and development of POC/Goals.       Assessment of current deficits   Functional mobility [x]  ADLs [x] Strength [x]  Cognition [x]  Functional transfers  [x] IADLs [] Safety Awareness [x]  Endurance [x]  Fine Motor Coordination [] Balance [x] Vision/perception [x] Sensation []   Gross Motor Coordination [] ROM [] Delirium []                  Motor Control []    Plan of Care:   5-7 days  ADL retraining [x]   Equipment needs [x]   Neuromuscular re-education [x] Energy Conservation Techniques [x]  Functional Transfer training [x] Patient and/or Family Education [x]  Functional Mobility training [x]  Environmental Modifications [x]  Cognitive re-training [x]   Compensatory techniques for ADLs [x]  Splinting Needs []   Positioning to improve overall function [x]   Therapeutic Activity [x]  Therapeutic Exercise  [x]  Visual/Perceptual: [x]    Delirium prevention/treatment  []   Other:  []    Rehab Potential: Good for established goals    Patient / Family Goal: Not stated     Patient and/or family were instructed on diagnosis, prognosis/goals and plan of care. Son demonstrated good understanding. [] Malnutrition indicators have been identified and nursing has been notified to ensure a dietitian consult is ordered.        Mod Evaluation completed +    Treatment Time In:13:41            Treatment Time Out:13:55              Treatment Charges: Mins Units   Ther Ex  51241     Manual Therapy 95054     Thera Activities 86175 8 1   ADL/Home Mgt 40440 6 0   Neuro Re-ed 78463     Group Therapy      Orthotic manage/training  07142     Non-Billable Time     Total Timed Treatment 14 1         [address], OTR/L #[id number] Yes

## 2024-07-29 NOTE — H&P
Hospital Medicine History & Physical      PCP: Alexis Lima MD    Date of Admission: 8/31/2020    Date of Service: Pt seen/examined on 8/31/2020 and Admitted to Inpatient with expected LOS greater than two midnights due to medical therapy. Chief Complaint: Dysarthria      History Of Present Illness:      77 y.o. male who presented to [facility] with past medical history of diabetes, COPD and tobacco dependence. Patient was last known well 2 days ago. Found by son today altered and not able to answer questions. Patient's dysarthria is constant, severe, associated with right-sided weakness and facial droop. Patient is unable to answer any questions due to severe dysarthria. NIH score was 8. Vital signs notable for blood pressure of 142/83. Labs showed hemoglobin of 16.8 glucose 201, troponin is negative and blood alcohol level is negative. Chest x-ray shows COPD/atelectasis, CAT scan of the head shows acute left MCA stroke. CTA of the head and neck showed 90% left ICA stenosis. EKG shows sinus rhythm rate of 84. He is being admitted for further management. Past Medical History:          Diagnosis Date    Diabetes mellitus ([address]        Past Surgical History:          Procedure Laterality Date    HERNIA REPAIR      TONSILLECTOMY         Medications Prior to Admission:      Prior to Admission medications    Medication Sig Start Date End Date Taking? Authorizing Provider   albuterol sulfate HFA (PROVENTIL HFA) 108 (90 BASE) MCG/ACT inhaler Inhale 2 puffs into the lungs every 4 hours as needed for Wheezing 3/11/17 3/11/18  Destini Perea DO       Allergies:  Patient has no known allergies. Social History:      The patient currently lives at home. TOBACCO:   reports that he has been smoking cigarettes. He has been smoking about 1.00 pack per day. He has never used smokeless tobacco.  ETOH:   reports no history of alcohol use.       Family History:     Reviewed in detail Positive as follows: Requested Prescriptions     Pending Prescriptions Disp Refills    citalopram (CELEXA) 40 MG tablet 30 tablet      Sig: Take 1 tablet by mouth daily    omeprazole (PRILOSEC) 40 MG delayed release capsule 30 capsule      Sig: Take 1 capsule by mouth every morning    traZODone (DESYREL) 100 MG tablet       Sig: Take 1 tablet by mouth nightly       09/20/2018    BLOODU Negative 09/20/2018    SPECGRAV >=1.030 09/20/2018    GLUCOSEU >=1000 09/20/2018       Radiology:       MRI BRAIN WO CONTRAST   Final Result      Left middle cerebral artery infarction. CT BRAIN PERFUSION   Final Result      Focal area of infarct is less than 50% of the ischemic penumbra of   acute left MCA distribution ischemic event. CTA HEAD W CONTRAST   Final Result      Soft and calcific atherosclerotic plaque results in approximately 90%   stenosis of the origin of the left internal carotid artery. A focus of hypoattenuation in the left middle cerebral artery   territory involving the insula, frontal operculum, basal ganglia and   external capsule compatible with subacute infarct is once again   present as seen on prior CTs earlier today. Cortical atrophy and chronic periventricular microangiopathy. No other evidence for stenosis, aneurysm or dissection on CTA of the   head and neck. CTA NECK W CONTRAST   Final Result      Soft and calcific atherosclerotic plaque results in approximately 90%   stenosis of the origin of the left internal carotid artery. A focus of hypoattenuation in the left middle cerebral artery   territory involving the insula, frontal operculum, basal ganglia and   external capsule compatible with subacute infarct is once again   present as seen on prior CTs earlier today. Cortical atrophy and chronic periventricular microangiopathy. No other evidence for stenosis, aneurysm or dissection on CTA of the   head and neck. XR CHEST PORTABLE   Final Result   COPD with atelectasis in the lung bases. CT HEAD WO CONTRAST   Final Result   1. Recent infarct in the left insula, frontal operculum, basal   ganglia, and external capsule, possibly subacute. 2. Possible acute or subacute infarct in the left parietal lobe. 3. No sign of acute intracranial hemorrhage or mass effect.       The major findings were discussed with ED attending Dr. Daria Baum   at approximately 1330 hours on 8/31/2020. US CAROTID ARTERY BILATERAL    (Results Pending)       ASSESSMENT:    Active Hospital Problems    Diagnosis Date Noted    Acute ischemic left MCA stroke [facility]) [I63.512] 08/31/2020    Middle cerebral artery stenosis, left [I66.02] 08/31/2020    Dysarthria [R47.1] 08/31/2020    Uncontrolled type 2 diabetes mellitus with hyperglycemia ([facility] [address] [E11.65] 08/31/2020    Tobacco dependence [F17.200] 08/31/2020    COPD (chronic obstructive pulmonary disease) ([facility] [address] [J44.9] 08/31/2020     PLAN:  1. Acute left MCA stroke. Neurology consult. Got aspirin and Plavix load in the ER. Not a TPA candidate. Echo. Neurochecks. Statin. 2.  Carotid artery disease with 90% ICA stenosis. Vascular surgery consult. 3.  Dysarthria secondary to stroke. Therapy. 4.  Uncontrolled type 2 diabetes with hyperglycemia. Sliding scale coverage, monitor blood sugar, A1c.  5.  COPD, no acute exacerbation, breathing treatment as needed  6. Tobacco dependence, smoking cessation    DVT Prophylaxis: Lovenox  Diet: DIET CARB CONTROL;  Code Status: Full Code    PT/OT Eval Status: Evaluation and treatment    Dispo -inpatient/telemetry       Irina Rice MD    Thank you Kary Gaffney MD for the opportunity to be involved in this patient's care.

## (undated) DEVICE — PATIENT RETURN ELECTRODE, SINGLE-USE, CONTACT QUALITY MONITORING, ADULT, WITH 9FT CORD, FOR PATIENTS WEIGING OVER 33LBS. (15KG): Brand: MEGADYNE

## (undated) DEVICE — Z INACTIVE USE 2535480 CLIP LIG M BLU TI HRT SHP WIRE HORZ 180 PER BX

## (undated) DEVICE — PACK,LAPAROTOMY,NO GOWNS: Brand: MEDLINE

## (undated) DEVICE — SURGICAL PROCEDURE PACK VASC MAJ CUST

## (undated) DEVICE — DRESSING FOAM W22XL25CM FILVE LAYR FOAM DP DEF SAFETAC

## (undated) DEVICE — GAUZE,SPONGE,4"X4",16PLY,XRAY,STRL,LF: Brand: MEDLINE

## (undated) DEVICE — Z DISCONTINUED PER MEDLINE USE 2425483 TAPE UMB L30IN DIA1/8IN WHT COT NONABSORBABLE W/O NDL FOR

## (undated) DEVICE — SET SURG BASIN MAYO REUSABLE

## (undated) DEVICE — SOLUTION IV IRRIG POUR BRL 0.9% SODIUM CHL 2F7124

## (undated) DEVICE — GRADUATE

## (undated) DEVICE — DOUBLE BASIN SET: Brand: MEDLINE INDUSTRIES, INC.

## (undated) DEVICE — LABEL MED 4 IN SURG PANEL W/ PEN STRL

## (undated) DEVICE — NEEDLE HYPO 18GA L1.5IN PNK POLYPR HUB S STL REG BVL STR

## (undated) DEVICE — AGENT HEMSTAT W2XL4IN OXIDIZED REGENERATED CELOS ABSRB

## (undated) DEVICE — SOLUTION IV 500ML 0.9% SOD CHL PH 5 INJ USP VIAFLX PLAS

## (undated) DEVICE — LOOP VES W25MM THK1MM MAXI RED SIL FLD REPELLENT 100 PER

## (undated) DEVICE — MAGNETIC INSTR DRAPE 20X16: Brand: MEDLINE INDUSTRIES, INC.

## (undated) DEVICE — GARMENT,MEDLINE,DVT,INT,CALF,MED, GEN2: Brand: MEDLINE

## (undated) DEVICE — SYRINGE MED 10ML LUERLOCK TIP W/O SFTY DISP

## (undated) DEVICE — CATHETER URETH 22FR L16IN LTX INTMIT ROB MOD BARDX

## (undated) DEVICE — SET SURG INSTR ART III

## (undated) DEVICE — 3M™ IOBAN™ 2 ANTIMICROBIAL INCISE DRAPE 6640EZ: Brand: IOBAN™ 2

## (undated) DEVICE — CLAMP INSERT: Brand: STEALTH® CLAMP INSERT

## (undated) DEVICE — NEEDLE HYPO 21GA L1.5IN GRN POLYPR HUB S STL REG BVL STR

## (undated) DEVICE — CLOTH SURG PREP PREOPERATIVE CHLORHEXIDINE GLUC 2% READYPREP

## (undated) DEVICE — TOWEL,OR,DSP,ST,BLUE,STD,6/PK,12PK/CS: Brand: MEDLINE

## (undated) DEVICE — 1.5L THIN WALL CAN: Brand: CRD

## (undated) DEVICE — NEEDLE HYPO 26GA L0.625IN TAN POLYPR HUB S STL REG BVL STR

## (undated) DEVICE — TOTAL TRAY, 16FR 10ML SIL FOLEY, URN: Brand: MEDLINE

## (undated) DEVICE — Z DUP USE 2257490 ADHESIVE SKIN CLSRE 036ML TPCL 2CTL CNCRLTE HIGH VSCSTY DRMB

## (undated) DEVICE — 1 ML TUBERCULIN SYRINGE LUER-LOCK TIP: Brand: MONOJECT

## (undated) DEVICE — GLOVE ORANGE PI 7 1/2   MSG9075

## (undated) DEVICE — SET INSTR ART 1

## (undated) DEVICE — CATHETER ETER IV 20GA L1IN POLYUR STR RADPQ INTROCAN SFTY

## (undated) DEVICE — BLADE CLIPPER GEN PURP NS

## (undated) DEVICE — GOWN,SIRUS,FABRNF,XL,20/CS: Brand: MEDLINE